# Patient Record
Sex: MALE | Race: BLACK OR AFRICAN AMERICAN | NOT HISPANIC OR LATINO | URBAN - METROPOLITAN AREA
[De-identification: names, ages, dates, MRNs, and addresses within clinical notes are randomized per-mention and may not be internally consistent; named-entity substitution may affect disease eponyms.]

---

## 2017-06-03 ENCOUNTER — INPATIENT (INPATIENT)
Facility: HOSPITAL | Age: 62
LOS: 0 days | Discharge: HOME | End: 2017-06-04
Attending: EMERGENCY MEDICINE

## 2017-06-03 DIAGNOSIS — E11.9 TYPE 2 DIABETES MELLITUS WITHOUT COMPLICATIONS: ICD-10-CM

## 2017-06-03 DIAGNOSIS — K85.90 ACUTE PANCREATITIS WITHOUT NECROSIS OR INFECTION, UNSPECIFIED: ICD-10-CM

## 2017-06-03 DIAGNOSIS — M19.90 UNSPECIFIED OSTEOARTHRITIS, UNSPECIFIED SITE: ICD-10-CM

## 2017-06-03 DIAGNOSIS — I10 ESSENTIAL (PRIMARY) HYPERTENSION: ICD-10-CM

## 2017-06-03 DIAGNOSIS — R10.9 UNSPECIFIED ABDOMINAL PAIN: ICD-10-CM

## 2017-06-03 DIAGNOSIS — C61 MALIGNANT NEOPLASM OF PROSTATE: ICD-10-CM

## 2017-06-03 DIAGNOSIS — J20.9 ACUTE BRONCHITIS, UNSPECIFIED: ICD-10-CM

## 2017-06-03 DIAGNOSIS — E78.5 HYPERLIPIDEMIA, UNSPECIFIED: ICD-10-CM

## 2017-06-28 DIAGNOSIS — Z96.649 PRESENCE OF UNSPECIFIED ARTIFICIAL HIP JOINT: ICD-10-CM

## 2017-06-28 DIAGNOSIS — E11.9 TYPE 2 DIABETES MELLITUS WITHOUT COMPLICATIONS: ICD-10-CM

## 2017-06-28 DIAGNOSIS — C61 MALIGNANT NEOPLASM OF PROSTATE: ICD-10-CM

## 2017-06-28 DIAGNOSIS — I10 ESSENTIAL (PRIMARY) HYPERTENSION: ICD-10-CM

## 2017-06-28 DIAGNOSIS — R07.9 CHEST PAIN, UNSPECIFIED: ICD-10-CM

## 2017-06-28 DIAGNOSIS — K85.90 ACUTE PANCREATITIS WITHOUT NECROSIS OR INFECTION, UNSPECIFIED: ICD-10-CM

## 2017-06-28 DIAGNOSIS — R07.89 OTHER CHEST PAIN: ICD-10-CM

## 2017-06-28 DIAGNOSIS — E78.5 HYPERLIPIDEMIA, UNSPECIFIED: ICD-10-CM

## 2017-08-25 ENCOUNTER — EMERGENCY (EMERGENCY)
Facility: HOSPITAL | Age: 62
LOS: 0 days | Discharge: HOME | End: 2017-08-25

## 2017-08-25 DIAGNOSIS — K86.1 OTHER CHRONIC PANCREATITIS: ICD-10-CM

## 2017-08-25 DIAGNOSIS — R10.9 UNSPECIFIED ABDOMINAL PAIN: ICD-10-CM

## 2017-08-25 DIAGNOSIS — R10.13 EPIGASTRIC PAIN: ICD-10-CM

## 2017-08-25 DIAGNOSIS — C61 MALIGNANT NEOPLASM OF PROSTATE: ICD-10-CM

## 2017-08-25 DIAGNOSIS — Z79.82 LONG TERM (CURRENT) USE OF ASPIRIN: ICD-10-CM

## 2017-08-25 DIAGNOSIS — Z79.84 LONG TERM (CURRENT) USE OF ORAL HYPOGLYCEMIC DRUGS: ICD-10-CM

## 2017-08-25 DIAGNOSIS — I10 ESSENTIAL (PRIMARY) HYPERTENSION: ICD-10-CM

## 2017-08-25 DIAGNOSIS — E78.5 HYPERLIPIDEMIA, UNSPECIFIED: ICD-10-CM

## 2017-08-25 DIAGNOSIS — M19.90 UNSPECIFIED OSTEOARTHRITIS, UNSPECIFIED SITE: ICD-10-CM

## 2017-08-25 DIAGNOSIS — E11.9 TYPE 2 DIABETES MELLITUS WITHOUT COMPLICATIONS: ICD-10-CM

## 2017-08-25 DIAGNOSIS — Z79.51 LONG TERM (CURRENT) USE OF INHALED STEROIDS: ICD-10-CM

## 2017-08-25 DIAGNOSIS — J20.9 ACUTE BRONCHITIS, UNSPECIFIED: ICD-10-CM

## 2017-08-25 DIAGNOSIS — K85.90 ACUTE PANCREATITIS WITHOUT NECROSIS OR INFECTION, UNSPECIFIED: ICD-10-CM

## 2017-08-25 DIAGNOSIS — Z79.899 OTHER LONG TERM (CURRENT) DRUG THERAPY: ICD-10-CM

## 2017-09-23 ENCOUNTER — EMERGENCY (EMERGENCY)
Facility: HOSPITAL | Age: 62
LOS: 0 days | Discharge: HOME | End: 2017-09-24

## 2017-09-23 DIAGNOSIS — R10.13 EPIGASTRIC PAIN: ICD-10-CM

## 2017-09-23 DIAGNOSIS — K85.90 ACUTE PANCREATITIS WITHOUT NECROSIS OR INFECTION, UNSPECIFIED: ICD-10-CM

## 2017-09-23 DIAGNOSIS — E78.5 HYPERLIPIDEMIA, UNSPECIFIED: ICD-10-CM

## 2017-09-23 DIAGNOSIS — I10 ESSENTIAL (PRIMARY) HYPERTENSION: ICD-10-CM

## 2017-09-23 DIAGNOSIS — R10.9 UNSPECIFIED ABDOMINAL PAIN: ICD-10-CM

## 2017-09-23 DIAGNOSIS — M19.90 UNSPECIFIED OSTEOARTHRITIS, UNSPECIFIED SITE: ICD-10-CM

## 2017-09-23 DIAGNOSIS — Z90.49 ACQUIRED ABSENCE OF OTHER SPECIFIED PARTS OF DIGESTIVE TRACT: ICD-10-CM

## 2017-09-23 DIAGNOSIS — Z79.51 LONG TERM (CURRENT) USE OF INHALED STEROIDS: ICD-10-CM

## 2017-09-23 DIAGNOSIS — Z79.84 LONG TERM (CURRENT) USE OF ORAL HYPOGLYCEMIC DRUGS: ICD-10-CM

## 2017-09-23 DIAGNOSIS — R10.84 GENERALIZED ABDOMINAL PAIN: ICD-10-CM

## 2017-09-23 DIAGNOSIS — E11.9 TYPE 2 DIABETES MELLITUS WITHOUT COMPLICATIONS: ICD-10-CM

## 2017-09-23 DIAGNOSIS — Z79.899 OTHER LONG TERM (CURRENT) DRUG THERAPY: ICD-10-CM

## 2017-09-23 DIAGNOSIS — Z79.82 LONG TERM (CURRENT) USE OF ASPIRIN: ICD-10-CM

## 2017-09-23 DIAGNOSIS — J20.9 ACUTE BRONCHITIS, UNSPECIFIED: ICD-10-CM

## 2017-09-23 DIAGNOSIS — R11.0 NAUSEA: ICD-10-CM

## 2017-09-23 DIAGNOSIS — R19.7 DIARRHEA, UNSPECIFIED: ICD-10-CM

## 2017-09-23 DIAGNOSIS — C61 MALIGNANT NEOPLASM OF PROSTATE: ICD-10-CM

## 2017-12-23 ENCOUNTER — EMERGENCY (EMERGENCY)
Facility: HOSPITAL | Age: 62
LOS: 0 days | Discharge: HOME | End: 2017-12-23

## 2017-12-23 DIAGNOSIS — E11.9 TYPE 2 DIABETES MELLITUS WITHOUT COMPLICATIONS: ICD-10-CM

## 2017-12-23 DIAGNOSIS — E78.00 PURE HYPERCHOLESTEROLEMIA, UNSPECIFIED: ICD-10-CM

## 2017-12-23 DIAGNOSIS — M19.90 UNSPECIFIED OSTEOARTHRITIS, UNSPECIFIED SITE: ICD-10-CM

## 2017-12-23 DIAGNOSIS — R30.0 DYSURIA: ICD-10-CM

## 2017-12-23 DIAGNOSIS — Z79.82 LONG TERM (CURRENT) USE OF ASPIRIN: ICD-10-CM

## 2017-12-23 DIAGNOSIS — K85.90 ACUTE PANCREATITIS WITHOUT NECROSIS OR INFECTION, UNSPECIFIED: ICD-10-CM

## 2017-12-23 DIAGNOSIS — I10 ESSENTIAL (PRIMARY) HYPERTENSION: ICD-10-CM

## 2017-12-23 DIAGNOSIS — E78.5 HYPERLIPIDEMIA, UNSPECIFIED: ICD-10-CM

## 2017-12-23 DIAGNOSIS — J20.9 ACUTE BRONCHITIS, UNSPECIFIED: ICD-10-CM

## 2017-12-23 DIAGNOSIS — R35.0 FREQUENCY OF MICTURITION: ICD-10-CM

## 2017-12-23 DIAGNOSIS — C61 MALIGNANT NEOPLASM OF PROSTATE: ICD-10-CM

## 2017-12-23 DIAGNOSIS — R10.9 UNSPECIFIED ABDOMINAL PAIN: ICD-10-CM

## 2017-12-23 DIAGNOSIS — R39.15 URGENCY OF URINATION: ICD-10-CM

## 2018-02-08 ENCOUNTER — OUTPATIENT (OUTPATIENT)
Dept: OUTPATIENT SERVICES | Facility: HOSPITAL | Age: 63
LOS: 1 days | Discharge: HOME | End: 2018-02-08

## 2018-02-08 DIAGNOSIS — C61 MALIGNANT NEOPLASM OF PROSTATE: ICD-10-CM

## 2018-02-27 ENCOUNTER — APPOINTMENT (OUTPATIENT)
Dept: PODIATRY | Facility: CLINIC | Age: 63
End: 2018-02-27

## 2018-04-04 ENCOUNTER — APPOINTMENT (OUTPATIENT)
Dept: INTERNAL MEDICINE | Facility: CLINIC | Age: 63
End: 2018-04-04

## 2018-04-04 ENCOUNTER — OUTPATIENT (OUTPATIENT)
Dept: OUTPATIENT SERVICES | Facility: HOSPITAL | Age: 63
LOS: 1 days | Discharge: HOME | End: 2018-04-04

## 2018-04-04 VITALS
BODY MASS INDEX: 32.73 KG/M2 | SYSTOLIC BLOOD PRESSURE: 134 MMHG | HEIGHT: 67.5 IN | WEIGHT: 211 LBS | HEART RATE: 74 BPM | DIASTOLIC BLOOD PRESSURE: 81 MMHG

## 2018-04-04 DIAGNOSIS — Z87.19 PERSONAL HISTORY OF OTHER DISEASES OF THE DIGESTIVE SYSTEM: ICD-10-CM

## 2018-04-05 DIAGNOSIS — I10 ESSENTIAL (PRIMARY) HYPERTENSION: ICD-10-CM

## 2018-04-05 DIAGNOSIS — M25.559 PAIN IN UNSPECIFIED HIP: ICD-10-CM

## 2018-04-05 DIAGNOSIS — M54.5 LOW BACK PAIN: ICD-10-CM

## 2018-04-05 DIAGNOSIS — E11.9 TYPE 2 DIABETES MELLITUS WITHOUT COMPLICATIONS: ICD-10-CM

## 2018-04-17 ENCOUNTER — INPATIENT (INPATIENT)
Facility: HOSPITAL | Age: 63
LOS: 2 days | Discharge: HOME | End: 2018-04-20
Attending: INTERNAL MEDICINE | Admitting: INTERNAL MEDICINE

## 2018-04-17 ENCOUNTER — OTHER (OUTPATIENT)
Age: 63
End: 2018-04-17

## 2018-04-17 VITALS
OXYGEN SATURATION: 98 % | DIASTOLIC BLOOD PRESSURE: 77 MMHG | HEART RATE: 82 BPM | TEMPERATURE: 97 F | RESPIRATION RATE: 18 BRPM | SYSTOLIC BLOOD PRESSURE: 137 MMHG

## 2018-04-17 DIAGNOSIS — Z98.890 OTHER SPECIFIED POSTPROCEDURAL STATES: Chronic | ICD-10-CM

## 2018-04-17 DIAGNOSIS — C61 MALIGNANT NEOPLASM OF PROSTATE: Chronic | ICD-10-CM

## 2018-04-17 DIAGNOSIS — Z96.642 PRESENCE OF LEFT ARTIFICIAL HIP JOINT: Chronic | ICD-10-CM

## 2018-04-17 LAB
ALBUMIN SERPL ELPH-MCNC: 4.1 G/DL — SIGNIFICANT CHANGE UP (ref 3.5–5.2)
ALP SERPL-CCNC: 88 U/L — SIGNIFICANT CHANGE UP (ref 30–115)
ALT FLD-CCNC: 15 U/L — SIGNIFICANT CHANGE UP (ref 0–41)
ANION GAP SERPL CALC-SCNC: 11 MMOL/L — SIGNIFICANT CHANGE UP (ref 7–14)
AST SERPL-CCNC: 16 U/L — SIGNIFICANT CHANGE UP (ref 0–41)
BASOPHILS # BLD AUTO: 0.04 K/UL — SIGNIFICANT CHANGE UP (ref 0–0.2)
BASOPHILS NFR BLD AUTO: 0.6 % — SIGNIFICANT CHANGE UP (ref 0–1)
BILIRUB DIRECT SERPL-MCNC: <0.2 MG/DL — SIGNIFICANT CHANGE UP (ref 0–0.2)
BILIRUB INDIRECT FLD-MCNC: >0.2 MG/DL — SIGNIFICANT CHANGE UP (ref 0.2–1.2)
BILIRUB SERPL-MCNC: 0.4 MG/DL — SIGNIFICANT CHANGE UP (ref 0.2–1.2)
BUN SERPL-MCNC: 16 MG/DL — SIGNIFICANT CHANGE UP (ref 10–20)
CALCIUM SERPL-MCNC: 8.8 MG/DL — SIGNIFICANT CHANGE UP (ref 8.5–10.1)
CHLORIDE SERPL-SCNC: 102 MMOL/L — SIGNIFICANT CHANGE UP (ref 98–110)
CK MB CFR SERPL CALC: 2.5 NG/ML — SIGNIFICANT CHANGE UP (ref 0.6–6.3)
CK SERPL-CCNC: 182 U/L — SIGNIFICANT CHANGE UP (ref 0–225)
CO2 SERPL-SCNC: 24 MMOL/L — SIGNIFICANT CHANGE UP (ref 17–32)
CREAT SERPL-MCNC: 0.7 MG/DL — SIGNIFICANT CHANGE UP (ref 0.7–1.5)
EOSINOPHIL # BLD AUTO: 0.31 K/UL — SIGNIFICANT CHANGE UP (ref 0–0.7)
EOSINOPHIL NFR BLD AUTO: 4.5 % — SIGNIFICANT CHANGE UP (ref 0–8)
GLUCOSE SERPL-MCNC: 121 MG/DL — HIGH (ref 70–99)
HCT VFR BLD CALC: 36.5 % — LOW (ref 42–52)
HGB BLD-MCNC: 12.3 G/DL — LOW (ref 14–18)
IMM GRANULOCYTES NFR BLD AUTO: 0.4 % — HIGH (ref 0.1–0.3)
LACTATE SERPL-SCNC: 1.4 MMOL/L — SIGNIFICANT CHANGE UP (ref 0.5–2.2)
LIDOCAIN IGE QN: 18 U/L — SIGNIFICANT CHANGE UP (ref 7–60)
LYMPHOCYTES # BLD AUTO: 1.68 K/UL — SIGNIFICANT CHANGE UP (ref 1.2–3.4)
LYMPHOCYTES # BLD AUTO: 24.3 % — SIGNIFICANT CHANGE UP (ref 20.5–51.1)
MCHC RBC-ENTMCNC: 29.1 PG — SIGNIFICANT CHANGE UP (ref 27–31)
MCHC RBC-ENTMCNC: 33.7 G/DL — SIGNIFICANT CHANGE UP (ref 32–37)
MCV RBC AUTO: 86.5 FL — SIGNIFICANT CHANGE UP (ref 80–94)
MONOCYTES # BLD AUTO: 0.68 K/UL — HIGH (ref 0.1–0.6)
MONOCYTES NFR BLD AUTO: 9.9 % — HIGH (ref 1.7–9.3)
NEUTROPHILS # BLD AUTO: 4.16 K/UL — SIGNIFICANT CHANGE UP (ref 1.4–6.5)
NEUTROPHILS NFR BLD AUTO: 60.3 % — SIGNIFICANT CHANGE UP (ref 42.2–75.2)
PLATELET # BLD AUTO: 213 K/UL — SIGNIFICANT CHANGE UP (ref 130–400)
POTASSIUM SERPL-MCNC: 4.2 MMOL/L — SIGNIFICANT CHANGE UP (ref 3.5–5)
POTASSIUM SERPL-SCNC: 4.2 MMOL/L — SIGNIFICANT CHANGE UP (ref 3.5–5)
PROT SERPL-MCNC: 6.5 G/DL — SIGNIFICANT CHANGE UP (ref 6–8)
RBC # BLD: 4.22 M/UL — LOW (ref 4.7–6.1)
RBC # FLD: 12.7 % — SIGNIFICANT CHANGE UP (ref 11.5–14.5)
SODIUM SERPL-SCNC: 137 MMOL/L — SIGNIFICANT CHANGE UP (ref 135–146)
TROPONIN T SERPL-MCNC: <0.01 NG/ML — SIGNIFICANT CHANGE UP
WBC # BLD: 6.9 K/UL — SIGNIFICANT CHANGE UP (ref 4.8–10.8)
WBC # FLD AUTO: 6.9 K/UL — SIGNIFICANT CHANGE UP (ref 4.8–10.8)

## 2018-04-17 RX ORDER — ATORVASTATIN CALCIUM 80 MG/1
40 TABLET, FILM COATED ORAL AT BEDTIME
Qty: 0 | Refills: 0 | Status: DISCONTINUED | OUTPATIENT
Start: 2018-04-17 | End: 2018-04-20

## 2018-04-17 RX ORDER — AMLODIPINE BESYLATE 2.5 MG/1
5 TABLET ORAL DAILY
Qty: 0 | Refills: 0 | Status: DISCONTINUED | OUTPATIENT
Start: 2018-04-17 | End: 2018-04-20

## 2018-04-17 RX ORDER — SENNA PLUS 8.6 MG/1
2 TABLET ORAL AT BEDTIME
Qty: 0 | Refills: 0 | Status: DISCONTINUED | OUTPATIENT
Start: 2018-04-17 | End: 2018-04-20

## 2018-04-17 RX ORDER — SODIUM CHLORIDE 9 MG/ML
1000 INJECTION, SOLUTION INTRAVENOUS
Qty: 0 | Refills: 0 | Status: DISCONTINUED | OUTPATIENT
Start: 2018-04-17 | End: 2018-04-19

## 2018-04-17 RX ORDER — MORPHINE SULFATE 50 MG/1
4 CAPSULE, EXTENDED RELEASE ORAL ONCE
Qty: 0 | Refills: 0 | Status: DISCONTINUED | OUTPATIENT
Start: 2018-04-17 | End: 2018-04-17

## 2018-04-17 RX ORDER — PANTOPRAZOLE SODIUM 20 MG/1
40 TABLET, DELAYED RELEASE ORAL
Qty: 0 | Refills: 0 | Status: DISCONTINUED | OUTPATIENT
Start: 2018-04-17 | End: 2018-04-18

## 2018-04-17 RX ORDER — HEPARIN SODIUM 5000 [USP'U]/ML
5000 INJECTION INTRAVENOUS; SUBCUTANEOUS EVERY 8 HOURS
Qty: 0 | Refills: 0 | Status: DISCONTINUED | OUTPATIENT
Start: 2018-04-17 | End: 2018-04-18

## 2018-04-17 RX ORDER — MORPHINE SULFATE 50 MG/1
4 CAPSULE, EXTENDED RELEASE ORAL EVERY 4 HOURS
Qty: 0 | Refills: 0 | Status: DISCONTINUED | OUTPATIENT
Start: 2018-04-17 | End: 2018-04-19

## 2018-04-17 RX ORDER — SODIUM CHLORIDE 9 MG/ML
1000 INJECTION, SOLUTION INTRAVENOUS
Qty: 0 | Refills: 0 | Status: DISCONTINUED | OUTPATIENT
Start: 2018-04-17 | End: 2018-04-17

## 2018-04-17 RX ORDER — FAMOTIDINE 10 MG/ML
20 INJECTION INTRAVENOUS ONCE
Qty: 0 | Refills: 0 | Status: COMPLETED | OUTPATIENT
Start: 2018-04-17 | End: 2018-04-17

## 2018-04-17 RX ORDER — SODIUM CHLORIDE 9 MG/ML
1000 INJECTION, SOLUTION INTRAVENOUS ONCE
Qty: 0 | Refills: 0 | Status: COMPLETED | OUTPATIENT
Start: 2018-04-17 | End: 2018-04-17

## 2018-04-17 RX ORDER — DIATRIZOATE MEGLUMINE 180 MG/ML
20 INJECTION, SOLUTION INTRAVESICAL ONCE
Qty: 0 | Refills: 0 | Status: COMPLETED | OUTPATIENT
Start: 2018-04-17 | End: 2018-04-17

## 2018-04-17 RX ORDER — ASPIRIN/CALCIUM CARB/MAGNESIUM 324 MG
81 TABLET ORAL DAILY
Qty: 0 | Refills: 0 | Status: DISCONTINUED | OUTPATIENT
Start: 2018-04-17 | End: 2018-04-20

## 2018-04-17 RX ORDER — DOCUSATE SODIUM 100 MG
100 CAPSULE ORAL
Qty: 0 | Refills: 0 | Status: DISCONTINUED | OUTPATIENT
Start: 2018-04-17 | End: 2018-04-20

## 2018-04-17 RX ORDER — ATORVASTATIN CALCIUM 80 MG/1
1 TABLET, FILM COATED ORAL
Qty: 0 | Refills: 0 | COMMUNITY

## 2018-04-17 RX ORDER — ONDANSETRON 8 MG/1
4 TABLET, FILM COATED ORAL ONCE
Qty: 0 | Refills: 0 | Status: COMPLETED | OUTPATIENT
Start: 2018-04-17 | End: 2018-04-17

## 2018-04-17 RX ORDER — ACETAMINOPHEN 500 MG
650 TABLET ORAL EVERY 6 HOURS
Qty: 0 | Refills: 0 | Status: DISCONTINUED | OUTPATIENT
Start: 2018-04-17 | End: 2018-04-20

## 2018-04-17 RX ORDER — TAMSULOSIN HYDROCHLORIDE 0.4 MG/1
0.4 CAPSULE ORAL AT BEDTIME
Qty: 0 | Refills: 0 | Status: DISCONTINUED | OUTPATIENT
Start: 2018-04-17 | End: 2018-04-20

## 2018-04-17 RX ADMIN — Medication 30 MILLILITER(S): at 03:56

## 2018-04-17 RX ADMIN — ATORVASTATIN CALCIUM 40 MILLIGRAM(S): 80 TABLET, FILM COATED ORAL at 21:32

## 2018-04-17 RX ADMIN — SODIUM CHLORIDE 200 MILLILITER(S): 9 INJECTION, SOLUTION INTRAVENOUS at 12:28

## 2018-04-17 RX ADMIN — FAMOTIDINE 20 MILLIGRAM(S): 10 INJECTION INTRAVENOUS at 03:56

## 2018-04-17 RX ADMIN — SODIUM CHLORIDE 2000 MILLILITER(S): 9 INJECTION, SOLUTION INTRAVENOUS at 03:53

## 2018-04-17 RX ADMIN — MORPHINE SULFATE 4 MILLIGRAM(S): 50 CAPSULE, EXTENDED RELEASE ORAL at 21:35

## 2018-04-17 RX ADMIN — MORPHINE SULFATE 4 MILLIGRAM(S): 50 CAPSULE, EXTENDED RELEASE ORAL at 20:05

## 2018-04-17 RX ADMIN — Medication 100 MILLIGRAM(S): at 17:01

## 2018-04-17 RX ADMIN — MORPHINE SULFATE 4 MILLIGRAM(S): 50 CAPSULE, EXTENDED RELEASE ORAL at 11:51

## 2018-04-17 RX ADMIN — DIATRIZOATE MEGLUMINE 20 MILLILITER(S): 180 INJECTION, SOLUTION INTRAVESICAL at 12:09

## 2018-04-17 RX ADMIN — AMLODIPINE BESYLATE 5 MILLIGRAM(S): 2.5 TABLET ORAL at 12:29

## 2018-04-17 RX ADMIN — ONDANSETRON 4 MILLIGRAM(S): 8 TABLET, FILM COATED ORAL at 03:53

## 2018-04-17 RX ADMIN — HEPARIN SODIUM 5000 UNIT(S): 5000 INJECTION INTRAVENOUS; SUBCUTANEOUS at 15:04

## 2018-04-17 RX ADMIN — SENNA PLUS 2 TABLET(S): 8.6 TABLET ORAL at 21:32

## 2018-04-17 RX ADMIN — Medication 81 MILLIGRAM(S): at 11:36

## 2018-04-17 RX ADMIN — MORPHINE SULFATE 4 MILLIGRAM(S): 50 CAPSULE, EXTENDED RELEASE ORAL at 11:36

## 2018-04-17 RX ADMIN — SODIUM CHLORIDE 100 MILLILITER(S): 9 INJECTION, SOLUTION INTRAVENOUS at 16:58

## 2018-04-17 RX ADMIN — MORPHINE SULFATE 4 MILLIGRAM(S): 50 CAPSULE, EXTENDED RELEASE ORAL at 05:51

## 2018-04-17 RX ADMIN — MORPHINE SULFATE 4 MILLIGRAM(S): 50 CAPSULE, EXTENDED RELEASE ORAL at 03:53

## 2018-04-17 RX ADMIN — TAMSULOSIN HYDROCHLORIDE 0.4 MILLIGRAM(S): 0.4 CAPSULE ORAL at 21:32

## 2018-04-17 RX ADMIN — HEPARIN SODIUM 5000 UNIT(S): 5000 INJECTION INTRAVENOUS; SUBCUTANEOUS at 21:32

## 2018-04-17 NOTE — H&P ADULT - HISTORY OF PRESENT ILLNESS
62 yom, PMH of DM II, HTN, prostate cancer s/p seeding, hyperlipidemia, osteoarthritis, several episodes of acute pancreatitis (last in December 2015) presents with several days of abdominal pain, greatest in the LUQ and RUQ, radiating to the back, increased with food intake.  No nausea/vomiting/diarrhea/constipation.  Patient states that the pain feels exactly like his previous episodes of pancreatitis.  Patient is unsure why he has recurrent episodes of pancreatitis.  He states that he does not drink alcohol at all and has no history of gallstones, but he does eat a high-fat diet.  Patient was recently seen by his PMD for a routine office visit and told to follow up in gastroenterology clinic, but has not yet made the appointment.  In the ED, patient received morphine, famotidine, zofran, and IVF.  Lipase was 18, but patient stated that he did not feel comfortable going home because of the severity of pain.  Abdominal ultrasound was performed, which showed no evidence of cholecystitis or cholelithiasis.  Of note, patient was recently prescribed high-dose ibuprofen for osteoarthritis pain (800 mg q8 hours PRN, but states that he has only taken several pills.  ROS is otherwise unremarkable. 62 yom, PMH of DM II, HTN, prostate cancer s/p seeding, osteoarthritis, hyperlipidemia, and recurrent pancreatitis (Patient states that he has had "several" episodes, last in December 2015.) presents with several days of abdominal pain, greatest in the LUQ and RUQ, radiating to the back, increased with any food intake.  Patient states that the pain feels exactly like his previous pain from pancreatitis.  He is unsure why he has had recurrent episodes of pancreatitis.  He does not consume alcohol and has no history of gallstones, but he does eat a high-fat diet.  Patient states that he has not yet followed up at the gastroenterology clinic.  In the ED, patient received morphine, famotidine, zofran, and LR.  Lipase was 18, and LFTs were within normal limits.  Abdominal ultrasound was performed, which showed no evidence of cholecystitis or cholelithiasis.  Patient declined to be discharged, secondary to the severity of the pain.  ROS negative for fevers, chills, nausea, vomiting, diarrhea, or urinary symptoms.  Of note, patient was recently prescribed ibuprofen 800 mg q8 hours PRN for osteoarthritis pain, but states that he has only taken several pills.  ROS is otherwise unremarkable. 61 yo man, PMH of DM II, HTN, prostate cancer s/p seeding (in remission, last PSA low 1 mo ago), osteoarthritis, hyperlipidemia, and hx pancreatitis (Patient states that he has had "several" episodes, last in December 2015.) Presents with several days of abdominal pain, greatest in the LUQ and RUQ, radiating to the back, increased with any food intake.  Patient states that the pain feels exactly like his previous pain from pancreatitis.  He is unsure why he has had recurrent episodes of pancreatitis.  He does not consume alcohol and has no history of gallstones, but he does eat a high-fat diet.  Patient states that he has not yet followed up at the gastroenterology clinic.  Pt says that he was completely fine until he ate multiple burgers from [employer].  24 hrs later he was sitting down to eat a dinner that he made (Spaghetti, meatballs and sausage) and he felt horrific pain after the first bite.  So, he felt well enough to make such a meal, but then got sick after the first bite.    Also, the last time I had him, he asked to borrow $5.00 from me.  I gave it to him and about a week later, he paid me back.  This was a few years ago.  Today, he asked to borrow $20.00, which I again gave to him.  He said he will pay me back May 1, when he gets paid again (he works as a  at [employer]).       In the ED, patient received morphine, famotidine, zofran, and LR.  Lipase was 18, and LFTs were within normal limits.  Abdominal ultrasound was performed, which showed no evidence of cholecystitis or cholelithiasis.  Patient declined to be discharged, secondary to the severity of the pain.  ROS negative for fevers, chills, nausea, vomiting, diarrhea, or urinary symptoms.  Of note, patient was recently prescribed ibuprofen 800 mg q8 hours PRN for osteoarthritis pain, but states that he has only taken several pills.  ROS is otherwise unremarkable.    Today, he feels a little better than last night, but still c/o pain.  He also says he is hungry.  He did his CT abd/pel.

## 2018-04-17 NOTE — ED PROVIDER NOTE - PHYSICAL EXAMINATION
CONSTITUTIONAL: Well-developed; well-nourished; in no acute distress.   SKIN: warm, dry  HEAD: Normocephalic; atraumatic.  EYES: no conj injection  ENT: No nasal discharge; airway clear.  NECK: Supple; non tender.  CARD: S1, S2 normal; no murmurs, gallops, or rubs. Regular rate and rhythm.   RESP: No wheezes, rales or rhonchi.  ABD: tender to palpation in epigastrium , no rebound or guarding, soft  EXT: Normal ROM.  No clubbing, cyanosis or edema.   LYMPH: No acute cervical adenopathy.  NEURO: Alert, oriented, grossly unremarkable  PSYCH: Cooperative, appropriate.

## 2018-04-17 NOTE — H&P ADULT - ASSESSMENT
62 yom, PMH of DM II, HTN, prostate cancer s/p seeding, osteoarthritis, hyperlipidemia, and several episodes of recurrent pancreatitis (unclear etiology) presents with several days of abdominal pain radiating to back, increasing with food intake, which patient describes as identical to prior pain from pancreatitis.  Lipase and LFTs WNL.  ROS otherwise unremarkable.  1. Abdominal pain-top differential diagnosis is acute on chronic pancreatitis.  Lipase could be normal in this setting.  Check CT abdomen and pelvis with contrast.  Pain control with bowel regimen.  Will start high rate of LR for now and check lipid profile for hypertriglyceridemia.  IgG subclass testing was performed in 11/2015 and was within reference range.  If CT findings not consistent with pancreatitis, can decrease or stop LR.  Other potential diagnoses include peptic ulcer disease and GERD.  Hold ibuprofen and continue PPI.  Consider gastroenterology consult, depending on results of CT abdomen and pelvis.  2. HTN-continue amlodipine, flomax  3. HLD-continue atorvastatin, check lipid profile  4. Osteoarthritis-change ibuprofen to tylenol for now.  Patient is receiving morphine for abdominal pain.  5. Keep patient NPO except medications.  6. DVT prophylaxis-heparin subq 62 yom, PMH of DM II, HTN, prostate cancer s/p seeding, osteoarthritis, hyperlipidemia, and several episodes of recurrent pancreatitis (unclear etiology) presents with several days of abdominal pain radiating to back, increasing with food intake, which patient describes as identical to prior pain from pancreatitis.  Lipase and LFTs WNL.  ROS otherwise unremarkable.  1. Abdominal pain-top differential diagnosis is acute on chronic pancreatitis.  Lipase could be normal in this setting.  Check CT abdomen and pelvis with contrast.  Keep patient NPO except medications.  Pain control with bowel regimen.  Will start high rate of LR for now and check lipid profile for hypertriglyceridemia.  IgG subclass testing was performed in 11/2015 and was within reference range.  If CT findings not consistent with pancreatitis, can decrease or stop LR.  Other potential diagnoses include peptic ulcer disease and GERD.  Hold ibuprofen and continue PPI.  Consider gastroenterology consult, depending on results of CT abdomen and pelvis.  2. DM II-check hemoglobin a1c.  Hold metformin.  Check fingersticks every 6 hours while patient is NPO.  3 HTN-continue amlodipine, flomax  4. HLD-continue atorvastatin, check lipid profile  5. Osteoarthritis-change ibuprofen to tylenol for now.  Patient is receiving morphine for abdominal pain.  6. Keep patient NPO except medications.  Re-evaluate after results of CT abdomen/pelvis available.  7. DVT prophylaxis-heparin subq 62 yom, PMH of DM II, HTN, prostate cancer s/p seeding, osteoarthritis, hyperlipidemia, and several episodes of recurrent pancreatitis (unclear etiology) presents with several days of abdominal pain radiating to back, increasing with food intake, which patient describes as identical to prior pain from pancreatitis.  Lipase and LFTs WNL.  ROS otherwise unremarkable. CT A/P nl.    1. Abdominal pain-likely secondary to gastroenteritis after eating out, w/ pain only - no nausea, no vomiting; pt is constipated x few days, but he does not believe pain is from this  Pancreatitis is ruled out w/ neg enz and neg CT, story does not fit well either.  IgG subclass testing was performed in 11/2015 and was within reference range.  OK to feed pt clear liquids and adv as tolerated  Maintain IVFs, but decrease rate to 100 cc/hr  Pain control with IV pain meds for today.  Check lipid profile for hypertriglyceridemia.    Other potential diagnoses include peptic ulcer disease and GERD.  Hold ibuprofen and continue PPI.  Consider gastroenterology consult, if pain does not go away.    2. DM II-check hemoglobin a1c.  Hold metformin.  Check fingersticks 1-2x/day for now.    3 HTN-continue amlodipine,    4. HLD-continue atorvastatin, check lipid profile    5. Osteoarthritis-change ibuprofen to tylenol for now.  Patient is receiving morphine for abdominal pain.    6.Prostate Ca hx - stable, cont flomax    7. DVT prophylaxis-heparin subq    8. Anticipate d/c home tomorrow

## 2018-04-17 NOTE — H&P ADULT - NSHPPHYSICALEXAM_GEN_ALL_CORE
Vital signs: 96.3 140/66 65 18 100% RA    General: NAD, lying comfortably in bed, non-toxic appearing  HEENT: NC/AT  Heart: +S1, S2, RRR  Lungs: CTA B/L  Abdomen: soft, ND, +BS, +diffuse tenderness to palpation, greatest in RUQ and LUQ, no rebound, voluntary guarding  Neuro: AAO x 4 Vital signs: 96.3 140/66 65 18 100% RA    General: NAD, lying comfortably in bed, non-toxic appearing  HEENT: NC/AT  Heart: +S1, S2, RRR  Lungs: CTA B/L  Abdomen: soft, ND, +BS, +diffuse tenderness to palpation, greatest in RUQ and LUQ, no rebound, no voluntary guarding  Neuro: AAO x 4

## 2018-04-17 NOTE — H&P ADULT - PSH
History of appendectomy    Prostate cancer  s/p history of seeding  S/P hip replacement, left  9/2015

## 2018-04-17 NOTE — H&P ADULT - NSHPLABSRESULTS_GEN_ALL_CORE
LABS:                         12.3   6.90  )-----------( 213      ( 17 Apr 2018 03:06 )             36.5     (hemoglobin at baseline)    04-17    137  |  102  |  16  ----------------------------<  121<H>  4.2   |  24  |  0.7    Ca    8.8      17 Apr 2018 03:06    TPro  6.5  /  Alb  4.1  /  TBili  0.4  /  DBili  <0.2  /  AST  16  /  ALT  15  /  AlkPhos  88  04-17    Lactate, Blood: 1.4 mmol/L (04-17 @ 03:06)    RADIOLOGY, EKG & ADDITIONAL TESTS: Reviewed. LABS:                         12.3   6.90  )-----------( 213      ( 17 Apr 2018 03:06 )             36.5     (hemoglobin at baseline)    04-17    137  |  102  |  16  ----------------------------<  121<H>  4.2   |  24  |  0.7    Ca    8.8      17 Apr 2018 03:06    TPro  6.5  /  Alb  4.1  /  TBili  0.4  /  DBili  <0.2  /  AST  16  /  ALT  15  /  AlkPhos  88  04-17    Lactate, Blood: 1.4 mmol/L (04-17 @ 03:06)    RADIOLOGY, EKG & ADDITIONAL TESTS: Reviewed.  < from: CT Abdomen and Pelvis w/ Oral Cont and w/ IV Cont (04.17.18 @ 14:40) >    IMPRESSION:    No CT evidence for acute intra-abdominal pathology.    < end of copied text >

## 2018-04-17 NOTE — H&P ADULT - PMH
Diabetes mellitus, type II    Hyperlipidemia    Hypertension    Osteoarthritis    Pancreatitis  recurrent  Prostate cancer  s/p history of seeding

## 2018-04-17 NOTE — ED PROVIDER NOTE - NS ED ROS FT
Eyes:  No visual changes, eye pain or discharge.  ENMT:  No hearing changes, pain, discharge or infections. No neck pain or stiffness.  Cardiac:  No chest pain with exertion.  Respiratory:  No cough or respiratory distress. No hemoptysis. No history of asthma or RAD.  GI:  see hpi  :  No dysuria, frequency or burning.  MS:  No back pain.  Neuro:  No headache  Skin:  No skin rash.   Endocrine: No history of thyroid disease

## 2018-04-17 NOTE — ED PROVIDER NOTE - ATTENDING CONTRIBUTION TO CARE
acute on chronic pancreatitis, abd benign.  doubt pseudocyst. plan is fluids, analgesia, labs and reassess.

## 2018-04-18 LAB
ALBUMIN SERPL ELPH-MCNC: 4.2 G/DL — SIGNIFICANT CHANGE UP (ref 3.5–5.2)
ALP SERPL-CCNC: 78 U/L — SIGNIFICANT CHANGE UP (ref 30–115)
ALT FLD-CCNC: 14 U/L — SIGNIFICANT CHANGE UP (ref 0–41)
ANION GAP SERPL CALC-SCNC: 12 MMOL/L — SIGNIFICANT CHANGE UP (ref 7–14)
AST SERPL-CCNC: 14 U/L — SIGNIFICANT CHANGE UP (ref 0–41)
BASOPHILS # BLD AUTO: 0.05 K/UL — SIGNIFICANT CHANGE UP (ref 0–0.2)
BASOPHILS NFR BLD AUTO: 0.9 % — SIGNIFICANT CHANGE UP (ref 0–1)
BILIRUB SERPL-MCNC: 0.9 MG/DL — SIGNIFICANT CHANGE UP (ref 0.2–1.2)
BUN SERPL-MCNC: 9 MG/DL — LOW (ref 10–20)
CALCIUM SERPL-MCNC: 9 MG/DL — SIGNIFICANT CHANGE UP (ref 8.5–10.1)
CHLORIDE SERPL-SCNC: 99 MMOL/L — SIGNIFICANT CHANGE UP (ref 98–110)
CHOLEST SERPL-MCNC: 146 MG/DL — SIGNIFICANT CHANGE UP (ref 100–200)
CO2 SERPL-SCNC: 29 MMOL/L — SIGNIFICANT CHANGE UP (ref 17–32)
CREAT SERPL-MCNC: 0.6 MG/DL — LOW (ref 0.7–1.5)
EOSINOPHIL # BLD AUTO: 0.22 K/UL — SIGNIFICANT CHANGE UP (ref 0–0.7)
EOSINOPHIL NFR BLD AUTO: 3.7 % — SIGNIFICANT CHANGE UP (ref 0–8)
ESTIMATED AVERAGE GLUCOSE: 160 MG/DL — HIGH (ref 68–114)
GLUCOSE SERPL-MCNC: 102 MG/DL — HIGH (ref 70–99)
HBA1C BLD-MCNC: 7.2 % — HIGH (ref 4–5.6)
HCT VFR BLD CALC: 37.6 % — LOW (ref 42–52)
HDLC SERPL-MCNC: 44 MG/DL — SIGNIFICANT CHANGE UP (ref 40–125)
HGB BLD-MCNC: 12.6 G/DL — LOW (ref 14–18)
IMM GRANULOCYTES NFR BLD AUTO: 0.3 % — SIGNIFICANT CHANGE UP (ref 0.1–0.3)
LIPID PNL WITH DIRECT LDL SERPL: 90 MG/DL — SIGNIFICANT CHANGE UP (ref 4–129)
LYMPHOCYTES # BLD AUTO: 1.19 K/UL — LOW (ref 1.2–3.4)
LYMPHOCYTES # BLD AUTO: 20.2 % — LOW (ref 20.5–51.1)
MAGNESIUM SERPL-MCNC: 2.2 MG/DL — SIGNIFICANT CHANGE UP (ref 1.8–2.4)
MCHC RBC-ENTMCNC: 29 PG — SIGNIFICANT CHANGE UP (ref 27–31)
MCHC RBC-ENTMCNC: 33.5 G/DL — SIGNIFICANT CHANGE UP (ref 32–37)
MCV RBC AUTO: 86.6 FL — SIGNIFICANT CHANGE UP (ref 80–94)
MONOCYTES # BLD AUTO: 0.52 K/UL — SIGNIFICANT CHANGE UP (ref 0.1–0.6)
MONOCYTES NFR BLD AUTO: 8.8 % — SIGNIFICANT CHANGE UP (ref 1.7–9.3)
NEUTROPHILS # BLD AUTO: 3.88 K/UL — SIGNIFICANT CHANGE UP (ref 1.4–6.5)
NEUTROPHILS NFR BLD AUTO: 66.1 % — SIGNIFICANT CHANGE UP (ref 42.2–75.2)
PLATELET # BLD AUTO: 203 K/UL — SIGNIFICANT CHANGE UP (ref 130–400)
POTASSIUM SERPL-MCNC: 4.3 MMOL/L — SIGNIFICANT CHANGE UP (ref 3.5–5)
POTASSIUM SERPL-SCNC: 4.3 MMOL/L — SIGNIFICANT CHANGE UP (ref 3.5–5)
PROT SERPL-MCNC: 6.7 G/DL — SIGNIFICANT CHANGE UP (ref 6–8)
RBC # BLD: 4.34 M/UL — LOW (ref 4.7–6.1)
RBC # FLD: 12.5 % — SIGNIFICANT CHANGE UP (ref 11.5–14.5)
SODIUM SERPL-SCNC: 140 MMOL/L — SIGNIFICANT CHANGE UP (ref 135–146)
TOTAL CHOLESTEROL/HDL RATIO MEASUREMENT: 3.3 RATIO — LOW (ref 4–5.5)
TRIGL SERPL-MCNC: 103 MG/DL — SIGNIFICANT CHANGE UP (ref 10–149)
TYPE + AB SCN PNL BLD: SIGNIFICANT CHANGE UP
WBC # BLD: 5.88 K/UL — SIGNIFICANT CHANGE UP (ref 4.8–10.8)
WBC # FLD AUTO: 5.88 K/UL — SIGNIFICANT CHANGE UP (ref 4.8–10.8)

## 2018-04-18 RX ORDER — PANTOPRAZOLE SODIUM 20 MG/1
40 TABLET, DELAYED RELEASE ORAL EVERY 12 HOURS
Qty: 0 | Refills: 0 | Status: DISCONTINUED | OUTPATIENT
Start: 2018-04-18 | End: 2018-04-20

## 2018-04-18 RX ADMIN — TAMSULOSIN HYDROCHLORIDE 0.4 MILLIGRAM(S): 0.4 CAPSULE ORAL at 21:07

## 2018-04-18 RX ADMIN — HEPARIN SODIUM 5000 UNIT(S): 5000 INJECTION INTRAVENOUS; SUBCUTANEOUS at 14:36

## 2018-04-18 RX ADMIN — SENNA PLUS 2 TABLET(S): 8.6 TABLET ORAL at 21:07

## 2018-04-18 RX ADMIN — MORPHINE SULFATE 4 MILLIGRAM(S): 50 CAPSULE, EXTENDED RELEASE ORAL at 10:47

## 2018-04-18 RX ADMIN — MORPHINE SULFATE 4 MILLIGRAM(S): 50 CAPSULE, EXTENDED RELEASE ORAL at 14:50

## 2018-04-18 RX ADMIN — MORPHINE SULFATE 4 MILLIGRAM(S): 50 CAPSULE, EXTENDED RELEASE ORAL at 22:41

## 2018-04-18 RX ADMIN — MORPHINE SULFATE 4 MILLIGRAM(S): 50 CAPSULE, EXTENDED RELEASE ORAL at 23:50

## 2018-04-18 RX ADMIN — AMLODIPINE BESYLATE 5 MILLIGRAM(S): 2.5 TABLET ORAL at 05:43

## 2018-04-18 RX ADMIN — Medication 100 MILLIGRAM(S): at 05:43

## 2018-04-18 RX ADMIN — MORPHINE SULFATE 4 MILLIGRAM(S): 50 CAPSULE, EXTENDED RELEASE ORAL at 11:09

## 2018-04-18 RX ADMIN — ATORVASTATIN CALCIUM 40 MILLIGRAM(S): 80 TABLET, FILM COATED ORAL at 21:07

## 2018-04-18 RX ADMIN — Medication 81 MILLIGRAM(S): at 10:48

## 2018-04-18 RX ADMIN — HEPARIN SODIUM 5000 UNIT(S): 5000 INJECTION INTRAVENOUS; SUBCUTANEOUS at 05:43

## 2018-04-18 RX ADMIN — PANTOPRAZOLE SODIUM 40 MILLIGRAM(S): 20 TABLET, DELAYED RELEASE ORAL at 17:26

## 2018-04-18 RX ADMIN — PANTOPRAZOLE SODIUM 40 MILLIGRAM(S): 20 TABLET, DELAYED RELEASE ORAL at 05:43

## 2018-04-18 RX ADMIN — MORPHINE SULFATE 4 MILLIGRAM(S): 50 CAPSULE, EXTENDED RELEASE ORAL at 18:26

## 2018-04-18 RX ADMIN — Medication 100 MILLIGRAM(S): at 17:25

## 2018-04-18 RX ADMIN — MORPHINE SULFATE 4 MILLIGRAM(S): 50 CAPSULE, EXTENDED RELEASE ORAL at 14:33

## 2018-04-18 RX ADMIN — MORPHINE SULFATE 4 MILLIGRAM(S): 50 CAPSULE, EXTENDED RELEASE ORAL at 01:35

## 2018-04-18 RX ADMIN — SODIUM CHLORIDE 100 MILLILITER(S): 9 INJECTION, SOLUTION INTRAVENOUS at 05:46

## 2018-04-18 NOTE — CONSULT NOTE ADULT - SUBJECTIVE AND OBJECTIVE BOX
Pt is a 63 yo man, PMH of DM II, HTN, prostate cancer s/p seeding (in remission, last PSA low 1 mo ago), osteoarthritis, hyperlipidemia, and hx pancreatitis presented to the ED with abdominal pain. Pt reports eating 2 Mchamburgers, 1 Mcchicken, and french fries on Friday. He reports waking up the next morning with severe abdominal pain. The worst pain is in the umbilical area, LUQ, and LLQ. States pain radiates to the back and is increased when he eats.  Pt took ibuprofen for this pain, but it did not help. Pt reports this pain feeling similar to his several episodes of pancreatitis. Last episode-December 2015.Consumes alcohol only on social occasions and has no history of gallstones. States he does eat a high-fat diet. Pt admits to taking 2 pills of ibuprofen (1600 mg) about 4-6x/mo for his hip pain. Last bowel movement was 4 days ago (usually has a bowel movement every 2 days). Denies nausea, vomiting, diarrhea, fever.  Pt was seen resting comfortably in bed. Pt is c/o of pain, although he said it has improved. Pt states he can't tolerate food well because it causes abdominal pain.     Past Medical History:  Diabetes mellitus, type II    Hyperlipidemia    Hypertension    Osteoarthritis    Pancreatitis,  recurrent  Prostate cancer  s/p history of seeding.     Past Surgical History:  History of appendectomy    Prostate cancer  s/p history of seeding  S/P hip replacement, left  9/2015.     Family History:  No pertinent family history in first degree relatives.     Social History:  Quit smoking at age of 50.  Drinks ETOH on rare occasion. Regularly uses marijuana.    Allergies:  No known allergies    Medications   acetaminophen   Tablet 650 milliGRAM(s) Oral every 6 hours PRN  amLODIPine   Tablet 5 milliGRAM(s) Oral daily  aspirin  chewable 81 milliGRAM(s) Oral daily  atorvastatin 40 milliGRAM(s) Oral at bedtime  docusate sodium 100 milliGRAM(s) Oral two times a day  heparin  Injectable 5000 Unit(s) SubCutaneous every 8 hours  lactated ringers. 1000 milliLiter(s) IV Continuous <Continuous>  morphine  - Injectable 4 milliGRAM(s) IV Push every 4 hours PRN  pantoprazole    Tablet 40 milliGRAM(s) Oral every 12 hours  senna 2 Tablet(s) Oral at bedtime  tamsulosin 0.4 milliGRAM(s) Oral at bedtime      Vitals  T(F): 97 (04-18-18 @ 05:26), Max: 97.4 (04-17-18 @ 21:05)  HR: 71 (04-18-18 @ 05:26) (60 - 71)  BP: 122/85 (04-18-18 @ 05:26) (117/59 - 133/77)  RR: 18 (04-18-18 @ 05:26) (18 - 18)  SpO2: 98% (04-18-18 @ 08:53) (96% - 98%)    PE  General: NAD, lying comfortably in bed, non-toxic appearing  Heart: +S1, S2, RRR  Lungs: CTA B/L  Abdomen: soft, ND, +BS, +diffuse tenderness to palpation in umbilical area, LLQ , no voluntary guarding  Neuro: AAO x 4    LABS:                        12.6    (    86.6   5.88  )-----------( ---------      203      ( 18 Apr 2018 05:36 )             37.6    (    12.5     140   (   99   (   102      04-18-18 @ 05:36  ----------------------               4.3   (   29   (   9                             -----                        0.6  Ca  9.0   Mg  2.2    P   --     LFT  6.7  (  0.9  (  14       04-18-18 @ 05:36  -------------------------  4.2  (  78  (  14    Cholesterol, serum: 146  Triglycerides, serum: 104  HDL Cholesterol, serum: 44  Direct LDL: 90    Lactate: 1.4    Lipase, serum: 18      CARDIAC MARKERS ( 17 Apr 2018 03:06 )  x     / <0.01 ng/mL / 182 U/L / x     / 2.5 ng/mL    RADIOLOGY & ADDITIONAL TESTS:  CT Abdomen and Pelvis w/ Oral Cont and w/ IV Cont: No CT evidence for acute intra-abdominal pathology.  Xray of Chest: No radiographic evidence of acute cardiopulmonary disease  US Abdomen: No evidence of acute cholecystitis or cholelithiasis Pt is a 63 yo man, PMH of DM II, HTN, prostate cancer s/p seeding (in remission, last PSA low 1 mo ago), osteoarthritis, hyperlipidemia, and hx pancreatitis presented to the ED with abdominal pain. Pt reports eating 2 Mchamburgers, 1 Mcchicken, and french fries on Friday. He reports waking up the next morning with severe abdominal pain. The worst pain is in the umbilical area, LUQ, and LLQ. States pain radiates to the back and is increased when he eats.  Pt took ibuprofen for this pain, but it did not help. Pt reports this pain feeling similar to his several episodes of pancreatitis. Last episode-December 2015.Consumes alcohol only on social occasions and has no history of gallstones. States he does eat a high-fat diet. Pt admits to taking 2 pills of ibuprofen (1600 mg) about 4-6x/mo for his hip pain. Last bowel movement was 4 days ago (usually has a bowel movement every 2 days). Denies nausea, vomiting, diarrhea, fever.  Pt was seen resting comfortably in bed. Pt is c/o of pain, although he said it has improved. Pt states he can't tolerate food well because it causes abdominal pain.     Last EGD: 2012 Gastritis, duodenitis. Miguel Ángel  Last colonoscopy: 2011 single diminutive polyp descending colon removed- ext/int hemorrhoids. Miguel Ángel.      Past Medical History:  Diabetes mellitus, type II    Hyperlipidemia    Hypertension    Osteoarthritis    Pancreatitis,  recurrent  Prostate cancer  s/p history of seeding.     Past Surgical History:  History of appendectomy    Prostate cancer  s/p history of seeding  S/P hip replacement, left  9/2015.     Family History:  No pertinent family history in first degree relatives.     Social History:  Quit smoking at age of 50.  Drinks ETOH on rare occasion. Regularly uses marijuana.    Allergies:  No known allergies    Medications   acetaminophen   Tablet 650 milliGRAM(s) Oral every 6 hours PRN  amLODIPine   Tablet 5 milliGRAM(s) Oral daily  aspirin  chewable 81 milliGRAM(s) Oral daily  atorvastatin 40 milliGRAM(s) Oral at bedtime  docusate sodium 100 milliGRAM(s) Oral two times a day  heparin  Injectable 5000 Unit(s) SubCutaneous every 8 hours  lactated ringers. 1000 milliLiter(s) IV Continuous <Continuous>  morphine  - Injectable 4 milliGRAM(s) IV Push every 4 hours PRN  pantoprazole    Tablet 40 milliGRAM(s) Oral every 12 hours  senna 2 Tablet(s) Oral at bedtime  tamsulosin 0.4 milliGRAM(s) Oral at bedtime      Vitals  T(F): 97 (04-18-18 @ 05:26), Max: 97.4 (04-17-18 @ 21:05)  HR: 71 (04-18-18 @ 05:26) (60 - 71)  BP: 122/85 (04-18-18 @ 05:26) (117/59 - 133/77)  RR: 18 (04-18-18 @ 05:26) (18 - 18)  SpO2: 98% (04-18-18 @ 08:53) (96% - 98%)    PE  General: NAD, lying comfortably in bed, non-toxic appearing  Heart: +S1, S2, RRR  Lungs: CTA B/L  Abdomen: soft, ND, +BS, +diffuse tenderness to palpation in umbilical area, LLQ , no voluntary guarding  Neuro: AAO x 4    LABS:                        12.6    (    86.6   5.88  )-----------( ---------      203      ( 18 Apr 2018 05:36 )             37.6    (    12.5     140   (   99   (   102      04-18-18 @ 05:36  ----------------------               4.3   (   29   (   9                             -----                        0.6  Ca  9.0   Mg  2.2    P   --     LFT  6.7  (  0.9  (  14       04-18-18 @ 05:36  -------------------------  4.2  (  78  (  14    Cholesterol, serum: 146  Triglycerides, serum: 104  HDL Cholesterol, serum: 44  Direct LDL: 90    Lactate: 1.4    Lipase, serum: 18      CARDIAC MARKERS ( 17 Apr 2018 03:06 )  x     / <0.01 ng/mL / 182 U/L / x     / 2.5 ng/mL    RADIOLOGY & ADDITIONAL TESTS:  CT Abdomen and Pelvis w/ Oral Cont and w/ IV Cont: No CT evidence for acute intra-abdominal pathology.  Xray of Chest: No radiographic evidence of acute cardiopulmonary disease  US Abdomen: No evidence of acute cholecystitis or cholelithiasis

## 2018-04-18 NOTE — CONSULT NOTE ADULT - ATTENDING COMMENTS
Pt seen and examined with Dr Chilo Florez.  Pt reports taking HD NSAID and ASA and presented with abdominal pain which he states to me is predominantly in the epigastrium and LUQ.  He also states that he is constipated.  I recommend egd tomorrow to rule out NSAID gastropathy and would recommend miralax BID to get the bowels moving since a component of his abdominal pain may be due to his constipation.

## 2018-04-18 NOTE — CONSULT NOTE ADULT - ASSESSMENT
Pt is a 61 yo man, PMH of DM II, HTN, hyperlipidemia, and hx pancreatitis presented to the ED with abdominal pain. The worst pain is in the umbical area, LUQ, and LLQ, radiates to the back, and is increased when he eats.  Pain began the morning after he ate greasy foods at [employer]. Pt took Ibuprofen for this pain. Pt admits to taking 2 pills of ibuprofen (1600 mg) about 4-6x/mo for his hip pain.  ROS negative for fevers, chills, nausea, vomiting, diarrhea.    In the ED, patient received morphine, famotidine, zofran, and LR.      #Peptic ulcer disease vs. gastritis  - Abdominal US- no evidence of cholecystitis or cholelithiasis.   - CT Abdomen and Pelvis w/ Oral Cont and w/ IV Cont: No CT evidence for acute intra-abdominal pathology  - Lipase: 18  - LFTS: wnl  - EGD  - PPI  - Analgesics Pt is a 63 yo man, PMH of DM II, HTN, hyperlipidemia, and hx pancreatitis presented to the ED with abdominal pain. The worst pain is in the umbical area, LUQ, and LLQ, radiates to the back, and is increased when he eats.  Pain began the morning after he ate greasy foods at [employer]. Pt took Ibuprofen for this pain. Pt admits to taking 2 pills of ibuprofen (1600 mg) about 4-6x/mo for his hip pain.  ROS negative for fevers, chills, nausea, vomiting, diarrhea.    In the ED, patient received morphine, famotidine, zofran, and LR.      #Peptic ulcer disease vs. gastritis  Possible etiologies, NSAID induced vs H.pylori  - Abdominal US- no evidence of cholecystitis or cholelithiasis.   - CT Abdomen and Pelvis w/ Oral Cont and w/ IV Cont: No CT evidence for acute intra-abdominal pathology  - Lipase: 18  - LFTS: wnl  - EGD (NPO after midnight and hold AM AC)  - PPI Pt is a 61 yo man, PMH of DM II, HTN, hyperlipidemia, and hx pancreatitis presented to the ED with abdominal pain. The worst pain is in the umbical area, LUQ, and LLQ, radiates to the back, and is increased when he eats.  Pain began the morning after he ate greasy foods at [employer]. Pt took Ibuprofen for this pain. Pt admits to taking 2 pills of ibuprofen (1600 mg) about 4-6x/mo for his hip pain.  ROS negative for fevers, chills, nausea, vomiting, diarrhea.    In the ED, patient received morphine, famotidine, zofran, and LR.      #Peptic ulcer disease vs. gastritis  Possible etiologies, NSAID induced vs H.pylori  - Abdominal US- no evidence of cholecystitis or cholelithiasis.   - CT Abdomen and Pelvis w/ Oral Cont and w/ IV Cont: No CT evidence for acute intra-abdominal pathology  - Lipase: 18  - LFTS: wnl  - EGD (NPO after midnight and hold AM AC)  - PPI    #Constipation:  - Miralax 17 gram BID  - dulcolax 2tabs x1 now

## 2018-04-19 ENCOUNTER — RESULT REVIEW (OUTPATIENT)
Age: 63
End: 2018-04-19

## 2018-04-19 LAB
ANION GAP SERPL CALC-SCNC: 18 MMOL/L — HIGH (ref 7–14)
APTT BLD: 26.8 SEC — LOW (ref 27–39.2)
BASOPHILS # BLD AUTO: 0.04 K/UL — SIGNIFICANT CHANGE UP (ref 0–0.2)
BASOPHILS NFR BLD AUTO: 0.7 % — SIGNIFICANT CHANGE UP (ref 0–1)
BUN SERPL-MCNC: 8 MG/DL — LOW (ref 10–20)
CALCIUM SERPL-MCNC: 9.4 MG/DL — SIGNIFICANT CHANGE UP (ref 8.5–10.1)
CHLORIDE SERPL-SCNC: 99 MMOL/L — SIGNIFICANT CHANGE UP (ref 98–110)
CO2 SERPL-SCNC: 22 MMOL/L — SIGNIFICANT CHANGE UP (ref 17–32)
CREAT SERPL-MCNC: 0.6 MG/DL — LOW (ref 0.7–1.5)
EOSINOPHIL # BLD AUTO: 0.25 K/UL — SIGNIFICANT CHANGE UP (ref 0–0.7)
EOSINOPHIL NFR BLD AUTO: 4.5 % — SIGNIFICANT CHANGE UP (ref 0–8)
GLUCOSE SERPL-MCNC: 106 MG/DL — HIGH (ref 70–99)
HCT VFR BLD CALC: 39.8 % — LOW (ref 42–52)
HGB BLD-MCNC: 13.5 G/DL — LOW (ref 14–18)
IMM GRANULOCYTES NFR BLD AUTO: 0.4 % — HIGH (ref 0.1–0.3)
INR BLD: 1.2 RATIO — SIGNIFICANT CHANGE UP (ref 0.65–1.3)
LYMPHOCYTES # BLD AUTO: 1.03 K/UL — LOW (ref 1.2–3.4)
LYMPHOCYTES # BLD AUTO: 18.5 % — LOW (ref 20.5–51.1)
MCHC RBC-ENTMCNC: 28.7 PG — SIGNIFICANT CHANGE UP (ref 27–31)
MCHC RBC-ENTMCNC: 33.9 G/DL — SIGNIFICANT CHANGE UP (ref 32–37)
MCV RBC AUTO: 84.5 FL — SIGNIFICANT CHANGE UP (ref 80–94)
MONOCYTES # BLD AUTO: 0.49 K/UL — SIGNIFICANT CHANGE UP (ref 0.1–0.6)
MONOCYTES NFR BLD AUTO: 8.8 % — SIGNIFICANT CHANGE UP (ref 1.7–9.3)
NEUTROPHILS # BLD AUTO: 3.73 K/UL — SIGNIFICANT CHANGE UP (ref 1.4–6.5)
NEUTROPHILS NFR BLD AUTO: 67.1 % — SIGNIFICANT CHANGE UP (ref 42.2–75.2)
NRBC # BLD: 0 /100 WBCS — SIGNIFICANT CHANGE UP (ref 0–0)
PLATELET # BLD AUTO: 210 K/UL — SIGNIFICANT CHANGE UP (ref 130–400)
POTASSIUM SERPL-MCNC: 3.9 MMOL/L — SIGNIFICANT CHANGE UP (ref 3.5–5)
POTASSIUM SERPL-SCNC: 3.9 MMOL/L — SIGNIFICANT CHANGE UP (ref 3.5–5)
PROTHROM AB SERPL-ACNC: 13 SEC — HIGH (ref 9.95–12.87)
RBC # BLD: 4.71 M/UL — SIGNIFICANT CHANGE UP (ref 4.7–6.1)
RBC # FLD: 12.1 % — SIGNIFICANT CHANGE UP (ref 11.5–14.5)
SODIUM SERPL-SCNC: 139 MMOL/L — SIGNIFICANT CHANGE UP (ref 135–146)
WBC # BLD: 5.56 K/UL — SIGNIFICANT CHANGE UP (ref 4.8–10.8)
WBC # FLD AUTO: 5.56 K/UL — SIGNIFICANT CHANGE UP (ref 4.8–10.8)

## 2018-04-19 RX ORDER — SIMETHICONE 80 MG/1
80 TABLET, CHEWABLE ORAL
Qty: 0 | Refills: 0 | Status: DISCONTINUED | OUTPATIENT
Start: 2018-04-19 | End: 2018-04-20

## 2018-04-19 RX ADMIN — MORPHINE SULFATE 4 MILLIGRAM(S): 50 CAPSULE, EXTENDED RELEASE ORAL at 12:04

## 2018-04-19 RX ADMIN — SIMETHICONE 80 MILLIGRAM(S): 80 TABLET, CHEWABLE ORAL at 23:31

## 2018-04-19 RX ADMIN — MORPHINE SULFATE 4 MILLIGRAM(S): 50 CAPSULE, EXTENDED RELEASE ORAL at 12:36

## 2018-04-19 RX ADMIN — Medication 100 MILLIGRAM(S): at 06:01

## 2018-04-19 RX ADMIN — Medication 81 MILLIGRAM(S): at 12:07

## 2018-04-19 RX ADMIN — SIMETHICONE 80 MILLIGRAM(S): 80 TABLET, CHEWABLE ORAL at 14:37

## 2018-04-19 RX ADMIN — SENNA PLUS 2 TABLET(S): 8.6 TABLET ORAL at 21:30

## 2018-04-19 RX ADMIN — ATORVASTATIN CALCIUM 40 MILLIGRAM(S): 80 TABLET, FILM COATED ORAL at 21:30

## 2018-04-19 RX ADMIN — MORPHINE SULFATE 4 MILLIGRAM(S): 50 CAPSULE, EXTENDED RELEASE ORAL at 03:40

## 2018-04-19 RX ADMIN — TAMSULOSIN HYDROCHLORIDE 0.4 MILLIGRAM(S): 0.4 CAPSULE ORAL at 21:30

## 2018-04-19 RX ADMIN — AMLODIPINE BESYLATE 5 MILLIGRAM(S): 2.5 TABLET ORAL at 06:02

## 2018-04-19 RX ADMIN — PANTOPRAZOLE SODIUM 40 MILLIGRAM(S): 20 TABLET, DELAYED RELEASE ORAL at 06:01

## 2018-04-19 RX ADMIN — MORPHINE SULFATE 4 MILLIGRAM(S): 50 CAPSULE, EXTENDED RELEASE ORAL at 04:52

## 2018-04-19 RX ADMIN — PANTOPRAZOLE SODIUM 40 MILLIGRAM(S): 20 TABLET, DELAYED RELEASE ORAL at 17:26

## 2018-04-19 RX ADMIN — Medication 100 MILLIGRAM(S): at 17:26

## 2018-04-19 RX ADMIN — SODIUM CHLORIDE 100 MILLILITER(S): 9 INJECTION, SOLUTION INTRAVENOUS at 04:52

## 2018-04-19 RX ADMIN — SIMETHICONE 80 MILLIGRAM(S): 80 TABLET, CHEWABLE ORAL at 17:27

## 2018-04-19 NOTE — CHART NOTE - NSCHARTNOTEFT_GEN_A_CORE
PACU ANESTHESIA ADMISSION NOTE      Procedure:   Post op diagnosis:      ____  Intubated  TV:______       Rate: ______      FiO2: ______    _x___  Patent Airway    _x___  Full return of protective reflexes    _x___  Full recovery from anesthesia / back to baseline status    Vitals:  T(C): 36.3 (04-19-18 @ 10:32), Max: 36.3 (04-18-18 @ 21:25)  HR: 68 (04-19-18 @ 10:32) (62 - 95)  BP: 123/122 (04-19-18 @ 10:32) (123/82 - 138/75)  RR: 18 (04-19-18 @ 10:32) (18 - 18)  SpO2: --    Mental Status:  _x___ Awake   _____ Alert   _____ Drowsy   _____ Sedated    Nausea/Vomiting:  _x___  NO       ______Yes,   See Post - Op Orders         Pain Scale (0-10):  __0___    Treatment: _x___ None    ____ See Post - Op/PCA Orders    Post - Operative Fluids:   __x__ Oral   ____ See Post - Op Orders    Plan: Discharge:   _x___Home       _____Floor     _____Critical Care    _____  Other:_________________    Comments:  No anesthesia issues or complications noted.  Discharge when criteria met.

## 2018-04-20 ENCOUNTER — TRANSCRIPTION ENCOUNTER (OUTPATIENT)
Age: 63
End: 2018-04-20

## 2018-04-20 VITALS
DIASTOLIC BLOOD PRESSURE: 86 MMHG | SYSTOLIC BLOOD PRESSURE: 125 MMHG | HEART RATE: 75 BPM | RESPIRATION RATE: 18 BRPM | TEMPERATURE: 97 F

## 2018-04-20 LAB
ALBUMIN SERPL ELPH-MCNC: 4.3 G/DL — SIGNIFICANT CHANGE UP (ref 3.5–5.2)
ALP SERPL-CCNC: 81 U/L — SIGNIFICANT CHANGE UP (ref 30–115)
ALT FLD-CCNC: 14 U/L — SIGNIFICANT CHANGE UP (ref 0–41)
ANION GAP SERPL CALC-SCNC: 15 MMOL/L — HIGH (ref 7–14)
AST SERPL-CCNC: 13 U/L — SIGNIFICANT CHANGE UP (ref 0–41)
BASOPHILS # BLD AUTO: 0.03 K/UL — SIGNIFICANT CHANGE UP (ref 0–0.2)
BASOPHILS NFR BLD AUTO: 0.4 % — SIGNIFICANT CHANGE UP (ref 0–1)
BILIRUB SERPL-MCNC: 0.8 MG/DL — SIGNIFICANT CHANGE UP (ref 0.2–1.2)
BUN SERPL-MCNC: 12 MG/DL — SIGNIFICANT CHANGE UP (ref 10–20)
CALCIUM SERPL-MCNC: 8.9 MG/DL — SIGNIFICANT CHANGE UP (ref 8.5–10.1)
CHLORIDE SERPL-SCNC: 97 MMOL/L — LOW (ref 98–110)
CO2 SERPL-SCNC: 26 MMOL/L — SIGNIFICANT CHANGE UP (ref 17–32)
CREAT SERPL-MCNC: 0.7 MG/DL — SIGNIFICANT CHANGE UP (ref 0.7–1.5)
EOSINOPHIL # BLD AUTO: 0.28 K/UL — SIGNIFICANT CHANGE UP (ref 0–0.7)
EOSINOPHIL NFR BLD AUTO: 4.2 % — SIGNIFICANT CHANGE UP (ref 0–8)
GLUCOSE SERPL-MCNC: 136 MG/DL — HIGH (ref 70–99)
HCT VFR BLD CALC: 38 % — LOW (ref 42–52)
HGB BLD-MCNC: 13.1 G/DL — LOW (ref 14–18)
IMM GRANULOCYTES NFR BLD AUTO: 0.3 % — SIGNIFICANT CHANGE UP (ref 0.1–0.3)
LYMPHOCYTES # BLD AUTO: 1.13 K/UL — LOW (ref 1.2–3.4)
LYMPHOCYTES # BLD AUTO: 16.8 % — LOW (ref 20.5–51.1)
MCHC RBC-ENTMCNC: 29.3 PG — SIGNIFICANT CHANGE UP (ref 27–31)
MCHC RBC-ENTMCNC: 34.5 G/DL — SIGNIFICANT CHANGE UP (ref 32–37)
MCV RBC AUTO: 85 FL — SIGNIFICANT CHANGE UP (ref 80–94)
MONOCYTES # BLD AUTO: 0.82 K/UL — HIGH (ref 0.1–0.6)
MONOCYTES NFR BLD AUTO: 12.2 % — HIGH (ref 1.7–9.3)
NEUTROPHILS # BLD AUTO: 4.46 K/UL — SIGNIFICANT CHANGE UP (ref 1.4–6.5)
NEUTROPHILS NFR BLD AUTO: 66.1 % — SIGNIFICANT CHANGE UP (ref 42.2–75.2)
NRBC # BLD: 0 /100 WBCS — SIGNIFICANT CHANGE UP (ref 0–0)
PLATELET # BLD AUTO: 217 K/UL — SIGNIFICANT CHANGE UP (ref 130–400)
POTASSIUM SERPL-MCNC: 4.3 MMOL/L — SIGNIFICANT CHANGE UP (ref 3.5–5)
POTASSIUM SERPL-SCNC: 4.3 MMOL/L — SIGNIFICANT CHANGE UP (ref 3.5–5)
PROT SERPL-MCNC: 6.9 G/DL — SIGNIFICANT CHANGE UP (ref 6–8)
RBC # BLD: 4.47 M/UL — LOW (ref 4.7–6.1)
RBC # FLD: 12.3 % — SIGNIFICANT CHANGE UP (ref 11.5–14.5)
SODIUM SERPL-SCNC: 138 MMOL/L — SIGNIFICANT CHANGE UP (ref 135–146)
WBC # BLD: 6.74 K/UL — SIGNIFICANT CHANGE UP (ref 4.8–10.8)
WBC # FLD AUTO: 6.74 K/UL — SIGNIFICANT CHANGE UP (ref 4.8–10.8)

## 2018-04-20 RX ORDER — ACETAMINOPHEN 500 MG
2 TABLET ORAL
Qty: 0 | Refills: 0 | COMMUNITY
Start: 2018-04-20

## 2018-04-20 RX ORDER — SIMETHICONE 80 MG/1
1 TABLET, CHEWABLE ORAL
Qty: 0 | Refills: 0 | COMMUNITY
Start: 2018-04-20

## 2018-04-20 RX ORDER — SENNA PLUS 8.6 MG/1
2 TABLET ORAL
Qty: 20 | Refills: 0 | OUTPATIENT
Start: 2018-04-20 | End: 2018-04-29

## 2018-04-20 RX ORDER — PANTOPRAZOLE SODIUM 20 MG/1
1 TABLET, DELAYED RELEASE ORAL
Qty: 60 | Refills: 0 | OUTPATIENT
Start: 2018-04-20 | End: 2018-05-19

## 2018-04-20 RX ORDER — SIMETHICONE 80 MG/1
1 TABLET, CHEWABLE ORAL
Qty: 120 | Refills: 0 | OUTPATIENT
Start: 2018-04-20 | End: 2018-05-19

## 2018-04-20 RX ORDER — PANTOPRAZOLE SODIUM 20 MG/1
1 TABLET, DELAYED RELEASE ORAL
Qty: 0 | Refills: 0 | COMMUNITY
Start: 2018-04-20

## 2018-04-20 RX ORDER — IBUPROFEN 200 MG
1 TABLET ORAL
Qty: 0 | Refills: 0 | COMMUNITY

## 2018-04-20 RX ORDER — DOCUSATE SODIUM 100 MG
1 CAPSULE ORAL
Qty: 20 | Refills: 0 | OUTPATIENT
Start: 2018-04-20 | End: 2018-04-29

## 2018-04-20 RX ORDER — OMEPRAZOLE 10 MG/1
1 CAPSULE, DELAYED RELEASE ORAL
Qty: 0 | Refills: 0 | COMMUNITY

## 2018-04-20 RX ADMIN — SIMETHICONE 80 MILLIGRAM(S): 80 TABLET, CHEWABLE ORAL at 05:13

## 2018-04-20 RX ADMIN — AMLODIPINE BESYLATE 5 MILLIGRAM(S): 2.5 TABLET ORAL at 05:13

## 2018-04-20 RX ADMIN — PANTOPRAZOLE SODIUM 40 MILLIGRAM(S): 20 TABLET, DELAYED RELEASE ORAL at 05:14

## 2018-04-20 RX ADMIN — Medication 100 MILLIGRAM(S): at 05:13

## 2018-04-20 NOTE — DISCHARGE NOTE ADULT - CARE PROVIDERS DIRECT ADDRESSES
[email or web address]

## 2018-04-20 NOTE — DISCHARGE NOTE ADULT - PLAN OF CARE
Medical Management Patient had EGD that showed mild gastritis and duodenitis. Biopsies were taken. He can follow up the results at Dr. Dobbs's office in 2 weeks. He should continue taking Protonix twice a day for the next two weeks. He is currently tolerating solid foods with minimal abdominal pain. Patient advised to avoid greasy foods. Patient had EGD that showed mild gastritis and duodenitis. Biopsies were taken. He can follow up the results at Dr. Dobbs's office in 2 weeks. He should continue taking Protonix twice a day for the next two weeks and then after that just take Protonix once a day. He is currently tolerating solid foods with minimal abdominal pain. Patient advised to avoid greasy foods.

## 2018-04-20 NOTE — DISCHARGE NOTE ADULT - PATIENT PORTAL LINK FT
You can access the [facility] Patient Portal, offered by [facility], by registering with the following website: [email or web address]

## 2018-04-20 NOTE — DISCHARGE NOTE ADULT - CARE PROVIDER_API CALL
Marty Oglesby), Internal Medicine  [address] [place] [address]  Phone: [phone]  Fax: [phone]    Allan Dobbs), Gastroenterology; Internal Medicine  [address] [place] [address]  Phone: [phone]  Fax: [phone]

## 2018-04-20 NOTE — DISCHARGE NOTE ADULT - HOSPITAL COURSE
62 yom, PMH of DM II, HTN, prostate cancer s/p seeding, osteoarthritis, hyperlipidemia, and several episodes of recurrent pancreatitis (unclear etiology) presents with several days of abdominal pain radiating to back, increasing with food intake, which patient describes as identical to prior pain from pancreatitis.  Lipase and LFTs WNL.  ROS otherwise unremarkable. CT A/P ruled out pancreatitis. Patient had EGD that showed mild gastritis and duodenitis. Biopsies were taken. He can follow up the results at Dr. Dobbs's office in 2 weeks. He should continue taking Protonix twice a day for the next two weeks. He is currently tolerating solid foods with minimal abdominal pain. Patient advised to avoid greasy foods.

## 2018-04-20 NOTE — DISCHARGE NOTE ADULT - MEDICATION SUMMARY - MEDICATIONS TO TAKE
I will START or STAY ON the medications listed below when I get home from the hospital:    aspirin 81 mg oral tablet  -- 1 tab(s) by mouth once a day  -- Indication: For CAD    acetaminophen 325 mg oral tablet  -- 2 tab(s) by mouth every 6 hours, As needed, mild pain  -- Indication: For Pain control    Flomax 0.4 mg oral capsule  -- 1 cap(s) by mouth once a day (at bedtime)  -- Indication: For BPH    metFORMIN 500 mg oral tablet  -- 1 tab(s) by mouth 2 times a day  -- Indication: For Diabetes mellitus, type II    Lipitor 40 mg oral tablet  -- 1 tab(s) by mouth once a day (at bedtime)  -- Indication: For Hyperlipidemia    amLODIPine 5 mg oral tablet  -- 1 tab(s) by mouth once a day  -- Indication: For Hypertension    docusate sodium 100 mg oral capsule  -- 1 cap(s) by mouth 2 times a day  -- Indication: For Constiopation    senna oral tablet  -- 2 tab(s) by mouth once a day (at bedtime)  -- Indication: For Constipation    simethicone 80 mg oral tablet, chewable  -- 1 tab(s) by mouth 4 times a day  -- Indication: For Gas and bloating    pantoprazole 40 mg oral delayed release tablet  -- 1 tab(s) by mouth every 12 hours  -- Indication: For Gastritis and duodenitis

## 2018-04-20 NOTE — DISCHARGE NOTE ADULT - CARE PLAN
Principal Discharge DX:	Gastritis and duodenitis  Goal:	Medical Management  Assessment and plan of treatment:	Patient had EGD that showed mild gastritis and duodenitis. Biopsies were taken. He can follow up the results at Dr. Dobbs's office in 2 weeks. He should continue taking Protonix twice a day for the next two weeks. He is currently tolerating solid foods with minimal abdominal pain. Patient advised to avoid greasy foods. Principal Discharge DX:	Gastritis and duodenitis  Goal:	Medical Management  Assessment and plan of treatment:	Patient had EGD that showed mild gastritis and duodenitis. Biopsies were taken. He can follow up the results at Dr. Dobbs's office in 2 weeks. He should continue taking Protonix twice a day for the next two weeks and then after that just take Protonix once a day. He is currently tolerating solid foods with minimal abdominal pain. Patient advised to avoid greasy foods.

## 2018-04-20 NOTE — PROGRESS NOTE ADULT - ASSESSMENT
62 yom, PMH of DM II, HTN, prostate cancer s/p seeding, osteoarthritis, hyperlipidemia, and several episodes of recurrent pancreatitis (unclear etiology) presents with several days of abdominal pain radiating to back, increasing with food intake, which patient describes as identical to prior pain from pancreatitis.  Lipase and LFTs WNL.  ROS otherwise unremarkable. CT A/P nl.    #Abdominal pain-likely secondary to PUD/gastritis from NSAID use  - Pancreatitis is ruled out w/ neg enz and neg CT, story does not fit well either.  IgG subclass testing was performed in 11/2015 and was within reference range.  - OK to feed pt clear liquids; will make NPO when GI ready to scope  - Maintain IVFs at 100 cc/hr  - Pain control with IV pain meds  - Check lipid profile for hypertriglyceridemia.    - Hold ibuprofen  - PPI q12  - Gastroenterology consult, since pain did not go away - EGD planned tomorrow    #DM II-check hemoglobin a1c.  Hold metformin.  Check fingersticks 1-2x/day for now.    #HTN- continue amlodipine,    #HLD- continue atorvastatin, check lipid profile    #Osteoarthritis-change ibuprofen to tylenol for now.  Patient is receiving morphine, too.    #Prostate Ca hx - stable, cont flomax    #DVT prophylaxis-heparin subq, hold in AM for EGD    #Code Status: Full Code  #Dispo: home when medically stable, pending EGD results. Plan discussed with attending.
62 yom, PMH of DM II, HTN, prostate cancer s/p seeding, osteoarthritis, hyperlipidemia, and several episodes of recurrent pancreatitis (unclear etiology) presents with several days of abdominal pain radiating to back, increasing with food intake, which patient describes as identical to prior pain from pancreatitis.  Lipase and LFTs WNL.  ROS otherwise unremarkable. CT A/P nl.    #Abdominal pain-likely secondary to PUD/gastritis from NSAID use  - Pancreatitis is ruled out w/ neg enz and neg CT, story does not fit well either.  IgG subclass testing was performed in 11/2015 and was within reference range.  - OK to feed pt clear liquids; advance diet as tolerated  - D/C IVF  - D/C IV pain meds   - Hold ibuprofen  - PPI q12  - Gastroenterology consult, since pain did not go away - EGD showed some mild gastritis and duodenitis    #DM II-check hemoglobin a1c.  Hold metformin.  Check fingersticks 1-2x/day for now.    #HTN- continue amlodipine,    #HLD- continue atorvastatin, check lipid profile    #Osteoarthritis-change ibuprofen to tylenol for now.  Patient is receiving morphine, too.    #Prostate Ca hx - stable, cont flomax    #DVT prophylaxis-heparin subq, hold in AM for EGD    #Code Status: Full Code  #Dispo: home when medically stable, likely tomorrow. Plan discussed with attending.
62 yom, PMH of DM II, HTN, prostate cancer s/p seeding, osteoarthritis, hyperlipidemia, and several episodes of recurrent pancreatitis (unclear etiology) presents with several days of abdominal pain radiating to back, increasing with food intake, which patient describes as identical to prior pain from pancreatitis.  Lipase and LFTs WNL.  ROS otherwise unremarkable. CT A/P nl.    #Abdominal pain-likely secondary to gastritis/duodenitis from NSAID use  - Pancreatitis is ruled out   diet advanced and tolerated  d/c ibuprofen  - PPI q12 for 2 weeks, then q24 for life (pt on ASA)  outpt f/u GI for bx results of subepithelial lesion in antrum    #DM II  OK to resume metformin    #HTN- continue amlodipine  OK to cont baby asa    #HLD- continue atorvastatin    #Osteoarthritis-use tylenol for now.     #Prostate Ca hx - stable, cont flomax    #DVT prophylaxis-heparin     #Code Status: Full Code    #Dispo: d/c home today
62 yom, PMH of DM II, HTN, prostate cancer s/p seeding, osteoarthritis, hyperlipidemia, and several episodes of recurrent pancreatitis (unclear etiology) presents with several days of abdominal pain radiating to back, increasing with food intake, which patient describes as identical to prior pain from pancreatitis.  Lipase and LFTs WNL.  ROS otherwise unremarkable. CT A/P nl.    1. Abdominal pain-likely secondary to PUD/gastritis from NSAID use  Pancreatitis is ruled out w/ neg enz and neg CT, story does not fit well either.  IgG subclass testing was performed in 11/2015 and was within reference range.  OK to feed pt clear liquids; will make NPO when GI ready to scope  Maintain IVFs at 100 cc/hr  Pain control with IV pain meds  Check lipid profile for hypertriglyceridemia.    Hold ibuprofen  PPI q12  Gastroenterology consult, since pain did not go away.    2. DM II-check hemoglobin a1c.  Hold metformin.  Check fingersticks 1-2x/day for now.    3 HTN- continue amlodipine,    4. HLD- continue atorvastatin, check lipid profile    5. Osteoarthritis-change ibuprofen to tylenol for now.  Patient is receiving morphine, too.    6.Prostate Ca hx - stable, cont flomax    7. DVT prophylaxis-heparin subq, unless hgb drops
62 yom, PMH of DM II, HTN, prostate cancer s/p seeding, osteoarthritis, hyperlipidemia, and several episodes of recurrent pancreatitis (unclear etiology) presents with several days of abdominal pain radiating to back, increasing with food intake, which patient describes as identical to prior pain from pancreatitis.  Lipase and LFTs WNL.  ROS otherwise unremarkable. CT A/P nl.    1. Abdominal pain-likely secondary to gastritis from NSAID use  Pancreatitis is ruled out w/ neg enz and neg CT, story does not fit well either.    OK to feed pt low fat, low salt diet  d/c IVFs  Pain control with tylenol, d/c iv pain meds  Hold ibuprofen forever  PPI q12  add simethicone  no need carafate  outpt GI f/u for bx results    2. DM II-check hemoglobin a1c.  Hold metformin.  Check fingersticks 1-2x/day for now.  can restart metformin on d/c  carb consistent diet    3 HTN- continue amlodipine,    4. HLD- continue atorvastatin, check lipid profile    5. Osteoarthritis-change ibuprofen to tylenol for now.      6.Prostate Ca hx - stable, cont flomax    7. DVT prophylaxis-heparin subq, unless hgb drops    Anticipate d/c home tomorrow

## 2018-04-20 NOTE — PROGRESS NOTE ADULT - SUBJECTIVE AND OBJECTIVE BOX
Patient is a 62y old  Male who presents with a chief complaint of Abdominal pain for several days (17 Apr 2018 10:28)   Patient seen and examined at bedside. He says he feels nauseous when he eats and has mild abdominal pain.    PAST MEDICAL & SURGICAL HISTORY:  Pancreatitis: recurrent  Osteoarthritis  Hyperlipidemia  Prostate cancer: s/p history of seeding  Hypertension  Diabetes mellitus, type II  S/P hip replacement, left: 9/2015  History of appendectomy  Prostate cancer: s/p history of seeding      MEDICATIONS  (STANDING):  amLODIPine   Tablet 5 milliGRAM(s) Oral daily  aspirin  chewable 81 milliGRAM(s) Oral daily  atorvastatin 40 milliGRAM(s) Oral at bedtime  docusate sodium 100 milliGRAM(s) Oral two times a day  lactated ringers. 1000 milliLiter(s) (100 mL/Hr) IV Continuous <Continuous>  pantoprazole    Tablet 40 milliGRAM(s) Oral every 12 hours  senna 2 Tablet(s) Oral at bedtime  tamsulosin 0.4 milliGRAM(s) Oral at bedtime    MEDICATIONS  (PRN):  acetaminophen   Tablet 650 milliGRAM(s) Oral every 6 hours PRN mild pain  morphine  - Injectable 4 milliGRAM(s) IV Push every 4 hours PRN Severe Pain (7 - 10)      Overnight events:    Vital Signs Last 24 Hrs  T(C): 35.6 (18 Apr 2018 14:08), Max: 36.3 (17 Apr 2018 21:05)  T(F): 96 (18 Apr 2018 14:08), Max: 97.4 (17 Apr 2018 21:05)  HR: 95 (18 Apr 2018 14:08) (66 - 95)  BP: 129/59 (18 Apr 2018 14:08) (117/59 - 129/59)  BP(mean): --  RR: 18 (18 Apr 2018 14:08) (18 - 18)  SpO2: 98% (18 Apr 2018 08:53) (96% - 98%)  CAPILLARY BLOOD GLUCOSE  132 (18 Apr 2018 11:47)  119 (18 Apr 2018 07:27)  105 (17 Apr 2018 21:34)  92 (17 Apr 2018 16:20)        I&O's Summary    17 Apr 2018 07:01  -  18 Apr 2018 07:00  --------------------------------------------------------  IN: 2700 mL / OUT: 1 mL / NET: 2699 mL    18 Apr 2018 07:01  -  18 Apr 2018 15:16  --------------------------------------------------------  IN: 600 mL / OUT: 0 mL / NET: 600 mL        Physical Exam:    GENERAL APPEARANCE:  alert and cooperative, and appears to be in no acute distress.  HEENT: Head Normocephalic/Atraumatic; PERRL  NECK: Neck supple, non-tender without lymphadenopathy, masses or thyromegaly.  CARDIAC: RRR, Normal S1 and S2. No S3, S4 or murmurs  LUNGS: Clear to auscultation without rales, rhonchi or wheezing.  ABDOMEN: Positive bowel sounds. Soft, nondistended, slight diffuse tenderness  MUSCULOSKELETAL: No joint erythema or tenderness. Normal gait.  EXTREMITIES: No edema. Peripheral pulses intact.   NEUROLOGICAL: CN II-XII intact. Strength and sensation symmetric and intact throughout.       Labs:                        12.6   5.88  )-----------( 203      ( 18 Apr 2018 05:36 )             37.6             04-18    140  |  99  |  9<L>  ----------------------------<  102<H>  4.3   |  29  |  0.6<L>    Ca    9.0      18 Apr 2018 05:36  Mg     2.2     04-18    TPro  6.7  /  Alb  4.2  /  TBili  0.9  /  DBili  x   /  AST  14  /  ALT  14  /  AlkPhos  78  04-18    LIVER FUNCTIONS - ( 18 Apr 2018 05:36 )  Alb: 4.2 g/dL / Pro: 6.7 g/dL / ALK PHOS: 78 U/L / ALT: 14 U/L / AST: 14 U/L / GGT: x                   CARDIAC MARKERS ( 17 Apr 2018 03:06 )  x     / <0.01 ng/mL / 182 U/L / x     / 2.5 ng/mL
Patient is a 62y old  Male who presents with a chief complaint of Abdominal pain for several days (17 Apr 2018 10:28)   Patient seen and examined at bedside. He complains of abdominal pain after eating jello.    PAST MEDICAL & SURGICAL HISTORY:  Pancreatitis: recurrent  Osteoarthritis  Hyperlipidemia  Prostate cancer: s/p history of seeding  Hypertension  Diabetes mellitus, type II  S/P hip replacement, left: 9/2015  History of appendectomy  Prostate cancer: s/p history of seeding      MEDICATIONS  (STANDING):  amLODIPine   Tablet 5 milliGRAM(s) Oral daily  aspirin  chewable 81 milliGRAM(s) Oral daily  atorvastatin 40 milliGRAM(s) Oral at bedtime  docusate sodium 100 milliGRAM(s) Oral two times a day  pantoprazole    Tablet 40 milliGRAM(s) Oral every 12 hours  senna 2 Tablet(s) Oral at bedtime  simethicone 80 milliGRAM(s) Chew four times a day  tamsulosin 0.4 milliGRAM(s) Oral at bedtime    MEDICATIONS  (PRN):  acetaminophen   Tablet 650 milliGRAM(s) Oral every 6 hours PRN mild pain      Overnight events:    Vital Signs Last 24 Hrs  T(C): 36.1 (19 Apr 2018 14:35), Max: 36.3 (18 Apr 2018 21:25)  T(F): 96.9 (19 Apr 2018 14:35), Max: 97.4 (18 Apr 2018 21:25)  HR: 74 (19 Apr 2018 14:35) (62 - 86)  BP: 140/73 (19 Apr 2018 14:35) (113/83 - 140/73)  BP(mean): 90 (19 Apr 2018 10:32) (90 - 90)  RR: 19 (19 Apr 2018 14:35) (12 - 19)  SpO2: 99% (19 Apr 2018 11:17) (99% - 99%)  CAPILLARY BLOOD GLUCOSE  107 (19 Apr 2018 12:07)  108 (19 Apr 2018 08:22)  100 (18 Apr 2018 22:16)  105 (18 Apr 2018 16:57)        I&O's Summary    18 Apr 2018 07:01  -  19 Apr 2018 07:00  --------------------------------------------------------  IN: 1600 mL / OUT: 0 mL / NET: 1600 mL    19 Apr 2018 07:01  -  19 Apr 2018 15:04  --------------------------------------------------------  IN: 0 mL / OUT: 1 mL / NET: -1 mL        Physical Exam:    GENERAL APPEARANCE:  alert and cooperative, and appears to be in no acute distress.  HEENT: Head Normocephalic/Atraumatic; PERRL  NECK: Neck supple, non-tender without lymphadenopathy, masses or thyromegaly.  CARDIAC: RRR, Normal S1 and S2. No S3, S4 or murmurs  LUNGS: Clear to auscultation without rales, rhonchi or wheezing.  ABDOMEN: Positive bowel sounds. Soft, nondistended, tenderness to palpation (seems out of proportion to exam)  MUSCULOSKELETAL: No joint erythema or tenderness. Normal gait.  EXTREMITIES: No edema. Peripheral pulses intact.  NEUROLOGICAL: CN II-XII intact. Strength and sensation symmetric and intact throughout.       Labs:                        13.5   5.56  )-----------( 210      ( 19 Apr 2018 04:36 )             39.8             04-19    139  |  99  |  8<L>  ----------------------------<  106<H>  3.9   |  22  |  0.6<L>    Ca    9.4      19 Apr 2018 04:36  Mg     2.2     04-18    TPro  6.7  /  Alb  4.2  /  TBili  0.9  /  DBili  x   /  AST  14  /  ALT  14  /  AlkPhos  78  04-18    LIVER FUNCTIONS - ( 18 Apr 2018 05:36 )  Alb: 4.2 g/dL / Pro: 6.7 g/dL / ALK PHOS: 78 U/L / ALT: 14 U/L / AST: 14 U/L / GGT: x                 PT/INR - ( 19 Apr 2018 04:36 )   PT: 13.00 sec;   INR: 1.20 ratio         PTT - ( 19 Apr 2018 04:36 )  PTT:26.8 sec
WILI ALFARO  62y  Male  ***My note supercedes ALL resident notes that I sign.  My corrections for their notes are in my note.***    INTERVAL EVENTS: Pt doing fine.  Ate 2 solid meals w/ no pain.  Pt feels well enough to go home.    T(F): 97.1 (04-20-18 @ 05:35), Max: 97.9 (04-19-18 @ 21:35)  HR: 75 (04-20-18 @ 05:35) (68 - 77)  BP: 125/86 (04-20-18 @ 05:35) (113/83 - 140/73)  RR: 18 (04-20-18 @ 05:35) (12 - 19)  SpO2: 99% (04-19-18 @ 11:17) (99% - 99%)    	General: NAD, lying comfortably in bed, non-toxic appearing  	HEENT: NC/AT  	Heart: +S1, S2, RRR  	Lungs: CTA B/L  	Abdomen: soft, ND, +BS, no tenderness no rebound, no voluntary guarding  Neuro: AAO x 4    LABS:                        13.5    (    84.5   5.56  )-----------( ---------      210      ( 19 Apr 2018 04:36 )             39.8    (    12.1         PT/INR - ( 19 Apr 2018 04:36 )   PT: 13.00 sec;   INR: 1.20 ratio         PTT - ( 19 Apr 2018 04:36 )  PTT:26.8 sec    RADIOLOGY & ADDITIONAL TESTS:      acetaminophen   Tablet 650 milliGRAM(s) Oral every 6 hours PRN  amLODIPine   Tablet 5 milliGRAM(s) Oral daily  aspirin  chewable 81 milliGRAM(s) Oral daily  atorvastatin 40 milliGRAM(s) Oral at bedtime  docusate sodium 100 milliGRAM(s) Oral two times a day  pantoprazole    Tablet 40 milliGRAM(s) Oral every 12 hours  senna 2 Tablet(s) Oral at bedtime  simethicone 80 milliGRAM(s) Chew four times a day  tamsulosin 0.4 milliGRAM(s) Oral at bedtime
WILI ALFARO  62y  Male  ***My note supercedes ALL resident notes that I sign.  My corrections for their notes are in my note.***    INTERVAL EVENTS: Pt drank juice fine, says jello hurt him a lot.  Pain occurred w/in 1 min of eating jello.  Pt is extremely hungry.  Lidya EGD well.  Still afraid to go home.    T(F): 97.4 (04-19-18 @ 09:50), Max: 97.4 (04-18-18 @ 21:25)  HR: 77 (04-19-18 @ 11:30) (62 - 95)  BP: 118/74 (04-19-18 @ 11:30) (113/83 - 138/75)  RR: 18 (04-19-18 @ 11:30) (12 - 18)  SpO2: 99% (04-19-18 @ 11:17) (99% - 99%)    	General: NAD, lying comfortably in bed, non-toxic appearing  	HEENT: NC/AT  	Heart: +S1, S2, RRR  	Lungs: CTA B/L  	Abdomen: soft, ND, +BS, +mild tenderness to palpation in epigastrium, no rebound, no voluntary guarding  Neuro: AAO x 4    LABS:                        13.5    (    84.5   5.56  )-----------( ---------      210      ( 19 Apr 2018 04:36 )             39.8    (    12.1     139   (   99   (   106      04-19-18 @ 04:36  ----------------------               3.9   (   22   (   8                             -----                        0.6  Ca  9.4   Mg  --    P   --       PT/INR - ( 19 Apr 2018 04:36 )   PT: 13.00 sec;   INR: 1.20 ratio         PTT - ( 19 Apr 2018 04:36 )  PTT:26.8 sec    RADIOLOGY & ADDITIONAL TESTS:  EGD - very mild gastritis and duod; no ulcer, no bleed; 1-2 cm subepithelial lesion in antrum - bx'd    acetaminophen   Tablet 650 milliGRAM(s) Oral every 6 hours PRN  amLODIPine   Tablet 5 milliGRAM(s) Oral daily  aspirin  chewable 81 milliGRAM(s) Oral daily  atorvastatin 40 milliGRAM(s) Oral at bedtime  docusate sodium 100 milliGRAM(s) Oral two times a day  pantoprazole    Tablet 40 milliGRAM(s) Oral every 12 hours  senna 2 Tablet(s) Oral at bedtime  simethicone 80 milliGRAM(s) Chew four times a day  tamsulosin 0.4 milliGRAM(s) Oral at bedtime
WILI ALFARO  62y  Male  ***My note supercedes ALL resident notes that I sign.  My corrections for their notes are in my note.***    INTERVAL EVENTS: Pt still having pain, not able to advance diet.  No breathing issues. Looks comfortable, but still reports pain - relieved w/ morphine.    T(F): 97 (04-18-18 @ 05:26), Max: 97.4 (04-17-18 @ 21:05)  HR: 71 (04-18-18 @ 05:26) (60 - 71)  BP: 122/85 (04-18-18 @ 05:26) (117/59 - 133/77)  RR: 18 (04-18-18 @ 05:26) (18 - 18)  SpO2: 98% (04-18-18 @ 08:53) (96% - 98%)    	General: NAD, lying comfortably in bed, non-toxic appearing  	HEENT: NC/AT  	Heart: +S1, S2, RRR  	Lungs: CTA B/L  	Abdomen: soft, ND, +BS, +diffuse tenderness to palpation, greatest in RUQ and LUQ, no rebound, no voluntary guarding  Neuro: AAO x 4    LABS:                        12.6    (    86.6   5.88  )-----------( ---------      203      ( 18 Apr 2018 05:36 )             37.6    (    12.5     140   (   99   (   102      04-18-18 @ 05:36  ----------------------               4.3   (   29   (   9                             -----                        0.6  Ca  9.0   Mg  2.2    P   --     LFT  6.7  (  0.9  (  14       04-18-18 @ 05:36  -------------------------  4.2  (  78  (  14      CARDIAC MARKERS ( 17 Apr 2018 03:06 )  x     / <0.01 ng/mL / 182 U/L / x     / 2.5 ng/mL    RADIOLOGY & ADDITIONAL TESTS:      acetaminophen   Tablet 650 milliGRAM(s) Oral every 6 hours PRN  amLODIPine   Tablet 5 milliGRAM(s) Oral daily  aspirin  chewable 81 milliGRAM(s) Oral daily  atorvastatin 40 milliGRAM(s) Oral at bedtime  docusate sodium 100 milliGRAM(s) Oral two times a day  heparin  Injectable 5000 Unit(s) SubCutaneous every 8 hours  lactated ringers. 1000 milliLiter(s) IV Continuous <Continuous>  morphine  - Injectable 4 milliGRAM(s) IV Push every 4 hours PRN  pantoprazole    Tablet 40 milliGRAM(s) Oral every 12 hours  senna 2 Tablet(s) Oral at bedtime  tamsulosin 0.4 milliGRAM(s) Oral at bedtime

## 2018-04-23 LAB — SURGICAL PATHOLOGY STUDY: SIGNIFICANT CHANGE UP

## 2018-04-25 ENCOUNTER — APPOINTMENT (OUTPATIENT)
Dept: ORTHOPEDIC SURGERY | Facility: CLINIC | Age: 63
End: 2018-04-25

## 2018-05-03 DIAGNOSIS — Z79.84 LONG TERM (CURRENT) USE OF ORAL HYPOGLYCEMIC DRUGS: ICD-10-CM

## 2018-05-03 DIAGNOSIS — Z85.46 PERSONAL HISTORY OF MALIGNANT NEOPLASM OF PROSTATE: ICD-10-CM

## 2018-05-03 DIAGNOSIS — R10.13 EPIGASTRIC PAIN: ICD-10-CM

## 2018-05-03 DIAGNOSIS — T39.395A ADVERSE EFFECT OF OTHER NONSTEROIDAL ANTI-INFLAMMATORY DRUGS [NSAID], INITIAL ENCOUNTER: ICD-10-CM

## 2018-05-03 DIAGNOSIS — Z87.891 PERSONAL HISTORY OF NICOTINE DEPENDENCE: ICD-10-CM

## 2018-05-03 DIAGNOSIS — I10 ESSENTIAL (PRIMARY) HYPERTENSION: ICD-10-CM

## 2018-05-03 DIAGNOSIS — Z79.82 LONG TERM (CURRENT) USE OF ASPIRIN: ICD-10-CM

## 2018-05-03 DIAGNOSIS — E11.9 TYPE 2 DIABETES MELLITUS WITHOUT COMPLICATIONS: ICD-10-CM

## 2018-05-03 DIAGNOSIS — E78.5 HYPERLIPIDEMIA, UNSPECIFIED: ICD-10-CM

## 2018-05-03 DIAGNOSIS — K59.00 CONSTIPATION, UNSPECIFIED: ICD-10-CM

## 2018-05-03 DIAGNOSIS — M19.90 UNSPECIFIED OSTEOARTHRITIS, UNSPECIFIED SITE: ICD-10-CM

## 2018-05-03 DIAGNOSIS — K29.90 GASTRODUODENITIS, UNSPECIFIED, WITHOUT BLEEDING: ICD-10-CM

## 2018-05-18 ENCOUNTER — APPOINTMENT (OUTPATIENT)
Dept: NUTRITION | Facility: CLINIC | Age: 63
End: 2018-05-18

## 2018-05-23 ENCOUNTER — APPOINTMENT (OUTPATIENT)
Dept: ORTHOPEDIC SURGERY | Facility: CLINIC | Age: 63
End: 2018-05-23

## 2018-06-23 ENCOUNTER — EMERGENCY (EMERGENCY)
Facility: HOSPITAL | Age: 63
LOS: 0 days | Discharge: HOME | End: 2018-06-23
Attending: EMERGENCY MEDICINE | Admitting: EMERGENCY MEDICINE

## 2018-06-23 VITALS
HEART RATE: 69 BPM | RESPIRATION RATE: 18 BRPM | SYSTOLIC BLOOD PRESSURE: 108 MMHG | TEMPERATURE: 97 F | OXYGEN SATURATION: 100 % | DIASTOLIC BLOOD PRESSURE: 72 MMHG

## 2018-06-23 DIAGNOSIS — Z79.899 OTHER LONG TERM (CURRENT) DRUG THERAPY: ICD-10-CM

## 2018-06-23 DIAGNOSIS — C61 MALIGNANT NEOPLASM OF PROSTATE: Chronic | ICD-10-CM

## 2018-06-23 DIAGNOSIS — Z79.84 LONG TERM (CURRENT) USE OF ORAL HYPOGLYCEMIC DRUGS: ICD-10-CM

## 2018-06-23 DIAGNOSIS — Z96.642 PRESENCE OF LEFT ARTIFICIAL HIP JOINT: Chronic | ICD-10-CM

## 2018-06-23 DIAGNOSIS — E11.9 TYPE 2 DIABETES MELLITUS WITHOUT COMPLICATIONS: ICD-10-CM

## 2018-06-23 DIAGNOSIS — Z98.890 OTHER SPECIFIED POSTPROCEDURAL STATES: Chronic | ICD-10-CM

## 2018-06-23 DIAGNOSIS — Z79.82 LONG TERM (CURRENT) USE OF ASPIRIN: ICD-10-CM

## 2018-06-23 DIAGNOSIS — N39.0 URINARY TRACT INFECTION, SITE NOT SPECIFIED: ICD-10-CM

## 2018-06-23 DIAGNOSIS — R10.13 EPIGASTRIC PAIN: ICD-10-CM

## 2018-06-23 DIAGNOSIS — E78.5 HYPERLIPIDEMIA, UNSPECIFIED: ICD-10-CM

## 2018-06-23 DIAGNOSIS — I10 ESSENTIAL (PRIMARY) HYPERTENSION: ICD-10-CM

## 2018-06-23 LAB
ALBUMIN SERPL ELPH-MCNC: 4 G/DL — SIGNIFICANT CHANGE UP (ref 3.5–5.2)
ALP SERPL-CCNC: 80 U/L — SIGNIFICANT CHANGE UP (ref 30–115)
ALT FLD-CCNC: 15 U/L — SIGNIFICANT CHANGE UP (ref 0–41)
ANION GAP SERPL CALC-SCNC: 13 MMOL/L — SIGNIFICANT CHANGE UP (ref 7–14)
APPEARANCE UR: (no result)
AST SERPL-CCNC: 26 U/L — SIGNIFICANT CHANGE UP (ref 0–41)
BASOPHILS # BLD AUTO: 0.04 K/UL — SIGNIFICANT CHANGE UP (ref 0–0.2)
BASOPHILS NFR BLD AUTO: 0.6 % — SIGNIFICANT CHANGE UP (ref 0–1)
BILIRUB SERPL-MCNC: 0.9 MG/DL — SIGNIFICANT CHANGE UP (ref 0.2–1.2)
BILIRUB UR-MCNC: NEGATIVE — SIGNIFICANT CHANGE UP
BUN SERPL-MCNC: 13 MG/DL — SIGNIFICANT CHANGE UP (ref 10–20)
CALCIUM SERPL-MCNC: 9.2 MG/DL — SIGNIFICANT CHANGE UP (ref 8.5–10.1)
CHLORIDE SERPL-SCNC: 100 MMOL/L — SIGNIFICANT CHANGE UP (ref 98–110)
CK MB CFR SERPL CALC: 2.2 NG/ML — SIGNIFICANT CHANGE UP (ref 0.6–6.3)
CK SERPL-CCNC: 177 U/L — SIGNIFICANT CHANGE UP (ref 0–225)
CO2 SERPL-SCNC: 23 MMOL/L — SIGNIFICANT CHANGE UP (ref 17–32)
COLOR SPEC: YELLOW — SIGNIFICANT CHANGE UP
CREAT SERPL-MCNC: 0.7 MG/DL — SIGNIFICANT CHANGE UP (ref 0.7–1.5)
DIFF PNL FLD: NEGATIVE — SIGNIFICANT CHANGE UP
EOSINOPHIL # BLD AUTO: 0.14 K/UL — SIGNIFICANT CHANGE UP (ref 0–0.7)
EOSINOPHIL NFR BLD AUTO: 2.1 % — SIGNIFICANT CHANGE UP (ref 0–8)
GLUCOSE SERPL-MCNC: 113 MG/DL — HIGH (ref 70–99)
GLUCOSE UR QL: NEGATIVE MG/DL — SIGNIFICANT CHANGE UP
HCT VFR BLD CALC: 37 % — LOW (ref 42–52)
HGB BLD-MCNC: 12.4 G/DL — LOW (ref 14–18)
IMM GRANULOCYTES NFR BLD AUTO: 0.3 % — SIGNIFICANT CHANGE UP (ref 0.1–0.3)
KETONES UR-MCNC: NEGATIVE — SIGNIFICANT CHANGE UP
LEUKOCYTE ESTERASE UR-ACNC: NEGATIVE — SIGNIFICANT CHANGE UP
LIDOCAIN IGE QN: 19 U/L — SIGNIFICANT CHANGE UP (ref 7–60)
LYMPHOCYTES # BLD AUTO: 1.52 K/UL — SIGNIFICANT CHANGE UP (ref 1.2–3.4)
LYMPHOCYTES # BLD AUTO: 23 % — SIGNIFICANT CHANGE UP (ref 20.5–51.1)
MCHC RBC-ENTMCNC: 29.2 PG — SIGNIFICANT CHANGE UP (ref 27–31)
MCHC RBC-ENTMCNC: 33.5 G/DL — SIGNIFICANT CHANGE UP (ref 32–37)
MCV RBC AUTO: 87.1 FL — SIGNIFICANT CHANGE UP (ref 80–94)
MONOCYTES # BLD AUTO: 0.66 K/UL — HIGH (ref 0.1–0.6)
MONOCYTES NFR BLD AUTO: 10 % — HIGH (ref 1.7–9.3)
NEUTROPHILS # BLD AUTO: 4.22 K/UL — SIGNIFICANT CHANGE UP (ref 1.4–6.5)
NEUTROPHILS NFR BLD AUTO: 64 % — SIGNIFICANT CHANGE UP (ref 42.2–75.2)
NITRITE UR-MCNC: NEGATIVE — SIGNIFICANT CHANGE UP
PH UR: 7 — SIGNIFICANT CHANGE UP (ref 5–8)
PLATELET # BLD AUTO: 237 K/UL — SIGNIFICANT CHANGE UP (ref 130–400)
POTASSIUM SERPL-MCNC: 5.9 MMOL/L — HIGH (ref 3.5–5)
POTASSIUM SERPL-SCNC: 5.9 MMOL/L — HIGH (ref 3.5–5)
PROT SERPL-MCNC: 7.3 G/DL — SIGNIFICANT CHANGE UP (ref 6–8)
PROT UR-MCNC: (no result) MG/DL
RBC # BLD: 4.25 M/UL — LOW (ref 4.7–6.1)
RBC # FLD: 12.4 % — SIGNIFICANT CHANGE UP (ref 11.5–14.5)
SODIUM SERPL-SCNC: 136 MMOL/L — SIGNIFICANT CHANGE UP (ref 135–146)
SP GR SPEC: 1.02 — SIGNIFICANT CHANGE UP (ref 1.01–1.03)
TROPONIN T SERPL-MCNC: <0.01 NG/ML — SIGNIFICANT CHANGE UP
UROBILINOGEN FLD QL: 1 MG/DL (ref 0.2–0.2)
WBC # BLD: 6.6 K/UL — SIGNIFICANT CHANGE UP (ref 4.8–10.8)
WBC # FLD AUTO: 6.6 K/UL — SIGNIFICANT CHANGE UP (ref 4.8–10.8)

## 2018-06-23 RX ORDER — MORPHINE SULFATE 50 MG/1
6 CAPSULE, EXTENDED RELEASE ORAL ONCE
Qty: 0 | Refills: 0 | Status: DISCONTINUED | OUTPATIENT
Start: 2018-06-23 | End: 2018-06-23

## 2018-06-23 RX ORDER — MORPHINE SULFATE 50 MG/1
4 CAPSULE, EXTENDED RELEASE ORAL ONCE
Qty: 0 | Refills: 0 | Status: DISCONTINUED | OUTPATIENT
Start: 2018-06-23 | End: 2018-06-23

## 2018-06-23 RX ORDER — AZTREONAM 2 G
1 VIAL (EA) INJECTION
Qty: 14 | Refills: 0 | OUTPATIENT
Start: 2018-06-23 | End: 2018-06-29

## 2018-06-23 RX ORDER — ONDANSETRON 8 MG/1
4 TABLET, FILM COATED ORAL ONCE
Qty: 0 | Refills: 0 | Status: COMPLETED | OUTPATIENT
Start: 2018-06-23 | End: 2018-06-23

## 2018-06-23 RX ORDER — SODIUM CHLORIDE 9 MG/ML
1000 INJECTION, SOLUTION INTRAVENOUS ONCE
Qty: 0 | Refills: 0 | Status: COMPLETED | OUTPATIENT
Start: 2018-06-23 | End: 2018-06-23

## 2018-06-23 RX ADMIN — SODIUM CHLORIDE 2000 MILLILITER(S): 9 INJECTION, SOLUTION INTRAVENOUS at 08:31

## 2018-06-23 RX ADMIN — MORPHINE SULFATE 6 MILLIGRAM(S): 50 CAPSULE, EXTENDED RELEASE ORAL at 08:22

## 2018-06-23 RX ADMIN — ONDANSETRON 4 MILLIGRAM(S): 8 TABLET, FILM COATED ORAL at 08:22

## 2018-06-23 RX ADMIN — MORPHINE SULFATE 4 MILLIGRAM(S): 50 CAPSULE, EXTENDED RELEASE ORAL at 11:26

## 2018-06-23 NOTE — ED PROVIDER NOTE - ATTENDING CONTRIBUTION TO CARE
62M prostate ca in remission, DM, HTN, pancreatitis, s/p appendicitis, p/w 3 days abd pain radiating to chest pain. started in epigastrium and radiates now to bilat UQ and down bilat LQ. No nvdc. feels like prior pancreatitis. No ETOH, gallstones hx. epigastric pain radiates to chest. No sob. worse w meals. no dysuria, freq, hematuria. no fever, cough. Last BM yesterday. No brbpr or melena. on exam, AFVSS, well liana nad, ncat, eomi, perrla, mmm, lctab, rrr nl s1s2 no mrg, abd soft +mild epigastric/RUQ/LUQ ttp, no rebound or rigidity, ND, no cvat, aaox3, no focal deficits, no le edema or calf ttp, a/p; abd pain, radiates to chest, concern for pancreatitis, cholecystitis. r/o acs. will do labs, ekg/trop, cxr, ct scan, ruq sono, ua, ivf pain control and re-eval.

## 2018-06-23 NOTE — ED PROVIDER NOTE - OBJECTIVE STATEMENT
61 yo M w hx of prostate CA, DM, HTN c/o 2 days of abdominal pain in the epigastrium and LLQ.  Pt nauseous but not vomiting.  No diarrhea.  no fever.  No dysuria.  Last bowel movement was 5 days ago.

## 2018-06-23 NOTE — ED PROVIDER NOTE - MEDICAL DECISION MAKING DETAILS
UA w bacteria, CT w ?cystitis, will treat w abx, f/u pmd 1-2 weeks, strict return precautions UA w bacteria, CT w ?cystitis, pt is diabetic, will treat w abx, f/u pmd 1-2 weeks, strict return precautions provided

## 2018-06-23 NOTE — ED PROVIDER NOTE - PHYSICAL EXAMINATION
CONSTITUTIONAL: Well-developed; well-nourished; in no acute distress.   SKIN: warm, dry  HEAD: Normocephalic; atraumatic.  EYES: PERRL, EOMI, no conjunctival erythema  ENT: No nasal discharge; airway clear.  NECK: Supple; non tender.  CARD: S1, S2 normal; no murmurs, gallops, or rubs. Regular rate and rhythm.   RESP: No wheezes, rales or rhonchi.  ABD: soft, tender in epigastrium, LLQ and RUQ   EXT: Normal ROM.  No clubbing, cyanosis or edema.   NEURO: Alert, oriented, grossly unremarkable  PSYCH: Cooperative, appropriate.

## 2018-06-23 NOTE — ED PROVIDER NOTE - NS ED ROS FT
Eyes:  No visual changes, eye pain or discharge.  ENMT:  No hearing changes, pain, no sore throat or runny nose, no difficulty swallowing  Cardiac:  No chest pain, SOB or edema. No chest pain with exertion.  Respiratory:  cough  GI:  abdominal pain, constipation   :  No dysuria, frequency or burning.  MS:  No myalgia, muscle weakness, joint pain or back pain.  Neuro:  No headache or weakness.  No LOC.  Skin:  No skin rash.

## 2018-06-23 NOTE — ED PROVIDER NOTE - PROGRESS NOTE DETAILS
no stone or dilation of GB on POCUS pt with gb stones on POCUS pt with possible gb stones on POCUS no stones on official sono.  will await ct pt with bladder wall thickening and bacteria in the urine.  will treat for uti.  recommend GI and urology follow up

## 2018-06-24 LAB
CULTURE RESULTS: NO GROWTH — SIGNIFICANT CHANGE UP
SPECIMEN SOURCE: SIGNIFICANT CHANGE UP

## 2018-06-27 RX ORDER — AZTREONAM 2 G
1 VIAL (EA) INJECTION
Qty: 14 | Refills: 0 | OUTPATIENT
Start: 2018-06-27 | End: 2018-07-03

## 2018-06-28 ENCOUNTER — APPOINTMENT (OUTPATIENT)
Dept: UROLOGY | Facility: CLINIC | Age: 63
End: 2018-06-28

## 2018-06-30 ENCOUNTER — EMERGENCY (EMERGENCY)
Facility: HOSPITAL | Age: 63
LOS: 0 days | Discharge: HOME | End: 2018-06-30
Attending: EMERGENCY MEDICINE | Admitting: EMERGENCY MEDICINE

## 2018-06-30 VITALS
TEMPERATURE: 98 F | SYSTOLIC BLOOD PRESSURE: 127 MMHG | OXYGEN SATURATION: 99 % | RESPIRATION RATE: 18 BRPM | DIASTOLIC BLOOD PRESSURE: 80 MMHG | HEART RATE: 86 BPM

## 2018-06-30 VITALS — WEIGHT: 201.06 LBS

## 2018-06-30 DIAGNOSIS — Z96.642 PRESENCE OF LEFT ARTIFICIAL HIP JOINT: Chronic | ICD-10-CM

## 2018-06-30 DIAGNOSIS — Z79.899 OTHER LONG TERM (CURRENT) DRUG THERAPY: ICD-10-CM

## 2018-06-30 DIAGNOSIS — Z98.890 OTHER SPECIFIED POSTPROCEDURAL STATES: Chronic | ICD-10-CM

## 2018-06-30 DIAGNOSIS — Z87.19 PERSONAL HISTORY OF OTHER DISEASES OF THE DIGESTIVE SYSTEM: ICD-10-CM

## 2018-06-30 DIAGNOSIS — C61 MALIGNANT NEOPLASM OF PROSTATE: Chronic | ICD-10-CM

## 2018-06-30 DIAGNOSIS — Z79.2 LONG TERM (CURRENT) USE OF ANTIBIOTICS: ICD-10-CM

## 2018-06-30 DIAGNOSIS — Z79.84 LONG TERM (CURRENT) USE OF ORAL HYPOGLYCEMIC DRUGS: ICD-10-CM

## 2018-06-30 DIAGNOSIS — E11.9 TYPE 2 DIABETES MELLITUS WITHOUT COMPLICATIONS: ICD-10-CM

## 2018-06-30 DIAGNOSIS — I10 ESSENTIAL (PRIMARY) HYPERTENSION: ICD-10-CM

## 2018-06-30 DIAGNOSIS — E78.5 HYPERLIPIDEMIA, UNSPECIFIED: ICD-10-CM

## 2018-06-30 DIAGNOSIS — R51 HEADACHE: ICD-10-CM

## 2018-06-30 DIAGNOSIS — Z85.46 PERSONAL HISTORY OF MALIGNANT NEOPLASM OF PROSTATE: ICD-10-CM

## 2018-06-30 DIAGNOSIS — Z79.82 LONG TERM (CURRENT) USE OF ASPIRIN: ICD-10-CM

## 2018-06-30 DIAGNOSIS — R10.9 UNSPECIFIED ABDOMINAL PAIN: ICD-10-CM

## 2018-06-30 DIAGNOSIS — Z87.39 PERSONAL HISTORY OF OTHER DISEASES OF THE MUSCULOSKELETAL SYSTEM AND CONNECTIVE TISSUE: ICD-10-CM

## 2018-06-30 DIAGNOSIS — R10.32 LEFT LOWER QUADRANT PAIN: ICD-10-CM

## 2018-06-30 LAB
ALBUMIN SERPL ELPH-MCNC: 4.6 G/DL — SIGNIFICANT CHANGE UP (ref 3.5–5.2)
ALP SERPL-CCNC: 88 U/L — SIGNIFICANT CHANGE UP (ref 30–115)
ALT FLD-CCNC: 14 U/L — SIGNIFICANT CHANGE UP (ref 0–41)
ANION GAP SERPL CALC-SCNC: 15 MMOL/L — HIGH (ref 7–14)
APPEARANCE UR: CLEAR — SIGNIFICANT CHANGE UP
AST SERPL-CCNC: 22 U/L — SIGNIFICANT CHANGE UP (ref 0–41)
BASOPHILS # BLD AUTO: 0.03 K/UL — SIGNIFICANT CHANGE UP (ref 0–0.2)
BASOPHILS NFR BLD AUTO: 0.4 % — SIGNIFICANT CHANGE UP (ref 0–1)
BILIRUB SERPL-MCNC: 1 MG/DL — SIGNIFICANT CHANGE UP (ref 0.2–1.2)
BILIRUB UR-MCNC: NEGATIVE — SIGNIFICANT CHANGE UP
BUN SERPL-MCNC: 19 MG/DL — SIGNIFICANT CHANGE UP (ref 10–20)
CALCIUM SERPL-MCNC: 9.6 MG/DL — SIGNIFICANT CHANGE UP (ref 8.5–10.1)
CHLORIDE SERPL-SCNC: 99 MMOL/L — SIGNIFICANT CHANGE UP (ref 98–110)
CO2 SERPL-SCNC: 23 MMOL/L — SIGNIFICANT CHANGE UP (ref 17–32)
COLOR SPEC: YELLOW — SIGNIFICANT CHANGE UP
CREAT SERPL-MCNC: 0.7 MG/DL — SIGNIFICANT CHANGE UP (ref 0.7–1.5)
DIFF PNL FLD: NEGATIVE — SIGNIFICANT CHANGE UP
EOSINOPHIL # BLD AUTO: 0.23 K/UL — SIGNIFICANT CHANGE UP (ref 0–0.7)
EOSINOPHIL NFR BLD AUTO: 2.8 % — SIGNIFICANT CHANGE UP (ref 0–8)
EPI CELLS # UR: (no result) /HPF
GLUCOSE SERPL-MCNC: 165 MG/DL — HIGH (ref 70–99)
GLUCOSE UR QL: 100 MG/DL
HCT VFR BLD CALC: 38.6 % — LOW (ref 42–52)
HGB BLD-MCNC: 13.1 G/DL — LOW (ref 14–18)
IMM GRANULOCYTES NFR BLD AUTO: 0.6 % — HIGH (ref 0.1–0.3)
KETONES UR-MCNC: (no result)
LACTATE SERPL-SCNC: 2 MMOL/L — SIGNIFICANT CHANGE UP (ref 0.5–2.2)
LEUKOCYTE ESTERASE UR-ACNC: NEGATIVE — SIGNIFICANT CHANGE UP
LIDOCAIN IGE QN: 19 U/L — SIGNIFICANT CHANGE UP (ref 7–60)
LYMPHOCYTES # BLD AUTO: 1.44 K/UL — SIGNIFICANT CHANGE UP (ref 1.2–3.4)
LYMPHOCYTES # BLD AUTO: 17.3 % — LOW (ref 20.5–51.1)
MCHC RBC-ENTMCNC: 29 PG — SIGNIFICANT CHANGE UP (ref 27–31)
MCHC RBC-ENTMCNC: 33.9 G/DL — SIGNIFICANT CHANGE UP (ref 32–37)
MCV RBC AUTO: 85.4 FL — SIGNIFICANT CHANGE UP (ref 80–94)
MONOCYTES # BLD AUTO: 0.92 K/UL — HIGH (ref 0.1–0.6)
MONOCYTES NFR BLD AUTO: 11.1 % — HIGH (ref 1.7–9.3)
NEUTROPHILS # BLD AUTO: 5.65 K/UL — SIGNIFICANT CHANGE UP (ref 1.4–6.5)
NEUTROPHILS NFR BLD AUTO: 67.8 % — SIGNIFICANT CHANGE UP (ref 42.2–75.2)
NITRITE UR-MCNC: NEGATIVE — SIGNIFICANT CHANGE UP
NRBC # BLD: 0 /100 WBCS — SIGNIFICANT CHANGE UP (ref 0–0)
PH UR: 6 — SIGNIFICANT CHANGE UP (ref 5–8)
PLATELET # BLD AUTO: 272 K/UL — SIGNIFICANT CHANGE UP (ref 130–400)
POTASSIUM SERPL-MCNC: 4.8 MMOL/L — SIGNIFICANT CHANGE UP (ref 3.5–5)
POTASSIUM SERPL-SCNC: 4.8 MMOL/L — SIGNIFICANT CHANGE UP (ref 3.5–5)
PROT SERPL-MCNC: 7.9 G/DL — SIGNIFICANT CHANGE UP (ref 6–8)
PROT UR-MCNC: 30
RBC # BLD: 4.52 M/UL — LOW (ref 4.7–6.1)
RBC # FLD: 12.6 % — SIGNIFICANT CHANGE UP (ref 11.5–14.5)
SODIUM SERPL-SCNC: 137 MMOL/L — SIGNIFICANT CHANGE UP (ref 135–146)
SP GR SPEC: >=1.03 — SIGNIFICANT CHANGE UP (ref 1.01–1.03)
UROBILINOGEN FLD QL: 1 (ref 0.2–0.2)
WBC # BLD: 8.32 K/UL — SIGNIFICANT CHANGE UP (ref 4.8–10.8)
WBC # FLD AUTO: 8.32 K/UL — SIGNIFICANT CHANGE UP (ref 4.8–10.8)

## 2018-06-30 RX ORDER — MORPHINE SULFATE 50 MG/1
4 CAPSULE, EXTENDED RELEASE ORAL ONCE
Qty: 0 | Refills: 0 | Status: DISCONTINUED | OUTPATIENT
Start: 2018-06-30 | End: 2018-06-30

## 2018-06-30 RX ADMIN — MORPHINE SULFATE 4 MILLIGRAM(S): 50 CAPSULE, EXTENDED RELEASE ORAL at 03:28

## 2018-06-30 NOTE — ED PROVIDER NOTE - OBJECTIVE STATEMENT
pt co 1 week ab pain, left sided. worse with  movement. non rad. no n, v, d. no fever or chills. also has mild ha. no vis changes or neuro c/o.

## 2018-06-30 NOTE — ED PROVIDER NOTE - PHYSICAL EXAMINATION
VITAL SIGNS: I have reviewed nursing notes and confirm.  CONSTITUTIONAL: Well-developed; well-nourished; in no acute distress.  SKIN: Skin exam is warm and dry, no acute rash.  HEAD: Normocephalic; atraumatic.  EYES: PERRL, EOM intact; conjunctiva and sclera clear.  ENT: No nasal discharge; airway clear.   NECK: Supple; non tender.  CARD:+ S1, S2   RESP: No wheezes, rales or rhonchi.  ABD: Normal bowel sounds; soft; non-distended; tender LLQ, guarding. no rebound;  EXT: Normal ROM. No cyanosis or edema.  LYMPH: No acute adenopathy.  NEURO: Alert. Grossly unremarkable. No focal deficits.  PSYCH: Cooperative, appropriate.

## 2018-06-30 NOTE — ED ADULT NURSE NOTE - CHPI ED SYMPTOMS NEG
no dysuria/no chills/no burning urination/no vomiting/no fever/no hematuria/no nausea/no abdominal distension/no diarrhea/no blood in stool

## 2018-07-01 LAB
CULTURE RESULTS: SIGNIFICANT CHANGE UP
SPECIMEN SOURCE: SIGNIFICANT CHANGE UP

## 2018-07-11 ENCOUNTER — APPOINTMENT (OUTPATIENT)
Dept: INTERNAL MEDICINE | Facility: CLINIC | Age: 63
End: 2018-07-11

## 2018-09-03 ENCOUNTER — EMERGENCY (EMERGENCY)
Facility: HOSPITAL | Age: 63
LOS: 0 days | Discharge: HOME | End: 2018-09-03
Attending: EMERGENCY MEDICINE | Admitting: EMERGENCY MEDICINE

## 2018-09-03 VITALS
SYSTOLIC BLOOD PRESSURE: 127 MMHG | HEART RATE: 80 BPM | DIASTOLIC BLOOD PRESSURE: 71 MMHG | RESPIRATION RATE: 18 BRPM | WEIGHT: 200.62 LBS | TEMPERATURE: 97 F | OXYGEN SATURATION: 98 %

## 2018-09-03 VITALS
RESPIRATION RATE: 18 BRPM | OXYGEN SATURATION: 99 % | HEART RATE: 72 BPM | SYSTOLIC BLOOD PRESSURE: 116 MMHG | DIASTOLIC BLOOD PRESSURE: 72 MMHG

## 2018-09-03 DIAGNOSIS — E11.9 TYPE 2 DIABETES MELLITUS WITHOUT COMPLICATIONS: ICD-10-CM

## 2018-09-03 DIAGNOSIS — R10.33 PERIUMBILICAL PAIN: ICD-10-CM

## 2018-09-03 DIAGNOSIS — I10 ESSENTIAL (PRIMARY) HYPERTENSION: ICD-10-CM

## 2018-09-03 DIAGNOSIS — Z98.890 OTHER SPECIFIED POSTPROCEDURAL STATES: Chronic | ICD-10-CM

## 2018-09-03 DIAGNOSIS — Z79.52 LONG TERM (CURRENT) USE OF SYSTEMIC STEROIDS: ICD-10-CM

## 2018-09-03 DIAGNOSIS — C61 MALIGNANT NEOPLASM OF PROSTATE: Chronic | ICD-10-CM

## 2018-09-03 DIAGNOSIS — Z79.4 LONG TERM (CURRENT) USE OF INSULIN: ICD-10-CM

## 2018-09-03 DIAGNOSIS — Z79.82 LONG TERM (CURRENT) USE OF ASPIRIN: ICD-10-CM

## 2018-09-03 DIAGNOSIS — R10.9 UNSPECIFIED ABDOMINAL PAIN: ICD-10-CM

## 2018-09-03 DIAGNOSIS — K59.00 CONSTIPATION, UNSPECIFIED: ICD-10-CM

## 2018-09-03 DIAGNOSIS — E78.5 HYPERLIPIDEMIA, UNSPECIFIED: ICD-10-CM

## 2018-09-03 DIAGNOSIS — Z79.84 LONG TERM (CURRENT) USE OF ORAL HYPOGLYCEMIC DRUGS: ICD-10-CM

## 2018-09-03 DIAGNOSIS — Z79.891 LONG TERM (CURRENT) USE OF OPIATE ANALGESIC: ICD-10-CM

## 2018-09-03 DIAGNOSIS — H72.91 UNSPECIFIED PERFORATION OF TYMPANIC MEMBRANE, RIGHT EAR: ICD-10-CM

## 2018-09-03 DIAGNOSIS — Z96.642 PRESENCE OF LEFT ARTIFICIAL HIP JOINT: Chronic | ICD-10-CM

## 2018-09-03 DIAGNOSIS — Z90.49 ACQUIRED ABSENCE OF OTHER SPECIFIED PARTS OF DIGESTIVE TRACT: ICD-10-CM

## 2018-09-03 PROBLEM — M19.90 UNSPECIFIED OSTEOARTHRITIS, UNSPECIFIED SITE: Chronic | Status: ACTIVE | Noted: 2018-04-17

## 2018-09-03 PROBLEM — K85.90 ACUTE PANCREATITIS WITHOUT NECROSIS OR INFECTION, UNSPECIFIED: Chronic | Status: ACTIVE | Noted: 2018-04-17

## 2018-09-03 LAB
ALBUMIN SERPL ELPH-MCNC: 4.4 G/DL — SIGNIFICANT CHANGE UP (ref 3.5–5.2)
ALP SERPL-CCNC: 95 U/L — SIGNIFICANT CHANGE UP (ref 30–115)
ALT FLD-CCNC: 13 U/L — SIGNIFICANT CHANGE UP (ref 0–41)
ANION GAP SERPL CALC-SCNC: 16 MMOL/L — HIGH (ref 7–14)
APPEARANCE UR: CLEAR — SIGNIFICANT CHANGE UP
AST SERPL-CCNC: 15 U/L — SIGNIFICANT CHANGE UP (ref 0–41)
BASE EXCESS BLDV CALC-SCNC: -0.7 MMOL/L — SIGNIFICANT CHANGE UP (ref -2–2)
BASOPHILS # BLD AUTO: 0.03 K/UL — SIGNIFICANT CHANGE UP (ref 0–0.2)
BASOPHILS NFR BLD AUTO: 0.5 % — SIGNIFICANT CHANGE UP (ref 0–1)
BILIRUB DIRECT SERPL-MCNC: <0.2 MG/DL — SIGNIFICANT CHANGE UP (ref 0–0.2)
BILIRUB INDIRECT FLD-MCNC: >0.3 MG/DL — SIGNIFICANT CHANGE UP (ref 0.2–1.2)
BILIRUB SERPL-MCNC: 0.5 MG/DL — SIGNIFICANT CHANGE UP (ref 0.2–1.2)
BILIRUB UR-MCNC: NEGATIVE — SIGNIFICANT CHANGE UP
BLD GP AB SCN SERPL QL: SIGNIFICANT CHANGE UP
BUN SERPL-MCNC: 16 MG/DL — SIGNIFICANT CHANGE UP (ref 10–20)
CA-I SERPL-SCNC: 1.2 MMOL/L — SIGNIFICANT CHANGE UP (ref 1.12–1.3)
CALCIUM SERPL-MCNC: 8.9 MG/DL — SIGNIFICANT CHANGE UP (ref 8.5–10.1)
CHLORIDE SERPL-SCNC: 103 MMOL/L — SIGNIFICANT CHANGE UP (ref 98–110)
CO2 SERPL-SCNC: 21 MMOL/L — SIGNIFICANT CHANGE UP (ref 17–32)
COLOR SPEC: YELLOW — SIGNIFICANT CHANGE UP
CREAT SERPL-MCNC: 0.6 MG/DL — LOW (ref 0.7–1.5)
DIFF PNL FLD: NEGATIVE — SIGNIFICANT CHANGE UP
EOSINOPHIL # BLD AUTO: 0.38 K/UL — SIGNIFICANT CHANGE UP (ref 0–0.7)
EOSINOPHIL NFR BLD AUTO: 6 % — SIGNIFICANT CHANGE UP (ref 0–8)
GAS PNL BLDV: 141 MMOL/L — SIGNIFICANT CHANGE UP (ref 136–145)
GAS PNL BLDV: SIGNIFICANT CHANGE UP
GLUCOSE SERPL-MCNC: 99 MG/DL — SIGNIFICANT CHANGE UP (ref 70–99)
GLUCOSE UR QL: NEGATIVE — SIGNIFICANT CHANGE UP
HCO3 BLDV-SCNC: 26 MMOL/L — SIGNIFICANT CHANGE UP (ref 22–29)
HCT VFR BLD CALC: 37.5 % — LOW (ref 42–52)
HCT VFR BLDA CALC: 40.4 % — SIGNIFICANT CHANGE UP (ref 34–44)
HGB BLD CALC-MCNC: 13.2 G/DL — LOW (ref 14–18)
HGB BLD-MCNC: 12.5 G/DL — LOW (ref 14–18)
IMM GRANULOCYTES NFR BLD AUTO: 0.3 % — SIGNIFICANT CHANGE UP (ref 0.1–0.3)
INR BLD: 1.11 RATIO — SIGNIFICANT CHANGE UP (ref 0.65–1.3)
KETONES UR-MCNC: NEGATIVE — SIGNIFICANT CHANGE UP
LACTATE BLDV-MCNC: 2.5 MMOL/L — HIGH (ref 0.5–1.6)
LEUKOCYTE ESTERASE UR-ACNC: NEGATIVE — SIGNIFICANT CHANGE UP
LIDOCAIN IGE QN: 14 U/L — SIGNIFICANT CHANGE UP (ref 7–60)
LYMPHOCYTES # BLD AUTO: 1.18 K/UL — LOW (ref 1.2–3.4)
LYMPHOCYTES # BLD AUTO: 18.6 % — LOW (ref 20.5–51.1)
MCHC RBC-ENTMCNC: 28.9 PG — SIGNIFICANT CHANGE UP (ref 27–31)
MCHC RBC-ENTMCNC: 33.3 G/DL — SIGNIFICANT CHANGE UP (ref 32–37)
MCV RBC AUTO: 86.8 FL — SIGNIFICANT CHANGE UP (ref 80–94)
MONOCYTES # BLD AUTO: 0.56 K/UL — SIGNIFICANT CHANGE UP (ref 0.1–0.6)
MONOCYTES NFR BLD AUTO: 8.8 % — SIGNIFICANT CHANGE UP (ref 1.7–9.3)
NEUTROPHILS # BLD AUTO: 4.18 K/UL — SIGNIFICANT CHANGE UP (ref 1.4–6.5)
NEUTROPHILS NFR BLD AUTO: 65.8 % — SIGNIFICANT CHANGE UP (ref 42.2–75.2)
NITRITE UR-MCNC: NEGATIVE — SIGNIFICANT CHANGE UP
NRBC # BLD: 0 /100 WBCS — SIGNIFICANT CHANGE UP (ref 0–0)
PCO2 BLDV: 47 MMHG — SIGNIFICANT CHANGE UP (ref 41–51)
PH BLDV: 7.34 — SIGNIFICANT CHANGE UP (ref 7.26–7.43)
PH UR: 6 — SIGNIFICANT CHANGE UP (ref 5–8)
PLATELET # BLD AUTO: 200 K/UL — SIGNIFICANT CHANGE UP (ref 130–400)
PO2 BLDV: 53 MMHG — HIGH (ref 20–40)
POTASSIUM BLDV-SCNC: 4.1 MMOL/L — SIGNIFICANT CHANGE UP (ref 3.3–5.6)
POTASSIUM SERPL-MCNC: 4.3 MMOL/L — SIGNIFICANT CHANGE UP (ref 3.5–5)
POTASSIUM SERPL-SCNC: 4.3 MMOL/L — SIGNIFICANT CHANGE UP (ref 3.5–5)
PROT SERPL-MCNC: 7 G/DL — SIGNIFICANT CHANGE UP (ref 6–8)
PROT UR-MCNC: ABNORMAL
PROTHROM AB SERPL-ACNC: 12 SEC — SIGNIFICANT CHANGE UP (ref 9.95–12.87)
RBC # BLD: 4.32 M/UL — LOW (ref 4.7–6.1)
RBC # FLD: 13 % — SIGNIFICANT CHANGE UP (ref 11.5–14.5)
SAO2 % BLDV: 84 % — SIGNIFICANT CHANGE UP
SODIUM SERPL-SCNC: 140 MMOL/L — SIGNIFICANT CHANGE UP (ref 135–146)
SP GR SPEC: >=1.03 — SIGNIFICANT CHANGE UP (ref 1.01–1.03)
TYPE + AB SCN PNL BLD: SIGNIFICANT CHANGE UP
UROBILINOGEN FLD QL: 1 (ref 0.2–0.2)
WBC # BLD: 6.35 K/UL — SIGNIFICANT CHANGE UP (ref 4.8–10.8)
WBC # FLD AUTO: 6.35 K/UL — SIGNIFICANT CHANGE UP (ref 4.8–10.8)

## 2018-09-03 RX ORDER — DOCUSATE SODIUM 100 MG
1 CAPSULE ORAL
Qty: 60 | Refills: 0 | OUTPATIENT
Start: 2018-09-03

## 2018-09-03 RX ORDER — ONDANSETRON 8 MG/1
4 TABLET, FILM COATED ORAL ONCE
Qty: 0 | Refills: 0 | Status: COMPLETED | OUTPATIENT
Start: 2018-09-03 | End: 2018-09-03

## 2018-09-03 RX ORDER — POLYETHYLENE GLYCOL 3350 17 G/17G
17 POWDER, FOR SOLUTION ORAL
Qty: 1 | Refills: 0 | OUTPATIENT
Start: 2018-09-03

## 2018-09-03 RX ORDER — SENNA PLUS 8.6 MG/1
1 TABLET ORAL
Qty: 60 | Refills: 0 | OUTPATIENT
Start: 2018-09-03

## 2018-09-03 RX ORDER — IBUPROFEN 200 MG
3 TABLET ORAL
Qty: 60 | Refills: 0 | OUTPATIENT
Start: 2018-09-03

## 2018-09-03 RX ORDER — IOHEXOL 300 MG/ML
30 INJECTION, SOLUTION INTRAVENOUS ONCE
Qty: 0 | Refills: 0 | Status: COMPLETED | OUTPATIENT
Start: 2018-09-03 | End: 2018-09-03

## 2018-09-03 RX ORDER — SODIUM CHLORIDE 9 MG/ML
1000 INJECTION INTRAMUSCULAR; INTRAVENOUS; SUBCUTANEOUS ONCE
Qty: 0 | Refills: 0 | Status: COMPLETED | OUTPATIENT
Start: 2018-09-03 | End: 2018-09-03

## 2018-09-03 RX ORDER — SODIUM CHLORIDE 9 MG/ML
3 INJECTION INTRAMUSCULAR; INTRAVENOUS; SUBCUTANEOUS ONCE
Qty: 0 | Refills: 0 | Status: COMPLETED | OUTPATIENT
Start: 2018-09-03 | End: 2018-09-03

## 2018-09-03 RX ORDER — KETOROLAC TROMETHAMINE 30 MG/ML
15 SYRINGE (ML) INJECTION ONCE
Qty: 0 | Refills: 0 | Status: DISCONTINUED | OUTPATIENT
Start: 2018-09-03 | End: 2018-09-03

## 2018-09-03 RX ORDER — OFLOXACIN OTIC SOLUTION 3 MG/ML
2 SOLUTION/ DROPS AURICULAR (OTIC)
Qty: 1 | Refills: 0 | OUTPATIENT
Start: 2018-09-03 | End: 2018-09-09

## 2018-09-03 RX ADMIN — SODIUM CHLORIDE 1000 MILLILITER(S): 9 INJECTION INTRAMUSCULAR; INTRAVENOUS; SUBCUTANEOUS at 13:43

## 2018-09-03 RX ADMIN — IOHEXOL 30 MILLILITER(S): 300 INJECTION, SOLUTION INTRAVENOUS at 14:18

## 2018-09-03 RX ADMIN — SODIUM CHLORIDE 1000 MILLILITER(S): 9 INJECTION INTRAMUSCULAR; INTRAVENOUS; SUBCUTANEOUS at 14:32

## 2018-09-03 RX ADMIN — ONDANSETRON 4 MILLIGRAM(S): 8 TABLET, FILM COATED ORAL at 14:10

## 2018-09-03 RX ADMIN — Medication 15 MILLIGRAM(S): at 14:10

## 2018-09-03 RX ADMIN — SODIUM CHLORIDE 3 MILLILITER(S): 9 INJECTION INTRAMUSCULAR; INTRAVENOUS; SUBCUTANEOUS at 13:42

## 2018-09-03 RX ADMIN — Medication 15 MILLIGRAM(S): at 00:00

## 2018-09-03 NOTE — ED PROVIDER NOTE - CARE PLAN
Principal Discharge DX:	Constipation, unspecified constipation type  Secondary Diagnosis:	Ruptured eardrum, right

## 2018-09-03 NOTE — ED ADULT TRIAGE NOTE - CHIEF COMPLAINT QUOTE
c/o nausea last night, had one episode of bloody stool today and abdominal pain, also c/o right ear pain

## 2018-09-03 NOTE — ED ADULT NURSE NOTE - NSIMPLEMENTINTERV_GEN_ALL_ED
Implemented All Universal Safety Interventions:  [place] to call system. Call bell, personal items and telephone within reach. Instruct patient to call for assistance. Room bathroom lighting operational. Non-slip footwear when patient is off stretcher. Physically safe environment: no spills, clutter or unnecessary equipment. Stretcher in lowest position, wheels locked, appropriate side rails in place.

## 2018-09-03 NOTE — ED PROVIDER NOTE - OBJECTIVE STATEMENT
This is a 62yoM who presents for abdominal pain x 3-4d. Pt has hx constipation, occasionally taking dulcolax, but has noticed worsening periumbilical abd pain, associated mild nausea, bloating and constipation. Last bowel movement 3-4d ago.  +straining with occasional blood tinge when wipe.  No vomiting.  Still tolerating PO, though less so today.

## 2018-09-03 NOTE — ED PROVIDER NOTE - PROGRESS NOTE DETAILS
Pt drinking contrast w/o distress. Labs notable for lactate 2.5. No leukocytosis or severe anemia. UA neg. Awaiting CMP and CT. Pt reports improved pain after toradol.  CT w/o obstruction and with moderate stool burden.  Pt tolerating PO.  Labs reassuring.  Examined ears at pt request due to persistent pain and dec hearing in R ear.  Concern for ruptured TM on exam along with some cerumen. No purulent drainage. No sig erythema. Will prescribe ofloxacin gtt and referral to ENT along w/ bowel regimen.

## 2018-09-03 NOTE — ED PROVIDER NOTE - NS ED ROS FT
Constitutional: no fevers/chills, no sick contacts  Eyes: No visual changes, eye pain or discharge. No photophobia  ENMT: No hearing changes, pain, discharge or infections. No sore throat or drooling.  Neck:  No neck pain or stiffness. No limited ROM  Cardiac: No SOB or edema. No chest pain with exertion.  Respiratory: No cough or respiratory distress. No hemoptysis. No history of asthma or RAD  GI: + nausea, no vomiting, diarrhea, + abdominal pain  : No dysuria, frequency or burning. No discharge  MS: No myalgia, muscle weakness, joint pain or back pain  Neuro: No headache or weakness. No LOC  Skin: No skin rash  Endo: + diabetes, no thyroid dysfunction  Heme: + occ spotting w/ straining, no hx abnormal bleeding/clotting  Except as documented in the HPI, all other systems are negative

## 2018-09-03 NOTE — ED PROVIDER NOTE - PHYSICAL EXAMINATION
CONSTITUTIONAL: well developed; well nourished; well appearing in no acute distress  HEAD: normocephalic; atraumatic  EYES: PERRL, no conjunctival injection, no scleral icterus  ENT: no nasal discharge; airway clear.  NECK: supple; non tender. + full passive ROM in all directions  CARD: S1, S2 normal; no murmurs, gallops, or rubs. Regular rate and rhythm  RESP: no wheezes, rales or rhonchi. Good air movement bilaterally without significant accessory muscle use  ABD: soft; mildly distended; mild periumbilical discomfort, no RLQ tenderness, no rebound, no guarding, no pulsatile abdominal mass  EXT: moving all extremities spontaneously, normal ROM. No clubbing, cyanosis or edema  SKIN: warm and dry, no lesions noted  NEURO: alert, oriented, CN II-XII grossly intact, motor and sensory grossly intact, speech nonslurred, stable gait, no focal deficits. GCS 15  PSYCH: calm, cooperative, appropriate, good eye contact, logical thought process, no apparent danger to self or others

## 2018-10-12 ENCOUNTER — EMERGENCY (EMERGENCY)
Facility: HOSPITAL | Age: 63
LOS: 0 days | Discharge: HOME | End: 2018-10-12
Attending: EMERGENCY MEDICINE | Admitting: EMERGENCY MEDICINE

## 2018-10-12 VITALS
OXYGEN SATURATION: 97 % | RESPIRATION RATE: 18 BRPM | HEART RATE: 81 BPM | SYSTOLIC BLOOD PRESSURE: 132 MMHG | DIASTOLIC BLOOD PRESSURE: 69 MMHG | WEIGHT: 202.83 LBS | TEMPERATURE: 97 F

## 2018-10-12 DIAGNOSIS — Z79.82 LONG TERM (CURRENT) USE OF ASPIRIN: ICD-10-CM

## 2018-10-12 DIAGNOSIS — E11.9 TYPE 2 DIABETES MELLITUS WITHOUT COMPLICATIONS: ICD-10-CM

## 2018-10-12 DIAGNOSIS — I10 ESSENTIAL (PRIMARY) HYPERTENSION: ICD-10-CM

## 2018-10-12 DIAGNOSIS — Z79.84 LONG TERM (CURRENT) USE OF ORAL HYPOGLYCEMIC DRUGS: ICD-10-CM

## 2018-10-12 DIAGNOSIS — R31.9 HEMATURIA, UNSPECIFIED: ICD-10-CM

## 2018-10-12 DIAGNOSIS — C61 MALIGNANT NEOPLASM OF PROSTATE: Chronic | ICD-10-CM

## 2018-10-12 DIAGNOSIS — E78.5 HYPERLIPIDEMIA, UNSPECIFIED: ICD-10-CM

## 2018-10-12 DIAGNOSIS — Z98.890 OTHER SPECIFIED POSTPROCEDURAL STATES: Chronic | ICD-10-CM

## 2018-10-12 DIAGNOSIS — Z96.642 PRESENCE OF LEFT ARTIFICIAL HIP JOINT: Chronic | ICD-10-CM

## 2018-10-12 DIAGNOSIS — Z79.1 LONG TERM (CURRENT) USE OF NON-STEROIDAL ANTI-INFLAMMATORIES (NSAID): ICD-10-CM

## 2018-10-12 DIAGNOSIS — Z79.899 OTHER LONG TERM (CURRENT) DRUG THERAPY: ICD-10-CM

## 2018-10-12 DIAGNOSIS — Z79.2 LONG TERM (CURRENT) USE OF ANTIBIOTICS: ICD-10-CM

## 2018-10-12 LAB
APPEARANCE UR: ABNORMAL
BILIRUB UR-MCNC: ABNORMAL
COLOR SPEC: ABNORMAL
DIFF PNL FLD: ABNORMAL
GLUCOSE UR QL: 100 MG/DL
KETONES UR-MCNC: ABNORMAL
LEUKOCYTE ESTERASE UR-ACNC: ABNORMAL
NITRITE UR-MCNC: NEGATIVE — SIGNIFICANT CHANGE UP
PH UR: 7 — SIGNIFICANT CHANGE UP (ref 5–8)
PROT UR-MCNC: 30
RBC CASTS # UR COMP ASSIST: >50 /HPF
SP GR SPEC: >=1.03 — SIGNIFICANT CHANGE UP (ref 1.01–1.03)
UROBILINOGEN FLD QL: 1 (ref 0.2–0.2)
WBC UR QL: SIGNIFICANT CHANGE UP /HPF

## 2018-10-12 NOTE — ED PROVIDER NOTE - PHYSICAL EXAMINATION
CONSTITUTIONAL: Well-developed; well-nourished; in no acute distress.   SKIN: warm, dry  HEAD: Normocephalic; atraumatic.  EYES: normal sclera and conjunctiva   ENT: No nasal discharge; airway clear.  NECK: Supple; non tender.  CARD: S1, S2 normal; no murmurs, gallops, or rubs. Regular rate and rhythm.   RESP: No wheezes, rales or rhonchi.  ABD: soft ntnd, no cva tenderness.   EXT: Normal ROM.  No clubbing, cyanosis or edema.   LYMPH: No acute cervical adenopathy.  NEURO: Alert, oriented, grossly unremarkable  PSYCH: Cooperative, appropriate.

## 2018-10-12 NOTE — ED PROVIDER NOTE - CARE PROVIDER_API CALL
Jean López), Urology  [address] [place] [address]  [address] [place] [address]  Phone: [phone]  Fax: [phone]

## 2018-10-12 NOTE — ED PROVIDER NOTE - ATTENDING CONTRIBUTION TO CARE
pt w hx prostate ca, s/p seeding, c/o painless hematuria. 1 episode pta. no sx of anemia. no pain, no fever, chills. not on ac.  had several neg CTs of ab/pelvis this year, latest 1 month ago. pt in nad, pink conj, neck sup, ctab, rrr, ab soft, nt, nd. penis nml. no cvat. no rash. no bruising or bleeding. will check urine.

## 2018-10-12 NOTE — ED ADULT NURSE NOTE - NSIMPLEMENTINTERV_GEN_ALL_ED
Implemented All Universal Safety Interventions:  [place] to call system. Call bell, personal items and telephone within reach. Instruct patient to call for assistance. Room bathroom lighting operational. Non-slip footwear when patient is off stretcher. Physically safe environment: no spills, clutter or unnecessary equipment. Stretcher in lowest position, wheels locked, appropriate side rails in place.

## 2018-10-12 NOTE — ED ADULT NURSE NOTE - OBJECTIVE STATEMENT
Pt c/o two episodes of hematuria since last night. Pt states it was dark red blood, denies any clots. Denies dysuria/fever/chills/nausea/vomiting/diarrhea.

## 2018-10-12 NOTE — ED PROVIDER NOTE - NS ED ROS FT
Eyes:  No visual changes, eye pain or discharge.  ENMT:  No hearing changes, pain, discharge or infections. No neck pain or stiffness.  Cardiac:  No chest pain, SOB or edema.   Respiratory:  No cough or respiratory distress.   GI:  No nausea, vomiting, diarrhea or abdominal pain.  :  Hematuria. No dysuria, frequency or burning.  MS:  No myalgia, muscle weakness, joint pain or back pain.  Neuro:  No headache or weakness.  No LOC.  Skin:  No skin rash.   Endocrine: No history of thyroid disease. DM.

## 2018-10-12 NOTE — ED PROVIDER NOTE - PROGRESS NOTE DETAILS
Discussed results with patient. Given return precautions. Instructed patient to call Dr. López and try and make an appointment for today. WIll give copy of results.

## 2018-10-12 NOTE — ED PROVIDER NOTE - CARE PROVIDERS DIRECT ADDRESSES
[email or web address]

## 2018-10-23 ENCOUNTER — APPOINTMENT (OUTPATIENT)
Dept: INTERNAL MEDICINE | Facility: CLINIC | Age: 63
End: 2018-10-23

## 2018-11-11 NOTE — DISCHARGE NOTE ADULT - THE PATIENT HAS
Admitted:     11/10/2018      PRIMARY CARE PHYSICIAN:   Porfirio Terrell MD      Information obtained mostly from the ER physician, Epic chart, some from the patient's daughter and son who were present at the bedside, and some from the patient as he is a poor historian.      CHIEF COMPLAINT:  Tremors.      HISTORY OF PRESENT ILLNESS:  Mr. Edison Boss is a 89-year-old gentleman with a complex medical history including prostate cancer, nephrolithiasis, urinary retention (has an indwelling Christianson catheter) who recently underwent lithotripsy with a redo cystoscopy with double-J stent exchange on 10/30.  He also has a history of a CVA, paroxysmal atrial fibrillation, status post permanent pacemaker, hypertension, dyslipidemia, chronic pain, ventral hernia with gastroesophageal reflux disease, restless leg syndrome, and cognitive impairment.  [facility] the [facility] intermitt[facility] for the past 2 months.  He presented to the emergency room on 11/08 complaining of right leg feeling heavy and weak and that it first started over 6 weeks ago.  He was evaluated with a CT of the head that was negative, and after a negative examination he was sent back to the TCU.  He comes back today with complaints of right lower leg and arm tremors.  These first started about a week ago and have been more frequent and intense causing them to seek further attention.  The patient has not been able to ambulate since last being seen on 11/08, and is mostly now wheelchair bound.  Prior to that, he was using a walker.  Denies any trauma, similar complaints, or previous history of seizures.  The patient is unable to tell me if there is any numbness, but clearly feels he cannot move that leg.      PAST MEDICAL HISTORY:   1.  Dyslipidemia.   2.  Hypertension.   3.  Paroxysmal atrial fib, status post permanent pacemaker.   4.  CVA.   5.  Cognitive impairment.   6.  Prostate cancer.   7.  Urinary retention, has an indwelling Christianson catheter.   8.   Nephrolithiasis status post recent left extracorporeal lithotripsy, redo cystoscopy, and double-J stent exchange on 10/30.   9.  Chronic indwelling Christianson catheter.   10.  Chronic pain.   11.  Ventral hernia.   12.  Restless leg syndrome.   13.  Mention of sleep apnea, but not on CPAP.   14.  Gastroesophageal reflux disease.      PAST SURGICAL HISTORY:   1.  Ventral hernia repair.   2.  Prostate surgery.   3.  Lithotripsy.   4.  Bilateral cataract extraction.   5.  TURP.   6.  Left rotator cuff repair.      SOCIAL HISTORY:  He is .  His wife just passed away earlier this year.  He has 8 children.He has currently lived at a TCU on and off for the past 2 months.  He was a former smoker, quit about 45 years ago.  Denies alcohol use.      CODE STATUS:  DNR/DNI.      HOME MEDICATIONS:   1.  Tylenol.   2.  Amlodipine 5 mg daily.   3.  Aspirin 81 mg daily.   4.  Lidocaine cream.   5.  Medrol Dosepak that he finished earlier this morning.   6.  Metoprolol XL 50 mg b.i.d.   7.  Multivitamin daily.   8.  Omeprazole 20 mg twice a day.   9.  Paxil 30 mg half a tablet at bedtime.   10.  Albuterol inhaler 2 puffs every 6 p.r.n.   11.  Temovate ointment p.r.n.   12.  Senna 1 tablet b.i.d.    13.  B and O 30 mg rectally twice a day as needed.      ALLERGIES:  HE IS ALLERGIC TO CEFTAZIDINE, CEFTRIAZONE, BACTRIM, ZITHROMAXZ, LEVAQUIN, AND MACRODANTIN.       PHYSICAL EXAMINATION:   GENERAL:  He is an elderly gentleman, well-built, well-nourished, in no acute respiratory distress.  He is awake, alert, oriented to place and person.  Speech is coherent.   NECK:  Supple without any carotid bruits or thyromegaly.   EYES:  Anicteric, PERRLA, EOMI.   ENT:  Clear.   LUNGS:  Clear to auscultation with no rhonchi, rales or wheezing.   HEART:  Regular S1, S2, with no murmurs, gallops or rubs.   ABDOMEN:  Nondistended, nontender, soft.  Bowel sounds present.   EXTREMITIES:  Without any significant pitting edema.  There is a small abrasion  over his left knee.  He has a leg bag with a Christianson catheter on his left leg.   NEUROLOGIC:  He is awake, alert, oriented x2.  Speech coherent.  Unable to move his right leg and right foot.  There is decreased sensation on his right lower leg compared to the left.  Gait not tested.      LABORATORY DATA:   Normal electrolytes.  Blood sugar 115.  Hemoglobin 13.1.  White count 10.6.      IMPRESSION AND PLAN:  Mr. Edison Boss is a 89-year-old gentleman with a complex medical history who presented to the emergency room a couple days ago with complaint of a several week history of right leg heaviness, weakness, and now presented again to the emergency room today with complaints of recurrent tremor/possible seizure involving the right lower extremity radiating to the right upper extremity, lasting less than 1 minute   1.  Right lower extremity and right upper extremity jerking, unclear etiology, question if this is in fact a seizure versus cervical spine impingement disorder.  Neurology was consulted by the ER physician and has recommended CT of his C-spine.  We will also get a CT of his head to rule out a CVA.  The patient will be admitted.  He will be n.p.o. until CT is done and if negative, get a bedside swallow and advance diet.  Otherwise, continue tele with seizure precautions and Neurology consult requested.  PT, OT consult.   2.  Paroxysmal atrial fibrillation, status post pacemaker.  EKG shows AV dual paced rhythm.  Continue prior to admission aspirin and metoprolol, and continue tele.   3.  History of nephrolithiasis, status post recent lithotripsy, redo cystoscopy and double-J stent exchange, and has an indwelling Christianson catheter.  Plan followup with Urology as previously planned.   3.  Hypertension, maintained on amlodipine 5 mg daily and metoprolol 50 twice a day.  We will continue metoprolol but hold amlodipine for now.  Resume if CT of the head is negative for stroke, allowing for permissive hypertension.    3.  Gastroesophageal reflux disease.  Continue prior to admission PPI.   4.  Gait impairment/deconditioned.  Question if there is other etiology as mentioned in problem #1.  PT, OT will be involved.   5.  Deep venous thrombosis prophylaxis with SCDs.      CODE STATUS:  DNR/DNI as discussed with the patient and family present.      DISPOSITION:  The patient came from [facility] where the family has given up his bed.  The patient will be admitted as an inpatient as he requires more than 2 midnight stays.  PT, OT, and a  will be consulted for placement planning.         FRED ELLISON MD             D: 11/10/2018   T: 11/10/2018   MT: ORESTES      Name:     JOSELIN VAN   MRN:      [id number]        Account:      [id number]   :      10/19/1924        Admitted:     11/10/2018                   Document: [id number]       no difficulties

## 2018-12-01 ENCOUNTER — EMERGENCY (EMERGENCY)
Facility: HOSPITAL | Age: 63
LOS: 0 days | Discharge: HOME | End: 2018-12-01
Attending: STUDENT IN AN ORGANIZED HEALTH CARE EDUCATION/TRAINING PROGRAM | Admitting: STUDENT IN AN ORGANIZED HEALTH CARE EDUCATION/TRAINING PROGRAM

## 2018-12-01 VITALS
WEIGHT: 205.25 LBS | DIASTOLIC BLOOD PRESSURE: 74 MMHG | HEIGHT: 67 IN | HEART RATE: 77 BPM | OXYGEN SATURATION: 97 % | TEMPERATURE: 96 F | RESPIRATION RATE: 20 BRPM | SYSTOLIC BLOOD PRESSURE: 119 MMHG

## 2018-12-01 VITALS
DIASTOLIC BLOOD PRESSURE: 75 MMHG | HEART RATE: 72 BPM | RESPIRATION RATE: 18 BRPM | OXYGEN SATURATION: 98 % | SYSTOLIC BLOOD PRESSURE: 120 MMHG | TEMPERATURE: 97 F

## 2018-12-01 DIAGNOSIS — E78.00 PURE HYPERCHOLESTEROLEMIA, UNSPECIFIED: ICD-10-CM

## 2018-12-01 DIAGNOSIS — R10.13 EPIGASTRIC PAIN: ICD-10-CM

## 2018-12-01 DIAGNOSIS — Z85.46 PERSONAL HISTORY OF MALIGNANT NEOPLASM OF PROSTATE: ICD-10-CM

## 2018-12-01 DIAGNOSIS — S76.002A UNSPECIFIED INJURY OF MUSCLE, FASCIA AND TENDON OF LEFT HIP, INITIAL ENCOUNTER: ICD-10-CM

## 2018-12-01 DIAGNOSIS — Z98.890 OTHER SPECIFIED POSTPROCEDURAL STATES: Chronic | ICD-10-CM
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
## 2018-12-01 DIAGNOSIS — Z90.49 ACQUIRED ABSENCE OF OTHER SPECIFIED PARTS OF DIGESTIVE TRACT: ICD-10-CM
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
## 2018-12-01 DIAGNOSIS — Z96.642 PRESENCE OF LEFT ARTIFICIAL HIP JOINT: Chronic | ICD-10-CM

## 2018-12-01 DIAGNOSIS — C61 MALIGNANT NEOPLASM OF PROSTATE: Chronic | ICD-10-CM
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
## 2018-12-01 DIAGNOSIS — E11.9 TYPE 2 DIABETES MELLITUS WITHOUT COMPLICATIONS: ICD-10-CM

## 2018-12-01 DIAGNOSIS — R10.9 UNSPECIFIED ABDOMINAL PAIN: ICD-10-CM

## 2018-12-01 DIAGNOSIS — I10 ESSENTIAL (PRIMARY) HYPERTENSION: ICD-10-CM

## 2018-12-01 LAB
ALBUMIN SERPL ELPH-MCNC: 4.6 G/DL — SIGNIFICANT CHANGE UP (ref 3.5–5.2)
ALP SERPL-CCNC: 103 U/L — SIGNIFICANT CHANGE UP (ref 30–115)
ALT FLD-CCNC: 19 U/L — SIGNIFICANT CHANGE UP (ref 0–41)
ANION GAP SERPL CALC-SCNC: 14 MMOL/L — SIGNIFICANT CHANGE UP (ref 7–14)
APPEARANCE UR: CLEAR — SIGNIFICANT CHANGE UP
AST SERPL-CCNC: 48 U/L — HIGH (ref 0–41)
BASE EXCESS BLDV CALC-SCNC: 1.8 MMOL/L — SIGNIFICANT CHANGE UP (ref -2–2)
BASOPHILS # BLD AUTO: 0.05 K/UL — SIGNIFICANT CHANGE UP (ref 0–0.2)
BASOPHILS NFR BLD AUTO: 0.7 % — SIGNIFICANT CHANGE UP (ref 0–1)
BILIRUB SERPL-MCNC: 1.2 MG/DL — SIGNIFICANT CHANGE UP (ref 0.2–1.2)
BILIRUB UR-MCNC: NEGATIVE — SIGNIFICANT CHANGE UP
BUN SERPL-MCNC: 12 MG/DL — SIGNIFICANT CHANGE UP (ref 10–20)
CA-I SERPL-SCNC: 1.21 MMOL/L — SIGNIFICANT CHANGE UP (ref 1.12–1.3)
CALCIUM SERPL-MCNC: 9.5 MG/DL — SIGNIFICANT CHANGE UP (ref 8.5–10.1)
CHLORIDE SERPL-SCNC: 98 MMOL/L — SIGNIFICANT CHANGE UP (ref 98–110)
CO2 SERPL-SCNC: 22 MMOL/L — SIGNIFICANT CHANGE UP (ref 17–32)
COLOR SPEC: YELLOW — SIGNIFICANT CHANGE UP
CREAT SERPL-MCNC: 0.7 MG/DL — SIGNIFICANT CHANGE UP (ref 0.7–1.5)
DIFF PNL FLD: NEGATIVE — SIGNIFICANT CHANGE UP
EOSINOPHIL # BLD AUTO: 0.39 K/UL — SIGNIFICANT CHANGE UP (ref 0–0.7)
EOSINOPHIL NFR BLD AUTO: 5.2 % — SIGNIFICANT CHANGE UP (ref 0–8)
GAS PNL BLDV: 137 MMOL/L — SIGNIFICANT CHANGE UP (ref 136–145)
GAS PNL BLDV: SIGNIFICANT CHANGE UP
GLUCOSE SERPL-MCNC: 116 MG/DL — HIGH (ref 70–99)
GLUCOSE UR QL: NEGATIVE MG/DL — SIGNIFICANT CHANGE UP
HCO3 BLDV-SCNC: 27 MMOL/L — SIGNIFICANT CHANGE UP (ref 22–29)
HCT VFR BLD CALC: 41.6 % — LOW (ref 42–52)
HCT VFR BLDA CALC: 42 % — SIGNIFICANT CHANGE UP (ref 34–44)
HGB BLD CALC-MCNC: 14 G/DL — SIGNIFICANT CHANGE UP (ref 14–18)
HGB BLD-MCNC: 13.9 G/DL — LOW (ref 14–18)
IMM GRANULOCYTES NFR BLD AUTO: 0.5 % — HIGH (ref 0.1–0.3)
KETONES UR-MCNC: NEGATIVE — SIGNIFICANT CHANGE UP
LACTATE BLDV-MCNC: 1.3 MMOL/L — SIGNIFICANT CHANGE UP (ref 0.5–1.6)
LEUKOCYTE ESTERASE UR-ACNC: NEGATIVE — SIGNIFICANT CHANGE UP
LIDOCAIN IGE QN: 27 U/L — SIGNIFICANT CHANGE UP (ref 7–60)
LYMPHOCYTES # BLD AUTO: 1.42 K/UL — SIGNIFICANT CHANGE UP (ref 1.2–3.4)
LYMPHOCYTES # BLD AUTO: 19 % — LOW (ref 20.5–51.1)
MCHC RBC-ENTMCNC: 28.3 PG — SIGNIFICANT CHANGE UP (ref 27–31)
MCHC RBC-ENTMCNC: 33.4 G/DL — SIGNIFICANT CHANGE UP (ref 32–37)
MCV RBC AUTO: 84.7 FL — SIGNIFICANT CHANGE UP (ref 80–94)
MONOCYTES # BLD AUTO: 0.69 K/UL — HIGH (ref 0.1–0.6)
MONOCYTES NFR BLD AUTO: 9.2 % — SIGNIFICANT CHANGE UP (ref 1.7–9.3)
NEUTROPHILS # BLD AUTO: 4.89 K/UL — SIGNIFICANT CHANGE UP (ref 1.4–6.5)
NEUTROPHILS NFR BLD AUTO: 65.4 % — SIGNIFICANT CHANGE UP (ref 42.2–75.2)
NITRITE UR-MCNC: NEGATIVE — SIGNIFICANT CHANGE UP
NRBC # BLD: 0 /100 WBCS — SIGNIFICANT CHANGE UP (ref 0–0)
PCO2 BLDV: 42 MMHG — SIGNIFICANT CHANGE UP (ref 41–51)
PH BLDV: 7.41 — SIGNIFICANT CHANGE UP (ref 7.26–7.43)
PH UR: 6.5 — SIGNIFICANT CHANGE UP (ref 5–8)
PLATELET # BLD AUTO: 241 K/UL — SIGNIFICANT CHANGE UP (ref 130–400)
PO2 BLDV: 67 MMHG — HIGH (ref 20–40)
POTASSIUM BLDV-SCNC: 3.9 MMOL/L — SIGNIFICANT CHANGE UP (ref 3.3–5.6)
POTASSIUM SERPL-MCNC: 8 MMOL/L — CRITICAL HIGH (ref 3.5–5)
POTASSIUM SERPL-SCNC: 8 MMOL/L — CRITICAL HIGH (ref 3.5–5)
PROT SERPL-MCNC: 8.4 G/DL — HIGH (ref 6–8)
PROT UR-MCNC: NEGATIVE MG/DL — SIGNIFICANT CHANGE UP
RBC # BLD: 4.91 M/UL — SIGNIFICANT CHANGE UP (ref 4.7–6.1)
RBC # FLD: 12.8 % — SIGNIFICANT CHANGE UP (ref 11.5–14.5)
SAO2 % BLDV: 92 % — SIGNIFICANT CHANGE UP
SODIUM SERPL-SCNC: 134 MMOL/L — LOW (ref 135–146)
SP GR SPEC: 1.02 — SIGNIFICANT CHANGE UP (ref 1.01–1.03)
TROPONIN T SERPL-MCNC: <0.01 NG/ML — SIGNIFICANT CHANGE UP
UROBILINOGEN FLD QL: 1 MG/DL (ref 0.2–0.2)
WBC # BLD: 7.48 K/UL — SIGNIFICANT CHANGE UP (ref 4.8–10.8)
WBC # FLD AUTO: 7.48 K/UL — SIGNIFICANT CHANGE UP (ref 4.8–10.8)

## 2018-12-01 RX ORDER — METHOCARBAMOL 500 MG/1
2 TABLET, FILM COATED ORAL
Qty: 28 | Refills: 0 | OUTPATIENT
Start: 2018-12-01 | End: 2018-12-07

## 2018-12-01 RX ORDER — KETOROLAC TROMETHAMINE 30 MG/ML
30 SYRINGE (ML) INJECTION ONCE
Qty: 0 | Refills: 0 | Status: DISCONTINUED | OUTPATIENT
Start: 2018-12-01 | End: 2018-12-01

## 2018-12-01 RX ORDER — METHOCARBAMOL 500 MG/1
1000 TABLET, FILM COATED ORAL ONCE
Qty: 0 | Refills: 0 | Status: COMPLETED | OUTPATIENT
Start: 2018-12-01 | End: 2018-12-01

## 2018-12-01 RX ORDER — IBUPROFEN 200 MG
1 TABLET ORAL
Qty: 42 | Refills: 0 | OUTPATIENT
Start: 2018-12-01 | End: 2018-12-14

## 2018-12-01 RX ORDER — ACETAMINOPHEN 500 MG
975 TABLET ORAL ONCE
Qty: 0 | Refills: 0 | Status: COMPLETED | OUTPATIENT
Start: 2018-12-01 | End: 2018-12-01

## 2018-12-01 RX ADMIN — METHOCARBAMOL 1000 MILLIGRAM(S): 500 TABLET, FILM COATED ORAL at 10:53

## 2018-12-01 RX ADMIN — Medication 975 MILLIGRAM(S): at 10:54

## 2018-12-01 RX ADMIN — Medication 30 MILLIGRAM(S): at 10:53

## 2018-12-01 NOTE — ED ADULT NURSE NOTE - NSIMPLEMENTINTERV_GEN_ALL_ED
Implemented All Universal Safety Interventions:  [place] to call system. Call bell, personal items and telephone within reach. Instruct patient to call for assistance. Room bathroom lighting operational. Non-slip footwear when patient is off stretcher. Physically safe environment: no spills, clutter or unnecessary equipment. Stretcher in lowest position, wheels locked, appropriate side rails in place.

## 2018-12-01 NOTE — ED PROVIDER NOTE - NSFOLLOWUPINSTRUCTIONS_ED_ALL_ED_FT
Muscle Strain    A muscle strain is an injury that occurs when a muscle is stretched beyond its normal length. Usually a small number of muscle fibers are torn when this happens. Muscle strain is rated in degrees. First-degree strains have the least amount of muscle fiber tearing and pain. Second-degree and third-degree strains have increasingly more tearing and pain.     Usually, recovery from muscle strain takes 1–2 weeks. Complete healing takes 5–6 weeks.     CAUSES  Muscle strain happens when a sudden, violent force placed on a muscle stretches it too far. This may occur with lifting, sports, or a fall.     RISK FACTORS  Muscle strain is especially common in athletes.     SIGNS AND SYMPTOMS  At the site of the muscle strain, there may be:    Pain.  Bruising.  Swelling.   Difficulty using the muscle due to pain or lack of normal function.     DIAGNOSIS  Your health care provider will perform a physical exam and ask about your medical history.    TREATMENT  Often, the best treatment for a muscle strain is resting, icing, and applying cold compresses to the injured area.      HOME CARE INSTRUCTIONS  Use the PRICE method of treatment to promote muscle healing during the first 2–3 days after your injury. The PRICE method involves:  Protecting the muscle from being injured again.  Restricting your activity and resting the injured body part.   Icing your injury. To do this, put ice in a plastic bag. Place a towel between your skin and the bag. Then, apply the ice and leave it on from 15–20 minutes each hour. After the third day, switch to moist heat packs.   Apply compression to the injured area with a splint or elastic bandage. Be careful not to wrap it too tightly. This may interfere with blood circulation or increase swelling.   Elevate the injured body part above the level of your heart as often as you can.  Only take over-the-counter or prescription medicines for pain, discomfort, or fever as directed by your health care provider.   Warming up prior to exercise helps to prevent future muscle strains.     SEEK MEDICAL CARE IF:  You have increasing pain or swelling in the injured area.  You have numbness, tingling, or a significant loss of strength in the injured area.     MAKE SURE YOU:  Understand these instructions.   Will watch your condition.  Will get help right away if you are not doing well or get worse.    Joint Pain    Joint pain, which is also called arthralgia, can be caused by many things. Joint pain often goes away when you follow your health care provider's instructions for relieving pain at home. However, joint pain can also be caused by conditions that require further treatment. Common causes of joint pain include:    Bruising in the area of the joint.  Overuse of the joint.  Wear and tear on the joints that occur with aging (osteoarthritis).  Various other forms of arthritis.  A buildup of a crystal form of uric acid in the joint (gout).  Infections of the joint (septic arthritis) or of the bone (osteomyelitis).    Your health care provider may recommend medicine to help with the pain. If your joint pain continues, additional tests may be needed to diagnose your condition.    HOME CARE INSTRUCTIONS  Watch your condition for any changes. Follow these instructions as directed to lessen the pain that you are feeling.    Take medicines only as directed by your health care provider.  Rest the affected area for as long as your health care provider says that you should. If directed to do so, raise the painful joint above the level of your heart while you are sitting or lying down.  Do not do things that cause or worsen pain.  If directed, apply ice to the painful area:  Put ice in a plastic bag.  Place a towel between your skin and the bag.  Leave the ice on for 20 minutes, 2–3 times per day.  Wear an elastic bandage, splint, or sling as directed by your health care provider. Loosen the elastic bandage or splint if your fingers or toes become numb and tingle, or if they turn cold and blue.  Begin exercising or stretching the affected area as directed by your health care provider. Ask your health care provider what types of exercise are safe for you.  Keep all follow-up visits as directed by your health care provider. This is important.    SEEK MEDICAL CARE IF:  Your pain increases, and medicine does not help.  Your joint pain does not improve within 3 days.  You have increased bruising or swelling.  You have a fever.  You lose 10 lb (4.5 kg) or more without trying.    SEEK IMMEDIATE MEDICAL CARE IF:  You are not able to move the joint.   Your fingers or toes become numb or they turn cold and blue.    ADDITIONAL NOTES AND INSTRUCTIONS    Please follow up with your Primary MD in 24-48 hr.  Seek immediate medical care for any new/worsening signs or symptoms.

## 2018-12-01 NOTE — ED ADULT NURSE REASSESSMENT NOTE - NS ED NURSE REASSESS COMMENT FT1
Received pt awake , A&Ox3 denies the need for pain or discomfort at this time. pt noted ambulate steady with no assistant. pt aware of the plan of care and has no questions or concerns at this time. Safety maintained. Will continue to monitor

## 2018-12-01 NOTE — ED PROVIDER NOTE - NS ED ROS FT
Constitutional:  No fever  Eyes:  No visual changes  ENMT: No neck pain or stiffness  Cardiac:  No chest pain  Respiratory:  No cough or respiratory distress.   GI:  No nausea, vomiting, diarrhea.  See HPI  :  No dysuria, frequency or burning.  MS:  See HPI  Neuro:  No headache   Skin:  No skin rash  Except as documented in the HPI,  all other systems are negative

## 2018-12-01 NOTE — ED PROVIDER NOTE - PHYSICAL EXAMINATION
CONSTITUTIONAL: NAD  SKIN: Warm dry  HEAD: NCAT  EYES: NL inspection  ENT: MMM  NECK: Supple; non tender.  CARD: RRR  RESP: CTAB  ABD: S/nt/ no r/g. + pain to epigastrium w/ trunk movements  EXT: no pedal edema; + pain t/ L hip w/ ROM, o/w non tender, NL pulses, bears weight, walks w/o assistance  NEURO: Grossly unremarkable  PSYCH: Cooperative, appropriate.

## 2018-12-01 NOTE — ED PROVIDER NOTE - MEDICAL DECISION MAKING DETAILS
No acute events.  pt walking in ED w/o assistant, felt significantly better after meds.  Discussed all available results.  Pt and/ or family understand results, plan of care, outpt follow up as discussed, and signs and symptoms for ED return.  Pt/ family is comfortable with discharge. No acute events.  pt walking in ED w/o assistant, felt significantly better after meds.  W/up suggests MSK etiology/ strain. Discussed all available results.  Pt and/ or family understand results, plan of care, outpt follow up as discussed, and signs and symptoms for ED return.  Pt/ family is comfortable with discharge.

## 2018-12-01 NOTE — ED PROVIDER NOTE - OBJECTIVE STATEMENT
62 y/o M pmh DM, HTN, s/p distant L hip arthroplasty, p/w epigastric pain and L hip x2d s/p slipping and "doing a split".  Has been walking, but woke this AM and pain more severe. abd pain is epigastric, worse with trunk flexion or when lift. Hip pain is worse w/ movement.  NO weakness, numbness, cp, sob, back pain

## 2018-12-11 ENCOUNTER — OUTPATIENT (OUTPATIENT)
Dept: OUTPATIENT SERVICES | Facility: HOSPITAL | Age: 63
LOS: 1 days | Discharge: HOME | End: 2018-12-11

## 2018-12-11 DIAGNOSIS — Z98.890 OTHER SPECIFIED POSTPROCEDURAL STATES: Chronic | ICD-10-CM

## 2018-12-11 DIAGNOSIS — C61 MALIGNANT NEOPLASM OF PROSTATE: Chronic | ICD-10-CM

## 2018-12-11 DIAGNOSIS — Z96.642 PRESENCE OF LEFT ARTIFICIAL HIP JOINT: Chronic | ICD-10-CM

## 2018-12-12 ENCOUNTER — OUTPATIENT (OUTPATIENT)
Dept: OUTPATIENT SERVICES | Facility: HOSPITAL | Age: 63
LOS: 1 days | Discharge: HOME | End: 2018-12-12

## 2018-12-12 DIAGNOSIS — Z96.642 PRESENCE OF LEFT ARTIFICIAL HIP JOINT: Chronic | ICD-10-CM

## 2018-12-12 DIAGNOSIS — Z98.890 OTHER SPECIFIED POSTPROCEDURAL STATES: Chronic | ICD-10-CM

## 2018-12-12 DIAGNOSIS — C61 MALIGNANT NEOPLASM OF PROSTATE: Chronic | ICD-10-CM

## 2018-12-14 ENCOUNTER — OUTPATIENT (OUTPATIENT)
Dept: OUTPATIENT SERVICES | Facility: HOSPITAL | Age: 63
LOS: 1 days | Discharge: HOME | End: 2018-12-14

## 2018-12-14 DIAGNOSIS — C61 MALIGNANT NEOPLASM OF PROSTATE: Chronic | ICD-10-CM

## 2018-12-14 DIAGNOSIS — Z98.890 OTHER SPECIFIED POSTPROCEDURAL STATES: Chronic | ICD-10-CM

## 2018-12-14 DIAGNOSIS — Z96.642 PRESENCE OF LEFT ARTIFICIAL HIP JOINT: Chronic | ICD-10-CM

## 2018-12-21 ENCOUNTER — EMERGENCY (EMERGENCY)
Facility: HOSPITAL | Age: 63
LOS: 0 days | Discharge: HOME | End: 2018-12-21
Admitting: PHYSICIAN ASSISTANT

## 2018-12-21 VITALS
RESPIRATION RATE: 18 BRPM | DIASTOLIC BLOOD PRESSURE: 77 MMHG | TEMPERATURE: 97 F | OXYGEN SATURATION: 99 % | SYSTOLIC BLOOD PRESSURE: 127 MMHG | HEART RATE: 69 BPM

## 2018-12-21 DIAGNOSIS — Z79.82 LONG TERM (CURRENT) USE OF ASPIRIN: ICD-10-CM

## 2018-12-21 DIAGNOSIS — Z90.49 ACQUIRED ABSENCE OF OTHER SPECIFIED PARTS OF DIGESTIVE TRACT: ICD-10-CM

## 2018-12-21 DIAGNOSIS — C61 MALIGNANT NEOPLASM OF PROSTATE: Chronic | ICD-10-CM

## 2018-12-21 DIAGNOSIS — E11.9 TYPE 2 DIABETES MELLITUS WITHOUT COMPLICATIONS: ICD-10-CM

## 2018-12-21 DIAGNOSIS — Z96.642 PRESENCE OF LEFT ARTIFICIAL HIP JOINT: ICD-10-CM

## 2018-12-21 DIAGNOSIS — I10 ESSENTIAL (PRIMARY) HYPERTENSION: ICD-10-CM

## 2018-12-21 DIAGNOSIS — Z79.84 LONG TERM (CURRENT) USE OF ORAL HYPOGLYCEMIC DRUGS: ICD-10-CM

## 2018-12-21 DIAGNOSIS — E78.5 HYPERLIPIDEMIA, UNSPECIFIED: ICD-10-CM

## 2018-12-21 DIAGNOSIS — Z96.642 PRESENCE OF LEFT ARTIFICIAL HIP JOINT: Chronic | ICD-10-CM

## 2018-12-21 DIAGNOSIS — Z98.890 OTHER SPECIFIED POSTPROCEDURAL STATES: Chronic | ICD-10-CM

## 2018-12-21 DIAGNOSIS — K08.89 OTHER SPECIFIED DISORDERS OF TEETH AND SUPPORTING STRUCTURES: ICD-10-CM

## 2018-12-21 DIAGNOSIS — K06.8 OTHER SPECIFIED DISORDERS OF GINGIVA AND EDENTULOUS ALVEOLAR RIDGE: ICD-10-CM

## 2018-12-21 DIAGNOSIS — Z79.899 OTHER LONG TERM (CURRENT) DRUG THERAPY: ICD-10-CM

## 2018-12-21 RX ORDER — DIPHENHYDRAMINE HYDROCHLORIDE AND LIDOCAINE HYDROCHLORIDE AND ALUMINUM HYDROXIDE AND MAGNESIUM HYDRO
30 KIT ONCE
Qty: 0 | Refills: 0 | Status: COMPLETED | OUTPATIENT
Start: 2018-12-21 | End: 2018-12-21

## 2018-12-21 RX ADMIN — DIPHENHYDRAMINE HYDROCHLORIDE AND LIDOCAINE HYDROCHLORIDE AND ALUMINUM HYDROXIDE AND MAGNESIUM HYDRO 30 MILLILITER(S): KIT at 18:12

## 2018-12-21 NOTE — ED PROVIDER NOTE - PHYSICAL EXAMINATION
GEN: Alert & Oriented x 3, No acute distress. Calm, appropriate.  Head and Neck: No cervical lymphadenopathy.   ENT:Oral mucosa pink. Dentures removed, Superficial ulcer noted to upper gingiva. No Trismus. No discharge. No facial swelling.   RESP: Lungs clear to auscult bilat. no wheezes, rhonchi or rales. No retractions. Equal air entry.  CARDIO: regular rate and rhythm, no murmurs, rubs or gallops. Normal S1, S2.

## 2018-12-21 NOTE — ED PROVIDER NOTE - MEDICAL DECISION MAKING DETAILS
Triamcinolone mucous membrane topical sent to pharmacy. Patient to follow up with dental clinic. Patient educated on s/s to return to ED. I have discussed the discharge plan with the patient. The patient agrees with the plan, as discussed.  The patient understands Emergency Department diagnosis is a preliminary diagnosis often based on limited information and that the patient must adhere to the follow-up plan as discussed.  The patient understands that if the symptoms worsen or if prescribed medications do not have the desired/planned effect that the patient may return to the Emergency Department at any time for further evaluation and treatment.

## 2018-12-21 NOTE — ED PROVIDER NOTE - OBJECTIVE STATEMENT
The patient is a 63y Male with PMH of DM, HTN and HLD is presenting to ED with gingival pain x 1wk. Patient states he got 4 teeth removed at the dental clinic 1 1/2wks ago and had dentures placed. He states he has been having pain since the dentures have been placed. He has been taking 800mg ibuprofen with limited relief. He denies drainage, gingival swelling, facial swelling, and fever/chills.

## 2018-12-21 NOTE — ED PROVIDER NOTE - NSFOLLOWUPCLINICS_GEN_ALL_ED_FT
[facility]  Dental  [address] [place] [id number]  Phone: [phone]  Fax:   Follow Up Time:

## 2018-12-21 NOTE — ED PROVIDER NOTE - NS ED ROS FT
GEN: (-) fever, (-) chills  HEENT: (+) gingival pain, (-) HA, (-) sore throat  SKIN: (-) rashes, (-) new lesions, (-) pruritus, (-) jaundice  HEME: (-) bleeding, (-) ecchymosis

## 2018-12-28 ENCOUNTER — OUTPATIENT (OUTPATIENT)
Dept: OUTPATIENT SERVICES | Facility: HOSPITAL | Age: 63
LOS: 1 days | Discharge: HOME | End: 2018-12-28

## 2018-12-28 DIAGNOSIS — Z96.642 PRESENCE OF LEFT ARTIFICIAL HIP JOINT: Chronic | ICD-10-CM

## 2018-12-28 DIAGNOSIS — Z98.890 OTHER SPECIFIED POSTPROCEDURAL STATES: Chronic | ICD-10-CM

## 2018-12-28 DIAGNOSIS — C61 MALIGNANT NEOPLASM OF PROSTATE: Chronic | ICD-10-CM

## 2019-02-01 ENCOUNTER — INPATIENT (INPATIENT)
Facility: HOSPITAL | Age: 64
LOS: 1 days | Discharge: HOME | End: 2019-02-03
Attending: HOSPITALIST | Admitting: HOSPITALIST

## 2019-02-01 ENCOUNTER — OUTPATIENT (OUTPATIENT)
Dept: OUTPATIENT SERVICES | Facility: HOSPITAL | Age: 64
LOS: 1 days | End: 2019-02-01
Payer: MEDICAID

## 2019-02-01 VITALS
HEART RATE: 84 BPM | TEMPERATURE: 98 F | DIASTOLIC BLOOD PRESSURE: 82 MMHG | RESPIRATION RATE: 18 BRPM | OXYGEN SATURATION: 100 % | SYSTOLIC BLOOD PRESSURE: 131 MMHG

## 2019-02-01 DIAGNOSIS — Z98.890 OTHER SPECIFIED POSTPROCEDURAL STATES: Chronic | ICD-10-CM

## 2019-02-01 DIAGNOSIS — Z96.642 PRESENCE OF LEFT ARTIFICIAL HIP JOINT: Chronic | ICD-10-CM

## 2019-02-01 DIAGNOSIS — C61 MALIGNANT NEOPLASM OF PROSTATE: Chronic | ICD-10-CM

## 2019-02-01 LAB
ALBUMIN SERPL ELPH-MCNC: 4.4 G/DL — SIGNIFICANT CHANGE UP (ref 3.5–5.2)
ALP SERPL-CCNC: 109 U/L — SIGNIFICANT CHANGE UP (ref 30–115)
ALT FLD-CCNC: 13 U/L — SIGNIFICANT CHANGE UP (ref 0–41)
ANION GAP SERPL CALC-SCNC: 16 MMOL/L — HIGH (ref 7–14)
APPEARANCE UR: ABNORMAL
AST SERPL-CCNC: 11 U/L — SIGNIFICANT CHANGE UP (ref 0–41)
BACTERIA # UR AUTO: ABNORMAL /HPF
BASOPHILS # BLD AUTO: 0.04 K/UL — SIGNIFICANT CHANGE UP (ref 0–0.2)
BASOPHILS NFR BLD AUTO: 0.6 % — SIGNIFICANT CHANGE UP (ref 0–1)
BILIRUB DIRECT SERPL-MCNC: <0.2 MG/DL — SIGNIFICANT CHANGE UP (ref 0–0.2)
BILIRUB INDIRECT FLD-MCNC: >0.1 MG/DL — LOW (ref 0.2–1.2)
BILIRUB SERPL-MCNC: 0.3 MG/DL — SIGNIFICANT CHANGE UP (ref 0.2–1.2)
BILIRUB UR-MCNC: NEGATIVE — SIGNIFICANT CHANGE UP
BUN SERPL-MCNC: 9 MG/DL — LOW (ref 10–20)
CALCIUM SERPL-MCNC: 9.2 MG/DL — SIGNIFICANT CHANGE UP (ref 8.5–10.1)
CHLORIDE SERPL-SCNC: 99 MMOL/L — SIGNIFICANT CHANGE UP (ref 98–110)
CO2 SERPL-SCNC: 24 MMOL/L — SIGNIFICANT CHANGE UP (ref 17–32)
COLOR SPEC: YELLOW — SIGNIFICANT CHANGE UP
CREAT SERPL-MCNC: 0.6 MG/DL — LOW (ref 0.7–1.5)
DIFF PNL FLD: NEGATIVE — SIGNIFICANT CHANGE UP
EOSINOPHIL # BLD AUTO: 0.4 K/UL — SIGNIFICANT CHANGE UP (ref 0–0.7)
EOSINOPHIL NFR BLD AUTO: 5.8 % — SIGNIFICANT CHANGE UP (ref 0–8)
GLUCOSE SERPL-MCNC: 132 MG/DL — HIGH (ref 70–99)
GLUCOSE UR QL: NEGATIVE — SIGNIFICANT CHANGE UP
HCT VFR BLD CALC: 37.5 % — LOW (ref 42–52)
HGB BLD-MCNC: 12.6 G/DL — LOW (ref 14–18)
IMM GRANULOCYTES NFR BLD AUTO: 0.4 % — HIGH (ref 0.1–0.3)
KETONES UR-MCNC: NEGATIVE — SIGNIFICANT CHANGE UP
LACTATE SERPL-SCNC: 1.8 MMOL/L — SIGNIFICANT CHANGE UP (ref 0.5–2.2)
LEUKOCYTE ESTERASE UR-ACNC: NEGATIVE — SIGNIFICANT CHANGE UP
LIDOCAIN IGE QN: 507 U/L — HIGH (ref 7–60)
LYMPHOCYTES # BLD AUTO: 1.51 K/UL — SIGNIFICANT CHANGE UP (ref 1.2–3.4)
LYMPHOCYTES # BLD AUTO: 21.8 % — SIGNIFICANT CHANGE UP (ref 20.5–51.1)
MCHC RBC-ENTMCNC: 28.7 PG — SIGNIFICANT CHANGE UP (ref 27–31)
MCHC RBC-ENTMCNC: 33.6 G/DL — SIGNIFICANT CHANGE UP (ref 32–37)
MCV RBC AUTO: 85.4 FL — SIGNIFICANT CHANGE UP (ref 80–94)
MONOCYTES # BLD AUTO: 0.63 K/UL — HIGH (ref 0.1–0.6)
MONOCYTES NFR BLD AUTO: 9.1 % — SIGNIFICANT CHANGE UP (ref 1.7–9.3)
NEUTROPHILS # BLD AUTO: 4.31 K/UL — SIGNIFICANT CHANGE UP (ref 1.4–6.5)
NEUTROPHILS NFR BLD AUTO: 62.3 % — SIGNIFICANT CHANGE UP (ref 42.2–75.2)
NITRITE UR-MCNC: NEGATIVE — SIGNIFICANT CHANGE UP
NRBC # BLD: 0 /100 WBCS — SIGNIFICANT CHANGE UP (ref 0–0)
PH UR: 6 — SIGNIFICANT CHANGE UP (ref 5–8)
PLATELET # BLD AUTO: 243 K/UL — SIGNIFICANT CHANGE UP (ref 130–400)
POTASSIUM SERPL-MCNC: 4.3 MMOL/L — SIGNIFICANT CHANGE UP (ref 3.5–5)
POTASSIUM SERPL-SCNC: 4.3 MMOL/L — SIGNIFICANT CHANGE UP (ref 3.5–5)
PROT SERPL-MCNC: 7.4 G/DL — SIGNIFICANT CHANGE UP (ref 6–8)
PROT UR-MCNC: ABNORMAL
RBC # BLD: 4.39 M/UL — LOW (ref 4.7–6.1)
RBC # FLD: 13 % — SIGNIFICANT CHANGE UP (ref 11.5–14.5)
SODIUM SERPL-SCNC: 139 MMOL/L — SIGNIFICANT CHANGE UP (ref 135–146)
SP GR SPEC: >=1.03 — SIGNIFICANT CHANGE UP (ref 1.01–1.03)
TROPONIN T SERPL-MCNC: <0.01 NG/ML — SIGNIFICANT CHANGE UP
UROBILINOGEN FLD QL: 0.2 — SIGNIFICANT CHANGE UP (ref 0.2–0.2)
WBC # BLD: 6.92 K/UL — SIGNIFICANT CHANGE UP (ref 4.8–10.8)
WBC # FLD AUTO: 6.92 K/UL — SIGNIFICANT CHANGE UP (ref 4.8–10.8)

## 2019-02-01 RX ORDER — MORPHINE SULFATE 50 MG/1
4 CAPSULE, EXTENDED RELEASE ORAL ONCE
Qty: 0 | Refills: 0 | Status: DISCONTINUED | OUTPATIENT
Start: 2019-02-01 | End: 2019-02-01

## 2019-02-01 RX ORDER — TETANUS TOXOID, REDUCED DIPHTHERIA TOXOID AND ACELLULAR PERTUSSIS VACCINE, ADSORBED 5; 2.5; 8; 8; 2.5 [IU]/.5ML; [IU]/.5ML; UG/.5ML; UG/.5ML; UG/.5ML
0.5 SUSPENSION INTRAMUSCULAR ONCE
Qty: 0 | Refills: 0 | Status: COMPLETED | OUTPATIENT
Start: 2019-02-01 | End: 2019-02-01

## 2019-02-01 RX ORDER — METHOCARBAMOL 500 MG/1
1 TABLET, FILM COATED ORAL
Qty: 7 | Refills: 0 | OUTPATIENT
Start: 2019-02-01 | End: 2019-02-07

## 2019-02-01 RX ORDER — IBUPROFEN 200 MG
1 TABLET ORAL
Qty: 28 | Refills: 0 | OUTPATIENT
Start: 2019-02-01 | End: 2019-02-07

## 2019-02-01 RX ORDER — SODIUM CHLORIDE 9 MG/ML
1000 INJECTION, SOLUTION INTRAVENOUS
Qty: 0 | Refills: 0 | Status: DISCONTINUED | OUTPATIENT
Start: 2019-02-01 | End: 2019-02-02

## 2019-02-01 RX ORDER — SODIUM CHLORIDE 9 MG/ML
1000 INJECTION INTRAMUSCULAR; INTRAVENOUS; SUBCUTANEOUS ONCE
Qty: 0 | Refills: 0 | Status: COMPLETED | OUTPATIENT
Start: 2019-02-01 | End: 2019-02-01

## 2019-02-01 RX ADMIN — TETANUS TOXOID, REDUCED DIPHTHERIA TOXOID AND ACELLULAR PERTUSSIS VACCINE, ADSORBED 0.5 MILLILITER(S): 5; 2.5; 8; 8; 2.5 SUSPENSION INTRAMUSCULAR at 22:39

## 2019-02-01 RX ADMIN — SODIUM CHLORIDE 1000 MILLILITER(S): 9 INJECTION INTRAMUSCULAR; INTRAVENOUS; SUBCUTANEOUS at 21:55

## 2019-02-01 RX ADMIN — MORPHINE SULFATE 4 MILLIGRAM(S): 50 CAPSULE, EXTENDED RELEASE ORAL at 19:55

## 2019-02-01 RX ADMIN — MORPHINE SULFATE 4 MILLIGRAM(S): 50 CAPSULE, EXTENDED RELEASE ORAL at 23:10

## 2019-02-01 RX ADMIN — MORPHINE SULFATE 4 MILLIGRAM(S): 50 CAPSULE, EXTENDED RELEASE ORAL at 21:55

## 2019-02-01 RX ADMIN — SODIUM CHLORIDE 200 MILLILITER(S): 9 INJECTION, SOLUTION INTRAVENOUS at 23:21

## 2019-02-01 RX ADMIN — SODIUM CHLORIDE 2000 MILLILITER(S): 9 INJECTION INTRAMUSCULAR; INTRAVENOUS; SUBCUTANEOUS at 19:30

## 2019-02-01 NOTE — ED PROVIDER NOTE - PROGRESS NOTE DETAILS
A/P: Will get EKG, CXR, labs, IVF, pain control, urine, CT abd/pelvis with IV contrast and reassess. pt reports feeling much  better, aware of all labs and imaging, reports has had this pain before form back pain, received cortisone shot in the past that helped, will give muscle relaxant as disussed and neurology and rehab follow up, pt reports no symptoms at this time, all results reviewed, aware of signs and symptoms to return for, requesting tetanus shot as well as he reports he was supposed to keith one for hurting himself with a nail in the past but did not ghet it, will give dose here, no infection in ed. pt reports feeling much  better, aware of all labs and imaging, reports has had this pain before form back pain, received cortisone shot in the past that helped, will give muscle relaxant as discussed and neurology and rehab follow up, pt reports no symptoms at this time, all results reviewed, aware of signs and symptoms to return for, requesting tetanus shot as well as he reports he was supposed to keith one for hurting himself with a nail in the past but did not get it, will give dose here, no infection in ed. lipase still pending, lab reports lab went down had to re-run it, pt aware,pt endorsed to Dr. Dobson for follow up lipase. Pt s/o to me by Dr. Mckeon to follow up lipase, reassess and dispo. Pt resting comfortably in NAD. VSS. Lipase resulted- 507. On my evaluation pt continues to have pain, tender to palpation in epigastrium and LUQ. No RUQ tenderness. No SOB or CP. Will admit for further treatment and evaluation. Pt aware and agrees.

## 2019-02-01 NOTE — ED PROVIDER NOTE - CARE PLAN
Principal Discharge DX:	Back pain Principal Discharge DX:	Pancreatitis  Secondary Diagnosis:	Back pain

## 2019-02-01 NOTE — ED PROVIDER NOTE - MEDICAL DECISION MAKING DETAILS
Patient to be admitted to an inpatient floor for further treatment pancreatitis. Case discussed with and care endorsed to medical admitting resident. Pt aware and agrees.

## 2019-02-01 NOTE — ED PROVIDER NOTE - NS ED ROS FT
Constitutional:  No behavior changes, fevers/chills.    Head: No injury, headache, LOC, dizziness/LH.    Eyes:  No visual changes, eye pain, redness, or discharge; no injury; no foreign body sensation.    ENMT:  No ear pain or discharge, hearing problems; no pain or injuries to the nose, ears, mouth, or throat.    Neck:  No pain, stiffness, injury; no loss of range of motion.    Cardiac: No chest pain, palpitations, diaphoresis.    Chest/respiratory: No cough, wheezing, shortness of breath, chest tightness, or trouble breathing; no injuries.    GI: No nausea, vomiting, or diarrhea. no injuries. No changes in PO intake. No melena/brbpr. (+) abdominal pain.     :  No dysuria, hematuria, urgency, or injuries. No changes in urine output. No penial pain or discharge. No testicular pain, swelling or erythema. No urinary or bowel incontinence. (+) flank pain    MS: No pain, weakness, injury, numbness or tingling; no loss of range of motion. No edema. No calf pain/swelling/erythema. No saddle paresthesias. No direct trauma.     Back:  No pain or injury.    Skin:  No rashes or color changes; no lacerations or abrasions.  Social: No sick contacts, recent travel or rash. Constitutional:  No behavior changes, fevers/chills.  Head: No injury, headache, LOC, dizziness/LH.  Eyes:  No visual changes, eye pain, redness, or discharge; no injury; no foreign body sensation.  ENMT:  No ear pain or discharge, hearing problems; no pain or injuries to the nose, ears, mouth, or throat.  Neck:  No pain, stiffness, injury; no loss of range of motion.  Cardiac: No chest pain, palpitations, diaphoresis.  Chest/respiratory: No cough, wheezing, shortness of breath, chest tightness, or trouble breathing; no injuries.  GI: No nausea, vomiting, or diarrhea. no injuries. No changes in PO intake. No melena/brbpr. (+) abdominal pain.   :  No dysuria, hematuria, urgency, or injuries. No changes in urine output. No penial pain or discharge. No testicular pain, swelling or erythema. No urinary or bowel incontinence. (+) flank pain  MS: No pain, weakness, injury, numbness or tingling; no loss of range of motion. No edema. No calf pain/swelling/erythema. No saddle paresthesias. No direct trauma.   Back:  No pain or injury.  Skin:  No rashes or color changes; no lacerations or abrasions.  Social: No sick contacts, recent travel or rash. Constitutional:  No behavior changes, fevers/chills.  Head: No injury, headache, LOC, dizziness/LH.  Eyes:  No visual changes, eye pain, redness, or discharge; no injury; no foreign body sensation.  Neck:  No pain, stiffness, injury; no loss of range of motion.  Cardiac: No chest pain, palpitations, diaphoresis.  Chest/respiratory: No cough, wheezing, shortness of breath, chest tightness, or trouble breathing; no injuries.  GI: No nausea, vomiting, or diarrhea. no injuries. No changes in PO intake. No melena/brbpr. (+) abdominal pain.   :  No dysuria, hematuria, urgency, or injuries. No changes in urine output. No penile pain or discharge. No testicular pain, swelling or erythema. No urinary or bowel incontinence. (+) flank pain  MS: No weakness, injury, numbness or tingling; no loss of range of motion. No edema. No calf pain/swelling/erythema. No direct trauma.   Back:  No injury. No saddle paresthesias.   Skin:  No rashes or color changes; no lacerations or abrasions.  Social: No sick contacts, recent travel.

## 2019-02-01 NOTE — ED ADULT NURSE NOTE - OBJECTIVE STATEMENT
md HPI Objective Statement: 62 y/o M with PMH of DM on metformin, HLD, HTN and prostate CA in remission, presenting with L sided flank pain radiating to L abdomen, worse with position and better at rest x 3 days. 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 Pt is an ex-smoker.

## 2019-02-01 NOTE — ED PROVIDER NOTE - PHYSICAL EXAMINATION
Vital Signs: I have reviewed the initial vital signs.  Constitutional: WDWN in nad. Sitting on stretcher in nad.  Integumentary: No rash.  ENT: MMM  NECK: Supple, non-tender, no meningeal signs.  Cardiovascular: RRR, radial pulses 2/4 b/l. No JVD.  Respiratory: BS present b/l, ctabl, no wheezing or crackles, good air exchange, good resp effort and excursion, no accessory muscle use, no stridor.  Gastrointestinal: BS present throughout all 4 quadrants, soft, nd, (+)epigastric pain to palpation.  no rebound tenderness or guarding, (+) L cvat.  Musculoskeletal: FROM, no edema, no calf pain/swelling/erythema. No spinous tenderness to neck or back, no palpable shelves, (-)SLR  Neurologic: AAOx3, motor 5/5 and sensation intact throughout upper and lowe ext, CN II-XII intact, No facial droop or slurring of speech. No focal deficits. Vital Signs: I have reviewed the initial vital signs.  Constitutional: WDWN in nad. Sitting on stretcher speaking full sentences.  Integumentary: No rash.  ENT: MMM  NECK: Supple, non-tender, no meningeal signs.  Cardiovascular: RRR, radial pulses 2/4 b/l. No JVD.  Respiratory: BS present b/l, ctabl, no wheezing or crackles, good air exchange, good resp effort and excursion, no accessory muscle use, no stridor.  Gastrointestinal: BS present throughout all 4 quadrants, soft, nd, (+)epigastric pain to palpation.  no rebound tenderness or guarding, (+) L cvat. (-) Harris (-) Roving (-) Obturator (-) Psoas.   Musculoskeletal: FROM, no edema, no calf pain/swelling/erythema. No spinous tenderness to neck or back, no palpable shelves, (-)SLR b/l.  Neurologic: AAOx3, motor 5/5 and sensation intact throughout upper and lower ext, CN II-XII intact, No facial droop or slurring of speech. No focal deficits. No foot droop. Ambulatory with no ataxia.

## 2019-02-01 NOTE — ED PROVIDER NOTE - OBJECTIVE STATEMENT
64 y/o M with PMH of DM on metformin, HLD, HTN and prostate CA in remission, presenting with L sided flank pain radiating to L abdomen, worse with position and better at rest x 3 days. 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 Pt is an ex-smoker.

## 2019-02-02 LAB
CHOLEST SERPL-MCNC: 148 MG/DL — SIGNIFICANT CHANGE UP (ref 100–200)
CULTURE RESULTS: SIGNIFICANT CHANGE UP
GLUCOSE BLDC GLUCOMTR-MCNC: 104 MG/DL — HIGH (ref 70–99)
GLUCOSE BLDC GLUCOMTR-MCNC: 108 MG/DL — HIGH (ref 70–99)
GLUCOSE BLDC GLUCOMTR-MCNC: 114 MG/DL — HIGH (ref 70–99)
GLUCOSE BLDC GLUCOMTR-MCNC: 119 MG/DL — HIGH (ref 70–99)
GLUCOSE BLDC GLUCOMTR-MCNC: 139 MG/DL — HIGH (ref 70–99)
HDLC SERPL-MCNC: 50 MG/DL — SIGNIFICANT CHANGE UP
LIDOCAIN IGE QN: 42 U/L — SIGNIFICANT CHANGE UP (ref 7–60)
LIPID PNL WITH DIRECT LDL SERPL: 96 MG/DL — SIGNIFICANT CHANGE UP (ref 4–129)
SPECIMEN SOURCE: SIGNIFICANT CHANGE UP
TOTAL CHOLESTEROL/HDL RATIO MEASUREMENT: 3 RATIO — LOW (ref 4–5.5)
TRIGL SERPL-MCNC: 80 MG/DL — SIGNIFICANT CHANGE UP (ref 10–149)

## 2019-02-02 RX ORDER — PANTOPRAZOLE SODIUM 20 MG/1
40 TABLET, DELAYED RELEASE ORAL
Qty: 0 | Refills: 0 | Status: DISCONTINUED | OUTPATIENT
Start: 2019-02-02 | End: 2019-02-03

## 2019-02-02 RX ORDER — ENOXAPARIN SODIUM 100 MG/ML
40 INJECTION SUBCUTANEOUS DAILY
Qty: 0 | Refills: 0 | Status: DISCONTINUED | OUTPATIENT
Start: 2019-02-02 | End: 2019-02-03

## 2019-02-02 RX ORDER — NYSTATIN CREAM 100000 [USP'U]/G
1 CREAM TOPICAL
Qty: 0 | Refills: 0 | Status: DISCONTINUED | OUTPATIENT
Start: 2019-02-02 | End: 2019-02-03

## 2019-02-02 RX ORDER — CHLORHEXIDINE GLUCONATE 213 G/1000ML
1 SOLUTION TOPICAL
Qty: 0 | Refills: 0 | Status: DISCONTINUED | OUTPATIENT
Start: 2019-02-02 | End: 2019-02-03

## 2019-02-02 RX ORDER — ASPIRIN/CALCIUM CARB/MAGNESIUM 324 MG
81 TABLET ORAL DAILY
Qty: 0 | Refills: 0 | Status: DISCONTINUED | OUTPATIENT
Start: 2019-02-02 | End: 2019-02-03

## 2019-02-02 RX ORDER — CYCLOBENZAPRINE HYDROCHLORIDE 10 MG/1
5 TABLET, FILM COATED ORAL THREE TIMES A DAY
Qty: 0 | Refills: 0 | Status: DISCONTINUED | OUTPATIENT
Start: 2019-02-02 | End: 2019-02-03

## 2019-02-02 RX ORDER — MORPHINE SULFATE 50 MG/1
4 CAPSULE, EXTENDED RELEASE ORAL EVERY 4 HOURS
Qty: 0 | Refills: 0 | Status: DISCONTINUED | OUTPATIENT
Start: 2019-02-02 | End: 2019-02-03

## 2019-02-02 RX ORDER — INFLUENZA VIRUS VACCINE 15; 15; 15; 15 UG/.5ML; UG/.5ML; UG/.5ML; UG/.5ML
0.5 SUSPENSION INTRAMUSCULAR ONCE
Qty: 0 | Refills: 0 | Status: COMPLETED | OUTPATIENT
Start: 2019-02-02 | End: 2019-02-03

## 2019-02-02 RX ORDER — ACETAMINOPHEN 500 MG
650 TABLET ORAL EVERY 6 HOURS
Qty: 0 | Refills: 0 | Status: DISCONTINUED | OUTPATIENT
Start: 2019-02-02 | End: 2019-02-03

## 2019-02-02 RX ORDER — AMLODIPINE BESYLATE 2.5 MG/1
5 TABLET ORAL DAILY
Qty: 0 | Refills: 0 | Status: DISCONTINUED | OUTPATIENT
Start: 2019-02-02 | End: 2019-02-03

## 2019-02-02 RX ORDER — TAMSULOSIN HYDROCHLORIDE 0.4 MG/1
0.4 CAPSULE ORAL AT BEDTIME
Qty: 0 | Refills: 0 | Status: DISCONTINUED | OUTPATIENT
Start: 2019-02-02 | End: 2019-02-03

## 2019-02-02 RX ORDER — ATORVASTATIN CALCIUM 80 MG/1
40 TABLET, FILM COATED ORAL AT BEDTIME
Qty: 0 | Refills: 0 | Status: DISCONTINUED | OUTPATIENT
Start: 2019-02-02 | End: 2019-02-03

## 2019-02-02 RX ORDER — MORPHINE SULFATE 50 MG/1
2 CAPSULE, EXTENDED RELEASE ORAL ONCE
Qty: 0 | Refills: 0 | Status: DISCONTINUED | OUTPATIENT
Start: 2019-02-02 | End: 2019-02-02

## 2019-02-02 RX ORDER — SODIUM CHLORIDE 9 MG/ML
1000 INJECTION, SOLUTION INTRAVENOUS
Qty: 0 | Refills: 0 | Status: DISCONTINUED | OUTPATIENT
Start: 2019-02-02 | End: 2019-02-02

## 2019-02-02 RX ADMIN — MORPHINE SULFATE 2 MILLIGRAM(S): 50 CAPSULE, EXTENDED RELEASE ORAL at 03:51

## 2019-02-02 RX ADMIN — MORPHINE SULFATE 2 MILLIGRAM(S): 50 CAPSULE, EXTENDED RELEASE ORAL at 04:33

## 2019-02-02 RX ADMIN — ENOXAPARIN SODIUM 40 MILLIGRAM(S): 100 INJECTION SUBCUTANEOUS at 11:40

## 2019-02-02 RX ADMIN — ATORVASTATIN CALCIUM 40 MILLIGRAM(S): 80 TABLET, FILM COATED ORAL at 21:12

## 2019-02-02 RX ADMIN — MORPHINE SULFATE 4 MILLIGRAM(S): 50 CAPSULE, EXTENDED RELEASE ORAL at 16:40

## 2019-02-02 RX ADMIN — SODIUM CHLORIDE 200 MILLILITER(S): 9 INJECTION, SOLUTION INTRAVENOUS at 01:13

## 2019-02-02 RX ADMIN — MORPHINE SULFATE 4 MILLIGRAM(S): 50 CAPSULE, EXTENDED RELEASE ORAL at 16:24

## 2019-02-02 RX ADMIN — MORPHINE SULFATE 4 MILLIGRAM(S): 50 CAPSULE, EXTENDED RELEASE ORAL at 21:35

## 2019-02-02 RX ADMIN — TAMSULOSIN HYDROCHLORIDE 0.4 MILLIGRAM(S): 0.4 CAPSULE ORAL at 21:12

## 2019-02-02 RX ADMIN — MORPHINE SULFATE 4 MILLIGRAM(S): 50 CAPSULE, EXTENDED RELEASE ORAL at 21:16

## 2019-02-02 RX ADMIN — SODIUM CHLORIDE 250 MILLILITER(S): 9 INJECTION, SOLUTION INTRAVENOUS at 10:52

## 2019-02-02 RX ADMIN — Medication 81 MILLIGRAM(S): at 11:12

## 2019-02-02 RX ADMIN — MORPHINE SULFATE 4 MILLIGRAM(S): 50 CAPSULE, EXTENDED RELEASE ORAL at 11:03

## 2019-02-02 RX ADMIN — MORPHINE SULFATE 4 MILLIGRAM(S): 50 CAPSULE, EXTENDED RELEASE ORAL at 11:18

## 2019-02-02 RX ADMIN — AMLODIPINE BESYLATE 5 MILLIGRAM(S): 2.5 TABLET ORAL at 11:15

## 2019-02-02 NOTE — H&P ADULT - PSH
History of appendectomy    Prostate cancer  s/p history of seeding  S/P hip replacement, left  9/2015 yes

## 2019-02-02 NOTE — H&P ADULT - NSHPPHYSICALEXAM_GEN_ALL_CORE
GEN:  DIANEENT:  CV:  RESP:  ABD:  EXT:  NEURO: GEN: NAD  HEENT: NCAT  CV: RRR  RESP: CTAB  ABD: epigastric tenderness, soft  EXT: no C/C/E  NEURO: no focal deficits

## 2019-02-02 NOTE — H&P ADULT - NSHPLABSRESULTS_GEN_ALL_CORE
12.6<L>  6.92  )-----------( 243      ( 02-01 @ 20:08 )             37.5<L>    02-01    139  |  99  |  9<L>  ----------------------------<  132<H>  4.3   |  24  |  0.6<L>    Ca    9.2      01 Feb 2019 20:08    TPro  7.4  /  Alb  4.4  /  TBili  0.3  /  DBili  <0.2  /  AST  11  /  ALT  13  /  AlkPhos  109  02-01    Lipase 507    IMAGING:  < from: CT Abdomen and Pelvis w/ IV Cont (02.01.19 @ 20:53) >    IMPRESSION:        No evidence of acute abdominal pathology.    < end of copied text >

## 2019-02-02 NOTE — H&P ADULT - ASSESSMENT
63 yo M w/ hx of HTN, DLD, DM, prostate CA s/p seeding, OA, hx of pancreatitis (most recent episode in 12/2015) presents with LUQ pain.    #Abdominal pain likely 2/2 acute pancreatitis  -CT A/P negative  -triglyceride WNL  -will check RUQ sono    #HTN - c/w amolidpine  #DLD - c/w statin  #DM - monitor fingerstick glucose, start ISS if consistently >180  #OA - c/w Tylenol prn  #Prostate CA s/p seeding - c/w Flomax    #Activity - as tolerated  #DVT ppx - Lovenox  #Diet -   #GI ppx - PPI  #Code status - full code  #Dispo - from home 61 yo M w/ hx of HTN, DLD, DM, prostate CA s/p seeding, OA, hx of pancreatitis (most recent episode in 12/2015) presents with LUQ pain.    #Abdominal pain likely 2/2 acute pancreatitis  -differentials including gastritis, PUD  -elevated lipase, WNL this am  -CT A/P negative  -triglyceride WNL  -will check RUQ sono  -NPO since still considerable abdominal pain present  -c/w LR @ 250 cc/hr  -start PPI    #Flank pain likely MSK origin  -trial Robaxin    #HTN - c/w amolidpine  #DLD - c/w statin  #DM - monitor fingerstick glucose, start ISS if consistently >180  #OA - c/w Tylenol prn  #Prostate CA s/p seeding - c/w Flomax    #Activity - as tolerated  #DVT ppx - Lovenox  #Diet - NPO  #GI ppx - PPI  #Code status - full code  #Dispo - from home 61 yo M w/ hx of HTN, DLD, DM, prostate CA s/p seeding, OA, hx of pancreatitis (most recent episode in 12/2015) presents with LUQ pain.    #Abdominal pain likely 2/2 acute pancreatitis  -differentials including gastritis, PUD  -elevated lipase, WNL this am  -CT A/P negative  -triglyceride WNL  -will check RUQ sono for biliary sludge / microlithiasis  -NPO since still considerable abdominal pain present this am  -c/w LR @ 250 cc/hr  -start PPI    #Flank pain likely MSK origin  -trial Flexeril    #HTN - c/w amolidpine  #DLD - c/w statin  #DM - monitor fingerstick glucose, start ISS if consistently >180  #OA - c/w Tylenol prn  #Prostate CA s/p seeding - c/w Flomax    #Activity - as tolerated  #DVT ppx - Lovenox  #Diet - NPO  #GI ppx - PPI  #Code status - full code  #Dispo - from home 63 yo M w/ hx of HTN, DLD, DM, prostate CA s/p seeding, OA, hx of pancreatitis (most recent episode in 12/2015) presents with LUQ pain.    #Abdominal pain likely 2/2 acute pancreatitis  -differentials including gastritis, PUD  -elevated lipase, WNL this am  -CT A/P negative  -triglyceride WNL  -will check RUQ sono for biliary sludge / microlithiasis  -NPO since still considerable abdominal pain present this am  -c/w LR @ 250 cc/hr  -start PPI  -pain control w/ morphine    #Flank pain likely MSK origin  -trial Flexeril    #HTN - c/w amolidpine  #DLD - c/w statin  #DM - monitor fingerstick glucose, start ISS if consistently >180  #OA - c/w Tylenol prn  #Prostate CA s/p seeding - c/w Flomax    #Activity - as tolerated  #DVT ppx - Lovenox  #Diet - NPO  #GI ppx - PPI  #Code status - full code  #Dispo - from home

## 2019-02-02 NOTE — H&P ADULT - HISTORY OF PRESENT ILLNESS
61 yo M w/ hx of HTN, DLD, DM, prostate CA s/p seeding, OA, hx of pancreatitis (most recent episode in 12/2015) presents with LUQ pain.    Pt had EGD in 04/2018, which showed mild gastritis and duodenitis. IgG subclass testing negative 11/2015.    In ED, given 1L NS bolus then started on LR @ 200mL/hr, also given morphine for pain control, CT A/P unremarkable, lipase 507 61 yo M w/ hx of HTN, DLD, DM, prostate CA s/p seeding, OA, hx of pancreatitis (most recent episode in 12/2015) presents with epigastric pain. Pain started earlier this year, epigastric with no radiation, associated with eating, he has been taking ibuprofen 800mg x2-3 per day for this pain with mild relief. Pt had this pain last year but it went away after starting on Protonix, he stopped Protonix after the pain resolved. Pt had EGD in 04/2018, which showed mild gastritis and duodenitis. IgG subclass testing negative 11/2015. Admits to occasional etoh. Also admits to left flank pain, associated with movement. Denies fatigue, fever, chills, CP, SOB, N/V, diarrhea, dysuria.    In ED, given 1L NS bolus then started on LR @ 200mL/hr, also given morphine for pain control, CT A/P unremarkable, lipase 507 63 yo M w/ hx of HTN, DLD, DM, prostate CA s/p seeding, OA, hx of pancreatitis (most recent episode in 12/2015) presents with epigastric pain. Pain started earlier this year, markedly worse yesterday after breakfast of coffee and cake, epigastric with no radiation, associated with eating, he has been taking ibuprofen 800mg x2-3 per day for this pain with mild relief for over a month. Pt had this pain last year but it went away after starting on Protonix, he stopped Protonix after the pain resolved. Pt had EGD in 04/2018, which showed mild gastritis and duodenitis. IgG subclass testing negative 11/2015. Admits to occasional etoh. Also admits to left flank pain, associated with movement. Denies fatigue, fever, chills, CP, SOB, N/V, diarrhea, dysuria.    In ED, given 1L NS bolus then started on LR @ 200mL/hr, also given morphine for pain control, CT A/P unremarkable, lipase 507

## 2019-02-02 NOTE — H&P ADULT - ATTENDING COMMENTS
Patient was evaluated and examined by bedside,  c/o left sided  abdominal pain with radiation to left flank area, no nausea, no vomiting, had one loose bm yesterday, no fever, no chills     All labs, radiology studies  Vital Signs Last 24 Hrs  T(C): 35.8 (02 Feb 2019 13:11), Max: 36.6 (01 Feb 2019 17:48)  T(F): 96.5 (02 Feb 2019 13:11), Max: 97.8 (01 Feb 2019 17:48)  HR: 65 (02 Feb 2019 13:11) (62 - 84)  BP: 153/88 (02 Feb 2019 13:11) (117/69 - 153/88)  BP(mean): --  RR: 20 (02 Feb 2019 13:11) (17 - 20)  SpO2: 95% (02 Feb 2019 11:00) (95% - 100%)  GENERAL: NAD, AAOX3, patient is laying comfortably in bed  HEENT: AT, NC, PERRLA, SUPPLE, NO JVD, NO CB  LUNG: CTA B/L  CVS: normal S1, S2, RRR, NO M/G/R  ABDOMEN: soft, bowel sounds present, normoactive in all 4 quadrants, tender in left mid/lower quadrants, left flank  area, non-distended  EXT: no E/C/C, positive PP b/l extremities  NEURO: no acute focal neurological deficits  SKIN: no rash, no ecchymosis  I agree with medical plan outlined by Medical resident as stated above.    - Acute left quadrant abdominal pain/ left flank pain -? so far no GI or  etiology, CT abdomen/pelvis - no acute pathology to contribute to pain  -lipase normal  -u/a wnr  -f/up US abdomen  -consult GI  -start PPI tx.  -clear liquid diet as tolerated, will advance slowly    -h/o HTN/Dyslip/DM/prostate ca - resumed on home regimen tx.  -trops x 1 set negative

## 2019-02-03 ENCOUNTER — TRANSCRIPTION ENCOUNTER (OUTPATIENT)
Age: 64
End: 2019-02-03

## 2019-02-03 VITALS — OXYGEN SATURATION: 97 %

## 2019-02-03 LAB
ALBUMIN SERPL ELPH-MCNC: 4.8 G/DL — SIGNIFICANT CHANGE UP (ref 3.5–5.2)
ALP SERPL-CCNC: 117 U/L — HIGH (ref 30–115)
ALT FLD-CCNC: 14 U/L — SIGNIFICANT CHANGE UP (ref 0–41)
ANION GAP SERPL CALC-SCNC: 22 MMOL/L — HIGH (ref 7–14)
AST SERPL-CCNC: 15 U/L — SIGNIFICANT CHANGE UP (ref 0–41)
BILIRUB SERPL-MCNC: 1.1 MG/DL — SIGNIFICANT CHANGE UP (ref 0.2–1.2)
BUN SERPL-MCNC: 7 MG/DL — LOW (ref 10–20)
CALCIUM SERPL-MCNC: 9.6 MG/DL — SIGNIFICANT CHANGE UP (ref 8.5–10.1)
CHLORIDE SERPL-SCNC: 91 MMOL/L — LOW (ref 98–110)
CO2 SERPL-SCNC: 21 MMOL/L — SIGNIFICANT CHANGE UP (ref 17–32)
CREAT SERPL-MCNC: 0.7 MG/DL — SIGNIFICANT CHANGE UP (ref 0.7–1.5)
GLUCOSE BLDC GLUCOMTR-MCNC: 102 MG/DL — HIGH (ref 70–99)
GLUCOSE BLDC GLUCOMTR-MCNC: 139 MG/DL — HIGH (ref 70–99)
GLUCOSE SERPL-MCNC: 141 MG/DL — HIGH (ref 70–99)
HCT VFR BLD CALC: 41.8 % — LOW (ref 42–52)
HCV AB S/CO SERPL IA: 0.08 S/CO — SIGNIFICANT CHANGE UP
HCV AB SERPL-IMP: SIGNIFICANT CHANGE UP
HGB BLD-MCNC: 14.2 G/DL — SIGNIFICANT CHANGE UP (ref 14–18)
MCHC RBC-ENTMCNC: 28.6 PG — SIGNIFICANT CHANGE UP (ref 27–31)
MCHC RBC-ENTMCNC: 34 G/DL — SIGNIFICANT CHANGE UP (ref 32–37)
MCV RBC AUTO: 84.3 FL — SIGNIFICANT CHANGE UP (ref 80–94)
NRBC # BLD: 0 /100 WBCS — SIGNIFICANT CHANGE UP (ref 0–0)
PLATELET # BLD AUTO: 237 K/UL — SIGNIFICANT CHANGE UP (ref 130–400)
POTASSIUM SERPL-MCNC: 4 MMOL/L — SIGNIFICANT CHANGE UP (ref 3.5–5)
POTASSIUM SERPL-SCNC: 4 MMOL/L — SIGNIFICANT CHANGE UP (ref 3.5–5)
PROT SERPL-MCNC: 8.1 G/DL — HIGH (ref 6–8)
RBC # BLD: 4.96 M/UL — SIGNIFICANT CHANGE UP (ref 4.7–6.1)
RBC # FLD: 12.8 % — SIGNIFICANT CHANGE UP (ref 11.5–14.5)
SODIUM SERPL-SCNC: 134 MMOL/L — LOW (ref 135–146)
WBC # BLD: 8.95 K/UL — SIGNIFICANT CHANGE UP (ref 4.8–10.8)
WBC # FLD AUTO: 8.95 K/UL — SIGNIFICANT CHANGE UP (ref 4.8–10.8)

## 2019-02-03 RX ORDER — PANTOPRAZOLE SODIUM 20 MG/1
1 TABLET, DELAYED RELEASE ORAL
Qty: 30 | Refills: 0 | OUTPATIENT
Start: 2019-02-03 | End: 2019-03-04

## 2019-02-03 RX ADMIN — ENOXAPARIN SODIUM 40 MILLIGRAM(S): 100 INJECTION SUBCUTANEOUS at 12:18

## 2019-02-03 RX ADMIN — MORPHINE SULFATE 4 MILLIGRAM(S): 50 CAPSULE, EXTENDED RELEASE ORAL at 05:23

## 2019-02-03 RX ADMIN — NYSTATIN CREAM 1 APPLICATION(S): 100000 CREAM TOPICAL at 05:07

## 2019-02-03 RX ADMIN — AMLODIPINE BESYLATE 5 MILLIGRAM(S): 2.5 TABLET ORAL at 05:07

## 2019-02-03 RX ADMIN — PANTOPRAZOLE SODIUM 40 MILLIGRAM(S): 20 TABLET, DELAYED RELEASE ORAL at 05:07

## 2019-02-03 RX ADMIN — MORPHINE SULFATE 4 MILLIGRAM(S): 50 CAPSULE, EXTENDED RELEASE ORAL at 01:16

## 2019-02-03 RX ADMIN — MORPHINE SULFATE 4 MILLIGRAM(S): 50 CAPSULE, EXTENDED RELEASE ORAL at 05:54

## 2019-02-03 RX ADMIN — CHLORHEXIDINE GLUCONATE 1 APPLICATION(S): 213 SOLUTION TOPICAL at 05:07

## 2019-02-03 RX ADMIN — Medication 81 MILLIGRAM(S): at 12:18

## 2019-02-03 RX ADMIN — INFLUENZA VIRUS VACCINE 0.5 MILLILITER(S): 15; 15; 15; 15 SUSPENSION INTRAMUSCULAR at 13:02

## 2019-02-03 RX ADMIN — MORPHINE SULFATE 4 MILLIGRAM(S): 50 CAPSULE, EXTENDED RELEASE ORAL at 01:31

## 2019-02-03 NOTE — PROGRESS NOTE ADULT - SUBJECTIVE AND OBJECTIVE BOX
WILI ALFARO  [facility] 0[facility] ([facility])            Patient was evaluated and examined  by bedside, reports decreased epigastric and left sided abdominal pain, no dysuria, no diarrhea, no nausea, tolerating clear liquid diet well          REVIEW OF SYSTEMS:  please see pertinent positives mentioned above, all other 12 ROS negative      T(C): , Max: 36.2 (02-02-19 @ 21:10)  HR: 78 (02-03-19 @ 04:42)  BP: 120/67 (02-03-19 @ 04:42)  RR: 18 (02-03-19 @ 04:42)  SpO2: 99% (02-02-19 @ 19:32)  CAPILLARY BLOOD GLUCOSE      POCT Blood Glucose.: 139 mg/dL (03 Feb 2019 07:54)  POCT Blood Glucose.: 119 mg/dL (02 Feb 2019 21:51)  POCT Blood Glucose.: 104 mg/dL (02 Feb 2019 16:56)      PHYSICAL EXAM:  General: NAD, AAOX3, patient is laying comfortably in bed  HEENT: AT, NC, Supple, NO JVD, NO CB  Lungs: CTA B/L, no wheezing, no rhonchi  CVS: normal S1, S2, RRR, NO M/G/R  Abdomen: soft, bowel sounds present, tender in epigastric area and left abd. quadrants, non-distended  Extremities: no edema, no clubbing, no cyanosis, positive peripheral pulses b/l  Neuro: no acute focal neurological deficits  Skin: no rush, no ecchymosis      LAB  CBC  Date: 02-03-19 @ 08:36  Mean cell Zjpffjijwk30.6  Mean cell Hemoglobin Conc34.0  Mean cell Volum 84.3  Platelet count-Automate 237  RBC Count 4.96  Red Cell Distrib Width12.8  WBC Count8.95  % Albumin, Urine--  Hematocrit 41.8  Hemoglobin 14.2  CBC  Date: 02-01-19 @ 20:08  Mean cell Zfbrevzjei70.7  Mean cell Hemoglobin Conc33.6  Mean cell Volum 85.4  Platelet count-Automate 243  RBC Count 4.39  Red Cell Distrib Width13.0  WBC Count6.92  % Albumin, Urine--  Hematocrit 37.5  Hemoglobin 12.6    BMP  02-03-19 @ 08:36  Blood Gas Arterial-Calcium,Ionized--  Blood Urea Nitrogen, Serum 7 mg/dL<L> [10 - 20]  Carbon Dioxide, Serum21 mmol/L [17 - 32]  Chloride, Serum91 mmol/L<L> [98 - 110]  Creatinie, Serum0.7 mg/dL [0.7 - 1.5]  Glucose, [id number] mg/dL<H> [70 - 99]  Potassium, Serum4.0 mmol/L [3.5 - 5.0]  Sodium, Serum 134 mmol/L<L> [135 - 146]  BMP  02-01-19 @ 20:08  Blood Gas Arterial-Calcium,Ionized--  Blood Urea Nitrogen, Serum 9 mg/dL<L> [10 - 20]  Carbon Dioxide, Serum24 mmol/L [17 - 32]  Chloride, Serum99 mmol/L [98 - 110]  Creatinie, Serum0.6 mg/dL<L> [0.7 - 1.5]  Glucose, [id number] mg/dL<H> [70 - 99]  Potassium, Serum4.3 mmol/L [3.5 - 5.0]  Sodium, Serum 139 mmol/L [135 - 146]              Microbiology:    Culture - Urine (collected 02-01-19 @ 20:08)  Source: .Urine Clean Catch (Midstream)  Final Report (02-02-19 @ 21:38):    <10,000 CFU/ml Normal Urogenital fang present      Medications:  acetaminophen   Tablet .. 650 milliGRAM(s) Oral every 6 hours PRN  amLODIPine   Tablet 5 milliGRAM(s) Oral daily  aspirin  chewable 81 milliGRAM(s) Oral daily  atorvastatin 40 milliGRAM(s) Oral at bedtime  chlorhexidine 4% Liquid 1 Application(s) Topical <User Schedule>  cyclobenzaprine 5 milliGRAM(s) Oral three times a day PRN  enoxaparin Injectable 40 milliGRAM(s) SubCutaneous daily  influenza   Vaccine 0.5 milliLiter(s) IntraMuscular once  nystatin Powder 1 Application(s) Topical two times a day  pantoprazole    Tablet 40 milliGRAM(s) Oral before breakfast  tamsulosin 0.4 milliGRAM(s) Oral at bedtime        Assessment and Plan:  - Acute left quadrant abdominal pain/ left flank pain -? marijuanna use vs. mild gastritis,   CT abdomen/pelvis - no acute pathology to contribute to pain  -lipase normal  -u/a wnr  -f/up US abdomen  -patient was evaluated by  GI , recommended outpatient GI f/up, unless pt. can not tolerate diet  -start PPI tx.  -advance to carb. controlled diet today, if tolerating ok , will d/c home today    -h/o HTN/Dyslip/DM/prostate ca - resumed on home regimen tx.    -d/c plan: patient anticipated for d/c home today if tolerating diet well

## 2019-02-03 NOTE — DISCHARGE NOTE ADULT - SECONDARY DIAGNOSIS.
Hypertension, unspecified type Type 2 diabetes mellitus without complication, without long-term current use of insulin Other hyperlipidemia Prostate cancer

## 2019-02-03 NOTE — DISCHARGE NOTE ADULT - MEDICATION SUMMARY - MEDICATIONS TO TAKE
I will START or STAY ON the medications listed below when I get home from the hospital:    aspirin 81 mg oral tablet  -- 1 tab(s) by mouth once a day  -- Indication: For to prevent stroke    acetaminophen 325 mg oral tablet  -- 2 tab(s) by mouth every 6 hours, As needed, mild pain  -- Indication: For Pain control     Flomax 0.4 mg oral capsule  -- 1 cap(s) by mouth once a day (at bedtime)  -- Indication: For Prostate dz    metFORMIN 500 mg oral tablet  -- 1 tab(s) by mouth 2 times a day  -- Indication: For Dm    Lipitor 40 mg oral tablet  -- 1 tab(s) by mouth once a day (at bedtime)  -- Indication: For high cholesterol    amLODIPine 5 mg oral tablet  -- 1 tab(s) by mouth once a day  -- Indication: For htn    pantoprazole 40 mg oral delayed release tablet  -- 1 tab(s) by mouth once a day (before a meal)  -- Indication: For mild gastritis

## 2019-02-03 NOTE — DISCHARGE NOTE ADULT - CARE PROVIDER_API CALL
follow up with Gastroenterology specialist and Family doctor in 1 week post d/c home,   Phone: (   )    -  Fax: (   )    -

## 2019-02-03 NOTE — DISCHARGE NOTE ADULT - PROVIDER TOKENS
FREE:[LAST:[follow up with Gastroenterology specialist and Family doctor in 1 week post d/c home],PHONE:[(   )    -],FAX:[(   )    -]]

## 2019-02-03 NOTE — DISCHARGE NOTE ADULT - CARE PLAN
Principal Discharge DX:	Acute gastritis without hemorrhage, unspecified gastritis type  Goal:	resolution of symptoms, continue taking protonix  Assessment and plan of treatment:	stop using marijuana and alcohol, follow up with Gastroenterology specialist post d/c home  Secondary Diagnosis:	Hypertension, unspecified type  Goal:	continue taking medications as prescribed  Assessment and plan of treatment:	low salt diet  Secondary Diagnosis:	Type 2 diabetes mellitus without complication, without long-term current use of insulin  Goal:	continue monitoring blood sugar at home, with low carbohydrate diet  Assessment and plan of treatment:	resume taking home medical tx.  Secondary Diagnosis:	Other hyperlipidemia  Goal:	low cholesterol diet  Secondary Diagnosis:	Prostate cancer

## 2019-02-03 NOTE — DISCHARGE NOTE ADULT - PLAN OF CARE
resolution of symptoms, continue taking protonix stop using marijuana and alcohol, follow up with Gastroenterology specialist post d/c home continue taking medications as prescribed low salt diet continue monitoring blood sugar at home, with low carbohydrate diet resume taking home medical tx. low cholesterol diet

## 2019-02-03 NOTE — CONSULT NOTE ADULT - SUBJECTIVE AND OBJECTIVE BOX
GI HPI:  Patient is a 63y old  Male who presents with a chief complaint of pancreatitis (02 Feb 2019 08:07)  63 yo M w/ hx of HTN, DLD, DM, prostate CA s/p seeding, OA, hx of recurrent pancreatitis (most recent episode in 12/2015) presents with epigastric pain. Pain started earlier this year, markedly worse yesterday after breakfast of coffee and cake, epigastric , stabbing type, with no radiation, associated with eating and also movement ,  he has been taking ibuprofen 800mg x2-3 per day for this pain with mild relief for over a month. Pt had this pain last year but it went away after starting on Protonix, he stopped Protonix after the pain resolved. Pt had EGD in 04/2018, which showed mild gastritis and duodenitis 9no h.pylori).  Admits to occasional etoh. Also admits to left flank pain, associated with movement. Denies fatigue, fever, chills, CP, SOB, N/V, diarrhea, dysuria.    In ED, given 1L NS bolus then started on LR @ 200mL/hr, also given morphine for pain control, CT A/P unremarkable, lipase 507 (02 Feb 2019 08:07)      PAST MEDICAL & SURGICAL HISTORY  Pancreatitis: recurrent  Osteoarthritis  Hyperlipidemia  Prostate cancer: s/p history of seeding  Hypertension  Diabetes mellitus, type II  S/P hip replacement, left: 9/2015  History of appendectomy  Prostate cancer: s/p history of seeding      FAMILY HISTORY:  FAMILY HISTORY:  No pertinent family history in first degree relatives      SOCIAL HISTORY:  smoker: denies   Alcohol: occasional   Drug: + marijuana use for many years     ALLERGIES:  No Known Allergies      MEDICATIONS:  MEDICATIONS  (STANDING):  amLODIPine   Tablet 5 milliGRAM(s) Oral daily  aspirin  chewable 81 milliGRAM(s) Oral daily  atorvastatin 40 milliGRAM(s) Oral at bedtime  chlorhexidine 4% Liquid 1 Application(s) Topical <User Schedule>  enoxaparin Injectable 40 milliGRAM(s) SubCutaneous daily  influenza   Vaccine 0.5 milliLiter(s) IntraMuscular once  nystatin Powder 1 Application(s) Topical two times a day  pantoprazole    Tablet 40 milliGRAM(s) Oral before breakfast  tamsulosin 0.4 milliGRAM(s) Oral at bedtime    MEDICATIONS  (PRN):  acetaminophen   Tablet .. 650 milliGRAM(s) Oral every 6 hours PRN Mild Pain (1 - 3)  cyclobenzaprine 5 milliGRAM(s) Oral three times a day PRN Muscle Spasm  morphine  - Injectable 4 milliGRAM(s) IV Push every 4 hours PRN Severe Pain (7 - 10)      HOME MEDICATIONS:  Home Medications:  acetaminophen 325 mg oral tablet: 2 tab(s) orally every 6 hours, As needed, mild pain (02 Feb 2019 08:25)  amLODIPine 5 mg oral tablet: 1 tab(s) orally once a day (02 Feb 2019 08:25)  aspirin 81 mg oral tablet: 1 tab(s) orally once a day (02 Feb 2019 08:25)  Flomax 0.4 mg oral capsule: 1 cap(s) orally once a day (at bedtime) (02 Feb 2019 08:25)  Lipitor 40 mg oral tablet: 1 tab(s) orally once a day (at bedtime) (02 Feb 2019 08:25)  metFORMIN 500 mg oral tablet: 1 tab(s) orally 2 times a day (02 Feb 2019 08:25)      ROS:     REVIEW OF SYSTEMS  General:  No fevers  Eyes:  No reported pain   ENT:  No sore throat   NECK: No stiffness   CV:  No chest pain   Resp:  No shortness of breath  GI:  See HPI  :  No dysuria  Muscle:  No weakness  Neuro:  No tingling  Endocrine:  No polyuria  Heme:  No ecchymosis          VITALS:   T(F): 96.3 (02-03 @ 04:42), Max: 97.8 (02-01 @ 17:48)  HR: 78 (02-03 @ 04:42) (62 - 84)  BP: 120/67 (02-03 @ 04:42) (117/69 - 153/88)  BP(mean): --  RR: 18 (02-03 @ 04:42) (17 - 20)  SpO2: 99% (02-02 @ 19:32) (95% - 100%)    I&O's Summary      PHYSICAL EXAM:  Gen: Pt appears comfortable   EYES: No scleral icterus   LUNG: Clear to auscultation bilaterally;   HEART: RRR; s1 and s2 heard   ABDOMEN: Soft, +BS, no abdominal distension, no Abdominal Tenderness, No guarding, No Harris Sign   Neuro: AAO x3   Ext: no edema     LABS:                        14.2   8.95  )-----------( 237      ( 03 Feb 2019 08:36 )             41.8       LIVER FUNCTIONS - ( 01 Feb 2019 20:08 )  Alb: 4.4 g/dL / Pro: 7.4 g/dL / ALK PHOS: 109 U/L / ALT: 13 U/L / AST: 11 U/L / GGT: x           02-01    139  |  99  |  9<L>  ----------------------------<  132<H>  4.3   |  24  |  0.6<L>    Ca    9.2      01 Feb 2019 20:08      CARDIAC MARKERS ( 01 Feb 2019 20:08 )  x     / <0.01 ng/mL / x     / x     / x          02-02 Chol 148 LDL 96 HDL 50 Trig 80    Previous EGD: 8/2018 - Gastritis , duodenitis (path - no h.pylori )     Previous colonoscopy: 2011 , good prep , one diminutive colon polyp (path - TA )       RADIOLOGY:    < from: CT Abdomen and Pelvis w/ IV Cont (02.01.19 @ 20:53) >    IMPRESSION:        No evidence of acute abdominal pathology.    Chronic findings as described.    < end of copied text >

## 2019-02-03 NOTE — DISCHARGE NOTE ADULT - PATIENT PORTAL LINK FT
You can access the [facility] Patient Portal, offered by [facility], by registering with the following website: [email or web address]

## 2019-02-03 NOTE — CONSULT NOTE ADULT - ASSESSMENT
61 yo M w/ hx of HTN, DLD, DM, prostate CA s/p seeding, OA, hx of recurrent pancreatitis (most recent episode in 12/2015), Gastritis, marijuana use , presents with epigastric pain. Pain started earlier this year, markedly worse yesterday after breakfast of coffee and cake, epigastric , stabbing type, with no radiation, associated with eating and also movement ,  he has been taking ibuprofen 800mg x2-3 per day for this pain with mild relief for over a month.  Denies nausea, vomiting, fever , diarrhea, constipation. Passing flatus .   Denies weight loss, dysphagia, melena, brbpr.     On exam VS - stable, abd - soft   On Labs - no leukocytosis , LFT - WNL , Lipase elevated to  507, LA - 1.8  On CT abd - no acute pathology , Prior USG - no gallstones     - Epigastric Abdominal Pain - possible sec to PUD vs marijuana use  Likely not related to pancreatitis   Currently tolerating liquid diet     Rec   Protonix 40 q12   Advance diet to GI soft  , low fat diet  Avoid NSAIDS   Lifestyle modifications , avoid marijuana use   Outpatient follow up in GI clinic     Please recall GI if pt unable to tolerate advancement of diet , at that time will consider in patient EGD evaluation for possible PUD 61 yo M w/ hx of HTN, DLD, DM, prostate CA s/p seeding, OA, hx of recurrent pancreatitis (most recent episode in 12/2015), Gastritis, marijuana use , presents with epigastric pain. Pain started earlier this year, markedly worse yesterday after breakfast of coffee and cake, epigastric , stabbing type, with no radiation, associated with eating and also movement ,  he has been taking ibuprofen 800mg x2-3 per day for this pain with mild relief for over a month.  Denies nausea, vomiting, fever , diarrhea. Passing flatus .   Denies weight loss, dysphagia, melena, brbpr.     On exam VS - stable, abd - soft   On Labs - no leukocytosis , LFT - WNL , Lipase elevated to  507, LA - 1.8  On CT abd - no acute pathology , Prior USG - no gallstones     - Epigastric Abdominal Pain - possible sec to PUD vs marijuana use  Likely not related to pancreatitis   Currently tolerating liquid diet     Rec   Protonix 40 q12   Advance diet to GI soft  , low fat diet  Avoid NSAIDS   Lifestyle modifications , avoid marijuana use   avoid constipation , daily bowel regimen   Outpatient follow up in GI clinic     Please recall GI if pt unable to tolerate advancement of diet , at that time will consider in patient EGD evaluation for possible PUD     - Diminutive colon polyp - TA in 2011  Rec outpatient surveillance colonoscopy

## 2019-02-06 DIAGNOSIS — Z79.82 LONG TERM (CURRENT) USE OF ASPIRIN: ICD-10-CM

## 2019-02-06 DIAGNOSIS — M19.90 UNSPECIFIED OSTEOARTHRITIS, UNSPECIFIED SITE: ICD-10-CM

## 2019-02-06 DIAGNOSIS — E78.5 HYPERLIPIDEMIA, UNSPECIFIED: ICD-10-CM

## 2019-02-06 DIAGNOSIS — Z87.891 PERSONAL HISTORY OF NICOTINE DEPENDENCE: ICD-10-CM

## 2019-02-06 DIAGNOSIS — Z85.46 PERSONAL HISTORY OF MALIGNANT NEOPLASM OF PROSTATE: ICD-10-CM

## 2019-02-06 DIAGNOSIS — Z23 ENCOUNTER FOR IMMUNIZATION: ICD-10-CM

## 2019-02-06 DIAGNOSIS — K85.90 ACUTE PANCREATITIS WITHOUT NECROSIS OR INFECTION, UNSPECIFIED: ICD-10-CM

## 2019-02-06 DIAGNOSIS — F12.90 CANNABIS USE, UNSPECIFIED, UNCOMPLICATED: ICD-10-CM

## 2019-02-06 DIAGNOSIS — K29.70 GASTRITIS, UNSPECIFIED, WITHOUT BLEEDING: ICD-10-CM

## 2019-02-06 DIAGNOSIS — I10 ESSENTIAL (PRIMARY) HYPERTENSION: ICD-10-CM

## 2019-02-06 DIAGNOSIS — E11.9 TYPE 2 DIABETES MELLITUS WITHOUT COMPLICATIONS: ICD-10-CM

## 2019-02-06 DIAGNOSIS — Z96.642 PRESENCE OF LEFT ARTIFICIAL HIP JOINT: ICD-10-CM

## 2019-02-11 ENCOUNTER — INPATIENT (INPATIENT)
Facility: HOSPITAL | Age: 64
LOS: 1 days | Discharge: HOME | End: 2019-02-13
Attending: INTERNAL MEDICINE | Admitting: INTERNAL MEDICINE

## 2019-02-11 VITALS
DIASTOLIC BLOOD PRESSURE: 82 MMHG | TEMPERATURE: 98 F | WEIGHT: 192.9 LBS | OXYGEN SATURATION: 98 % | SYSTOLIC BLOOD PRESSURE: 117 MMHG | HEIGHT: 67.5 IN | HEART RATE: 94 BPM | RESPIRATION RATE: 18 BRPM

## 2019-02-11 DIAGNOSIS — Z96.642 PRESENCE OF LEFT ARTIFICIAL HIP JOINT: Chronic | ICD-10-CM

## 2019-02-11 DIAGNOSIS — C61 MALIGNANT NEOPLASM OF PROSTATE: Chronic | ICD-10-CM

## 2019-02-11 DIAGNOSIS — Z98.890 OTHER SPECIFIED POSTPROCEDURAL STATES: Chronic | ICD-10-CM

## 2019-02-11 RX ORDER — SODIUM CHLORIDE 9 MG/ML
2000 INJECTION, SOLUTION INTRAVENOUS ONCE
Qty: 0 | Refills: 0 | Status: COMPLETED | OUTPATIENT
Start: 2019-02-11 | End: 2019-02-11

## 2019-02-11 RX ORDER — MORPHINE SULFATE 50 MG/1
6 CAPSULE, EXTENDED RELEASE ORAL ONCE
Qty: 0 | Refills: 0 | Status: DISCONTINUED | OUTPATIENT
Start: 2019-02-11 | End: 2019-02-11

## 2019-02-11 RX ORDER — METOCLOPRAMIDE HCL 10 MG
10 TABLET ORAL ONCE
Qty: 0 | Refills: 0 | Status: COMPLETED | OUTPATIENT
Start: 2019-02-11 | End: 2019-02-11

## 2019-02-12 LAB
ALBUMIN SERPL ELPH-MCNC: 4.5 G/DL — SIGNIFICANT CHANGE UP (ref 3.5–5.2)
ALP SERPL-CCNC: 92 U/L — SIGNIFICANT CHANGE UP (ref 30–115)
ALT FLD-CCNC: 12 U/L — SIGNIFICANT CHANGE UP (ref 0–41)
ANION GAP SERPL CALC-SCNC: 20 MMOL/L — HIGH (ref 7–14)
AST SERPL-CCNC: 17 U/L — SIGNIFICANT CHANGE UP (ref 0–41)
BASE EXCESS BLDV CALC-SCNC: 0.5 MMOL/L — SIGNIFICANT CHANGE UP (ref -2–2)
BILIRUB DIRECT SERPL-MCNC: 0.2 MG/DL — SIGNIFICANT CHANGE UP (ref 0–0.2)
BILIRUB INDIRECT FLD-MCNC: 0.9 MG/DL — SIGNIFICANT CHANGE UP (ref 0.2–1.2)
BILIRUB SERPL-MCNC: 1.1 MG/DL — SIGNIFICANT CHANGE UP (ref 0.2–1.2)
BUN SERPL-MCNC: 12 MG/DL — SIGNIFICANT CHANGE UP (ref 10–20)
CA-I SERPL-SCNC: 1.16 MMOL/L — SIGNIFICANT CHANGE UP (ref 1.12–1.3)
CALCIUM SERPL-MCNC: 9 MG/DL — SIGNIFICANT CHANGE UP (ref 8.5–10.1)
CHLORIDE SERPL-SCNC: 96 MMOL/L — LOW (ref 98–110)
CO2 SERPL-SCNC: 23 MMOL/L — SIGNIFICANT CHANGE UP (ref 17–32)
CREAT SERPL-MCNC: 0.8 MG/DL — SIGNIFICANT CHANGE UP (ref 0.7–1.5)
GAS PNL BLDV: 137 MMOL/L — SIGNIFICANT CHANGE UP (ref 136–145)
GAS PNL BLDV: SIGNIFICANT CHANGE UP
GLUCOSE BLDC GLUCOMTR-MCNC: 110 MG/DL — HIGH (ref 70–99)
GLUCOSE BLDC GLUCOMTR-MCNC: 110 MG/DL — HIGH (ref 70–99)
GLUCOSE SERPL-MCNC: 95 MG/DL — SIGNIFICANT CHANGE UP (ref 70–99)
HCO3 BLDV-SCNC: 26 MMOL/L — SIGNIFICANT CHANGE UP (ref 22–29)
HCT VFR BLD CALC: 39.9 % — LOW (ref 42–52)
HCT VFR BLDA CALC: 50.8 % — HIGH (ref 34–44)
HGB BLD CALC-MCNC: 16.6 G/DL — SIGNIFICANT CHANGE UP (ref 14–18)
HGB BLD-MCNC: 13.5 G/DL — LOW (ref 14–18)
LACTATE BLDV-MCNC: 1.3 MMOL/L — SIGNIFICANT CHANGE UP (ref 0.5–1.6)
LIDOCAIN IGE QN: 10 U/L — SIGNIFICANT CHANGE UP (ref 7–60)
MCHC RBC-ENTMCNC: 28.5 PG — SIGNIFICANT CHANGE UP (ref 27–31)
MCHC RBC-ENTMCNC: 33.8 G/DL — SIGNIFICANT CHANGE UP (ref 32–37)
MCV RBC AUTO: 84.2 FL — SIGNIFICANT CHANGE UP (ref 80–94)
NRBC # BLD: 0 /100 WBCS — SIGNIFICANT CHANGE UP (ref 0–0)
PCO2 BLDV: 42 MMHG — SIGNIFICANT CHANGE UP (ref 41–51)
PH BLDV: 7.39 — SIGNIFICANT CHANGE UP (ref 7.26–7.43)
PLATELET # BLD AUTO: 231 K/UL — SIGNIFICANT CHANGE UP (ref 130–400)
PO2 BLDV: 50 MMHG — HIGH (ref 20–40)
POTASSIUM BLDV-SCNC: 3.3 MMOL/L — SIGNIFICANT CHANGE UP (ref 3.3–5.6)
POTASSIUM SERPL-MCNC: 4 MMOL/L — SIGNIFICANT CHANGE UP (ref 3.5–5)
POTASSIUM SERPL-SCNC: 4 MMOL/L — SIGNIFICANT CHANGE UP (ref 3.5–5)
PROT SERPL-MCNC: 7.5 G/DL — SIGNIFICANT CHANGE UP (ref 6–8)
RBC # BLD: 4.74 M/UL — SIGNIFICANT CHANGE UP (ref 4.7–6.1)
RBC # FLD: 12.6 % — SIGNIFICANT CHANGE UP (ref 11.5–14.5)
SAO2 % BLDV: 82 % — SIGNIFICANT CHANGE UP
SODIUM SERPL-SCNC: 139 MMOL/L — SIGNIFICANT CHANGE UP (ref 135–146)
WBC # BLD: 7.17 K/UL — SIGNIFICANT CHANGE UP (ref 4.8–10.8)
WBC # FLD AUTO: 7.17 K/UL — SIGNIFICANT CHANGE UP (ref 4.8–10.8)

## 2019-02-12 RX ORDER — METRONIDAZOLE 500 MG
500 TABLET ORAL ONCE
Qty: 0 | Refills: 0 | Status: COMPLETED | OUTPATIENT
Start: 2019-02-12 | End: 2019-02-12

## 2019-02-12 RX ORDER — ASPIRIN/CALCIUM CARB/MAGNESIUM 324 MG
81 TABLET ORAL DAILY
Qty: 0 | Refills: 0 | Status: DISCONTINUED | OUTPATIENT
Start: 2019-02-12 | End: 2019-02-13

## 2019-02-12 RX ORDER — ENOXAPARIN SODIUM 100 MG/ML
40 INJECTION SUBCUTANEOUS EVERY 24 HOURS
Qty: 0 | Refills: 0 | Status: DISCONTINUED | OUTPATIENT
Start: 2019-02-12 | End: 2019-02-13

## 2019-02-12 RX ORDER — METRONIDAZOLE 500 MG
1 TABLET ORAL
Qty: 21 | Refills: 0 | OUTPATIENT
Start: 2019-02-12 | End: 2019-02-18

## 2019-02-12 RX ORDER — MOXIFLOXACIN HYDROCHLORIDE TABLETS, 400 MG 400 MG/1
1 TABLET, FILM COATED ORAL
Qty: 14 | Refills: 0 | OUTPATIENT
Start: 2019-02-12 | End: 2019-02-18

## 2019-02-12 RX ORDER — SODIUM CHLORIDE 9 MG/ML
1000 INJECTION, SOLUTION INTRAVENOUS
Qty: 0 | Refills: 0 | Status: DISCONTINUED | OUTPATIENT
Start: 2019-02-12 | End: 2019-02-13

## 2019-02-12 RX ORDER — MORPHINE SULFATE 50 MG/1
6 CAPSULE, EXTENDED RELEASE ORAL ONCE
Qty: 0 | Refills: 0 | Status: DISCONTINUED | OUTPATIENT
Start: 2019-02-12 | End: 2019-02-12

## 2019-02-12 RX ORDER — ATORVASTATIN CALCIUM 80 MG/1
40 TABLET, FILM COATED ORAL AT BEDTIME
Qty: 0 | Refills: 0 | Status: DISCONTINUED | OUTPATIENT
Start: 2019-02-12 | End: 2019-02-13

## 2019-02-12 RX ORDER — PANTOPRAZOLE SODIUM 20 MG/1
40 TABLET, DELAYED RELEASE ORAL
Qty: 0 | Refills: 0 | Status: DISCONTINUED | OUTPATIENT
Start: 2019-02-12 | End: 2019-02-13

## 2019-02-12 RX ORDER — CHLORHEXIDINE GLUCONATE 213 G/1000ML
1 SOLUTION TOPICAL
Qty: 0 | Refills: 0 | Status: DISCONTINUED | OUTPATIENT
Start: 2019-02-12 | End: 2019-02-13

## 2019-02-12 RX ORDER — METRONIDAZOLE 500 MG
500 TABLET ORAL EVERY 8 HOURS
Qty: 0 | Refills: 0 | Status: DISCONTINUED | OUTPATIENT
Start: 2019-02-12 | End: 2019-02-13

## 2019-02-12 RX ORDER — CIPROFLOXACIN LACTATE 400MG/40ML
400 VIAL (ML) INTRAVENOUS ONCE
Qty: 0 | Refills: 0 | Status: COMPLETED | OUTPATIENT
Start: 2019-02-12 | End: 2019-02-12

## 2019-02-12 RX ORDER — MORPHINE SULFATE 50 MG/1
4 CAPSULE, EXTENDED RELEASE ORAL ONCE
Qty: 0 | Refills: 0 | Status: DISCONTINUED | OUTPATIENT
Start: 2019-02-12 | End: 2019-02-12

## 2019-02-12 RX ORDER — TAMSULOSIN HYDROCHLORIDE 0.4 MG/1
0.4 CAPSULE ORAL AT BEDTIME
Qty: 0 | Refills: 0 | Status: DISCONTINUED | OUTPATIENT
Start: 2019-02-12 | End: 2019-02-13

## 2019-02-12 RX ORDER — MORPHINE SULFATE 50 MG/1
2 CAPSULE, EXTENDED RELEASE ORAL EVERY 4 HOURS
Qty: 0 | Refills: 0 | Status: DISCONTINUED | OUTPATIENT
Start: 2019-02-12 | End: 2019-02-13

## 2019-02-12 RX ORDER — CIPROFLOXACIN LACTATE 400MG/40ML
400 VIAL (ML) INTRAVENOUS EVERY 12 HOURS
Qty: 0 | Refills: 0 | Status: DISCONTINUED | OUTPATIENT
Start: 2019-02-12 | End: 2019-02-13

## 2019-02-12 RX ORDER — KETOROLAC TROMETHAMINE 30 MG/ML
30 SYRINGE (ML) INJECTION ONCE
Qty: 0 | Refills: 0 | Status: DISCONTINUED | OUTPATIENT
Start: 2019-02-12 | End: 2019-02-12

## 2019-02-12 RX ORDER — AMLODIPINE BESYLATE 2.5 MG/1
5 TABLET ORAL DAILY
Qty: 0 | Refills: 0 | Status: DISCONTINUED | OUTPATIENT
Start: 2019-02-12 | End: 2019-02-13

## 2019-02-12 RX ADMIN — Medication 100 MILLIGRAM(S): at 13:55

## 2019-02-12 RX ADMIN — Medication 200 MILLIGRAM(S): at 05:20

## 2019-02-12 RX ADMIN — SODIUM CHLORIDE 1000 MILLILITER(S): 9 INJECTION, SOLUTION INTRAVENOUS at 00:07

## 2019-02-12 RX ADMIN — Medication 10 MILLIGRAM(S): at 00:06

## 2019-02-12 RX ADMIN — MORPHINE SULFATE 6 MILLIGRAM(S): 50 CAPSULE, EXTENDED RELEASE ORAL at 00:07

## 2019-02-12 RX ADMIN — MORPHINE SULFATE 2 MILLIGRAM(S): 50 CAPSULE, EXTENDED RELEASE ORAL at 11:46

## 2019-02-12 RX ADMIN — MORPHINE SULFATE 4 MILLIGRAM(S): 50 CAPSULE, EXTENDED RELEASE ORAL at 03:40

## 2019-02-12 RX ADMIN — SODIUM CHLORIDE 75 MILLILITER(S): 9 INJECTION, SOLUTION INTRAVENOUS at 13:53

## 2019-02-12 RX ADMIN — AMLODIPINE BESYLATE 5 MILLIGRAM(S): 2.5 TABLET ORAL at 13:54

## 2019-02-12 RX ADMIN — MORPHINE SULFATE 6 MILLIGRAM(S): 50 CAPSULE, EXTENDED RELEASE ORAL at 07:17

## 2019-02-12 RX ADMIN — MORPHINE SULFATE 2 MILLIGRAM(S): 50 CAPSULE, EXTENDED RELEASE ORAL at 17:44

## 2019-02-12 RX ADMIN — Medication 30 MILLIGRAM(S): at 05:34

## 2019-02-12 RX ADMIN — ATORVASTATIN CALCIUM 40 MILLIGRAM(S): 80 TABLET, FILM COATED ORAL at 22:41

## 2019-02-12 RX ADMIN — Medication 100 MILLIGRAM(S): at 22:41

## 2019-02-12 RX ADMIN — Medication 100 MILLIGRAM(S): at 05:20

## 2019-02-12 RX ADMIN — Medication 200 MILLIGRAM(S): at 17:44

## 2019-02-12 RX ADMIN — ENOXAPARIN SODIUM 40 MILLIGRAM(S): 100 INJECTION SUBCUTANEOUS at 13:55

## 2019-02-12 RX ADMIN — TAMSULOSIN HYDROCHLORIDE 0.4 MILLIGRAM(S): 0.4 CAPSULE ORAL at 22:41

## 2019-02-12 RX ADMIN — MORPHINE SULFATE 2 MILLIGRAM(S): 50 CAPSULE, EXTENDED RELEASE ORAL at 22:41

## 2019-02-12 RX ADMIN — Medication 81 MILLIGRAM(S): at 13:53

## 2019-02-12 NOTE — H&P ADULT - NSHPSOCIALHISTORY_GEN_ALL_CORE
Smokes Marijuana for recreation  No tobacco smoking  Non alcoholic  No IV drug abuse    Lives in senior assisted living

## 2019-02-12 NOTE — ED PROVIDER NOTE - PHYSICAL EXAMINATION
Vital Signs: I have reviewed the initial vital signs.  Constitutional: NAD, well-nourished, appears stated age.  HEENT: Airway patent, moist MM, no erythema/swelling/deformity of oral structures. EOMI, PERRLA.  CV: regular rate, regular rhythm, well-perfused extremities, 2+ b/l DP and radial pulses equal.  Lungs: BCTA, no increased WOB.  ABD: + tenderness to left lateral abd and LLQ. + focal guarding. No rebound, no pulsatile mass, no hernias.   MSK: Neck supple, nontender, nl ROM, no stepoff. Chest nontender. Back nontender in TLS spine or to b/l bony structures or flanks. Ext nontender, nl rom, no deformity.   INTEG: Skin warm, dry, no rash.  NEURO: A&Ox3, normal strength, nl sensation throughout, normal speech.   PSYCH: Calm, cooperative, normal affect and interaction.

## 2019-02-12 NOTE — H&P ADULT - NSHPPHYSICALEXAM_GEN_ALL_CORE
PHYSICAL EXAM:      Constitutional: obese male lying in the bed c/o abdominal pain    Respiratory: lungs B/L clear on auscultation    Cardiovascular: S1S2 normal, no murmur heard    Gastrointestinal: Abd soft, non distended, BS+, tenderness + in LLQ    Extremities: No pedal edema    Neurological: AAOX3, No FND

## 2019-02-12 NOTE — ED PROVIDER NOTE - CARE PLAN
Assessment and plan of treatment:	Eval for pancreatitis vs diverticulitis vs colitis or other intraabdominal emergency causing focal abd tenderness and diarrhea. Eval for DKA as well given DM and poor PO intake. Labs, imaging, fluids, symptom control, reassess. Principal Discharge DX:	Colitis  Assessment and plan of treatment:	Eval for pancreatitis vs diverticulitis vs colitis or other intraabdominal emergency causing focal abd tenderness and diarrhea. Eval for DKA as well given DM and poor PO intake. Labs, imaging, fluids, symptom control, reassess.

## 2019-02-12 NOTE — H&P ADULT - ASSESSMENT
# Abdominal pain with bloody diarrhea: CT abdomen shows Colitis without diverticulosis.  DD includes infectious vs Colon cancer vs polyp induced vs R/O IBD.  - get stool studies to R/O infectious etiology including  C diff, ESR, CRP, Fecal calprotectin.  - start on IV cipro and Flagyl  -LAst colonoscopy showed one tubular adenoma (dimunitive) in 2011, so surely needs a repeat Colonoscopy at some point.  -Pain control with Morphine 2mg every 4hrs prn.  -NPo as of now    #DM II: Hold Metformin, monitor FS, goal 110-180.  PAtient is NPO also    # Prostate cancer in remission.  Last PSA check was normal according to patient  He has a follow up in next month with Dr. Rolon.  c/w flomax    # HTN: SBP goal < 140, Hold Norvasc 5mg as of now. As patient is having constant diarrhea and BP is 110/70mmHg.  Can restart if it goes above 140mmHG    # DLD: c/w ASA, lipitor    Full code  DVT PPX: Lovenox  OOB  Diet: NPO  CHG bath daily

## 2019-02-12 NOTE — H&P ADULT - FAMILY HISTORY
Father  Still living? No  Family history of diabetes mellitus in father, Age at diagnosis: Age Unknown

## 2019-02-12 NOTE — ED PROVIDER NOTE - PROGRESS NOTE DETAILS
Found to have colitis. No pancreatitis. tolerating PO. Pain controlled. ok to discharge with abx and strict return precautions with Rx abx

## 2019-02-12 NOTE — ED PROVIDER NOTE - NSFOLLOWUPINSTRUCTIONS_ED_ALL_ED_FT
Abdominal Pain    Many things can cause abdominal pain. Many times, abdominal pain is not caused by a disease and will improve without treatment. Your health care provider will do a physical exam to determine if there is a dangerous cause of your pain; blood tests and imaging may help determine the cause of your pain. However, in many cases, no cause may be found and you may need further testing as an outpatient. Monitor your abdominal pain for any changes.     SEEK IMMEDIATE MEDICAL CARE IF YOU HAVE ANY OF THE FOLLOWING SYMPTOMS: worsening abdominal pain, uncontrollable vomiting, profuse diarrhea, inability to have bowel movements or pass gas, black or bloody stools, fever accompanying chest pain or back pain, or fainting. These symptoms may represent a serious problem that is an emergency. Do not wait to see if the symptoms will go away. Get medical help right away. Call 911 and do not drive yourself to the hospital.    Diarrhea    Diarrhea is frequent loose or watery bowel movements that has many causes. Diarrhea can make you feel weak and cause you to become dehydrated. Diarrhea typically lasts 2–3 days, but can last longer if it is a sign of something more serious. Drink clear fluids to prevent dehydration. Eat bland, easy-to-digest foods as tolerated.     SEEK IMMEDIATE MEDICAL CARE IF YOU HAVE ANY OF THE FOLLOWING SYMPTOMS: high fevers, lightheadedness/dizziness, chest pain, black or bloody stools, shortness of breath, severe abdominal or back pain, or any signs of dehydration.

## 2019-02-12 NOTE — ED PROVIDER NOTE - OBJECTIVE STATEMENT
63 y M PMH DM, HTN, HLD, h/o appendectomy, prior pancreatitis, pw abd pain to LLQ, left lateral abd, worsening x 2 days, with diarrhea and intolerance of PO, feeling dehydrated. Different from prior pancreatitis as he has not had diarrhea in the past from pancreatitis. No fever, chills, dysuria, hematuria, numbness, tingling, weakness, testicular pain.

## 2019-02-12 NOTE — H&P ADULT - NSHPLABSRESULTS_GEN_ALL_CORE
Colonoscopy in 2011: Internal/External Hemorrhoids and diminutive single polyp (Tubular adenoma) Colonoscopy in 2011: Internal/External Hemorrhoids and diminutive single polyp (Tubular adenoma)                            13.5   7.17  )-----------( 231      ( 12 Feb 2019 00:30 )             39.9       02-12    139  |  96<L>  |  12  ----------------------------<  95  4.0   |  23  |  0.8    Ca    9.0      12 Feb 2019 00:30    TPro  7.5  /  Alb  4.5  /  TBili  1.1  /  DBili  0.2  /  AST  17  /  ALT  12  /  AlkPhos  92  02-12                      Lactate Trend            CAPILLARY BLOOD GLUCOSE      POCT Blood Glucose.: 110 mg/dL (12 Feb 2019 08:02)

## 2019-02-12 NOTE — H&P ADULT - ATTENDING COMMENTS
Patient seen and examined independently. Agree with resident note/ history / physical exam and plan of care with following exceptions/additions/updates. Case discussed with house-staff, nursing and patient/pt decision maker.     pt still has abd pain, has diarrhea, no bleeding. no n/v , unable to tolerate diet due to pain. pain is mostly right side of his abd, no radiation. pain is severe. constant.   Vital Signs Last 24 Hrs  T(C): 35.9 (12 Feb 2019 09:00), Max: 36.8 (12 Feb 2019 00:38)  T(F): 96.7 (12 Feb 2019 09:00), Max: 98.2 (12 Feb 2019 00:38)  HR: 69 (12 Feb 2019 09:00) (69 - 94)  BP: 119/68 (12 Feb 2019 09:00) (110/73 - 119/68)  RR: 18 (12 Feb 2019 09:00) (18 - 18)  SpO2: 98% (12 Feb 2019 09:00) (98% - 98%)  Physical exam:   constitutional NAD, AAOX3, Respiratory  lungs CTA, CVS heart RRR, GI: abdomen Soft tender on the right side, slightly distended, no rebound tenderness,  BS+, skin: intact  neuro exam non focal.                           13.5   7.17  )-----------( 231      ( 12 Feb 2019 00:30 )             39.9   02-12    139  |  96<L>  |  12  ----------------------------<  95  4.0   |  23  |  0.8    Ca    9.0      12 Feb 2019 00:30    TPro  7.5  /  Alb  4.5  /  TBili  1.1  /  DBili  0.2  /  AST  17  /  ALT  12  /  AlkPhos  92  02-12    < from: CT Abdomen and Pelvis w/ IV Cont (02.12.19 @ 03:00) >      Colitis extending from the distal descending colon to the rectum with   associated area.    < end of copied text >    a/p  colitis, cont abx, GI eval, check for c. diff, monitor electrolytes, IVF.     PAST MEDICAL & SURGICAL HISTORY: cont meds.   Pancreatitis: recurrent  Osteoarthritis  Hyperlipidemia  Prostate cancer: s/p history of seeding  Hypertension  Diabetes mellitus, type II  S/P hip replacement, left: 9/2015  History of appendectomy

## 2019-02-12 NOTE — ED PROVIDER NOTE - PLAN OF CARE
Eval for pancreatitis vs diverticulitis vs colitis or other intraabdominal emergency causing focal abd tenderness and diarrhea. Eval for DKA as well given DM and poor PO intake. Labs, imaging, fluids, symptom control, reassess.

## 2019-02-12 NOTE — ED PROVIDER NOTE - NS ED ROS FT
Constitutional: (-) fever, (-) chills  Eyes/ENT: (-) blurry vision, (-) epistaxis, (-) sore throat  Cardiovascular: (-) chest pain, (-) syncope  Respiratory: (-) cough, (-) shortness of breath  Gastrointestinal: (+) abdominal pain, (+) vomiting, (+) diarrhea  Musculoskeletal: (-) neck pain, (-) back pain, (-) joint pain  Integumentary: (-) rash, (-) edema  Neurological: (-) headache, (-) altered mental status  : (-) dysuria, hematuria.  Allergic/Immunologic: (-) pruritus, rash

## 2019-02-12 NOTE — ED PROVIDER NOTE - MEDICAL DECISION MAKING DETAILS
presented with abd pain, diarrhea, vomiting. labs and imaging support colitis. Abx started, pain contorlled. Patient to be discharged from ED. Any available test results were discussed with patient and/or family. Verbal instructions given, including instructions to return to ED immediately for any new, worsening, or concerning symptoms. Patient endorsed understanding. Written discharge instructions additionally given, including follow-up plan. presented with abd pain, diarrhea, vomiting. labs and imaging support colitis. Abx started, pain controlled. Patient to be discharged from ED. Any available test results were discussed with patient and/or family. Verbal instructions given, including instructions to return to ED immediately for any new, worsening, or concerning symptoms. Patient endorsed understanding. Written discharge instructions additionally given, including follow-up plan.

## 2019-02-12 NOTE — H&P ADULT - HISTORY OF PRESENT ILLNESS
63 yo M w/ hx of HTN, DLD, DM, prostate CA s/p seeding on remission, OA, hx of pancreatitis (most recent episode in 12/2015) presents with looses tools with LLQ pain for last 5 days.  Patient was recently discharged with epigastric pain likely 2/2 gastritis 10 days ago and was doing fine in senior living and 5 days ago he started having LLQ abdominal pain radiating LUQ, sudden onset, sharp, 8/10, mostly constant and aggravated by movement. It was also a/s with 5-10 loose stools, mixed with some blood specks and also seen in toilet paper but no bela blood. He is also having decreased appetite He is not c/o rectal pain or mass protruding outside. He reports around 80 pounds weight loss in 6 months. So he decided to come to the Ed. He denies fever, chills, nausea, vomiting. No recent Abx use in the system, no laxatives. His last colonoscopy was in 2011 and showed one tubular adenoma.     In the Ed he was hemodynamically stable and was given Cipro/Flagyl and RL 2L.For pain He was given Morphine 16mg, ketorolac 30mg once.

## 2019-02-13 ENCOUNTER — TRANSCRIPTION ENCOUNTER (OUTPATIENT)
Age: 64
End: 2019-02-13

## 2019-02-13 VITALS
SYSTOLIC BLOOD PRESSURE: 117 MMHG | DIASTOLIC BLOOD PRESSURE: 69 MMHG | TEMPERATURE: 97 F | OXYGEN SATURATION: 97 % | HEART RATE: 74 BPM | RESPIRATION RATE: 18 BRPM

## 2019-02-13 DIAGNOSIS — Z71.89 OTHER SPECIFIED COUNSELING: ICD-10-CM

## 2019-02-13 LAB
GLUCOSE BLDC GLUCOMTR-MCNC: 115 MG/DL — HIGH (ref 70–99)
GLUCOSE BLDC GLUCOMTR-MCNC: 93 MG/DL — SIGNIFICANT CHANGE UP (ref 70–99)

## 2019-02-13 RX ORDER — MOXIFLOXACIN HYDROCHLORIDE TABLETS, 400 MG 400 MG/1
1 TABLET, FILM COATED ORAL
Qty: 24 | Refills: 0 | OUTPATIENT
Start: 2019-02-13 | End: 2019-02-24

## 2019-02-13 RX ORDER — METRONIDAZOLE 500 MG
1 TABLET ORAL
Qty: 36 | Refills: 0 | OUTPATIENT
Start: 2019-02-13 | End: 2019-02-24

## 2019-02-13 RX ADMIN — PANTOPRAZOLE SODIUM 40 MILLIGRAM(S): 20 TABLET, DELAYED RELEASE ORAL at 06:12

## 2019-02-13 RX ADMIN — Medication 200 MILLIGRAM(S): at 06:12

## 2019-02-13 RX ADMIN — ENOXAPARIN SODIUM 40 MILLIGRAM(S): 100 INJECTION SUBCUTANEOUS at 11:59

## 2019-02-13 RX ADMIN — AMLODIPINE BESYLATE 5 MILLIGRAM(S): 2.5 TABLET ORAL at 06:11

## 2019-02-13 RX ADMIN — Medication 100 MILLIGRAM(S): at 06:12

## 2019-02-13 RX ADMIN — Medication 81 MILLIGRAM(S): at 11:59

## 2019-02-13 NOTE — DISCHARGE NOTE ADULT - CARE PROVIDERS DIRECT ADDRESSES
,[email or web address]

## 2019-02-13 NOTE — CONSULT NOTE ADULT - ASSESSMENT
63 yo M w/ hx of HTN, DLD, DM, prostate CA s/p seeding on remission, OA, hx of pancreatitis (most recent episode in 12/2015) presents with looses tools with LLQ pain for last 5 days.    # LLQ pain/ Colitis on CT scan/ No Wbc/ Lactate normal/ Diarrhea resolved rule out infectious (Cdiff) vs ischemic vs inflammatory   Patient feeling better  Advance diet   IV hydration   Pain control   Check C DIff PCR and GI PCR when pt have a BM   Continue cipro and flagyl for now   Will need colonoscopy as outpatient in 8 weeks 61 yo M w/ hx of HTN, DLD, DM, prostate CA s/p seeding on remission, OA, hx of pancreatitis (most recent episode in 12/2015) presents with looses tools with LLQ pain for last 5 days.    # LLQ pain/ Colitis on CT scan/ No Wbc/ Lactate normal/ Diarrhea resolved rule out infectious (Cdiff) vs ischemic vs inflammatory   Patient feeling better  Advance diet   IV hydration   Pain control   Check C DIff PCR and GI PCR when pt have a BM   Continue cipro and flagyl for now   Will need colonoscopy as outpatient in 8 weeks

## 2019-02-13 NOTE — DISCHARGE NOTE ADULT - CARE PLAN
Principal Discharge DX:	Colitis  Goal:	Medical management  Assessment and plan of treatment:	The CT scan showed colitis of the descending colon to the rectum. You were started on IV antibiotics and now tolerating oral intake. Resume the antibiotics that were sent to your pharmacy for 12 more days. Follow up with your PMD for further recommendations.  Secondary Diagnosis:	Hyperlipidemia  Goal:	Medical management and follow up  Assessment and plan of treatment:	Resume your medications as instructed. Follow up with your PMD for further recommendations.  Secondary Diagnosis:	Hypertension  Goal:	Medical management and follow up  Assessment and plan of treatment:	Resume your medications as instructed. Follow up with your PMD for further recommendations.  Secondary Diagnosis:	Diabetes mellitus, type II  Goal:	Medical management and follow up  Assessment and plan of treatment:	Resume your medications as instructed. Follow up with your PMD for further recommendations.  Secondary Diagnosis:	Pancreatitis  Goal:	Follow up  Assessment and plan of treatment:	Avoid alcohol intake in the setting of your prior pancreatitis. Follow up with your PMD and Gastroenterologist for further recommendations.  Secondary Diagnosis:	Prostate cancer  Goal:	Follow up  Assessment and plan of treatment:	Stable. Follow up with your hematologist oncologist and urologist for further recommendations.

## 2019-02-13 NOTE — DISCHARGE NOTE ADULT - MEDICATION SUMMARY - MEDICATIONS TO TAKE
I will START or STAY ON the medications listed below when I get home from the hospital:    Flagyl 500 mg oral tablet  -- 1 tab(s) by mouth 3 times a day  -- Indication: For Colitis    acetaminophen 325 mg oral tablet  -- 2 tab(s) by mouth every 6 hours, As needed, mild pain  -- Indication: For Pain    aspirin 81 mg oral tablet  -- 1 tab(s) by mouth once a day  -- Indication: For HTN    Flomax 0.4 mg oral capsule  -- 1 cap(s) by mouth once a day (at bedtime)  -- Indication: For BPH    metFORMIN 500 mg oral tablet  -- 1 tab(s) by mouth 2 times a day  -- Indication: For DM    Lipitor 40 mg oral tablet  -- 1 tab(s) by mouth once a day (at bedtime)  -- Indication: For DLD    amLODIPine 5 mg oral tablet  -- 1 tab(s) by mouth once a day  -- Indication: For HTN    pantoprazole 40 mg oral delayed release tablet  -- 1 tab(s) by mouth once a day (before a meal)  -- Indication: For GERD    Cipro 500 mg oral tablet  -- 1 tab(s) by mouth every 12 hours  -- Indication: For Colitis

## 2019-02-13 NOTE — DISCHARGE NOTE ADULT - HOSPITAL COURSE
61 yo M w/ hx of HTN, DLD, DM, prostate CA s/p seeding on remission, OA, hx of pancreatitis (most recent episode in 12/2015) presents with looses tools with LLQ pain for last 5 days.  Patient was recently discharged with epigastric pain likely 2/2 gastritis 10 days ago and was doing fine in senior living and 5 days ago he started having LLQ abdominal pain radiating LUQ, sudden onset, sharp, 8/10, mostly constant and aggravated by movement. It was also a/s with 5-10 loose stools, mixed with some blood specks and also seen in toilet paper but no bela blood. He is also having decreased appetite He is not c/o rectal pain or mass protruding outside. He reports around 80 pounds weight loss in 6 months. So he decided to come to the Ed. He denies fever, chills, nausea, vomiting. No recent Abx use in the system, no laxatives. His last colonoscopy was in 2011 and showed one tubular adenoma.     In the Ed he was hemodynamically stable and was given Cipro/Flagyl and RL 2L.For pain He was given Morphine 16mg, ketorolac 30mg once.    CT scan showed colitis, started on IV antibiotics, tolerating PO intake.

## 2019-02-13 NOTE — DISCHARGE NOTE ADULT - OTHER SIGNIFICANT FINDINGS
< from: CT Abdomen and Pelvis w/ IV Cont (02.12.19 @ 03:00) >  PELVIC ORGANS: Pelvic surgical clips versus brachytherapy seeds.   Partially obscured by streak artifact from left hip prosthesis.    PERITONEUM/MESENTERY/BOWEL: There is colitis extending from the distal   descending colon to the rectum. Liquid stool is noted throughout the   colon compatible with diarrhea. Appendectomy. No pneumoperitoneum. No   bowel obstruction.    BONES/SOFT TISSUES: Left total hip arthroplasty. Right hip, sacroiliac   joints and spine degenerative changes.    OTHER: Normal caliber abdominal aorta mild atherosclerotic changes      IMPRESSION:        Colitis extending from the distal descending colon to the rectum with   associated area.    < end of copied text >

## 2019-02-13 NOTE — DISCHARGE NOTE ADULT - PROVIDER TOKENS
PROVIDER:[TOKEN:[[id number]]],PROVIDER:[TOKEN:[[id number]]] PROVIDER:[TOKEN:[[id number]]],PROVIDER:[TOKEN:[[id number]]],PROVIDER:[TOKEN:[[id number]]]

## 2019-02-13 NOTE — PROGRESS NOTE ADULT - SUBJECTIVE AND OBJECTIVE BOX
HPI:  61 yo M w/ hx of HTN, DLD, DM, prostate CA s/p seeding on remission, OA, hx of pancreatitis (most recent episode in 12/2015) presents with looses tools with LLQ pain for last 5 days.  Patient was recently discharged with epigastric pain likely 2/2 gastritis 10 days ago and was doing fine in senior living and 5 days ago he started having LLQ abdominal pain radiating LUQ, sudden onset, sharp, 8/10, mostly constant and aggravated by movement. It was also a/s with 5-10 loose stools, mixed with some blood specks and also seen in toilet paper but no bela blood. He is also having decreased appetite He is not c/o rectal pain or mass protruding outside. He reports around 80 pounds weight loss in 6 months. So he decided to come to the Ed. He denies fever, chills, nausea, vomiting. No recent Abx use in the system, no laxatives. His last colonoscopy was in 2011 and showed one tubular adenoma.     In the Ed he was hemodynamically stable and was given Cipro/Flagyl and RL 2L.For pain He was given Morphine 16mg, ketorolac 30mg once. (12 Feb 2019 10:34)    pt is feeling much better today, pain is controlled, diarrhea has stopped and he is able to tolerated diet.     Vital Signs Last 24 Hrs  T(C): 36.1 (13 Feb 2019 07:59), Max: 37.6 (12 Feb 2019 23:40)  T(F): 96.9 (13 Feb 2019 07:59), Max: 99.6 (12 Feb 2019 23:40)  HR: 74 (13 Feb 2019 07:59) (66 - 74)  BP: 117/69 (13 Feb 2019 07:59) (111/68 - 128/80)  RR: 18 (13 Feb 2019 07:59) (18 - 18)  SpO2: 97% (13 Feb 2019 07:59) (97% - 100%)    Physical exam:   constitutional NAD, AAOX3, Respiratory  lungs CTA, CVS heart RRR, GI: abdomen Soft NT, ND, BS+, skin: intact  neuro exam non focal.                         13.5   7.17  )-----------( 231      ( 12 Feb 2019 00:30 )             39.9   02-12    139  |  96<L>  |  12  ----------------------------<  95  4.0   |  23  |  0.8    Ca    9.0      12 Feb 2019 00:30    TPro  7.5  /  Alb  4.5  /  TBili  1.1  /  DBili  0.2  /  AST  17  /  ALT  12  /  AlkPhos  92  02-12    a/p    1- colitis, cont flagyl and cipro po for total of 14 days. tolerating diet  2- pancreatitis, no pain, outpt fu  3-comorbidites: cont meds  Hyperlipidemia  Prostate cancer: s/p history of seeding  Hypertension  Diabetes mellitus, type II  S/P hip replacement, left: 9/2015  History of appendectomy  Prostate cancer: s/p history of seeding    dc pt home,   time spent 35 min  fu with pmd.

## 2019-02-13 NOTE — DISCHARGE NOTE ADULT - PATIENT PORTAL LINK FT
You can access the [facility] Patient Portal, offered by [facility], by registering with the following website: [email or web address]

## 2019-02-13 NOTE — DISCHARGE NOTE ADULT - ADDITIONAL INSTRUCTIONS
FOLLOW UP WITH YOUR PRIMARY MEDICAL DOCTOR  FOLLOW UP WITH YOUR GASTROENTEROLOGIST  FOLLOW UP WITH HEMATOLOGIST ONCOLOGIST AND UROLOGIST

## 2019-02-13 NOTE — CONSULT NOTE ADULT - SUBJECTIVE AND OBJECTIVE BOX
GI HPI:  63 yo M w/ hx of HTN, DLD, DM, prostate CA s/p seeding on remission, OA, hx of pancreatitis (most recent episode in 12/2015) presents with looses tools with LLQ pain for last 5 days.  Patient was recently discharged with epigastric pain likely 2/2 gastritis 10 days ago and was doing fine in senior living and 5 days ago he started having LLQ abdominal pain sudden onset, Crampy like , 8/10, intermittent t and aggravated by movement. It was also a/s with 5-10 loose stools, mixed with some blood specks and also seen in toilet paper but no bela blood. The diarrhea resolved and last BM was 2 days ago He is also having decreased appetiteHe reports around 80 pounds intentional  weight loss in 6 months.. No recent Abx use in the system.  Previous colonoscopy: 2011 showed 1 tubular adenoma     PAST MEDICAL & SURGICAL HISTORY  Pancreatitis: recurrent  Osteoarthritis  Hyperlipidemia  Prostate cancer: s/p history of seeding  Hypertension  Diabetes mellitus, type II  S/P hip replacement, left: 9/2015  History of appendectomy  Prostate cancer: s/p history of seeding        SOCIAL HISTORY:  smoker: x  smoker  Alcohol: occasional alcohol use   Drug: Denies use of drugs   FAMILY HISTORY:  FAMILY HISTORY:  Family history of diabetes mellitus in father (Father)      ALLERGIES:  No Known Allergies      MEDICATIONS:  MEDICATIONS  (STANDING):  amLODIPine   Tablet 5 milliGRAM(s) Oral daily  aspirin  chewable 81 milliGRAM(s) Oral daily  atorvastatin 40 milliGRAM(s) Oral at bedtime  chlorhexidine 4% Liquid 1 Application(s) Topical <User Schedule>  ciprofloxacin   IVPB 400 milliGRAM(s) IV Intermittent every 12 hours  enoxaparin Injectable 40 milliGRAM(s) SubCutaneous every 24 hours  lactated ringers. 1000 milliLiter(s) (75 mL/Hr) IV Continuous <Continuous>  metroNIDAZOLE  IVPB 500 milliGRAM(s) IV Intermittent every 8 hours  pantoprazole    Tablet 40 milliGRAM(s) Oral before breakfast  tamsulosin 0.4 milliGRAM(s) Oral at bedtime    MEDICATIONS  (PRN):  morphine  - Injectable 2 milliGRAM(s) IV Push every 4 hours PRN Severe Pain (7 - 10)      HOME MEDICATIONS:  Home Medications:  acetaminophen 325 mg oral tablet: 2 tab(s) orally every 6 hours, As needed, mild pain (12 Feb 2019 11:23)  amLODIPine 5 mg oral tablet: 1 tab(s) orally once a day (12 Feb 2019 11:23)  aspirin 81 mg oral tablet: 1 tab(s) orally once a day (12 Feb 2019 11:23)  Flomax 0.4 mg oral capsule: 1 cap(s) orally once a day (at bedtime) (12 Feb 2019 11:23)  Lipitor 40 mg oral tablet: 1 tab(s) orally once a day (at bedtime) (12 Feb 2019 11:23)  metFORMIN 500 mg oral tablet: 1 tab(s) orally 2 times a day (12 Feb 2019 11:23)      ROS:     REVIEW OF SYSTEMS  General:  No fevers  Eyes:  No reported pain   ENT:  No sore throat   NECK: No stiffness   CV:  No chest pain   Resp:  No shortness of breath  GI:  See HPI  :  No dysuria  Muscle:  No weakness  Neuro:  No tingling  Endocrine:  No polyuria  Heme:  No ecchymosis          VITALS:   T(F): 96.9 (02-13 @ 07:59), Max: 99.6 (02-12 @ 23:40)  HR: 74 (02-13 @ 07:59) (66 - 94)  BP: 117/69 (02-13 @ 07:59) (110/73 - 128/80)  BP(mean): --  RR: 18 (02-13 @ 07:59) (18 - 18)  SpO2: 97% (02-13 @ 07:59) (97% - 100%)    I&O's Summary      PHYSICAL EXAM:  GENERAL:  Appears in no distress  HEENT:  Conjunctivae  anicteric  CHEST:  Full & symmetric excursion  HEART:  N S1, S2  ABDOMEN:  Soft, mild LLQ tender, + distended, no masses   EXTREMITIES  no  edema  SKIN:  No rash  NEURO:  Alert        LABS:                        13.5   7.17  )-----------( 231      ( 12 Feb 2019 00:30 )             39.9       LIVER FUNCTIONS - ( 12 Feb 2019 00:30 )  Alb: 4.5 g/dL / Pro: 7.5 g/dL / ALK PHOS: 92 U/L / ALT: 12 U/L / AST: 17 U/L / GGT: x           02-12    139  |  96<L>  |  12  ----------------------------<  95  4.0   |  23  |  0.8    Ca    9.0      12 Feb 2019 00:30          02-02 Chol 148 LDL 96 HDL 50 Trig 80          RADIOLOGY:  < from: CT Abdomen and Pelvis w/ IV Cont (02.12.19 @ 03:00) >    EXAM:  CT ABDOMEN AND PELVIS IC            PROCEDURE DATE:  02/12/2019            INTERPRETATION:  CLINICAL STATEMENT: Left abdominal pain. Diarrhea.      TECHNIQUE: Contiguous axial CT images were obtained from the lower chest   to the pubic symphysis following administration of 100cc Optiray 320   intravenous contrast.  Oral contrast was not administered.  Reformatted   images in the coronal and sagittal planes were acquired.    COMPARISON CT: Abdominal pelvic CT performed 11 days prior on February 1, 2019.      FINDINGS:    LOWER CHEST: Unremarkable.    HEPATOBILIARY: Unremarkable.     SPLEEN: Unremarkable.    PANCREAS: Unremarkable.    ADRENAL GLANDS: Unremarkable.    KIDNEYS: Unremarkable.    ABDOMINOPELVIC NODES: Unremarkable.    PELVIC ORGANS: Pelvic surgical clips versus brachytherapy seeds.   Partially obscured by streak artifact from left hip prosthesis.    PERITONEUM/MESENTERY/BOWEL: There is colitis extending from the distal   descending colon to the rectum. Liquid stool is noted throughout the   colon compatible with diarrhea. Appendectomy. No pneumoperitoneum. No   bowel obstruction.    BONES/SOFT TISSUES: Left total hip arthroplasty. Right hip, sacroiliac   joints and spine degenerative changes.    OTHER: Normal caliber abdominal aorta mild atherosclerotic changes      IMPRESSION:        Colitis extending from the distal descending colon to the rectum with   associated area.              CHELSEY SCHULTZ M.D., RESIDENT RADIOLOGIST  This document has been electronically signed.  INA SANTIAGO M.D., ATTENDING RADIOLOGIST  This document has been electronically signed. Feb 12 2019  5:53AM              < end of copied text >

## 2019-02-13 NOTE — DISCHARGE NOTE ADULT - CARE PROVIDER_API CALL
Jean López)  Urology  [address] [place] [address], [address]  [place] [address]  Phone: [phone]  Fax: [phone]  Follow Up Time:     Travis Scruggs)  Gastroenterology; Internal Medicine  [address] [place] [address]  Phone: [phone]  Fax: [phone]  Follow Up Time: Jean López)  Urology  [address] [place] [address], [address]  [place] [address]  Phone: [phone]  Fax: [phone]  Follow Up Time:     Travis Srcuggs)  Gastroenterology; Internal Medicine  [address] [place] [address]  Phone: [phone]  Fax: [phone]  Follow Up Time:     Andry Bustillos ()  Medicine  Physicians  [address] [place] [address]  Phone: [phone]  Fax: [phone]  Follow Up Time:

## 2019-02-13 NOTE — DISCHARGE NOTE ADULT - PLAN OF CARE
Medical management The CT scan showed colitis of the descending colon to the rectum. You were started on IV antibiotics and now tolerating oral intake. Resume the antibiotics that were sent to your pharmacy for 12 more days. Follow up with your PMD for further recommendations. Medical management and follow up Resume your medications as instructed. Follow up with your PMD for further recommendations. Follow up Avoid alcohol intake in the setting of your prior pancreatitis. Follow up with your PMD and Gastroenterologist for further recommendations. Stable. Follow up with your hematologist oncologist and urologist for further recommendations.

## 2019-02-18 DIAGNOSIS — K86.1 OTHER CHRONIC PANCREATITIS: ICD-10-CM

## 2019-02-18 DIAGNOSIS — K29.70 GASTRITIS, UNSPECIFIED, WITHOUT BLEEDING: ICD-10-CM

## 2019-02-18 DIAGNOSIS — R10.9 UNSPECIFIED ABDOMINAL PAIN: ICD-10-CM

## 2019-02-18 DIAGNOSIS — Z96.642 PRESENCE OF LEFT ARTIFICIAL HIP JOINT: ICD-10-CM

## 2019-02-18 DIAGNOSIS — R63.4 ABNORMAL WEIGHT LOSS: ICD-10-CM

## 2019-02-18 DIAGNOSIS — E11.9 TYPE 2 DIABETES MELLITUS WITHOUT COMPLICATIONS: ICD-10-CM

## 2019-02-18 DIAGNOSIS — Z79.82 LONG TERM (CURRENT) USE OF ASPIRIN: ICD-10-CM

## 2019-02-18 DIAGNOSIS — Z79.84 LONG TERM (CURRENT) USE OF ORAL HYPOGLYCEMIC DRUGS: ICD-10-CM

## 2019-02-18 DIAGNOSIS — E78.5 HYPERLIPIDEMIA, UNSPECIFIED: ICD-10-CM

## 2019-02-18 DIAGNOSIS — Z85.46 PERSONAL HISTORY OF MALIGNANT NEOPLASM OF PROSTATE: ICD-10-CM

## 2019-02-18 DIAGNOSIS — K52.9 NONINFECTIVE GASTROENTERITIS AND COLITIS, UNSPECIFIED: ICD-10-CM

## 2019-02-18 DIAGNOSIS — I10 ESSENTIAL (PRIMARY) HYPERTENSION: ICD-10-CM

## 2019-02-18 DIAGNOSIS — M19.90 UNSPECIFIED OSTEOARTHRITIS, UNSPECIFIED SITE: ICD-10-CM

## 2019-02-19 DIAGNOSIS — Z76.89 PERSONS ENCOUNTERING HEALTH SERVICES IN OTHER SPECIFIED CIRCUMSTANCES: ICD-10-CM

## 2019-03-01 PROCEDURE — G9005: CPT

## 2019-03-13 ENCOUNTER — APPOINTMENT (OUTPATIENT)
Dept: INTERNAL MEDICINE | Facility: CLINIC | Age: 64
End: 2019-03-13

## 2019-03-13 ENCOUNTER — OUTPATIENT (OUTPATIENT)
Dept: OUTPATIENT SERVICES | Facility: HOSPITAL | Age: 64
LOS: 1 days | Discharge: HOME | End: 2019-03-13

## 2019-03-13 VITALS
WEIGHT: 202 LBS | HEIGHT: 67.5 IN | BODY MASS INDEX: 31.34 KG/M2 | HEART RATE: 76 BPM | DIASTOLIC BLOOD PRESSURE: 76 MMHG | SYSTOLIC BLOOD PRESSURE: 108 MMHG

## 2019-03-13 DIAGNOSIS — K52.9 NONINFECTIVE GASTROENTERITIS AND COLITIS, UNSPECIFIED: ICD-10-CM

## 2019-03-13 DIAGNOSIS — Z98.890 OTHER SPECIFIED POSTPROCEDURAL STATES: Chronic | ICD-10-CM

## 2019-03-13 DIAGNOSIS — Z96.642 PRESENCE OF LEFT ARTIFICIAL HIP JOINT: Chronic | ICD-10-CM

## 2019-03-13 DIAGNOSIS — E11.9 TYPE 2 DIABETES MELLITUS WITHOUT COMPLICATIONS: ICD-10-CM

## 2019-03-13 DIAGNOSIS — E78.5 HYPERLIPIDEMIA, UNSPECIFIED: ICD-10-CM

## 2019-03-13 DIAGNOSIS — C61 MALIGNANT NEOPLASM OF PROSTATE: Chronic | ICD-10-CM

## 2019-03-13 RX ORDER — IBUPROFEN 800 MG/1
800 TABLET, FILM COATED ORAL EVERY 8 HOURS
Qty: 42 | Refills: 0 | Status: DISCONTINUED | COMMUNITY
Start: 2017-12-24 | End: 2019-03-13

## 2019-03-13 NOTE — PHYSICAL EXAM
[No Acute Distress] : no acute distress [Clear to Auscultation] : lungs were clear to auscultation bilaterally [Normal Rate] : normal rate  [Regular Rhythm] : with a regular rhythm [Normal S1, S2] : normal S1 and S2 [No Murmur] : no murmur heard [No Edema] : there was no peripheral edema [No Extremity Clubbing/Cyanosis] : no extremity clubbing/cyanosis [Soft] : abdomen soft [Non Tender] : non-tender [No HSM] : no HSM [No CVA Tenderness] : no CVA  tenderness [Normal Gait] : normal gait

## 2019-03-14 NOTE — ASSESSMENT
[FreeTextEntry1] : 63  yom with pmh of bph, prostate ca s/p radiation therapy, htn, dm, dld , left hip replacement, OA presents for follow-up after recent admission for colitis.\par \par 1) Htn\par - c/w amlodipine 5mg\par \par 2) DM\par - c/w metformin 500 mg bid\par - Will check a1c today + referral to podiatry.\par - Patient has a scheduled appointment with ophthalmo\par \par 3) BPH/ prostate cancer\par - c/w flomax\par - Will check PSA today\par \par 4) b/l Hip pain/ s/p right hip replacement\par - c/w ibuprofen prn\par - c/womeprazole as pt taking high dose of ibuprofen\par \par 5) dyslipidemia\par - c/w lipitor 40mg; check lipid profile\par \par 6) descending colon colitis.\par - improvement of symptoms with ciprofloxacin and metronidazole\par - will request referral to GI for colonoscopy 8 weeks after acute episode\par \par 7) HCM\par Had flu shot this year.\par As patient smokes 1ppd  for more than 30 years, will request a low dose CT chest without contrast to screen for lung cancer. \par

## 2019-03-14 NOTE — HISTORY OF PRESENT ILLNESS
[FreeTextEntry1] : Regular follow-up [de-identified] : 61 yo M w/ hx of HTN, DLD, DM, prostate CA s/p seeding on remission, OA, hx of pancreatitis (most recent episode in 12/2015), recent admission in February with looses tools with LLQ pain for last 5 days. A CT of abdomen showed colitis, was treated with coprofloxacin + metronidazole and discharged to complete course with a planned colonoscopy in 8 weeks.\par \par Patient noted that he lost his bottles and only completed 5-6 days of antibiotics, however pain and diarrhea subsided.\par Also he is asking to be screened for sexually transmitted diseases as he had unprotected sex with 4 different partners in the last year. Otherwise he denies any new symptom\par

## 2019-03-14 NOTE — REVIEW OF SYSTEMS
[Constipation] : constipation [Diarrhea] : diarrhea [Hesitancy] : hesitancy [Frequency] : frequency [Joint Pain] : joint pain [Negative] : Neurological [Fever] : no fever [Night Sweats] : no night sweats [Chest Pain] : no chest pain [Paroysmal Nocturnal Dyspnea] : no paroysmal nocturnal dyspnea [Shortness Of Breath] : no shortness of breath [Wheezing] : no wheezing [Cough] : no cough [Abdominal Pain] : no abdominal pain [Nausea] : no nausea [Vomiting] : no vomiting [Dysuria] : no dysuria [Dizziness] : no dizziness [Insomnia] : no insomnia [Easy Bruising] : no easy bruising

## 2019-03-20 ENCOUNTER — RX CHANGE (OUTPATIENT)
Age: 64
End: 2019-03-20

## 2019-03-25 NOTE — ED ADULT NURSE NOTE - PAIN: PRESENCE, MLM
Testosterone 300mg injection given in right buttock. Patient aware to return to clinic in 3 weeks for next injection. Patient tolerated without incident. See MAR for documentation. complains of pain/discomfort

## 2019-03-28 ENCOUNTER — INPATIENT (INPATIENT)
Facility: HOSPITAL | Age: 64
LOS: 4 days | Discharge: HOME | End: 2019-04-02
Attending: INTERNAL MEDICINE | Admitting: INTERNAL MEDICINE
Payer: MEDICAID

## 2019-03-28 VITALS
SYSTOLIC BLOOD PRESSURE: 122 MMHG | RESPIRATION RATE: 18 BRPM | OXYGEN SATURATION: 98 % | HEART RATE: 78 BPM | TEMPERATURE: 98 F | DIASTOLIC BLOOD PRESSURE: 74 MMHG

## 2019-03-28 DIAGNOSIS — F10.11 ALCOHOL ABUSE, IN REMISSION: ICD-10-CM

## 2019-03-28 DIAGNOSIS — C61 MALIGNANT NEOPLASM OF PROSTATE: Chronic | ICD-10-CM

## 2019-03-28 DIAGNOSIS — C61 MALIGNANT NEOPLASM OF PROSTATE: ICD-10-CM

## 2019-03-28 DIAGNOSIS — R07.9 CHEST PAIN, UNSPECIFIED: ICD-10-CM

## 2019-03-28 DIAGNOSIS — I25.10 ATHEROSCLEROTIC HEART DISEASE OF NATIVE CORONARY ARTERY WITHOUT ANGINA PECTORIS: ICD-10-CM

## 2019-03-28 DIAGNOSIS — Z98.890 OTHER SPECIFIED POSTPROCEDURAL STATES: Chronic | ICD-10-CM

## 2019-03-28 DIAGNOSIS — Z87.891 PERSONAL HISTORY OF NICOTINE DEPENDENCE: ICD-10-CM

## 2019-03-28 DIAGNOSIS — Z79.82 LONG TERM (CURRENT) USE OF ASPIRIN: ICD-10-CM

## 2019-03-28 DIAGNOSIS — M19.90 UNSPECIFIED OSTEOARTHRITIS, UNSPECIFIED SITE: ICD-10-CM

## 2019-03-28 DIAGNOSIS — Z96.642 PRESENCE OF LEFT ARTIFICIAL HIP JOINT: Chronic | ICD-10-CM

## 2019-03-28 DIAGNOSIS — G89.3 NEOPLASM RELATED PAIN (ACUTE) (CHRONIC): ICD-10-CM

## 2019-03-28 DIAGNOSIS — Z83.3 FAMILY HISTORY OF DIABETES MELLITUS: ICD-10-CM

## 2019-03-28 DIAGNOSIS — R31.0 GROSS HEMATURIA: ICD-10-CM

## 2019-03-28 DIAGNOSIS — I10 ESSENTIAL (PRIMARY) HYPERTENSION: ICD-10-CM

## 2019-03-28 DIAGNOSIS — E78.5 HYPERLIPIDEMIA, UNSPECIFIED: ICD-10-CM

## 2019-03-28 DIAGNOSIS — Z82.49 FAMILY HISTORY OF ISCHEMIC HEART DISEASE AND OTHER DISEASES OF THE CIRCULATORY SYSTEM: ICD-10-CM

## 2019-03-28 DIAGNOSIS — Z98.890 OTHER SPECIFIED POSTPROCEDURAL STATES: ICD-10-CM

## 2019-03-28 DIAGNOSIS — Z96.642 PRESENCE OF LEFT ARTIFICIAL HIP JOINT: ICD-10-CM

## 2019-03-28 DIAGNOSIS — D64.9 ANEMIA, UNSPECIFIED: ICD-10-CM

## 2019-03-28 DIAGNOSIS — E11.9 TYPE 2 DIABETES MELLITUS WITHOUT COMPLICATIONS: ICD-10-CM

## 2019-03-28 DIAGNOSIS — Z79.84 LONG TERM (CURRENT) USE OF ORAL HYPOGLYCEMIC DRUGS: ICD-10-CM

## 2019-03-28 DIAGNOSIS — M46.1 SACROILIITIS, NOT ELSEWHERE CLASSIFIED: ICD-10-CM

## 2019-03-28 DIAGNOSIS — K86.1 OTHER CHRONIC PANCREATITIS: ICD-10-CM

## 2019-03-28 LAB
ALBUMIN SERPL ELPH-MCNC: 4.4 G/DL — SIGNIFICANT CHANGE UP (ref 3.5–5.2)
ALP SERPL-CCNC: 85 U/L — SIGNIFICANT CHANGE UP (ref 30–115)
ALT FLD-CCNC: 12 U/L — SIGNIFICANT CHANGE UP (ref 0–41)
ANION GAP SERPL CALC-SCNC: 12 MMOL/L — SIGNIFICANT CHANGE UP (ref 7–14)
AST SERPL-CCNC: 12 U/L — SIGNIFICANT CHANGE UP (ref 0–41)
BASOPHILS # BLD AUTO: 0.03 K/UL — SIGNIFICANT CHANGE UP (ref 0–0.2)
BASOPHILS NFR BLD AUTO: 0.4 % — SIGNIFICANT CHANGE UP (ref 0–1)
BILIRUB SERPL-MCNC: 0.5 MG/DL — SIGNIFICANT CHANGE UP (ref 0.2–1.2)
BUN SERPL-MCNC: 13 MG/DL — SIGNIFICANT CHANGE UP (ref 10–20)
CALCIUM SERPL-MCNC: 9.3 MG/DL — SIGNIFICANT CHANGE UP (ref 8.5–10.1)
CHLORIDE SERPL-SCNC: 107 MMOL/L — SIGNIFICANT CHANGE UP (ref 98–110)
CO2 SERPL-SCNC: 25 MMOL/L — SIGNIFICANT CHANGE UP (ref 17–32)
CREAT SERPL-MCNC: 0.7 MG/DL — SIGNIFICANT CHANGE UP (ref 0.7–1.5)
EOSINOPHIL # BLD AUTO: 0.27 K/UL — SIGNIFICANT CHANGE UP (ref 0–0.7)
EOSINOPHIL NFR BLD AUTO: 4 % — SIGNIFICANT CHANGE UP (ref 0–8)
GLUCOSE SERPL-MCNC: 116 MG/DL — HIGH (ref 70–99)
HCT VFR BLD CALC: 36.1 % — LOW (ref 42–52)
HGB BLD-MCNC: 11.9 G/DL — LOW (ref 14–18)
IMM GRANULOCYTES NFR BLD AUTO: 0.4 % — HIGH (ref 0.1–0.3)
LACTATE SERPL-SCNC: 1.5 MMOL/L — SIGNIFICANT CHANGE UP (ref 0.5–2.2)
LIDOCAIN IGE QN: 14 U/L — SIGNIFICANT CHANGE UP (ref 7–60)
LYMPHOCYTES # BLD AUTO: 1.75 K/UL — SIGNIFICANT CHANGE UP (ref 1.2–3.4)
LYMPHOCYTES # BLD AUTO: 26.2 % — SIGNIFICANT CHANGE UP (ref 20.5–51.1)
MCHC RBC-ENTMCNC: 28.7 PG — SIGNIFICANT CHANGE UP (ref 27–31)
MCHC RBC-ENTMCNC: 33 G/DL — SIGNIFICANT CHANGE UP (ref 32–37)
MCV RBC AUTO: 87 FL — SIGNIFICANT CHANGE UP (ref 80–94)
MONOCYTES # BLD AUTO: 0.71 K/UL — HIGH (ref 0.1–0.6)
MONOCYTES NFR BLD AUTO: 10.6 % — HIGH (ref 1.7–9.3)
NEUTROPHILS # BLD AUTO: 3.89 K/UL — SIGNIFICANT CHANGE UP (ref 1.4–6.5)
NEUTROPHILS NFR BLD AUTO: 58.4 % — SIGNIFICANT CHANGE UP (ref 42.2–75.2)
PLATELET # BLD AUTO: 236 K/UL — SIGNIFICANT CHANGE UP (ref 130–400)
POTASSIUM SERPL-MCNC: 4.2 MMOL/L — SIGNIFICANT CHANGE UP (ref 3.5–5)
POTASSIUM SERPL-SCNC: 4.2 MMOL/L — SIGNIFICANT CHANGE UP (ref 3.5–5)
PROT SERPL-MCNC: 6.9 G/DL — SIGNIFICANT CHANGE UP (ref 6–8)
RBC # BLD: 4.15 M/UL — LOW (ref 4.7–6.1)
SODIUM SERPL-SCNC: 144 MMOL/L — SIGNIFICANT CHANGE UP (ref 135–146)
TROPONIN T SERPL-MCNC: <0.01 NG/ML — SIGNIFICANT CHANGE UP
WBC # BLD: 6.68 K/UL — SIGNIFICANT CHANGE UP (ref 4.8–10.8)
WBC # FLD AUTO: 6.68 K/UL — SIGNIFICANT CHANGE UP (ref 4.8–10.8)

## 2019-03-28 RX ORDER — MORPHINE SULFATE 50 MG/1
6 CAPSULE, EXTENDED RELEASE ORAL ONCE
Qty: 0 | Refills: 0 | Status: DISCONTINUED | OUTPATIENT
Start: 2019-03-28 | End: 2019-03-28

## 2019-03-28 RX ORDER — ONDANSETRON 8 MG/1
4 TABLET, FILM COATED ORAL ONCE
Qty: 0 | Refills: 0 | Status: COMPLETED | OUTPATIENT
Start: 2019-03-28 | End: 2019-03-28

## 2019-03-28 RX ORDER — SODIUM CHLORIDE 9 MG/ML
1000 INJECTION INTRAMUSCULAR; INTRAVENOUS; SUBCUTANEOUS ONCE
Qty: 0 | Refills: 0 | Status: COMPLETED | OUTPATIENT
Start: 2019-03-28 | End: 2019-03-28

## 2019-03-28 RX ADMIN — ONDANSETRON 4 MILLIGRAM(S): 8 TABLET, FILM COATED ORAL at 23:00

## 2019-03-28 RX ADMIN — MORPHINE SULFATE 6 MILLIGRAM(S): 50 CAPSULE, EXTENDED RELEASE ORAL at 23:00

## 2019-03-28 RX ADMIN — SODIUM CHLORIDE 2000 MILLILITER(S): 9 INJECTION INTRAMUSCULAR; INTRAVENOUS; SUBCUTANEOUS at 23:00

## 2019-03-28 NOTE — ED PROVIDER NOTE - PROGRESS NOTE DETAILS
when I offered pt to have hiv testing, he also requested to have gc testing.  will order gc urine test.  pt has no penile discharge, no current dysuria.  pt had hematuria 3 days ago only pt signed out to Dr. Wilkerson to follow up CT, labs, reassess and dispo pt signed out to Dr. Wilkerson to follow up CT, labs, EKG, reassess and dispo

## 2019-03-28 NOTE — ED PROVIDER NOTE - OBJECTIVE STATEMENT
62 yo m with pmh of htn, dm, prostate ca presents with hematuria that occurred 3 days ago and now 2 days of left flank pain.  no fevers, no chills, no vomiting, no diarrhea.  no sob, no trouble breathing.  earlier today when pt was having the pain he had one brief episode of upper abd/lower chest discomfort.  no other chest pain, no sob.  no current cp.  no pleuritic pain. 62 yo m with pmh of htn, dm, prostate ca presents with hematuria that occurred 3 days ago and now 2 days of left flank pain.  no fevers, no chills, no vomiting, no diarrhea.  no dysuria.  no current hematuria.  no testicle pain.  no sob, no trouble breathing.  earlier today when pt was having the pain he had one brief episode of upper abd/lower chest discomfort.  no other chest pain, no sob.  no current cp.  no pleuritic pain.

## 2019-03-28 NOTE — ED PROVIDER NOTE - CLINICAL SUMMARY MEDICAL DECISION MAKING FREE TEXT BOX
I personally evaluated the patient. I reviewed the Resident’s or Physician Assistant’s note (as assigned above), and agree with the findings and plan except as documented in my note. labs and imaging reviewed. Patient endorsed left side chest pain on discharge. Patient accepted edou admission for chest pain. patient ekg displays 1st degree av block.

## 2019-03-28 NOTE — ED PROVIDER NOTE - PHYSICAL EXAMINATION
CONSTITUTIONAL: Well-developed; well-nourished; in no acute distress, nontoxic appearing  SKIN: skin exam is warm and dry,  HEAD: Normocephalic; atraumatic.  EYES: conjunctiva and sclera clear.  ENT: MMM, no nasal congestion  NECK: Supple; non tender.  ROM intact.  CARD: S1, S2 normal, no murmur  RESP: No wheezes, rales or rhonchi. Good air movement bilaterally  ABD: soft; non-distended; mild LLQ tenderness, no cva tenderness, no midline tenderness  : no testicle tenderness, no penile discharge, no penile bleeding  EXT: Normal ROM.   NEURO: awake, alert, following commands, oriented, grossly unremarkable. No Focal deficits. GCS 15.   PSYCH: Cooperative, appropriate.

## 2019-03-28 NOTE — ED PROVIDER NOTE - NS ED ROS FT
Constitutional:  no fevers, no chills, no malaise  ENMT: No neck pain or stiffness, no nasal congestion, no ear pain, no throat pain  Cardiac:  No chest pain  Respiratory:  No cough or sob  GI:  see hpi  :  see hpi  MS:  + flank pain  Neuro:  No headache, no dizziness, no change in mental status  Skin:  No skin rash  Except as documented in the HPI,  all other systems are negative

## 2019-03-29 LAB
APPEARANCE UR: CLEAR — SIGNIFICANT CHANGE UP
BACTERIA # UR AUTO: ABNORMAL /HPF
BILIRUB UR-MCNC: NEGATIVE — SIGNIFICANT CHANGE UP
C TRACH RRNA SPEC QL NAA+PROBE: SIGNIFICANT CHANGE UP
COLOR SPEC: YELLOW — SIGNIFICANT CHANGE UP
DIFF PNL FLD: NEGATIVE — SIGNIFICANT CHANGE UP
EPI CELLS # UR: ABNORMAL /HPF
GLUCOSE BLDC GLUCOMTR-MCNC: 103 MG/DL — HIGH (ref 70–99)
GLUCOSE BLDC GLUCOMTR-MCNC: 95 MG/DL — SIGNIFICANT CHANGE UP (ref 70–99)
GLUCOSE UR QL: NEGATIVE — SIGNIFICANT CHANGE UP
HIV 1 & 2 AB SERPL IA.RAPID: SIGNIFICANT CHANGE UP
KETONES UR-MCNC: ABNORMAL
LEUKOCYTE ESTERASE UR-ACNC: ABNORMAL
N GONORRHOEA RRNA SPEC QL NAA+PROBE: SIGNIFICANT CHANGE UP
NITRITE UR-MCNC: NEGATIVE — SIGNIFICANT CHANGE UP
PH UR: 6 — SIGNIFICANT CHANGE UP (ref 5–8)
PROT UR-MCNC: NEGATIVE — SIGNIFICANT CHANGE UP
SP GR SPEC: >=1.03 — SIGNIFICANT CHANGE UP (ref 1.01–1.03)
SPECIMEN SOURCE: SIGNIFICANT CHANGE UP
TROPONIN T SERPL-MCNC: <0.01 NG/ML — SIGNIFICANT CHANGE UP
UROBILINOGEN FLD QL: 1 (ref 0.2–0.2)
WBC UR QL: ABNORMAL /HPF

## 2019-03-29 RX ORDER — TAMSULOSIN HYDROCHLORIDE 0.4 MG/1
0.4 CAPSULE ORAL AT BEDTIME
Qty: 0 | Refills: 0 | Status: DISCONTINUED | OUTPATIENT
Start: 2019-03-29 | End: 2019-04-02

## 2019-03-29 RX ORDER — ATORVASTATIN CALCIUM 80 MG/1
1 TABLET, FILM COATED ORAL
Qty: 0 | Refills: 0 | COMMUNITY

## 2019-03-29 RX ORDER — AMLODIPINE BESYLATE 2.5 MG/1
5 TABLET ORAL DAILY
Qty: 0 | Refills: 0 | Status: DISCONTINUED | OUTPATIENT
Start: 2019-03-29 | End: 2019-04-02

## 2019-03-29 RX ORDER — MORPHINE SULFATE 50 MG/1
6 CAPSULE, EXTENDED RELEASE ORAL ONCE
Qty: 0 | Refills: 0 | Status: DISCONTINUED | OUTPATIENT
Start: 2019-03-29 | End: 2019-03-29

## 2019-03-29 RX ORDER — IBUPROFEN 200 MG
600 TABLET ORAL ONCE
Qty: 0 | Refills: 0 | Status: DISCONTINUED | OUTPATIENT
Start: 2019-03-29 | End: 2019-03-29

## 2019-03-29 RX ORDER — MORPHINE SULFATE 50 MG/1
4 CAPSULE, EXTENDED RELEASE ORAL ONCE
Qty: 0 | Refills: 0 | Status: DISCONTINUED | OUTPATIENT
Start: 2019-03-29 | End: 2019-03-29

## 2019-03-29 RX ORDER — MORPHINE SULFATE 50 MG/1
2 CAPSULE, EXTENDED RELEASE ORAL ONCE
Qty: 0 | Refills: 0 | Status: DISCONTINUED | OUTPATIENT
Start: 2019-03-29 | End: 2019-03-29

## 2019-03-29 RX ORDER — ATORVASTATIN CALCIUM 80 MG/1
40 TABLET, FILM COATED ORAL AT BEDTIME
Qty: 0 | Refills: 0 | Status: DISCONTINUED | OUTPATIENT
Start: 2019-03-29 | End: 2019-04-02

## 2019-03-29 RX ORDER — ASPIRIN/CALCIUM CARB/MAGNESIUM 324 MG
325 TABLET ORAL ONCE
Qty: 0 | Refills: 0 | Status: COMPLETED | OUTPATIENT
Start: 2019-03-29 | End: 2019-03-29

## 2019-03-29 RX ORDER — ASPIRIN/CALCIUM CARB/MAGNESIUM 324 MG
81 TABLET ORAL DAILY
Qty: 0 | Refills: 0 | Status: DISCONTINUED | OUTPATIENT
Start: 2019-03-29 | End: 2019-04-02

## 2019-03-29 RX ADMIN — Medication 325 MILLIGRAM(S): at 15:11

## 2019-03-29 RX ADMIN — MORPHINE SULFATE 4 MILLIGRAM(S): 50 CAPSULE, EXTENDED RELEASE ORAL at 00:47

## 2019-03-29 RX ADMIN — MORPHINE SULFATE 2 MILLIGRAM(S): 50 CAPSULE, EXTENDED RELEASE ORAL at 21:40

## 2019-03-29 RX ADMIN — MORPHINE SULFATE 6 MILLIGRAM(S): 50 CAPSULE, EXTENDED RELEASE ORAL at 14:45

## 2019-03-29 RX ADMIN — MORPHINE SULFATE 2 MILLIGRAM(S): 50 CAPSULE, EXTENDED RELEASE ORAL at 22:00

## 2019-03-29 NOTE — H&P ADULT - NSICDXFAMILYHX_GEN_ALL_CORE_FT
FAMILY HISTORY:  Father  Still living? No  Family history of diabetes mellitus in father, Age at diagnosis: Age Unknown

## 2019-03-29 NOTE — H&P ADULT - HISTORY OF PRESENT ILLNESS
62 y/o M with PMH of HTN, DM II, prostate ca s/p seeds? presented for L. flank pain for 2 days with hematuria and one episode of sharp left chest pain.     Denies nausea, vomiting, shortness of breath, diaphoresis, diarrhea or dysuria.    63 yold male to Ed Pmhx Dm, Htn, Hld, prostate cancer s/p seeds, pancreatitis, OA c/o Left side lower abdominal pain chronically but worse x 2 day with hematuria; pt with moderate sharp sudden onset left sided chest pain lasting few seconds today; pt denies n/v/sob/diaphoresis; pt denies radiation of chest pain to back, arm, legs; pt's last cardiac workup was 3 yrs ago(wnl as per pt); pt seen in main Ed - ct abdomen negative for abdominal pathology and placed in obs for cp evaluation;    62 y/o m with pmh of htn, dm, prostate ca presents with hematuria that occurred 3 days ago and now 2 days of left flank pain. no fevers, no chills, no vomiting, no diarrhea.  no dysuria.  no current hematuria.  no testicle pain.  no sob, no trouble breathing.  earlier today when pt was having the pain he had one brief episode of upper abd/lower chest discomfort.  no other chest pain, no sob.  no current cp.  no pleuritic pain. 62 y/o M with PMH of HTN, DM II, HLD prostate cancer s/p seeds, presented for acute on chronic L. chest pain, L. flank pain, and dysuria. The patient states he has been having intermittent symptoms for the past 3 months, but states the pain became acutely worse over the past 2 days, which prompted him to come to the ED for evaluation. He states he has sharp L. sided chest pain that radiates to his left flank. The episodes last 10-20 minutes and are exacerbated by movement and relieved by rest. No radiation elsewhere. No nausea, vomiting, diaphoresis, or shortness of breath. He also complains of pain with urination and intermittent gross hematuria. He admits to not following up with his urologist after treatment of his prostate cancer (approximately 10 years ago). The last episode of hematuria was 2 days ago.     He was admitted to the observation unit for the chest pain. Troponin was negative over two sets, so the patient was sent for a nuclear exercise stress test, which was positive for a small reversible defect in the inferolateral wall of the left ventricle consistent with ischemia. He is now being admitted to medicine for further cardiovascular evaluation. Currently not having chest or flank pain.

## 2019-03-29 NOTE — ED ADULT NURSE REASSESSMENT NOTE - NS ED NURSE REASSESS COMMENT FT1
Patient non compliant with tle monitoring despite education and encouragement. Frequently found of monitor and does not alert staff to restart

## 2019-03-29 NOTE — ED CDU PROVIDER DISPOSITION NOTE - CLINICAL COURSE
Patient was sent to EDOU for further evaluation of atypical, chest pains, ekgs times two no evidence of ischemic changes, enzymes times two normal, Nuclear stress testing abnormal and consistent with ischemia, Patient admitted to telemetry for further cardiac evaluation and possible cath,

## 2019-03-29 NOTE — H&P ADULT - NSHPSOCIALHISTORY_GEN_ALL_CORE
Former cigarette smoker. 35 pack year history. Quit 13 years ago. Current marijuana smoker.  Former heavy alcohol drinker, quit drinking heavily in his 30s, but currently socially drinks on weekends.  Former cocaine user in his 20s. Quit in his 30s.

## 2019-03-29 NOTE — ED CDU PROVIDER INITIAL DAY NOTE - PHYSICAL EXAMINATION
Constitutional: Well developed, well nourished. NAD  Head: Normocephalic, atraumatic.  Eyes: PERRL, EOMI.  ENT: No nasal discharge. Mucous membranes dry.  Neck: Supple. Painless ROM.  Cardiovascular:  Regular rate and rhythm.   Pulmonary: . Lungs clear to auscultation bilaterally.   Abdominal: Soft. Nondistended. No rebound, guarding, rigidity.  Extremities. Pelvis stable. No lower extremity edema, symmetric calves.  Skin: No rashes, cyanosis.  Neuro: AAOx3. No focal neurological deficits.  Psych: Normal mood. Normal affect.

## 2019-03-29 NOTE — ED CDU PROVIDER INITIAL DAY NOTE - ATTENDING CONTRIBUTION TO CARE
Seen with PA agree with above, lungs- clear, abdomen- soft no tenderness to any region, neuro- non focal , s1s2 no gallops or murmurs, full workup in progress will continue to re-evaluate

## 2019-03-29 NOTE — CONSULT NOTE ADULT - SUBJECTIVE AND OBJECTIVE BOX
HPI:  The patient is a 63-year-old male with a PMH of HTN, type II DM, DLD, gastritis/duodenitis, pancreatitis, and prostate cancer who presented with abdominal pain.    PAST MEDICAL & SURGICAL HISTORY  Pancreatitis: recurrent  Osteoarthritis  Hyperlipidemia  Prostate cancer: s/p history of seeding  Hypertension  Diabetes mellitus, type II  S/P hip replacement, left: 9/2015  History of appendectomy  Prostate cancer: s/p history of seeding    FAMILY HISTORY:  FAMILY HISTORY:  Family history of diabetes mellitus in father (Father)    SOCIAL HISTORY:  []smoker  []Alcohol  []Drug    ALLERGIES:  No Known Allergies    HOME MEDICATIONS:  Home Medications:  acetaminophen 325 mg oral tablet: 2 tab(s) orally every 6 hours, As needed, mild pain (12 Feb 2019 11:23)  amLODIPine 5 mg oral tablet: 1 tab(s) orally once a day (12 Feb 2019 11:23)  aspirin 81 mg oral tablet: 1 tab(s) orally once a day (12 Feb 2019 11:23)  Flomax 0.4 mg oral capsule: 1 cap(s) orally once a day (at bedtime) (12 Feb 2019 11:23)  Lipitor 40 mg oral tablet: 1 tab(s) orally once a day (at bedtime) (12 Feb 2019 11:23)  metFORMIN 500 mg oral tablet: 1 tab(s) orally 2 times a day (12 Feb 2019 11:23)    VITALS:   T(F): 98.4 (03-29 @ 07:23), Max: 98.4 (03-29 @ 07:23)  HR: 57 (03-29 @ 07:23) (57 - 78)  BP: 135/65 (03-29 @ 07:23) (118/63 - 135/65)  BP(mean): --  RR: 18 (03-29 @ 07:23) (16 - 18)  SpO2: 99% (03-29 @ 07:23) (98% - 99%)    REVIEW OF SYSTEMS:  CONSTITUTIONAL: No fevers or chills  EYES/ENT: No visual changes  NECK: No pain  RESPIRATORY:  No shortness of breath  CARDIOVASCULAR: (+) chest pain  GASTROINTESTINAL: (+) abdominal pain  GENITOURINARY: (+) hematuria  NEUROLOGICAL: No weakness  SKIN: no rashes    PHYSICAL EXAM:  NEURO:   GEN:   NECK:   LUNGS:   CARDIOVASCULAR:   ABD:   EXT:   SKIN:     LABS:                        11.9   6.68  )-----------( 236      ( 28 Mar 2019 22:42 )             36.1     03-28    144  |  107  |  13  ----------------------------<  116<H>  4.2   |  25  |  0.7    Ca    9.3      28 Mar 2019 22:42    TPro  6.9  /  Alb  4.4  /  TBili  0.5  /  DBili  x   /  AST  12  /  ALT  12  /  AlkPhos  85  03-28    Troponin T, Serum: <0.01 ng/mL (03-29-19 @ 03:40)  Troponin T, Serum: <0.01 ng/mL (03-28-19 @ 22:42)  Lactate, Blood: 1.5 mmol/L (03-28-19 @ 22:42)    02-02 Chol 148 LDL 96 HDL 50 Trig 80      RADIOLOGY:  -CXR: no evidence of acute cardio-pulmonary disease    -TTE (7/2012): EF 55-65%    -STRESS TEST (3/29/19): small reversible defect in the infero-lateral wall of the LV consistent with ischemia; transient ischemic dilatation    ECG: normal sinus rhythm, left axis deviation, LAFB, poor R-wave progression    TELEMETRY EVENTS: HPI:  The patient is a 63-year-old male with a PMH of HTN, type II DM, DLD, gastritis/duodenitis, pancreatitis, BPH, and prostate cancer who initially presented with left flank pain.  He also reported having a several-month history of chest pain described as left-sided, sharp in quality, lasting most of the day, and non-radiating.  The chest pain can occur at any time; he reports experiencing the pain about twice a month.  It is aggravated on exertion and alleviated with rest.  Otherwise, he denied having palpitations or shortness of breath.  To note, the patient has been seen in the ED multiple times in the past for abdominal pain localized in the left quadrants.    PAST MEDICAL & SURGICAL HISTORY  Pancreatitis: recurrent  Osteoarthritis  Hyperlipidemia  Prostate cancer: s/p history of seeding  Hypertension  Diabetes mellitus, type II  S/P hip replacement, left: 9/2015  History of appendectomy  Prostate cancer: s/p history of seeding    FAMILY HISTORY:  FAMILY HISTORY:  Family history of diabetes mellitus in father (Father)  Family history of MI in sister ("in early 60s")    SOCIAL HISTORY:  [-] former smoker (quit at age 50)  [+] occasional alcohol  [+] marijuana use    ALLERGIES:  No Known Allergies    HOME MEDICATIONS:  Home Medications:  acetaminophen 325 mg oral tablet: 2 tab(s) orally every 6 hours, As needed, mild pain (12 Feb 2019 11:23)  amLODIPine 5 mg oral tablet: 1 tab(s) orally once a day (12 Feb 2019 11:23)  aspirin 81 mg oral tablet: 1 tab(s) orally once a day (12 Feb 2019 11:23)  Flomax 0.4 mg oral capsule: 1 cap(s) orally once a day (at bedtime) (12 Feb 2019 11:23)  Lipitor 40 mg oral tablet: 1 tab(s) orally once a day (at bedtime) (12 Feb 2019 11:23)  metFORMIN 500 mg oral tablet: 1 tab(s) orally 2 times a day (12 Feb 2019 11:23)    VITALS:   T(F): 98.4 (03-29 @ 07:23), Max: 98.4 (03-29 @ 07:23)  HR: 57 (03-29 @ 07:23) (57 - 78)  BP: 135/65 (03-29 @ 07:23) (118/63 - 135/65)  BP(mean): --  RR: 18 (03-29 @ 07:23) (16 - 18)  SpO2: 99% (03-29 @ 07:23) (98% - 99%)    REVIEW OF SYSTEMS:  CONSTITUTIONAL: No fevers or chills  EYES/ENT: No visual changes  NECK: No pain  RESPIRATORY:  No shortness of breath  CARDIOVASCULAR: (+) chest pain  GASTROINTESTINAL: (+) abdominal pain  GENITOURINARY: (+) hematuria  NEUROLOGICAL: No weakness  SKIN: no rashes    PHYSICAL EXAM:  NEURO: AAO x3  GEN: in no acute distress  NECK: supple  LUNGS: CTA B/L  CARDIOVASCULAR: RRR, S1/S2  ABD: soft, non-tender  EXT: no lower extremity edema  SKIN: intact    LABS:                        11.9   6.68  )-----------( 236      ( 28 Mar 2019 22:42 )             36.1     03-28    144  |  107  |  13  ----------------------------<  116<H>  4.2   |  25  |  0.7    Ca    9.3      28 Mar 2019 22:42    TPro  6.9  /  Alb  4.4  /  TBili  0.5  /  DBili  x   /  AST  12  /  ALT  12  /  AlkPhos  85  03-28    Troponin T, Serum: <0.01 ng/mL (03-29-19 @ 03:40)  Troponin T, Serum: <0.01 ng/mL (03-28-19 @ 22:42)  Lactate, Blood: 1.5 mmol/L (03-28-19 @ 22:42)    02-02 Chol 148 LDL 96 HDL 50 Trig 80    RADIOLOGY:  -CXR: no evidence of acute cardio-pulmonary disease    -TTE (7/2012): EF 55-65%    -STRESS TEST (3/29/19): small reversible defect in the infero-lateral wall of the LV consistent with ischemia; transient ischemic dilatation    ECG: normal sinus rhythm, left axis deviation, LAFB, poor R-wave progression    TELEMETRY EVENTS: HPI:  The patient is a 63-year-old male with a PMH of HTN, type II DM, DLD, gastritis/duodenitis, pancreatitis, BPH, and prostate cancer who initially presented with left flank pain and gross hematuria.  He also reported having a several-month history of chest pain described as left-sided, sharp in quality, lasting most of the day, and non-radiating.  The chest pain can occur at any time; he reports experiencing the pain about twice a month.  It is aggravated on exertion and alleviated with rest.  Otherwise, he denied having palpitations or shortness of breath.  To note, the patient has been seen in the ED multiple times in the past for abdominal pain localized in the left quadrants.    PAST MEDICAL & SURGICAL HISTORY  Pancreatitis: recurrent  Osteoarthritis  Hyperlipidemia  Prostate cancer: s/p history of seeding  Hypertension  Diabetes mellitus, type II  S/P hip replacement, left: 9/2015  History of appendectomy  Prostate cancer: s/p history of seeding    FAMILY HISTORY:  FAMILY HISTORY:  Family history of diabetes mellitus in father (Father)  Family history of MI in sister ("in early 60s")    SOCIAL HISTORY:  [-] former smoker (quit at age 50)  [+] occasional alcohol  [+] marijuana use    ALLERGIES:  No Known Allergies    HOME MEDICATIONS:  Home Medications:  acetaminophen 325 mg oral tablet: 2 tab(s) orally every 6 hours, As needed, mild pain (12 Feb 2019 11:23)  amLODIPine 5 mg oral tablet: 1 tab(s) orally once a day (12 Feb 2019 11:23)  aspirin 81 mg oral tablet: 1 tab(s) orally once a day (12 Feb 2019 11:23)  Flomax 0.4 mg oral capsule: 1 cap(s) orally once a day (at bedtime) (12 Feb 2019 11:23)  Lipitor 40 mg oral tablet: 1 tab(s) orally once a day (at bedtime) (12 Feb 2019 11:23)  metFORMIN 500 mg oral tablet: 1 tab(s) orally 2 times a day (12 Feb 2019 11:23)    VITALS:   T(F): 98.4 (03-29 @ 07:23), Max: 98.4 (03-29 @ 07:23)  HR: 57 (03-29 @ 07:23) (57 - 78)  BP: 135/65 (03-29 @ 07:23) (118/63 - 135/65)  BP(mean): --  RR: 18 (03-29 @ 07:23) (16 - 18)  SpO2: 99% (03-29 @ 07:23) (98% - 99%)    REVIEW OF SYSTEMS:  CONSTITUTIONAL: No fevers or chills  EYES/ENT: No visual changes  NECK: No pain  RESPIRATORY:  No shortness of breath  CARDIOVASCULAR: (+) chest pain  GASTROINTESTINAL: (+) abdominal pain  GENITOURINARY: (+) hematuria  NEUROLOGICAL: No weakness  SKIN: no rashes    PHYSICAL EXAM:  NEURO: AAO x3  GEN: in no acute distress  NECK: supple  LUNGS: CTA B/L  CARDIOVASCULAR: RRR, S1/S2  ABD: soft, non-tender  EXT: no lower extremity edema  SKIN: intact    LABS:                        11.9   6.68  )-----------( 236      ( 28 Mar 2019 22:42 )             36.1     03-28    144  |  107  |  13  ----------------------------<  116<H>  4.2   |  25  |  0.7    Ca    9.3      28 Mar 2019 22:42    TPro  6.9  /  Alb  4.4  /  TBili  0.5  /  DBili  x   /  AST  12  /  ALT  12  /  AlkPhos  85  03-28    Troponin T, Serum: <0.01 ng/mL (03-29-19 @ 03:40)  Troponin T, Serum: <0.01 ng/mL (03-28-19 @ 22:42)  Lactate, Blood: 1.5 mmol/L (03-28-19 @ 22:42)    02-02 Chol 148 LDL 96 HDL 50 Trig 80    RADIOLOGY:  -CXR: no evidence of acute cardio-pulmonary disease    -TTE (7/2012): EF 55-65%    -STRESS TEST (3/29/19): small reversible defect in the infero-lateral wall of the LV consistent with ischemia; transient ischemic dilatation    - ECG: normal sinus rhythm, left axis deviation, LAFB, poor R-wave progression    - TELEMETRY EVENTS: None

## 2019-03-29 NOTE — CONSULT NOTE ADULT - ATTENDING COMMENTS
Seen / examined and above reviewed.    No cardiac history.  Risk factors include DM, HTN, HLD, former smoking.    Presented with left flank pain and gross hematuria.  Mild anemia (worse compared to 2/2019).  History of BPH.    Additionally experiencing left chest pain.  Some atypical features, but there is an exertional component.  Hemodynamics stable.  EKG: NSR, poss LAFB, NT.  Ruled-out MI.    MPI reviewed.  Mild inferolateral ischemia with TID.    Overall, high risk for underlying CAD.  Ultimately, cath indicated.  Plan for Monday.    RECOMMEND:  - Start Toprol 25mg q24.  - Cont statin.  - Consider CT w/o contrast to assess for nephrolithiasis.  - Urology consult for hematuria evaluation.  Will need to define prior PCI if indicated.

## 2019-03-29 NOTE — H&P ADULT - NSICDXPASTMEDICALHX_GEN_ALL_CORE_FT
PAST MEDICAL HISTORY:  Diabetes mellitus, type II     Hyperlipidemia     Hypertension     Osteoarthritis     Pancreatitis recurrent    Prostate cancer s/p history of seeding

## 2019-03-29 NOTE — ED ADULT NURSE REASSESSMENT NOTE - NS ED NURSE REASSESS COMMENT FT1
Patient assessed VSS. Patient updated on plan of care and understanding was verbalized. Patient pain currently improved since arrival but still present discussed with MD Abbott will give Toradol

## 2019-03-29 NOTE — H&P ADULT - ATTENDING COMMENTS
Patient is seen and examined independently at bedside. I agree with the resident's note, history, physical exam and plan as above.     T(F): 96.1 (03-30-19 @ 05:06), Max: 96.7 (03-29-19 @ 20:05)  HR: 64 (03-30-19 @ 05:06) (64 - 68)  BP: 136/90 (03-30-19 @ 05:06) (117/77 - 136/90)  RR: 18 (03-30-19 @ 05:06) (18 - 18)    EKG reviewed - sinus rhythm with sinus arrhythmia, T wave abnormality in inferolateral leads, left anterior fasicular block    # Atypical chest pain with positive NM stress test  - on telemetry  - cardiac cath coming week  - c/w aspirin and lipitor    # Left pelvic and lower rib pain likely musculoskeletal in nature  - apply lidocaine patch for now, muscle relaxant if needed  - US negative for hematuria, CT Abdomen/Pelvis negative for acute etiology    # HTN, stable - c/w amlodipine  # DM II - monitor fingerstick glucose, start insulin regimen if fsg>180  # DLD - c/w lipitor  # BPH - c/w flomax    All labs, radiologic studies, vitals, orders and medications list reviewed.

## 2019-03-29 NOTE — H&P ADULT - ASSESSMENT
64 y/o M with PMH of HTN, DM II, HLD prostate cancer s/p seeds, presented for acute on chronic L. chest pain, L. flank pain, and dysuria. 62 y/o M with PMH of HTN, DM II, HLD prostate cancer s/p seeds, presented for acute on chronic L. chest pain, L. flank pain, and dysuria.    1.) Chest pain and positive stress test:    - Currently not in pain.    - Cardiology is following: Plan for cardiac catheterization next week. Continue aspirin and statin.    - Continue aspirin 81mg and atorvastatin 40mg.    - Continue telemetry monitoring.    2.) L. flank pain, dysuria, and intermittent hematuria in the setting of history of prostate cancer    - f/u CT abd/pelvis.    - Consider urology consult.    3.) HTN / HLD:    - Continue amlodipine and atorvastatin.    4.) DM II:    - Hold metformin.    - Start basal / bolus insulin.    - Monitor blood glucose.    - Carbohydrate consistent, heart healthy diet.    5.) GI / DVT PPx: not indicated / lovenox    6.) Disposition:     - Full Code.    - From Home. 62 y/o M with PMH of HTN, DM II, HLD prostate cancer s/p seeds, presented for acute on chronic L. chest pain, L. flank pain, and dysuria.    1.) Chest pain and positive stress test:    - Currently not in pain.    - Cardiology is following: Plan for cardiac catheterization next week. Continue aspirin and statin.    - Continue aspirin 81mg and atorvastatin 40mg.    - Continue telemetry monitoring.    2.) L. flank pain, dysuria, and intermittent hematuria in the setting of history of prostate cancer    - f/u CT abd/pelvis.    - Consider urology consult.    3.) HTN / HLD:    - Continue amlodipine and atorvastatin.    4.) DM II:    - Hold metformin.    - Blood glucose controlled on admission.    - Monitor blood glucose.    - Start basal / bolus insulin if blood glucose becomes persistently greater than 180.    - Carbohydrate consistent, heart healthy diet.    5.) GI / DVT PPx: not indicated / lovenox    6.) Disposition:     - Full Code.    - From Home.

## 2019-03-29 NOTE — H&P ADULT - NSHPLABSRESULTS_GEN_ALL_CORE
CBC Full  -  ( 28 Mar 2019 22:42 )  WBC Count : 6.68 K/uL  RBC Count : 4.15 M/uL  Hemoglobin : 11.9 g/dL  Hematocrit : 36.1 %  Platelet Count - Automated : 236 K/uL  Mean Cell Volume : 87.0 fL  Mean Cell Hemoglobin : 28.7 pg  Mean Cell Hemoglobin Concentration : 33.0 g/dL  Auto Neutrophil # : 3.89 K/uL  Auto Lymphocyte # : 1.75 K/uL  Auto Monocyte # : 0.71 K/uL  Auto Eosinophil # : 0.27 K/uL  Auto Basophil # : 0.03 K/uL  Auto Neutrophil % : 58.4 %  Auto Lymphocyte % : 26.2 %  Auto Monocyte % : 10.6 %  Auto Eosinophil % : 4.0 %  Auto Basophil % : 0.4 %    BMP: 03-28-19 @ 22:42  144 | 107 | 13   -----------------< 116  4.2  | 25 | 0.7  eGFR(AA): 116, eGFR (non-AA): 100  Ca 9.3, Mg --, P --    LFTs: 03-28-19 @ 22:42  TP  6.9  | 4.4 Alb   ---------------  TB  0.5  | --  DB   ---------------  ALT 12  | 12  AST            ^          85  ALK    Cardiac Enzymes: 03-29-19 @ 03:40  Trop T: <0.01  Trop I:   CKMB:    CK:     Cardiac Enzymes: 03-28-19 @ 22:42  Trop T: <0.01  Trop I:   CKMB:    CK:    Urinalysis (03.29.19 @ 00:00)    Glucose Qualitative, Urine: Negative    Blood, Urine: Negative    pH Urine: 6.0    Color: Yellow    Urine Appearance: Clear    Bilirubin: Negative    Ketone - Urine: Trace    Specific Gravity: >=1.030    Protein, Urine: Negative    Urobilinogen: 1.0    Nitrite: Negative    Leukocyte Esterase Concentration: Small  Urine Microscopic-Add On (NC) (03.29.19 @ 00:00)    White Blood Cell - Urine: 6-10 /HPF    Bacteria: Few /HPF    Epithelial Cells: Moderate /HPF    NM Nuclear Stress Multiple (03.29.19 @ 10:48):  1.   Small reversible defect in the inferolateral wall of the left ventricle consistent with ischemia.  2.  Transient ischemic dilatation (t.i.d. index 1.4) which makes the finding of ischemia more clinically significant.  3.  Normal left ventricular wall motion and wall thickening.  4.  Left ventricular ejection fraction calculated as 64% which is within the range of normal.     CT Abdomen and Pelvis w/ IV Cont (03.29.19 @ 00:29):  No definite CT evidence of acute intra-abdominal or pelvic pathology.  Underdistention versus urinary bladder wall thickening, limited in evaluation secondary to underdistention and artifact from left hip prosthesis.    X-ray Chest 1 View-PORTABLE IMMEDIATE (03.28.19 @ 23:24):  No radiographic evidence of acute cardiopulmonary disease.

## 2019-03-29 NOTE — CONSULT NOTE ADULT - ASSESSMENT
The patient is a 63-year-old male with a PMH of HTN, type II DM, DLD, gastritis/duodenitis, pancreatitis, and prostate cancer who presented with abdominal pain. The patient is a 63-year-old male with a PMH of HTN, type II DM, DLD, gastritis/duodenitis, pancreatitis, BPH, and prostate cancer s/p seeding therapy who initially presented with left flank pain.  He also reported having a several-month history of chest pain described as left-sided, sharp in quality, lasting most of the day, and non-radiating.  The chest pain can occur at any time; he reports experiencing the pain about twice a month.  It is aggravated on exertion and alleviated with rest.  Otherwise, he denied having palpitations or shortness of breath.  To note, the patient has been seen in the ED multiple times in the past for abdominal pain localized in the left quadrants.    1. Chest pain  - troponin <0.01 x2  - EKG: NSR with left axis deviation, LAFB, poor R-wave progression  - exercise nuclear stress test (3/29/19): small reversible defect in the infero-lateral wall of the LV consistent with ischemia; transient ischemic dilatation  - SCOT score is 2 (8% all-cause mortality risk)  - continue ASA, statin    2. DLD  - 10-years ASCVD risk is 16.9%  - recommend high-intensity statin The patient is a 63-year-old male with a PMH of HTN, type II DM, DLD, gastritis/duodenitis, pancreatitis, BPH, and prostate cancer s/p seeding therapy who initially presented with left flank pain and gross hematuria.  He also reported having a several-month history of chest pain described as left-sided, sharp in quality, lasting most of the day, and non-radiating.  The chest pain can occur at any time; he reports experiencing the pain about twice a month.  It is aggravated on exertion and alleviated with rest.  Otherwise, he denied having palpitations or shortness of breath.  To note, the patient has been seen in the ED multiple times in the past for abdominal pain localized in the left quadrants.    1. Chest pain  - suspicious for ischemic heart disease  - troponin <0.01 x2  - EKG: NSR with left axis deviation, LAFB, poor R-wave progression  - exercise nuclear stress test (3/29/19): small reversible defect in the infero-lateral wall of the LV consistent with ischemia; transient ischemic dilatation  - SCOT score is 2 (8% all-cause mortality risk)  - continue ASA and statin  - plan for cardiac catheterization next week    2. DLD  - 10-years ASCVD risk is 16.9%  - recommend high-intensity statin    3. Left flank pain and gross hematuria  - consider CT abdomen/pelvis non-contrast to r/o nephrolithiasis

## 2019-03-29 NOTE — ED CDU PROVIDER INITIAL DAY NOTE - OBJECTIVE STATEMENT
63 yold male to Ed [facility] Dm, Htn, Hld, prostate cancer s/p seeds, pancreatitis, OA c/o Left side lower abdominal pain chronically but worse x 2 day with hematuria; pt with moderate sharp sudden onset left sided chest pain lasting few seconds today; pt denies n/v/sob/diaphoresis; pt denies radiation of chest pain to back, arm, legs; pt's last cardiac workup was 3 yrs ago(wnl as per pt); pt seen in main Ed - ct abdomen negative for abdominal pathology and placed in obs for cp evaluation;

## 2019-03-29 NOTE — H&P ADULT - NSICDXPASTSURGICALHX_GEN_ALL_CORE_FT
PAST SURGICAL HISTORY:  History of appendectomy     Prostate cancer s/p history of seeding    S/P hip replacement, left 9/2015

## 2019-03-29 NOTE — ED CDU PROVIDER DISPOSITION NOTE - ATTENDING CONTRIBUTION TO CARE
Please see clinical course, Nuclear stress testing abnormal and will need further cardiac evaluation

## 2019-03-29 NOTE — H&P ADULT - NSHPPHYSICALEXAM_GEN_ALL_CORE
Height (cm): 170.18 (03-29-19 @ 15:49)  Weight (kg): 89.5 (03-29-19 @ 15:49)  BMI (kg/m2): 30.9 (03-29-19 @ 15:49)  BSA (m2): 2.01 (03-29-19 @ 15:49)  T(F): 95.7 (03-29-19 @ 15:49), Max: 98.4 (03-29-19 @ 07:23)  HR: 67 (03-29-19 @ 15:49) (57 - 78)  BP: 129/84 (03-29-19 @ 15:49) (118/63 - 135/65)  RR: 18 (03-29-19 @ 15:49) (16 - 18)  SpO2: 99% (03-29-19 @ 07:23) (98% - 99%) Height (cm): 170.18 (03-29-19 @ 15:49)  Weight (kg): 89.5 (03-29-19 @ 15:49)  BMI (kg/m2): 30.9 (03-29-19 @ 15:49)  BSA (m2): 2.01 (03-29-19 @ 15:49)  T(F): 95.7 (03-29-19 @ 15:49), Max: 98.4 (03-29-19 @ 07:23)  HR: 67 (03-29-19 @ 15:49) (57 - 78)  BP: 129/84 (03-29-19 @ 15:49) (118/63 - 135/65)  RR: 18 (03-29-19 @ 15:49) (16 - 18)  SpO2: 99% (03-29-19 @ 07:23) (98% - 99%)    Physical Exam:  General: Not in distress.   HEENT: Moist mucus membranes. PERRLA.  Cardio: Regular rate and rhythm, S1, S2, no murmur, rub, or gallop.  Pulm: Clear to auscultation bilaterally. No wheezing, rales, or rhonchi.  Abdomen: Soft, non-tender, non-distended. Normoactive bowel sounds.  Extremities: No cyanosis or edema bilaterally. No calf tenderness to palpation.  Neuro: A&O x3. No focal deficits. CN II - XII grossly intact. Height (cm): 170.18 (03-29-19 @ 15:49)  Weight (kg): 89.5 (03-29-19 @ 15:49)  BMI (kg/m2): 30.9 (03-29-19 @ 15:49)  BSA (m2): 2.01 (03-29-19 @ 15:49)  T(F): 95.7 (03-29-19 @ 15:49), Max: 98.4 (03-29-19 @ 07:23)  HR: 67 (03-29-19 @ 15:49) (57 - 78)  BP: 129/84 (03-29-19 @ 15:49) (118/63 - 135/65)  RR: 18 (03-29-19 @ 15:49) (16 - 18)  SpO2: 99% (03-29-19 @ 07:23) (98% - 99%)    Physical Exam:  Constitutional: Not in distress.   Eyes: EOMI, PERRLA, conjunctiva and sclera clear.  HEMNT: NCAT, Moist mucus membranes. No lesions.  Cardio: Regular rate and rhythm, S1, S2, no murmur, rub, or gallop.  Respiratory: Clear to auscultation bilaterally. No wheezing, rales, or rhonchi.  Gastrointestinal: Soft, non-tender, non-distended. Normoactive bowel sounds.  Extremities: No cyanosis or edema bilaterally. No calf tenderness to palpation.  Neuro: A&O x3. No focal deficits. CN II - XII grossly intact.  Musculoskeletal: No joint swelling or tenderness. No neck or back pain. Left pelvic border tenderness and left lateral rib border tenderness.  Lymph: No lymphadenopathy noted.  Skin: No rashes or lesions.

## 2019-03-30 LAB
ANION GAP SERPL CALC-SCNC: 12 MMOL/L — SIGNIFICANT CHANGE UP (ref 7–14)
APTT BLD: 37.2 SEC — SIGNIFICANT CHANGE UP (ref 27–39.2)
BUN SERPL-MCNC: 10 MG/DL — SIGNIFICANT CHANGE UP (ref 10–20)
CALCIUM SERPL-MCNC: 9.6 MG/DL — SIGNIFICANT CHANGE UP (ref 8.5–10.1)
CHLORIDE SERPL-SCNC: 100 MMOL/L — SIGNIFICANT CHANGE UP (ref 98–110)
CK MB CFR SERPL CALC: 1.8 NG/ML — SIGNIFICANT CHANGE UP (ref 0.6–6.3)
CK SERPL-CCNC: 130 U/L — SIGNIFICANT CHANGE UP (ref 0–225)
CO2 SERPL-SCNC: 25 MMOL/L — SIGNIFICANT CHANGE UP (ref 17–32)
CREAT SERPL-MCNC: 0.6 MG/DL — LOW (ref 0.7–1.5)
GLUCOSE BLDC GLUCOMTR-MCNC: 103 MG/DL — HIGH (ref 70–99)
GLUCOSE BLDC GLUCOMTR-MCNC: 105 MG/DL — HIGH (ref 70–99)
GLUCOSE BLDC GLUCOMTR-MCNC: 116 MG/DL — HIGH (ref 70–99)
GLUCOSE BLDC GLUCOMTR-MCNC: 143 MG/DL — HIGH (ref 70–99)
GLUCOSE SERPL-MCNC: 109 MG/DL — HIGH (ref 70–99)
HCT VFR BLD CALC: 37.7 % — LOW (ref 42–52)
HGB BLD-MCNC: 12.7 G/DL — LOW (ref 14–18)
INR BLD: 1.18 RATIO — SIGNIFICANT CHANGE UP (ref 0.65–1.3)
MCHC RBC-ENTMCNC: 28.6 PG — SIGNIFICANT CHANGE UP (ref 27–31)
MCHC RBC-ENTMCNC: 33.7 G/DL — SIGNIFICANT CHANGE UP (ref 32–37)
MCV RBC AUTO: 84.9 FL — SIGNIFICANT CHANGE UP (ref 80–94)
NRBC # BLD: 0 /100 WBCS — SIGNIFICANT CHANGE UP (ref 0–0)
PLATELET # BLD AUTO: 250 K/UL — SIGNIFICANT CHANGE UP (ref 130–400)
POTASSIUM SERPL-MCNC: 4.3 MMOL/L — SIGNIFICANT CHANGE UP (ref 3.5–5)
POTASSIUM SERPL-SCNC: 4.3 MMOL/L — SIGNIFICANT CHANGE UP (ref 3.5–5)
PROTHROM AB SERPL-ACNC: 13.5 SEC — HIGH (ref 9.95–12.87)
RBC # BLD: 4.44 M/UL — LOW (ref 4.7–6.1)
RBC # FLD: 12.7 % — SIGNIFICANT CHANGE UP (ref 11.5–14.5)
SODIUM SERPL-SCNC: 137 MMOL/L — SIGNIFICANT CHANGE UP (ref 135–146)
TROPONIN T SERPL-MCNC: <0.01 NG/ML — SIGNIFICANT CHANGE UP
TROPONIN T SERPL-MCNC: <0.01 NG/ML — SIGNIFICANT CHANGE UP
WBC # BLD: 6.54 K/UL — SIGNIFICANT CHANGE UP (ref 4.8–10.8)
WBC # FLD AUTO: 6.54 K/UL — SIGNIFICANT CHANGE UP (ref 4.8–10.8)

## 2019-03-30 RX ORDER — HEPARIN SODIUM 5000 [USP'U]/ML
5000 INJECTION INTRAVENOUS; SUBCUTANEOUS EVERY 12 HOURS
Qty: 0 | Refills: 0 | Status: DISCONTINUED | OUTPATIENT
Start: 2019-03-30 | End: 2019-04-02

## 2019-03-30 RX ORDER — MORPHINE SULFATE 50 MG/1
1 CAPSULE, EXTENDED RELEASE ORAL EVERY 6 HOURS
Qty: 0 | Refills: 0 | Status: DISCONTINUED | OUTPATIENT
Start: 2019-03-30 | End: 2019-03-30

## 2019-03-30 RX ORDER — MORPHINE SULFATE 50 MG/1
2 CAPSULE, EXTENDED RELEASE ORAL ONCE
Qty: 0 | Refills: 0 | Status: DISCONTINUED | OUTPATIENT
Start: 2019-03-30 | End: 2019-03-30

## 2019-03-30 RX ORDER — LIDOCAINE 4 G/100G
2 CREAM TOPICAL DAILY
Qty: 0 | Refills: 0 | Status: DISCONTINUED | OUTPATIENT
Start: 2019-03-30 | End: 2019-04-02

## 2019-03-30 RX ORDER — CYCLOBENZAPRINE HYDROCHLORIDE 10 MG/1
10 TABLET, FILM COATED ORAL ONCE
Qty: 0 | Refills: 0 | Status: COMPLETED | OUTPATIENT
Start: 2019-03-30 | End: 2019-03-30

## 2019-03-30 RX ORDER — MORPHINE SULFATE 50 MG/1
2 CAPSULE, EXTENDED RELEASE ORAL EVERY 4 HOURS
Qty: 0 | Refills: 0 | Status: DISCONTINUED | OUTPATIENT
Start: 2019-03-30 | End: 2019-04-02

## 2019-03-30 RX ORDER — METHOCARBAMOL 500 MG/1
750 TABLET, FILM COATED ORAL ONCE
Qty: 0 | Refills: 0 | Status: COMPLETED | OUTPATIENT
Start: 2019-03-30 | End: 2019-03-30

## 2019-03-30 RX ORDER — CYCLOBENZAPRINE HYDROCHLORIDE 10 MG/1
10 TABLET, FILM COATED ORAL AT BEDTIME
Qty: 0 | Refills: 0 | Status: DISCONTINUED | OUTPATIENT
Start: 2019-03-30 | End: 2019-04-02

## 2019-03-30 RX ORDER — METOPROLOL TARTRATE 50 MG
12.5 TABLET ORAL
Qty: 0 | Refills: 0 | Status: DISCONTINUED | OUTPATIENT
Start: 2019-03-30 | End: 2019-04-02

## 2019-03-30 RX ADMIN — ATORVASTATIN CALCIUM 40 MILLIGRAM(S): 80 TABLET, FILM COATED ORAL at 21:52

## 2019-03-30 RX ADMIN — MORPHINE SULFATE 1 MILLIGRAM(S): 50 CAPSULE, EXTENDED RELEASE ORAL at 12:57

## 2019-03-30 RX ADMIN — HEPARIN SODIUM 5000 UNIT(S): 5000 INJECTION INTRAVENOUS; SUBCUTANEOUS at 18:07

## 2019-03-30 RX ADMIN — MORPHINE SULFATE 1 MILLIGRAM(S): 50 CAPSULE, EXTENDED RELEASE ORAL at 18:06

## 2019-03-30 RX ADMIN — Medication 12.5 MILLIGRAM(S): at 12:59

## 2019-03-30 RX ADMIN — Medication 81 MILLIGRAM(S): at 12:58

## 2019-03-30 RX ADMIN — LIDOCAINE 2 PATCH: 4 CREAM TOPICAL at 18:06

## 2019-03-30 RX ADMIN — LIDOCAINE 2 PATCH: 4 CREAM TOPICAL at 03:39

## 2019-03-30 RX ADMIN — CYCLOBENZAPRINE HYDROCHLORIDE 10 MILLIGRAM(S): 10 TABLET, FILM COATED ORAL at 21:52

## 2019-03-30 RX ADMIN — TAMSULOSIN HYDROCHLORIDE 0.4 MILLIGRAM(S): 0.4 CAPSULE ORAL at 21:52

## 2019-03-30 RX ADMIN — AMLODIPINE BESYLATE 5 MILLIGRAM(S): 2.5 TABLET ORAL at 06:35

## 2019-03-30 RX ADMIN — METHOCARBAMOL 750 MILLIGRAM(S): 500 TABLET, FILM COATED ORAL at 03:38

## 2019-03-30 RX ADMIN — CYCLOBENZAPRINE HYDROCHLORIDE 10 MILLIGRAM(S): 10 TABLET, FILM COATED ORAL at 18:08

## 2019-03-30 RX ADMIN — MORPHINE SULFATE 2 MILLIGRAM(S): 50 CAPSULE, EXTENDED RELEASE ORAL at 04:37

## 2019-03-30 RX ADMIN — MORPHINE SULFATE 2 MILLIGRAM(S): 50 CAPSULE, EXTENDED RELEASE ORAL at 03:38

## 2019-03-30 NOTE — PROGRESS NOTE ADULT - SUBJECTIVE AND OBJECTIVE BOX
SUBJECTIVE:    Patient is a 63y old Male who presents with a chief complaint of Chest Pain, L. Flank Pain, Dysuria (29 Mar 2019 16:27)    Currently admitted to medicine with the primary diagnosis of Chest pain     Today is hospital day 1d. This morning he is resting comfortably in bed and reports no new issues or overnight events.     PAST MEDICAL & SURGICAL HISTORY  Pancreatitis: recurrent  Osteoarthritis  Hyperlipidemia  Prostate cancer: s/p history of seeding  Hypertension  Diabetes mellitus, type II  S/P hip replacement, left: 9/2015  History of appendectomy  Prostate cancer: s/p history of seeding    SOCIAL HISTORY:  Negative for smoking/alcohol/drug use.     ALLERGIES:  No Known Allergies    MEDICATIONS:  STANDING MEDICATIONS  amLODIPine   Tablet 5 milliGRAM(s) Oral daily  aspirin  chewable 81 milliGRAM(s) Oral daily  atorvastatin 40 milliGRAM(s) Oral at bedtime  lidocaine   Patch 2 Patch Transdermal daily  tamsulosin 0.4 milliGRAM(s) Oral at bedtime    PRN MEDICATIONS    VITALS:   T(F): 96.1  HR: 64  BP: 136/90  RR: 18  SpO2: --    LABS:                        11.9   6.68  )-----------( 236      ( 28 Mar 2019 22:42 )             36.1     03-28    144  |  107  |  13  ----------------------------<  116<H>  4.2   |  25  |  0.7    Ca    9.3      28 Mar 2019 22:42    TPro  6.9  /  Alb  4.4  /  TBili  0.5  /  DBili  x   /  AST  12  /  ALT  12  /  AlkPhos  85  03-28      Urinalysis Basic - ( 29 Mar 2019 00:00 )    Color: Yellow / Appearance: Clear / SG: >=1.030 / pH: x  Gluc: x / Ketone: Trace  / Bili: Negative / Urobili: 1.0   Blood: x / Protein: Negative / Nitrite: Negative   Leuk Esterase: Small / RBC: x / WBC 6-10 /HPF   Sq Epi: x / Non Sq Epi: Moderate /HPF / Bacteria: Few /HPF      CARDIAC MARKERS ( 29 Mar 2019 03:40 )  x     / <0.01 ng/mL / x     / x     / x      CARDIAC MARKERS ( 28 Mar 2019 22:42 )  x     / <0.01 ng/mL / x     / x     / x          RADIOLOGY:    Nuclear Stress Multiple (03.29.19 @ 10:48):  	1.   Small reversible defect in the inferolateral wall of the left ventricle consistent with ischemia.  	2.  Transient ischemic dilatation (t.i.d. index 1.4) which makes the finding of ischemia more clinically significant.  	3.  Normal left ventricular wall motion and wall thickening.  	4.  Left ventricular ejection fraction calculated as 64% which is within the range of normal.     CT Abdomen and Pelvis w/ IV Cont (03.29.19 @ 00:29):  	No definite CT evidence of acute intra-abdominal or pelvic pathology.  	Underdistention versus urinary bladder wall thickening, limited in evaluation secondary to underdistention and artifact from left hip prosthesis.    X-ray Chest 1 View-PORTABLE IMMEDIATE (03.28.19 @ 23:24):  No radiographic evidence of acute cardiopulmonary disease.      PHYSICAL EXAM:  GEN: No acute distress  LUNGS: Clear to auscultation bilaterally   HEART: S1/S2 present. RRR.   ABD: Soft, non-tender, non-distended. Bowel sounds present  EXT: NC/NC/NE/2+PP/LORENZANA  NEURO: AAOX3 SUBJECTIVE:    Patient is a 63y old Male who presents with a chief complaint of Chest Pain, L. Flank Pain, Dysuria (29 Mar 2019 16:27)    Currently admitted to medicine with the primary diagnosis of Chest pain     Today is hospital day 1d. No new issues or overnight events. This morning he complains of left flank pain.     PAST MEDICAL & SURGICAL HISTORY  Pancreatitis: recurrent  Osteoarthritis  Hyperlipidemia  Prostate cancer: s/p history of seeding  Hypertension  Diabetes mellitus, type II  S/P hip replacement, left: 9/2015  History of appendectomy  Prostate cancer: s/p history of seeding    SOCIAL HISTORY:  Negative for smoking/alcohol/drug use.     ALLERGIES:  No Known Allergies    MEDICATIONS:  STANDING MEDICATIONS  amLODIPine   Tablet 5 milliGRAM(s) Oral daily  aspirin  chewable 81 milliGRAM(s) Oral daily  atorvastatin 40 milliGRAM(s) Oral at bedtime  lidocaine   Patch 2 Patch Transdermal daily  tamsulosin 0.4 milliGRAM(s) Oral at bedtime    PRN MEDICATIONS    VITALS:   T(F): 96.1  HR: 64  BP: 136/90  RR: 18  SpO2: --    LABS:                        11.9   6.68  )-----------( 236      ( 28 Mar 2019 22:42 )             36.1     03-28    144  |  107  |  13  ----------------------------<  116<H>  4.2   |  25  |  0.7    Ca    9.3      28 Mar 2019 22:42    TPro  6.9  /  Alb  4.4  /  TBili  0.5  /  DBili  x   /  AST  12  /  ALT  12  /  AlkPhos  85  03-28      Urinalysis Basic - ( 29 Mar 2019 00:00 )    Color: Yellow / Appearance: Clear / SG: >=1.030 / pH: x  Gluc: x / Ketone: Trace  / Bili: Negative / Urobili: 1.0   Blood: x / Protein: Negative / Nitrite: Negative   Leuk Esterase: Small / RBC: x / WBC 6-10 /HPF   Sq Epi: x / Non Sq Epi: Moderate /HPF / Bacteria: Few /HPF      CARDIAC MARKERS ( 29 Mar 2019 03:40 )  x     / <0.01 ng/mL / x     / x     / x      CARDIAC MARKERS ( 28 Mar 2019 22:42 )  x     / <0.01 ng/mL / x     / x     / x          RADIOLOGY:    Nuclear Stress Multiple (03.29.19 @ 10:48):  	1.   Small reversible defect in the inferolateral wall of the left ventricle consistent with ischemia.  	2.  Transient ischemic dilatation (t.i.d. index 1.4) which makes the finding of ischemia more clinically significant.  	3.  Normal left ventricular wall motion and wall thickening.  	4.  Left ventricular ejection fraction calculated as 64% which is within the range of normal.     CT Abdomen and Pelvis w/ IV Cont (03.29.19 @ 00:29):  	No definite CT evidence of acute intra-abdominal or pelvic pathology.  	Underdistention versus urinary bladder wall thickening, limited in evaluation secondary to underdistention and artifact from left hip prosthesis.    X-ray Chest 1 View-PORTABLE IMMEDIATE (03.28.19 @ 23:24):  No radiographic evidence of acute cardiopulmonary disease.      PHYSICAL EXAM:  GEN: No acute distress  LUNGS: Clear to auscultation bilaterally   HEART: S1/S2 present. RRR.   ABD: Soft, non-tender, non-distended. Bowel sounds present  EXT: NC/NC/NE/2+PP/LORENZANA  NEURO: AAOX3

## 2019-03-30 NOTE — PROGRESS NOTE ADULT - ASSESSMENT
64 y/o M with PMH of HTN, DM II, HLD prostate cancer s/p seeds, presented for acute on chronic L. chest pain, L. flank pain, and dysuria.    # Atypical chest pain with positive NM stress test  - Cardiology is following: Plan for cardiac catheterization next week  - c/w aspirin and Lipitor  - on telemetry    # Left pelvic and lower rib pain likely musculoskeletal in nature  - c/w Lidocaine patch   - CT Abdomen/Pelvis negative for acute etiology    # Hx of prostate cancer  - outpt follow up     # HTN / HLD  - c/w amlodipine and atorvastatin.    # DM II  - Monitor blood glucose  - Start basal / bolus insulin if blood glucose becomes persistently greater than 180.    # DVT Ppx  - c/w Lovenox    # DASH diet, carb consistent    # Disposition:   - as per cardiology     # Full code status 62 y/o M with PMH of HTN, DM II, HLD prostate cancer s/p seeds, presented for acute on chronic L. chest pain, L. flank pain, and dysuria.    # Atypical chest pain with positive NM stress test  - Cardiology is following: Plan for cardiac catheterization next week  - c/w aspirin and Lipitor  - on telemetry    # Left pelvic and lower rib pain likely musculoskeletal in nature  - c/w Lidocaine patch   - CT Abdomen/Pelvis negative for acute etiology    # Hx of prostate cancer  - outpt follow up     # HTN / HLD  - c/w amlodipine and atorvastatin.    # DM II  - Monitor blood glucose  - start basal / bolus insulin if blood glucose becomes persistently greater than 180.    # DVT Ppx  - c/w Lovenox    # DASH diet, carb consistent    # Disposition:   - as per cardiology     # Full code status 62 y/o M with PMH of HTN, DM II, HLD prostate cancer s/p seeds, presented for acute on chronic L. chest pain, L. flank pain, and dysuria.    # Atypical chest pain with positive NM stress test  - Cardiology is following: Plan for cardiac catheterization next week  - c/w aspirin and Lipitor  - start metoprolol titrate 12.5mg q12  - on telemetry    # Left pelvic and lower rib pain likely musculoskeletal in nature  - c/w Lidocaine patch   - CT Abdomen/Pelvis negative for acute etiology    # Hx of prostate cancer  - outpt follow up     # HTN / HLD  - c/w amlodipine and atorvastatin.    # DM II  - Monitor blood glucose  - start basal / bolus insulin if blood glucose becomes persistently greater than 180.    # DVT Ppx  - c/w Lovenox    # DASH diet, carb consistent    # Disposition:   - as per cardiology     # Full code status 64 y/o M with PMH of HTN, DM II, HLD prostate cancer s/p seeds, presented for acute on chronic L. chest pain, L. flank pain, and dysuria.    # Atypical chest pain with positive NM stress test  - Cardiology is following: Plan for cardiac catheterization Monday   - c/w aspirin and Lipitor  - start metoprolol titrate 12.5mg q12  - on telemetry    # Left pelvic and lower rib pain likely musculoskeletal in nature  - c/w Lidocaine patch   - Flexeril at bedtime and PRN  - PMR consult for steroid injection for possible sacroiliitis   - CT Abdomen/Pelvis negative for acute etiology    # Hx of prostate cancer  - outpt follow up     # HTN / HLD  - c/w amlodipine and atorvastatin.    # DM II  - Monitor blood glucose  - start basal / bolus insulin if blood glucose becomes persistently greater than 180.    # DVT Ppx  - c/w Heparin     # DASH diet, carb consistent    # Disposition:   - as per cardiology     # Full code status

## 2019-03-31 LAB
GLUCOSE BLDC GLUCOMTR-MCNC: 113 MG/DL — HIGH (ref 70–99)
GLUCOSE BLDC GLUCOMTR-MCNC: 125 MG/DL — HIGH (ref 70–99)
GLUCOSE BLDC GLUCOMTR-MCNC: 127 MG/DL — HIGH (ref 70–99)
GLUCOSE BLDC GLUCOMTR-MCNC: 147 MG/DL — HIGH (ref 70–99)

## 2019-03-31 RX ADMIN — MORPHINE SULFATE 2 MILLIGRAM(S): 50 CAPSULE, EXTENDED RELEASE ORAL at 18:45

## 2019-03-31 RX ADMIN — MORPHINE SULFATE 2 MILLIGRAM(S): 50 CAPSULE, EXTENDED RELEASE ORAL at 19:30

## 2019-03-31 RX ADMIN — MORPHINE SULFATE 2 MILLIGRAM(S): 50 CAPSULE, EXTENDED RELEASE ORAL at 23:30

## 2019-03-31 RX ADMIN — MORPHINE SULFATE 2 MILLIGRAM(S): 50 CAPSULE, EXTENDED RELEASE ORAL at 10:43

## 2019-03-31 RX ADMIN — Medication 81 MILLIGRAM(S): at 12:38

## 2019-03-31 RX ADMIN — LIDOCAINE 2 PATCH: 4 CREAM TOPICAL at 12:38

## 2019-03-31 RX ADMIN — ATORVASTATIN CALCIUM 40 MILLIGRAM(S): 80 TABLET, FILM COATED ORAL at 21:53

## 2019-03-31 RX ADMIN — HEPARIN SODIUM 5000 UNIT(S): 5000 INJECTION INTRAVENOUS; SUBCUTANEOUS at 18:48

## 2019-03-31 RX ADMIN — LIDOCAINE 2 PATCH: 4 CREAM TOPICAL at 18:49

## 2019-03-31 RX ADMIN — TAMSULOSIN HYDROCHLORIDE 0.4 MILLIGRAM(S): 0.4 CAPSULE ORAL at 21:53

## 2019-03-31 RX ADMIN — HEPARIN SODIUM 5000 UNIT(S): 5000 INJECTION INTRAVENOUS; SUBCUTANEOUS at 06:39

## 2019-03-31 RX ADMIN — Medication 12.5 MILLIGRAM(S): at 18:48

## 2019-03-31 RX ADMIN — Medication 12.5 MILLIGRAM(S): at 06:39

## 2019-03-31 RX ADMIN — AMLODIPINE BESYLATE 5 MILLIGRAM(S): 2.5 TABLET ORAL at 06:39

## 2019-03-31 RX ADMIN — MORPHINE SULFATE 2 MILLIGRAM(S): 50 CAPSULE, EXTENDED RELEASE ORAL at 22:49

## 2019-03-31 RX ADMIN — CYCLOBENZAPRINE HYDROCHLORIDE 10 MILLIGRAM(S): 10 TABLET, FILM COATED ORAL at 21:53

## 2019-03-31 NOTE — PROGRESS NOTE ADULT - ASSESSMENT
62 y/o M with PMH of HTN, DM II, HLD prostate cancer s/p seeds, presented for acute on chronic left lower back pain and atypical chest pain.     # Atypical chest pain with positive NM stress test  - Cardiology is following: Plan for cardiac catheterization Monday   - c/w aspirin and Lipitor  - c/w metoprolol titrate 12.5mg q12  - on telemetry    # Left lower back pain  - hx of left hip arthroplasty, positive RACHEL test as well, suspect sacroiliitis vs lumbar strain   - c/w Lidocaine patch   - Flexeril at bedtime and PRN  - Pain mgmt consult for steroid injection for possible sacroiliitis   - CT Abdomen/Pelvis negative for acute etiology, doubt nephrolithiasis given clinical picture      # Hx of prostate cancer  - in remission per patient     # HTN / HLD  - c/w amlodipine and atorvastatin.    # DM II  - Monitor blood glucose  - start basal / bolus insulin if blood glucose becomes persistently greater than 180.    # DVT Ppx  - c/w Heparin     # DASH diet, carb consistent    # Disposition:   - as per cardiology     # Full code status

## 2019-03-31 NOTE — PROGRESS NOTE ADULT - SUBJECTIVE AND OBJECTIVE BOX
Pt seen and examined at bedside. Continues to have left lower back pain. Denies any other complaints. No CP, SOB.     VITAL SIGNS (Last 24 hrs):  T(C): 36.1 (19 @ 05:46), Max: 36.2 (19 @ 20:37)  HR: 59 (19 @ 05:46) (59 - 66)  BP: 121/86 (19 @ 05:46) (121/86 - 131/85)  RR: 18 (19 @ 05:46) (18 - 18)  SpO2: --  Wt(kg): --  Daily     Daily Weight in k.1 (31 Mar 2019 05:46)    I&O's Summary      PHYSICAL EXAM:  GENERAL: NAD, well-developed  HEAD:  Atraumatic, Normocephalic  EYES: EOMI, PERRLA, conjunctiva and sclera clear  NECK: Supple, No JVD  CHEST/LUNG: Clear to auscultation bilaterally; No wheeze  HEART: Regular rate and rhythm; No murmurs, rubs, or gallops  ABDOMEN: Soft, Nontender, Nondistended; Bowel sounds present  EXTREMITIES:  2+ Peripheral Pulses, No clubbing, cyanosis, or edema  PSYCH: AAOx3  NEUROLOGY: non-focal  SKIN: No rashes or lesions    Labs Reviewed  Spoke to patient in regards to abnormal labs.    CBC Full  -  ( 30 Mar 2019 16:44 )  WBC Count : 6.54 K/uL  Hemoglobin : 12.7 g/dL  Hematocrit : 37.7 %  Platelet Count - Automated : 250 K/uL  Mean Cell Volume : 84.9 fL  Mean Cell Hemoglobin : 28.6 pg  Mean Cell Hemoglobin Concentration : 33.7 g/dL  Auto Neutrophil # : x  Auto Lymphocyte # : x  Auto Monocyte # : x  Auto Eosinophil # : x  Auto Basophil # : x  Auto Neutrophil % : x  Auto Lymphocyte % : x  Auto Monocyte % : x  Auto Eosinophil % : x  Auto Basophil % : x    BMP:     @ 09:32    Blood Urea Nitrogen - 10  Calcium - 9.6  Carbond Dioxide - 25  Chloride - 100  Creatinine - 0.6  Glucose - 109  Potassium - 4.3  Sodium - 137      Hemoglobin A1c -   PT/INR - ( 30 Mar 2019 09:32 )   PT: 13.50 sec;   INR: 1.18 ratio         PTT - ( 30 Mar 2019 09:32 )  PTT:37.2 sec       MEDICATIONS  (STANDING):  amLODIPine   Tablet 5 milliGRAM(s) Oral daily  aspirin  chewable 81 milliGRAM(s) Oral daily  atorvastatin 40 milliGRAM(s) Oral at bedtime  cyclobenzaprine 10 milliGRAM(s) Oral at bedtime  heparin  Injectable 5000 Unit(s) SubCutaneous every 12 hours  lidocaine   Patch 2 Patch Transdermal daily  metoprolol tartrate 12.5 milliGRAM(s) Oral two times a day  tamsulosin 0.4 milliGRAM(s) Oral at bedtime    MEDICATIONS  (PRN):  morphine  - Injectable 2 milliGRAM(s) IV Push every 4 hours PRN Severe Pain (7 - 10)

## 2019-04-01 DIAGNOSIS — C61 MALIGNANT NEOPLASM OF PROSTATE: ICD-10-CM

## 2019-04-01 LAB
ALLERGY+IMMUNOLOGY DIAG STUDY NOTE: SIGNIFICANT CHANGE UP
ANION GAP SERPL CALC-SCNC: 11 MMOL/L — SIGNIFICANT CHANGE UP (ref 7–14)
APTT BLD: 32.4 SEC — SIGNIFICANT CHANGE UP (ref 27–39.2)
BUN SERPL-MCNC: 16 MG/DL — SIGNIFICANT CHANGE UP (ref 10–20)
CALCIUM SERPL-MCNC: 9.2 MG/DL — SIGNIFICANT CHANGE UP (ref 8.5–10.1)
CHLORIDE SERPL-SCNC: 104 MMOL/L — SIGNIFICANT CHANGE UP (ref 98–110)
CO2 SERPL-SCNC: 21 MMOL/L — SIGNIFICANT CHANGE UP (ref 17–32)
CREAT SERPL-MCNC: 0.7 MG/DL — SIGNIFICANT CHANGE UP (ref 0.7–1.5)
GLUCOSE BLDC GLUCOMTR-MCNC: 107 MG/DL — HIGH (ref 70–99)
GLUCOSE BLDC GLUCOMTR-MCNC: 139 MG/DL — HIGH (ref 70–99)
GLUCOSE BLDC GLUCOMTR-MCNC: 93 MG/DL — SIGNIFICANT CHANGE UP (ref 70–99)
GLUCOSE SERPL-MCNC: 114 MG/DL — HIGH (ref 70–99)
HCT VFR BLD CALC: 37.8 % — LOW (ref 42–52)
HGB BLD-MCNC: 12.6 G/DL — LOW (ref 14–18)
INR BLD: 1.07 RATIO — SIGNIFICANT CHANGE UP (ref 0.65–1.3)
MCHC RBC-ENTMCNC: 28.7 PG — SIGNIFICANT CHANGE UP (ref 27–31)
MCHC RBC-ENTMCNC: 33.3 G/DL — SIGNIFICANT CHANGE UP (ref 32–37)
MCV RBC AUTO: 86.1 FL — SIGNIFICANT CHANGE UP (ref 80–94)
NRBC # BLD: 0 /100 WBCS — SIGNIFICANT CHANGE UP (ref 0–0)
PLATELET # BLD AUTO: 234 K/UL — SIGNIFICANT CHANGE UP (ref 130–400)
POTASSIUM SERPL-MCNC: 4.5 MMOL/L — SIGNIFICANT CHANGE UP (ref 3.5–5)
POTASSIUM SERPL-SCNC: 4.5 MMOL/L — SIGNIFICANT CHANGE UP (ref 3.5–5)
PROTHROM AB SERPL-ACNC: 12.3 SEC — SIGNIFICANT CHANGE UP (ref 9.95–12.87)
RBC # BLD: 4.39 M/UL — LOW (ref 4.7–6.1)
RBC # FLD: 12.8 % — SIGNIFICANT CHANGE UP (ref 11.5–14.5)
SODIUM SERPL-SCNC: 136 MMOL/L — SIGNIFICANT CHANGE UP (ref 135–146)
TYPE + AB SCN PNL BLD: SIGNIFICANT CHANGE UP
WBC # BLD: 5.52 K/UL — SIGNIFICANT CHANGE UP (ref 4.8–10.8)
WBC # FLD AUTO: 5.52 K/UL — SIGNIFICANT CHANGE UP (ref 4.8–10.8)

## 2019-04-01 PROCEDURE — 93454 CORONARY ARTERY ANGIO S&I: CPT | Mod: 26

## 2019-04-01 RX ORDER — SODIUM CHLORIDE 9 MG/ML
1000 INJECTION INTRAMUSCULAR; INTRAVENOUS; SUBCUTANEOUS
Qty: 0 | Refills: 0 | Status: DISCONTINUED | OUTPATIENT
Start: 2019-04-01 | End: 2019-04-02

## 2019-04-01 RX ADMIN — MORPHINE SULFATE 2 MILLIGRAM(S): 50 CAPSULE, EXTENDED RELEASE ORAL at 09:18

## 2019-04-01 RX ADMIN — Medication 81 MILLIGRAM(S): at 11:03

## 2019-04-01 RX ADMIN — MORPHINE SULFATE 2 MILLIGRAM(S): 50 CAPSULE, EXTENDED RELEASE ORAL at 05:36

## 2019-04-01 RX ADMIN — ATORVASTATIN CALCIUM 40 MILLIGRAM(S): 80 TABLET, FILM COATED ORAL at 21:26

## 2019-04-01 RX ADMIN — Medication 12.5 MILLIGRAM(S): at 05:37

## 2019-04-01 RX ADMIN — LIDOCAINE 2 PATCH: 4 CREAM TOPICAL at 11:03

## 2019-04-01 RX ADMIN — LIDOCAINE 2 PATCH: 4 CREAM TOPICAL at 00:01

## 2019-04-01 RX ADMIN — AMLODIPINE BESYLATE 5 MILLIGRAM(S): 2.5 TABLET ORAL at 05:37

## 2019-04-01 RX ADMIN — SODIUM CHLORIDE 100 MILLILITER(S): 9 INJECTION INTRAMUSCULAR; INTRAVENOUS; SUBCUTANEOUS at 21:27

## 2019-04-01 RX ADMIN — MORPHINE SULFATE 2 MILLIGRAM(S): 50 CAPSULE, EXTENDED RELEASE ORAL at 21:29

## 2019-04-01 RX ADMIN — MORPHINE SULFATE 2 MILLIGRAM(S): 50 CAPSULE, EXTENDED RELEASE ORAL at 21:59

## 2019-04-01 RX ADMIN — LIDOCAINE 2 PATCH: 4 CREAM TOPICAL at 21:34

## 2019-04-01 RX ADMIN — LIDOCAINE 2 PATCH: 4 CREAM TOPICAL at 19:24

## 2019-04-01 RX ADMIN — TAMSULOSIN HYDROCHLORIDE 0.4 MILLIGRAM(S): 0.4 CAPSULE ORAL at 21:26

## 2019-04-01 RX ADMIN — CYCLOBENZAPRINE HYDROCHLORIDE 10 MILLIGRAM(S): 10 TABLET, FILM COATED ORAL at 21:26

## 2019-04-01 RX ADMIN — MORPHINE SULFATE 2 MILLIGRAM(S): 50 CAPSULE, EXTENDED RELEASE ORAL at 10:57

## 2019-04-01 RX ADMIN — MORPHINE SULFATE 2 MILLIGRAM(S): 50 CAPSULE, EXTENDED RELEASE ORAL at 06:10

## 2019-04-01 NOTE — PROGRESS NOTE ADULT - SUBJECTIVE AND OBJECTIVE BOX
SUBJECTIVE:    Today is hospital day 3d.   OVERNIGHT EVENTS: none  Today, patient is doing well.  No complaints.  Asking for pain medications as prescribed      MEDICATIONS:  STANDING MEDICATIONS  amLODIPine   Tablet 5 milliGRAM(s) Oral daily  aspirin  chewable 81 milliGRAM(s) Oral daily  atorvastatin 40 milliGRAM(s) Oral at bedtime  cyclobenzaprine 10 milliGRAM(s) Oral at bedtime  heparin  Injectable 5000 Unit(s) SubCutaneous every 12 hours  lidocaine   Patch 2 Patch Transdermal daily  metoprolol tartrate 12.5 milliGRAM(s) Oral two times a day  sodium chloride 0.9%. 1000 milliLiter(s) IV Continuous <Continuous>  tamsulosin 0.4 milliGRAM(s) Oral at bedtime    PRN MEDICATIONS  morphine  - Injectable 2 milliGRAM(s) IV Push every 4 hours PRN    VITALS:   T(F): 97.1  HR: 69  BP: 114/70  RR: 20  SpO2: 98%          LABS:                        12.6   5.52  )-----------( 234      ( 01 Apr 2019 06:30 )             37.8     04-01    136  |  104  |  16  ----------------------------<  114<H>  4.5   |  21  |  0.7    Ca    9.2      01 Apr 2019 06:30      PT/INR - ( 01 Apr 2019 06:30 )   PT: 12.30 sec;   INR: 1.07 ratio         PTT - ( 01 Apr 2019 06:30 )  PTT:32.4 sec              RADIOLOGY:    PHYSICAL EXAM:  GEN: awake, alert  HEAD:  Atraumatic, normocephaic  Eyes:  EOMI, Sclera white  LUNGS: ctab  HEART: RRR  ABD: soft non tender, non distended  EXT: no edema  NEURO: oriented x 3

## 2019-04-01 NOTE — CONSULT NOTE ADULT - ASSESSMENT
REVIEW OF SYSTEMS    General:	NAD   Skin/Breast:	Neg  Ophthalmologic:	Neg  ENMT: Neg	  Respiratory and Thorax: Neg	  Cardiovascular:	Neg  Gastrointestinal:	Neg  Genitourinary:	Neg  Musculoskeletal:	Neg  Neurological:	Neg  Psychiatric:	Neg  Hematology/Lymphatics:	Neg  Endocrine:	Neg        PHYSICAL EXAM:    GENERAL: NAD, well-groomed, well-developed  HEAD:  Atraumatic, Normocephalic  EYES: EOMI, PERRLA, conjunctiva and sclera clear  ENMT: No tonsillar erythema, exudates, or enlargement; Moist mucous membranes, Good dentition, No lesions  NECK: Supple, No JVD, Normal thyroid  NERVOUS SYSTEM:  Alert & Oriented X3, Good concentration; Motor Strength 5/5 B/L upper and lower extremities  CHEST/LUNG: Clear to percussion bilaterally; No rales, rhonchi, wheezing, or rubs  HEART: Regular rate  No murmurs, rubs, or gallops  ABDOMEN: Soft, Nontender, Nondistended; Bowel sounds present  EXTREMITIES: No clubbing, cyanosis, or edema  LYMPH: No lymphadenopathy noted  SKIN: No rashes or lesions      Patient lying on stretcher supine. Patient states pain is 9/10 before pain meds and  2/10 after pain meds. Pain is  6/10 now. Pain is described as sharp, constant, non-radiating. complains of pain to left side chest, left lower back and left hip. No respiratory distress noted. Neg N/V at this time. Patient with prostate cancer, but is not complaining of pelvic pain. back pain may be related to prostate as referred pain.     He states he has sharp L. sided chest pain that radiates to his left flank. The episodes last 10-20 minutes and are exacerbated by movement and relieved by rest. No radiation elsewhere. No nausea, vomiting, diaphoresis, or shortness of breath. He also complains of pain with urination and intermittent gross hematuria. He admits to not following up with his urologist after treatment of his prostate cancer (approximately 10 years ago). The last episode of hematuria was 2 days ago.     patient states morphine is giving him relief. patient for cardiac work up/angiogram.

## 2019-04-01 NOTE — PROGRESS NOTE ADULT - SUBJECTIVE AND OBJECTIVE BOX
PREOPERATIVE DAY OF PROCEDURE EVALUATION:  I have personally seen and examined the patient.  Clinically stable.  Proceed with cath per my consult.  (Signed electronically by Linden Cao)  04-01-19 @ 17:58

## 2019-04-01 NOTE — CONSULT NOTE ADULT - PROBLEM SELECTOR RECOMMENDATION 9
Cyclobenzaprine 10 milliGRAM(s) Oral at bedtime  Morphine  - Injectable 2 milliGRAM(s) IV Push every 4 hours PRN  Acetaminophen 650mg PO Q6hrs GERA  Warm compress to lower back and left hip Q1hrs [id number] PRN   Lidoderm Cream 4% to lower back and left hip 2x/day  Neurontin 100mg PO Q12hrs gera

## 2019-04-01 NOTE — PROGRESS NOTE ADULT - SUBJECTIVE AND OBJECTIVE BOX
PRE-OP DIAGNOSIS:  Chest OPain, Abnormal Stress Test    POST-OP DIAGNOSIS:  Same    PROCEDURE:  Coronary Angiogram    PHYSICIAN: Linden Cao MD  ASSISTANT: Fellow    ANESTHESIA TYPE:  [ X] Sedation  [ X] Local/Regional  [   ]General Anesthesia    ESTIMATED BLOOD LOSS: < 10 mL    IV CONTRAST: 70 mL    COMPLICATIONS: None    POST-OP CONDITION:  [X ] Good  [   ] Fair  [   ] Serious  [   ] Critical    ACCESS:  Left Radial --> D-Stat    FINDINGS:  Dominance: Right    Mild CAD  (see report for details)    INTERVENTION: None    IMPLANTS (if applicable): N/A    SPECIMENS REMOVED (if applicable): None    IMPRESSION:  Mild CAD    PLAN:  Medical therapy and risk factor modification.

## 2019-04-01 NOTE — PROGRESS NOTE ADULT - ASSESSMENT
64 y/o M with PMH of HTN, DM II, HLD prostate cancer s/p seeds, presented for acute on chronic left lower back pain and atypical chest pain.     # Atypical chest pain with positive NM stress test  - Cardiology is following: Plan for cardiac catheterization today   - c/w aspirin and Lipitor  - c/w metoprolol titrate 12.5mg q12  -    # Left lower back pain  - hx of left hip arthroplasty,  lumbar strain --patient is chr pain seeking  - c/w Lidocaine patch   - Flexeril at bedtime and PRN  -- CT Abdomen/Pelvis negative for acute etiology, doubt nephrolithiasis given clinical picture  -- no hematuria-- has resolved and has been walking around    # Hx of prostate cancer  - in remission per patient     # HTN / HLD  - c/w amlodipine and atorvastatin.    # DM II  - Monitor blood glucose  - start basal / bolus insulin if blood glucose becomes persistently greater than 180.    # DVT Ppx  - c/w Heparin     # DASH diet, carb consistent    # Disposition:   - as per cath report    # Full code status

## 2019-04-01 NOTE — CONSULT NOTE ADULT - SUBJECTIVE AND OBJECTIVE BOX
Chief Complaint:     64 y/o M with PMH of HTN, DM II, HLD prostate cancer s/p seeds, presented for acute on chronic L. chest pain, L. flank pain, and dysuria. The patient states his pain worse over the last few days,  which lead him to come to the ED for evaluation.     Allergies    No Known Allergies    Intolerances        PAST MEDICAL & SURGICAL HISTORY:  Pancreatitis: recurrent  Osteoarthritis  Hyperlipidemia  Prostate cancer: s/p history of seeding  Hypertension  Diabetes mellitus, type II  S/P hip replacement, left: 9/2015  History of appendectomy  Prostate cancer: s/p history of seeding        SOCIAL HISTORY:  Denies Smoking, Alcohol, or Drug Use    No Known Allergies      PAIN MEDICATIONS:  cyclobenzaprine 10 milliGRAM(s) Oral at bedtime  morphine  - Injectable 2 milliGRAM(s) IV Push every 4 hours PRN    Heme:  aspirin  chewable 81 milliGRAM(s) Oral daily  heparin  Injectable 5000 Unit(s) SubCutaneous every 12 hours    Antibiotics:    Cardiovascular:  amLODIPine   Tablet 5 milliGRAM(s) Oral daily  metoprolol tartrate 12.5 milliGRAM(s) Oral two times a day  tamsulosin 0.4 milliGRAM(s) Oral at bedtime    GI:    Endocrine:  atorvastatin 40 milliGRAM(s) Oral at bedtime    All Other Medications:  lidocaine   Patch 2 Patch Transdermal daily      Vital Signs Last 24 Hrs  T(C): 36.2 (01 Apr 2019 13:05), Max: 36.2 (31 Mar 2019 20:33)  T(F): 97.1 (01 Apr 2019 13:05), Max: 97.2 (31 Mar 2019 20:33)  HR: 69 (01 Apr 2019 13:05) (65 - 69)  BP: 114/70 (01 Apr 2019 13:05) (111/74 - 114/70)  BP(mean): --  RR: 20 (01 Apr 2019 13:05) (18 - 20)  SpO2: 98% (01 Apr 2019 08:03) (98% - 98%)      03-31-19 @ 07:01  -  04-01-19 @ 07:00  --------------------------------------------------------  IN: 540 mL / OUT: 900 mL / NET: -360 mL        LABS:                          12.6   5.52  )-----------( 234      ( 01 Apr 2019 06:30 )             37.8     04-01    136  |  104  |  16  ----------------------------<  114<H>  4.5   |  21  |  0.7    Ca    9.2      01 Apr 2019 06:30      PT/INR - ( 01 Apr 2019 06:30 )   PT: 12.30 sec;   INR: 1.07 ratio         PTT - ( 01 Apr 2019 06:30 )  PTT:32.4 sec      RADIOLOGY:    Drug Screen:        [ ]  [place]  Reviewed on

## 2019-04-01 NOTE — PROGRESS NOTE ADULT - ASSESSMENT
64 y/o M with PMH of HTN, DM II, HLD prostate cancer s/p seeds, presented for acute on chronic L. chest pain, L. flank pain, and dysuria.  Pt was admitted for chest pain and positive stress test.      # Non obstructive CAD  - s/p cath today - which showed non obstructive CAD  - c/w aspirin and Lipitor  - c/w metoprolol titrate 12.5mg q12    # Left pelvic and lower rib pain likely musculoskeletal in nature  - c/w Lidocaine patch   - Flexeril at bedtime and PRN  - PMR consult for steroid injection for possible sacroiliitis   - CT Abdomen/Pelvis negative for acute etiology    # Hx of prostate cancer  - outpt follow up     # HTN / HLD  - c/w amlodipine and atorvastatin.    # DM II  - Monitor blood glucose  - start basal / bolus insulin if blood glucose becomes persistently greater than 180.    # DVT Ppx  - c/w Heparin     # DASH diet, carb consistent    # Full code status

## 2019-04-01 NOTE — PROGRESS NOTE ADULT - SUBJECTIVE AND OBJECTIVE BOX
SUBJECTIVE:    Patient is a 63y old Male who presents with a chief complaint of Chest Pain, L. Flank Pain, Dysuria (01 Apr 2019 13:37)    Currently admitted to medicine with the primary diagnosis of Chest pain     Today is hospital day 3d.     PAST MEDICAL & SURGICAL HISTORY  Pancreatitis: recurrent  Osteoarthritis  Hyperlipidemia  Prostate cancer: s/p history of seeding  Hypertension  Diabetes mellitus, type II  S/P hip replacement, left: 9/2015  History of appendectomy  Prostate cancer: s/p history of seeding    ALLERGIES:  No Known Allergies    MEDICATIONS:  STANDING MEDICATIONS  amLODIPine   Tablet 5 milliGRAM(s) Oral daily  aspirin  chewable 81 milliGRAM(s) Oral daily  atorvastatin 40 milliGRAM(s) Oral at bedtime  cyclobenzaprine 10 milliGRAM(s) Oral at bedtime  heparin  Injectable 5000 Unit(s) SubCutaneous every 12 hours  lidocaine   Patch 2 Patch Transdermal daily  metoprolol tartrate 12.5 milliGRAM(s) Oral two times a day  tamsulosin 0.4 milliGRAM(s) Oral at bedtime    PRN MEDICATIONS  morphine  - Injectable 2 milliGRAM(s) IV Push every 4 hours PRN    VITALS:   T(F): 97.1  HR: 69  BP: 114/70  RR: 20  SpO2: 98%    LABS:                        12.6   5.52  )-----------( 234      ( 01 Apr 2019 06:30 )             37.8     04-01    136  |  104  |  16  ----------------------------<  114<H>  4.5   |  21  |  0.7    Ca    9.2      01 Apr 2019 06:30      PT/INR - ( 01 Apr 2019 06:30 )   PT: 12.30 sec;   INR: 1.07 ratio         PTT - ( 01 Apr 2019 06:30 )  PTT:32.4 sec          CARDIAC MARKERS ( 30 Mar 2019 16:44 )  x     / <0.01 ng/mL / x     / x     / x          RADIOLOGY:    PHYSICAL EXAM:  GEN: No acute distress  LUNGS: Clear to auscultation bilaterally   HEART: S1/S2 present. RRR.   ABD/ GI: Soft, non-tender, non-distended. Bowel sounds present  EXT: NC/NC/NE/2+PP/LORENZANA  NEURO: AAOX3

## 2019-04-02 ENCOUNTER — TRANSCRIPTION ENCOUNTER (OUTPATIENT)
Age: 64
End: 2019-04-02

## 2019-04-02 VITALS
HEART RATE: 78 BPM | SYSTOLIC BLOOD PRESSURE: 136 MMHG | TEMPERATURE: 98 F | DIASTOLIC BLOOD PRESSURE: 80 MMHG | RESPIRATION RATE: 20 BRPM

## 2019-04-02 LAB
GLUCOSE BLDC GLUCOMTR-MCNC: 106 MG/DL — HIGH (ref 70–99)
GLUCOSE BLDC GLUCOMTR-MCNC: 110 MG/DL — HIGH (ref 70–99)

## 2019-04-02 RX ORDER — METFORMIN HYDROCHLORIDE 850 MG/1
1 TABLET ORAL
Qty: 0 | Refills: 0 | COMMUNITY

## 2019-04-02 RX ADMIN — MORPHINE SULFATE 2 MILLIGRAM(S): 50 CAPSULE, EXTENDED RELEASE ORAL at 02:30

## 2019-04-02 RX ADMIN — HEPARIN SODIUM 5000 UNIT(S): 5000 INJECTION INTRAVENOUS; SUBCUTANEOUS at 05:28

## 2019-04-02 RX ADMIN — AMLODIPINE BESYLATE 5 MILLIGRAM(S): 2.5 TABLET ORAL at 05:28

## 2019-04-02 RX ADMIN — Medication 81 MILLIGRAM(S): at 11:34

## 2019-04-02 RX ADMIN — LIDOCAINE 2 PATCH: 4 CREAM TOPICAL at 11:35

## 2019-04-02 RX ADMIN — MORPHINE SULFATE 2 MILLIGRAM(S): 50 CAPSULE, EXTENDED RELEASE ORAL at 03:00

## 2019-04-02 RX ADMIN — Medication 12.5 MILLIGRAM(S): at 05:28

## 2019-04-02 NOTE — PROGRESS NOTE ADULT - SUBJECTIVE AND OBJECTIVE BOX
SUBJECTIVE:    Patient is a 63y old Male who presents with a chief complaint of Chest Pain, L. Flank Pain, Dysuria (01 Apr 2019 19:30)    Currently admitted to medicine with the primary diagnosis of Chest pain     Today is hospital day 4d.     PAST MEDICAL & SURGICAL HISTORY  Pancreatitis: recurrent  Osteoarthritis  Hyperlipidemia  Prostate cancer: s/p history of seeding  Hypertension  Diabetes mellitus, type II  S/P hip replacement, left: 9/2015  History of appendectomy  Prostate cancer: s/p history of seeding    ALLERGIES:  No Known Allergies    MEDICATIONS:  STANDING MEDICATIONS  amLODIPine   Tablet 5 milliGRAM(s) Oral daily  aspirin  chewable 81 milliGRAM(s) Oral daily  atorvastatin 40 milliGRAM(s) Oral at bedtime  cyclobenzaprine 10 milliGRAM(s) Oral at bedtime  heparin  Injectable 5000 Unit(s) SubCutaneous every 12 hours  lidocaine   Patch 2 Patch Transdermal daily  metoprolol tartrate 12.5 milliGRAM(s) Oral two times a day  sodium chloride 0.9%. 1000 milliLiter(s) IV Continuous <Continuous>  tamsulosin 0.4 milliGRAM(s) Oral at bedtime    PRN MEDICATIONS    VITALS:   T(F): 96.8  HR: 70  BP: 114/78  RR: 20  SpO2: 97%    LABS:                        12.6   5.52  )-----------( 234      ( 01 Apr 2019 06:30 )             37.8     04-01    136  |  104  |  16  ----------------------------<  114<H>  4.5   |  21  |  0.7    Ca    9.2      01 Apr 2019 06:30      PT/INR - ( 01 Apr 2019 06:30 )   PT: 12.30 sec;   INR: 1.07 ratio         PTT - ( 01 Apr 2019 06:30 )  PTT:32.4 sec              RADIOLOGY:    PHYSICAL EXAM:  GEN: No acute distress  LUNGS: Clear to auscultation bilaterally   HEART: S1/S2 present. RRR.   ABD/ GI: Soft, non-tender, non-distended. Bowel sounds present  EXT: NC/NC/NE/2+PP/LORENZANA  NEURO: AAOX3

## 2019-04-02 NOTE — PROGRESS NOTE ADULT - ASSESSMENT
· Assessment		  62 y/o M with PMH of HTN, DM II, HLD prostate cancer s/p seeds, presented for acute on chronic left lower back pain and atypical chest pain.     # Atypical chest pain with positive NM stress test  - Cardiology is following: cardiac catheterization revealed mild CAD  -medical management advised  - c/w aspirin and Lipitor  - c/w metoprolol titrate 12.5mg q12  -    # Left lower back pain  - hx of left hip arthroplasty,  lumbar strain --patient is chr pain seeking  - c/w Lidocaine patch   - Flexeril at bedtime and PRN  -- CT Abdomen/Pelvis negative for acute etiology, doubt nephrolithiasis given clinical picture  -- no hematuria-- has resolved and has been walking around    # Hx of prostate cancer  - in remission per patient     # HTN / HLD  - c/w amlodipine and atorvastatin.    # DM II  - metformin to be started after 2 days    DC home today--spent more than 30mins

## 2019-04-02 NOTE — DISCHARGE NOTE NURSING/CASE MANAGEMENT/SOCIAL WORK - NSDCDPATPORTLINK_GEN_ALL_CORE
You can access the [email or web address] Patient Portal, offered by [facility], by registering with the following website: [email or web address]

## 2019-04-02 NOTE — DISCHARGE NOTE PROVIDER - NSDCCPCAREPLAN_GEN_ALL_CORE_FT
PRINCIPAL DISCHARGE DIAGNOSIS  Diagnosis: Chest pain  Assessment and Plan of Treatment: Resolution.  Please follow up with your doctors.  Your chest pain was not due to your heart vessels.  However, you do have coronary artery disease and you should take the medications and follow up with your doctors.      SECONDARY DISCHARGE DIAGNOSES  Diagnosis: Abnormal stress test  Assessment and Plan of Treatment: You had a catheterization and it was negative for obstruction in your coronary vessels.  You do have mild coronary artery disease but we did not find any obstruction.

## 2019-04-02 NOTE — DISCHARGE NOTE PROVIDER - HOSPITAL COURSE
62 y/o M with PMH of HTN, DM II, HLD prostate cancer s/p seeds, presented for acute on chronic L. chest pain, L. flank pain, and dysuria. Patient was admitted for atypical chest pain.  He had a stress test that was positive with small reversible defect in the inferolateral wall of the LV consistent with ischemia.  Pt's flank pain resolved.  UA positive for epithelial cells and urine gonorrhea, chlamydia negative. Pt was taken to the cath lab and was found to have non-obstructive CAD (no stents were placed). Pt will be discharged on aspirin, metoprolol and high dose statin.

## 2019-04-02 NOTE — PROGRESS NOTE ADULT - REASON FOR ADMISSION
Chest Pain, L. Flank Pain, Dysuria
Chest Pain
Chest Pain, L. Flank Pain, Dysuria

## 2019-04-02 NOTE — DISCHARGE NOTE PROVIDER - CARE PROVIDER_API CALL
Linden Cao (MD)  Cardiovascular Disease; Internal Medicine  [address] [place] [address]  Phone: [phone]  Fax: [phone]  Follow Up Time:     Andry Bustillos (DO)  Medicine  Physicians  [address] [place] [address]  Phone: [phone]  Fax: [phone]  Follow Up Time:

## 2019-04-10 ENCOUNTER — LABORATORY RESULT (OUTPATIENT)
Age: 64
End: 2019-04-10

## 2019-04-11 LAB
25(OH)D3 SERPL-MCNC: 9 NG/ML
ALBUMIN SERPL ELPH-MCNC: 4.5 G/DL
ALP BLD-CCNC: 86 U/L
ALT SERPL-CCNC: 13 U/L
ANION GAP SERPL CALC-SCNC: 15 MMOL/L
APPEARANCE: CLEAR
AST SERPL-CCNC: 12 U/L
BASOPHILS # BLD AUTO: 0.04 K/UL
BASOPHILS NFR BLD AUTO: 0.8 %
BILIRUB SERPL-MCNC: 0.6 MG/DL
BILIRUBIN URINE: NEGATIVE
BLOOD URINE: NEGATIVE
BUN SERPL-MCNC: 10 MG/DL
C TRACH RRNA SPEC QL NAA+PROBE: NOT DETECTED
CALCIUM SERPL-MCNC: 9 MG/DL
CHLORIDE SERPL-SCNC: 102 MMOL/L
CHOLEST SERPL-MCNC: 136 MG/DL
CHOLEST/HDLC SERPL: 2.6 RATIO
CO2 SERPL-SCNC: 23 MMOL/L
COLOR: YELLOW
CREAT SERPL-MCNC: 0.6 MG/DL
CREAT SPEC-SCNC: 131 MG/DL
EOSINOPHIL # BLD AUTO: 0.18 K/UL
EOSINOPHIL NFR BLD AUTO: 3.7 %
GLUCOSE QUALITATIVE U: NEGATIVE MG/DL
GLUCOSE SERPL-MCNC: 93 MG/DL
HBV CORE IGG+IGM SER QL: REACTIVE
HBV SURFACE AG SER QL: NONREACTIVE
HCT VFR BLD CALC: 36.4 %
HCV AB SER QL: NONREACTIVE
HCV S/CO RATIO: 0.05 S/CO
HDLC SERPL-MCNC: 52 MG/DL
HGB BLD-MCNC: 11.7 G/DL
HIV1+2 AB SPEC QL IA.RAPID: NONREACTIVE
IMM GRANULOCYTES NFR BLD AUTO: 0.2 %
KETONES URINE: NEGATIVE
LDLC SERPL CALC-MCNC: 80 MG/DL
LEUKOCYTE ESTERASE URINE: NEGATIVE
LYMPHOCYTES # BLD AUTO: 1.2 K/UL
LYMPHOCYTES NFR BLD AUTO: 24.3 %
MAN DIFF?: NORMAL
MCHC RBC-ENTMCNC: 28.5 PG
MCHC RBC-ENTMCNC: 32.1 G/DL
MCV RBC AUTO: 88.8 FL
MICROALBUMIN 24H UR DL<=1MG/L-MCNC: <1.2 MG/DL
MICROALBUMIN/CREAT 24H UR-RTO: NORMAL MG/G
MONOCYTES # BLD AUTO: 0.45 K/UL
MONOCYTES NFR BLD AUTO: 9.1 %
N GONORRHOEA RRNA SPEC QL NAA+PROBE: NOT DETECTED
NEUTROPHILS # BLD AUTO: 3.05 K/UL
NEUTROPHILS NFR BLD AUTO: 61.9 %
NITRITE URINE: NEGATIVE
PH URINE: 6
PLATELET # BLD AUTO: 233 K/UL
POTASSIUM SERPL-SCNC: 4.4 MMOL/L
PROT SERPL-MCNC: 7 G/DL
PROTEIN URINE: NEGATIVE MG/DL
PSA SERPL-MCNC: 0.71 NG/ML
RBC # BLD: 4.1 M/UL
RBC # FLD: 13.1 %
SODIUM SERPL-SCNC: 140 MMOL/L
SOURCE AMPLIFICATION: NORMAL
SPECIFIC GRAVITY URINE: 1.02
TRIGL SERPL-MCNC: 82 MG/DL
TSH SERPL-ACNC: 1.17 UIU/ML
UROBILINOGEN URINE: 0.2 MG/DL (ref 0.2–?)
WBC # FLD AUTO: 4.93 K/UL

## 2019-04-12 LAB — T PALLIDUM AB SER QL IA: NEGATIVE

## 2019-04-16 ENCOUNTER — APPOINTMENT (OUTPATIENT)
Dept: PODIATRY | Facility: CLINIC | Age: 64
End: 2019-04-16

## 2019-04-19 ENCOUNTER — APPOINTMENT (OUTPATIENT)
Dept: GASTROENTEROLOGY | Facility: CLINIC | Age: 64
End: 2019-04-19

## 2019-05-23 ENCOUNTER — RX CHANGE (OUTPATIENT)
Age: 64
End: 2019-05-23

## 2019-05-23 DIAGNOSIS — Z72.0 TOBACCO USE: ICD-10-CM

## 2019-05-24 ENCOUNTER — APPOINTMENT (OUTPATIENT)
Dept: GASTROENTEROLOGY | Facility: CLINIC | Age: 64
End: 2019-05-24

## 2019-06-29 ENCOUNTER — EMERGENCY (EMERGENCY)
Facility: HOSPITAL | Age: 64
LOS: 0 days | Discharge: HOME | End: 2019-06-29
Attending: EMERGENCY MEDICINE | Admitting: EMERGENCY MEDICINE
Payer: MEDICAID

## 2019-06-29 VITALS
RESPIRATION RATE: 22 BRPM | TEMPERATURE: 98 F | SYSTOLIC BLOOD PRESSURE: 111 MMHG | OXYGEN SATURATION: 97 % | HEART RATE: 79 BPM | DIASTOLIC BLOOD PRESSURE: 67 MMHG

## 2019-06-29 VITALS
SYSTOLIC BLOOD PRESSURE: 118 MMHG | OXYGEN SATURATION: 96 % | RESPIRATION RATE: 20 BRPM | DIASTOLIC BLOOD PRESSURE: 73 MMHG | TEMPERATURE: 97 F

## 2019-06-29 DIAGNOSIS — Z79.82 LONG TERM (CURRENT) USE OF ASPIRIN: ICD-10-CM

## 2019-06-29 DIAGNOSIS — C61 MALIGNANT NEOPLASM OF PROSTATE: Chronic | ICD-10-CM

## 2019-06-29 DIAGNOSIS — I10 ESSENTIAL (PRIMARY) HYPERTENSION: ICD-10-CM

## 2019-06-29 DIAGNOSIS — Z96.642 PRESENCE OF LEFT ARTIFICIAL HIP JOINT: Chronic | ICD-10-CM

## 2019-06-29 DIAGNOSIS — Z79.84 LONG TERM (CURRENT) USE OF ORAL HYPOGLYCEMIC DRUGS: ICD-10-CM

## 2019-06-29 DIAGNOSIS — M25.552 PAIN IN LEFT HIP: ICD-10-CM

## 2019-06-29 DIAGNOSIS — Z98.890 OTHER SPECIFIED POSTPROCEDURAL STATES: Chronic | ICD-10-CM

## 2019-06-29 DIAGNOSIS — Z79.899 OTHER LONG TERM (CURRENT) DRUG THERAPY: ICD-10-CM

## 2019-06-29 DIAGNOSIS — E11.9 TYPE 2 DIABETES MELLITUS WITHOUT COMPLICATIONS: ICD-10-CM

## 2019-06-29 DIAGNOSIS — E78.5 HYPERLIPIDEMIA, UNSPECIFIED: ICD-10-CM

## 2019-06-29 DIAGNOSIS — M25.559 PAIN IN UNSPECIFIED HIP: ICD-10-CM

## 2019-06-29 LAB
ALBUMIN SERPL ELPH-MCNC: 4.3 G/DL — SIGNIFICANT CHANGE UP (ref 3.5–5.2)
ALP SERPL-CCNC: 99 U/L — SIGNIFICANT CHANGE UP (ref 30–115)
ALT FLD-CCNC: 10 U/L — SIGNIFICANT CHANGE UP (ref 0–41)
ANION GAP SERPL CALC-SCNC: 12 MMOL/L — SIGNIFICANT CHANGE UP (ref 7–14)
APPEARANCE UR: CLEAR — SIGNIFICANT CHANGE UP
AST SERPL-CCNC: 11 U/L — SIGNIFICANT CHANGE UP (ref 0–41)
BASOPHILS # BLD AUTO: 0.03 K/UL — SIGNIFICANT CHANGE UP (ref 0–0.2)
BASOPHILS NFR BLD AUTO: 0.5 % — SIGNIFICANT CHANGE UP (ref 0–1)
BILIRUB SERPL-MCNC: 0.7 MG/DL — SIGNIFICANT CHANGE UP (ref 0.2–1.2)
BILIRUB UR-MCNC: NEGATIVE — SIGNIFICANT CHANGE UP
BUN SERPL-MCNC: 11 MG/DL — SIGNIFICANT CHANGE UP (ref 10–20)
CALCIUM SERPL-MCNC: 9.1 MG/DL — SIGNIFICANT CHANGE UP (ref 8.5–10.1)
CHLORIDE SERPL-SCNC: 102 MMOL/L — SIGNIFICANT CHANGE UP (ref 98–110)
CO2 SERPL-SCNC: 24 MMOL/L — SIGNIFICANT CHANGE UP (ref 17–32)
COLOR SPEC: YELLOW — SIGNIFICANT CHANGE UP
CREAT SERPL-MCNC: 0.6 MG/DL — LOW (ref 0.7–1.5)
DIFF PNL FLD: NEGATIVE — SIGNIFICANT CHANGE UP
EOSINOPHIL # BLD AUTO: 0.23 K/UL — SIGNIFICANT CHANGE UP (ref 0–0.7)
EOSINOPHIL NFR BLD AUTO: 3.7 % — SIGNIFICANT CHANGE UP (ref 0–8)
GLUCOSE SERPL-MCNC: 105 MG/DL — HIGH (ref 70–99)
GLUCOSE UR QL: NEGATIVE MG/DL — SIGNIFICANT CHANGE UP
HCT VFR BLD CALC: 36.3 % — LOW (ref 42–52)
HGB BLD-MCNC: 12.2 G/DL — LOW (ref 14–18)
IMM GRANULOCYTES NFR BLD AUTO: 0.3 % — SIGNIFICANT CHANGE UP (ref 0.1–0.3)
KETONES UR-MCNC: NEGATIVE — SIGNIFICANT CHANGE UP
LEUKOCYTE ESTERASE UR-ACNC: NEGATIVE — SIGNIFICANT CHANGE UP
LYMPHOCYTES # BLD AUTO: 1.46 K/UL — SIGNIFICANT CHANGE UP (ref 1.2–3.4)
LYMPHOCYTES # BLD AUTO: 23.3 % — SIGNIFICANT CHANGE UP (ref 20.5–51.1)
MCHC RBC-ENTMCNC: 29.7 PG — SIGNIFICANT CHANGE UP (ref 27–31)
MCHC RBC-ENTMCNC: 33.6 G/DL — SIGNIFICANT CHANGE UP (ref 32–37)
MCV RBC AUTO: 88.3 FL — SIGNIFICANT CHANGE UP (ref 80–94)
MONOCYTES # BLD AUTO: 0.68 K/UL — HIGH (ref 0.1–0.6)
MONOCYTES NFR BLD AUTO: 10.9 % — HIGH (ref 1.7–9.3)
NEUTROPHILS # BLD AUTO: 3.84 K/UL — SIGNIFICANT CHANGE UP (ref 1.4–6.5)
NEUTROPHILS NFR BLD AUTO: 61.3 % — SIGNIFICANT CHANGE UP (ref 42.2–75.2)
NITRITE UR-MCNC: NEGATIVE — SIGNIFICANT CHANGE UP
NRBC # BLD: 0 /100 WBCS — SIGNIFICANT CHANGE UP (ref 0–0)
PH UR: 6.5 — SIGNIFICANT CHANGE UP (ref 5–8)
PLATELET # BLD AUTO: 211 K/UL — SIGNIFICANT CHANGE UP (ref 130–400)
POTASSIUM SERPL-MCNC: 4 MMOL/L — SIGNIFICANT CHANGE UP (ref 3.5–5)
POTASSIUM SERPL-SCNC: 4 MMOL/L — SIGNIFICANT CHANGE UP (ref 3.5–5)
PROT SERPL-MCNC: 6.9 G/DL — SIGNIFICANT CHANGE UP (ref 6–8)
PROT UR-MCNC: NEGATIVE MG/DL — SIGNIFICANT CHANGE UP
RBC # BLD: 4.11 M/UL — LOW (ref 4.7–6.1)
RBC # FLD: 13.2 % — SIGNIFICANT CHANGE UP (ref 11.5–14.5)
SODIUM SERPL-SCNC: 138 MMOL/L — SIGNIFICANT CHANGE UP (ref 135–146)
SP GR SPEC: 1.01 — SIGNIFICANT CHANGE UP (ref 1.01–1.03)
UROBILINOGEN FLD QL: 0.2 MG/DL — SIGNIFICANT CHANGE UP (ref 0.2–0.2)
WBC # BLD: 6.26 K/UL — SIGNIFICANT CHANGE UP (ref 4.8–10.8)
WBC # FLD AUTO: 6.26 K/UL — SIGNIFICANT CHANGE UP (ref 4.8–10.8)

## 2019-06-29 PROCEDURE — 73701 CT LOWER EXTREMITY W/DYE: CPT | Mod: 26,LT

## 2019-06-29 PROCEDURE — 74177 CT ABD & PELVIS W/CONTRAST: CPT | Mod: 26

## 2019-06-29 PROCEDURE — 99284 EMERGENCY DEPT VISIT MOD MDM: CPT

## 2019-06-29 RX ORDER — SODIUM CHLORIDE 9 MG/ML
1000 INJECTION INTRAMUSCULAR; INTRAVENOUS; SUBCUTANEOUS ONCE
Refills: 0 | Status: COMPLETED | OUTPATIENT
Start: 2019-06-29 | End: 2019-06-29

## 2019-06-29 RX ORDER — IBUPROFEN 200 MG
4 TABLET ORAL
Qty: 84 | Refills: 0
Start: 2019-06-29 | End: 2019-07-05

## 2019-06-29 RX ORDER — KETOROLAC TROMETHAMINE 30 MG/ML
30 SYRINGE (ML) INJECTION ONCE
Refills: 0 | Status: DISCONTINUED | OUTPATIENT
Start: 2019-06-29 | End: 2019-06-29

## 2019-06-29 RX ADMIN — Medication 30 MILLIGRAM(S): at 04:10

## 2019-06-29 RX ADMIN — SODIUM CHLORIDE 2000 MILLILITER(S): 9 INJECTION INTRAMUSCULAR; INTRAVENOUS; SUBCUTANEOUS at 04:51

## 2019-06-29 NOTE — ED PROVIDER NOTE - PHYSICAL EXAMINATION
CONSTITUTIONAL: well-appearing, in NAD  SKIN: Warm dry, normal skin turgor, well healed scar over L hip.  HEAD: NCAT  NECK: Supple; non tender. Full ROM.  CARD: RRR, no murmurs.  RESP: clear to ausculation b/l. No crackles or wheezing.  ABD: soft, non-tender, non-distended, no rebound or guarding.  EXT: Full ROM, no bony tenderness, no pedal edema, no calf tenderness, tenderness over L lateral gluteal region.  : chaperoned by nurse Shan: normal  anatomy, no swelling, + cremaster b/l, no inguinal wall laxity.  NEURO: normal motor. normal sensory.   PSYCH: Cooperative, appropriate.

## 2019-06-29 NOTE — ED ADULT NURSE NOTE - OBJECTIVE STATEMENT
Patient is a 64 yo male with history of left hip replacement c/o constant, aching left hip pain radiates to left shoulder, head, & foot x few days. Denies fall and injury.

## 2019-06-29 NOTE — ED PROVIDER NOTE - PROGRESS NOTE DETAILS
Endorsed to Dr. Torres Hari: patient signed out to me, re-evaluated; ambulated well in ER; left hip no overlying swelling, erythema, ecchymosis; ROM left hip intact; patient aware of results, advised to follow up with ortho. Patient understands and agrees.

## 2019-06-29 NOTE — ED PROVIDER NOTE - CLINICAL SUMMARY MEDICAL DECISION MAKING FREE TEXT BOX
63 male here for left hip pain after replacement. Has occupation with high amount of exercise.  Workup in ED included labs and imaging which revealed no acute findings. Will discharge with outpatient management.

## 2019-06-29 NOTE — ED PROVIDER NOTE - CARE PROVIDER_API CALL
Seamus Leung)  Orthopaedic Surgery  [address] [place] [id number]  Phone: [phone]  Fax: [phone]  Follow Up Time: 1-3 Days

## 2019-06-29 NOTE — ED PROVIDER NOTE - NS ED ROS FT
Constitutional:  (-) fever, (-) chills, (-) lethargy  ENMT: (-) nasal discharge, (-) sore throat. (-) neck pain or stiffness  Cardiac: (-) chest pain (-) palpitations  Respiratory:  (-) cough (-) respiratory distress.   GI:  (-) nausea (-) vomiting (-) diarrhea (-) abdominal pain.  :  (-) dysuria (-) frequency (-) burning.  MS:  (-) back pain (+) L hip pain.  Neuro:  (-) headache (-) numbness (-) tingling (-) focal weakness  Skin:  (-) rash  Except as documented in the HPI,  all other systems are negative

## 2019-06-29 NOTE — ED PROVIDER NOTE - ATTENDING CONTRIBUTION TO CARE
I personally evaluated the patient. I reviewed the Resident’s or Physician Assistant’s note (as assigned above), and agree with the findings and plan except as documented in my note.  63 yr M with hx of HTN, DM, HLD, and left hip replacement 14 years ago with complaints of left hip pain after hitting his hip on the corner of a counter. Reports associated left flank pain and difficulty walking. No fever, no bruising, no urinary complaints. VS reviewed, pt well appearing, NAD. Head ncat, normal s1s2 without any murmurs, Lungs CTAB with normal work of breathing. abd +BS, s/nd/nt, + ttp of the left hip with painful ROM,  exam as per MLP note, extremities wnl, neuro exam grossly normal. No acute skin rashes. Plan is labs, UA, imaging and reassess.

## 2019-06-29 NOTE — ED ADULT TRIAGE NOTE - CHIEF COMPLAINT QUOTE
pt complaining of left hip pain that it travels down to his feet. pt denies any trauma to the area pt complaining of left hip pain that it travels up to his shoulder and down to his feet. pt denies any trauma to the area pt complaining of left hip pain that it travels up to his shoulder and head and down to his feet. pt denies any trauma to the area

## 2019-06-29 NOTE — ED ADULT NURSE NOTE - NSIMPLEMENTINTERV_GEN_ALL_ED
Implemented All Fall with Harm Risk Interventions:  [place] to call system. Call bell, personal items and telephone within reach. Instruct patient to call for assistance. Room bathroom lighting operational. Non-slip footwear when patient is off stretcher. Physically safe environment: no spills, clutter or unnecessary equipment. Stretcher in lowest position, wheels locked, appropriate side rails in place. Provide visual cue, wrist band, yellow gown, etc. Monitor gait and stability. Monitor for mental status changes and reorient to person, place, and time. Review medications for side effects contributing to fall risk. Reinforce activity limits and safety measures with patient and family. Provide visual clues: red socks.

## 2019-06-29 NOTE — ED ADULT NURSE NOTE - CHIEF COMPLAINT QUOTE
pt complaining of left hip pain that it travels up to his shoulder and head and down to his feet. pt denies any trauma to the area

## 2019-06-29 NOTE — ED PROVIDER NOTE - OBJECTIVE STATEMENT
63 y.o M w/ PMHx L hip replacement, HTN, DM, prostate CA, HLD p/w L hip pain x1 week. Pain has been getting worse until tonight where he could not sleep from the pain. Pt states pain is in the area where he had his hip replaced about 14 years ago. No back pain, no dysuria, no diarrhea, no constipation, no saddle anesthesia, no IVDA, no fever, no swelling, no rash.

## 2019-06-29 NOTE — ED ADULT NURSE NOTE - CHPI ED NUR SYMPTOMS NEG
no decreased eating/drinking/no chills/no weakness/no nausea/no fever/no vomiting/no tingling/no dizziness

## 2019-09-03 ENCOUNTER — OUTPATIENT (OUTPATIENT)
Dept: OUTPATIENT SERVICES | Facility: HOSPITAL | Age: 64
LOS: 1 days | Discharge: HOME | End: 2019-09-03

## 2019-09-03 ENCOUNTER — APPOINTMENT (OUTPATIENT)
Dept: INTERNAL MEDICINE | Facility: CLINIC | Age: 64
End: 2019-09-03

## 2019-09-03 VITALS
DIASTOLIC BLOOD PRESSURE: 91 MMHG | WEIGHT: 190 LBS | HEIGHT: 67.5 IN | BODY MASS INDEX: 29.47 KG/M2 | HEART RATE: 80 BPM | TEMPERATURE: 97.7 F | SYSTOLIC BLOOD PRESSURE: 138 MMHG

## 2019-09-03 DIAGNOSIS — Z98.890 OTHER SPECIFIED POSTPROCEDURAL STATES: Chronic | ICD-10-CM

## 2019-09-03 DIAGNOSIS — C61 MALIGNANT NEOPLASM OF PROSTATE: Chronic | ICD-10-CM

## 2019-09-03 DIAGNOSIS — Z96.642 PRESENCE OF LEFT ARTIFICIAL HIP JOINT: Chronic | ICD-10-CM

## 2019-09-03 DIAGNOSIS — Z00.00 ENCOUNTER FOR GENERAL ADULT MEDICAL EXAMINATION W/OUT ABNORMAL FINDINGS: ICD-10-CM

## 2019-09-03 NOTE — PHYSICAL EXAM
[No Acute Distress] : no acute distress [Clear to Auscultation] : lungs were clear to auscultation bilaterally [Normal Rate] : normal rate  [Normal S1, S2] : normal S1 and S2 [Regular Rhythm] : with a regular rhythm [No Extremity Clubbing/Cyanosis] : no extremity clubbing/cyanosis [No Edema] : there was no peripheral edema [No Murmur] : no murmur heard [Soft] : abdomen soft [Non Tender] : non-tender [No HSM] : no HSM [No CVA Tenderness] : no CVA  tenderness [Normal Gait] : normal gait

## 2019-09-10 DIAGNOSIS — Z00.00 ENCOUNTER FOR GENERAL ADULT MEDICAL EXAMINATION WITHOUT ABNORMAL FINDINGS: ICD-10-CM

## 2019-09-23 ENCOUNTER — APPOINTMENT (OUTPATIENT)
Dept: PODIATRY | Facility: CLINIC | Age: 64
End: 2019-09-23

## 2019-09-30 NOTE — HISTORY OF PRESENT ILLNESS
[FreeTextEntry1] : Regular follow-up [de-identified] : 63 yo M w/ hx of HTN, DLD, DM, prostate CA s/p seeding on remission, OA, hx of pancreatitis (most recent episode in 12/2015) is here for follow up visit. ROS is negative. \par

## 2019-09-30 NOTE — REVIEW OF SYSTEMS
[Constipation] : constipation [Diarrhea] : diarrhea [Hesitancy] : hesitancy [Frequency] : frequency [Joint Pain] : joint pain [Negative] : Neurological [Fever] : no fever [Chest Pain] : no chest pain [Night Sweats] : no night sweats [Paroysmal Nocturnal Dyspnea] : no paroysmal nocturnal dyspnea [Shortness Of Breath] : no shortness of breath [Wheezing] : no wheezing [Abdominal Pain] : no abdominal pain [Cough] : no cough [Nausea] : no nausea [Vomiting] : no vomiting [Dysuria] : no dysuria [Insomnia] : no insomnia [Dizziness] : no dizziness [Easy Bruising] : no easy bruising

## 2019-09-30 NOTE — ASSESSMENT
[FreeTextEntry1] : 61 yo M w/ hx of HTN, DLD, DM, prostate CA s/p seeding on remission, OA, hx of pancreatitis (most recent episode in 12/2015) is here for follow up visit. ROS is negative. \par \par 1) Htn: stable \par - c/w amlodipine 5mg\par \par 2) DM\par - c/w metformin 500 mg bid\par -  a1c 4/2019 is 6.6  \par - appointment with podiatry. in 10 days-  up to date with ophthalmology \par \par 3) BPH/ prostate cancer: stable \par - c/w flomax\par \par 4) b/l Hip pain/ s/p right hip replacement\par - c/w ibuprofen prn\par - c/omeprazole as pt taking high dose of ibuprofen\par \par 5) dyslipidemia\par - c/w lipitor 40mg\par \par #  HCM\par - colonoscopy in 2011 with one tubular adenoma; GI referral for repeat colonoscopy\par - tdap- in 2012

## 2019-12-10 ENCOUNTER — APPOINTMENT (OUTPATIENT)
Dept: INTERNAL MEDICINE | Facility: CLINIC | Age: 64
End: 2019-12-10

## 2020-01-03 ENCOUNTER — OUTPATIENT (OUTPATIENT)
Dept: OUTPATIENT SERVICES | Facility: HOSPITAL | Age: 65
LOS: 1 days | Discharge: HOME | End: 2020-01-03

## 2020-01-03 ENCOUNTER — APPOINTMENT (OUTPATIENT)
Dept: GASTROENTEROLOGY | Facility: CLINIC | Age: 65
End: 2020-01-03
Payer: MEDICAID

## 2020-01-03 ENCOUNTER — LABORATORY RESULT (OUTPATIENT)
Age: 65
End: 2020-01-03

## 2020-01-03 VITALS
WEIGHT: 205 LBS | BODY MASS INDEX: 32.18 KG/M2 | HEIGHT: 67 IN | SYSTOLIC BLOOD PRESSURE: 120 MMHG | DIASTOLIC BLOOD PRESSURE: 84 MMHG

## 2020-01-03 DIAGNOSIS — Z98.890 OTHER SPECIFIED POSTPROCEDURAL STATES: Chronic | ICD-10-CM

## 2020-01-03 DIAGNOSIS — C61 MALIGNANT NEOPLASM OF PROSTATE: Chronic | ICD-10-CM

## 2020-01-03 DIAGNOSIS — Z08 ENCOUNTER FOR FOLLOW-UP EXAMINATION AFTER COMPLETED TREATMENT FOR MALIGNANT NEOPLASM: ICD-10-CM

## 2020-01-03 DIAGNOSIS — Z96.642 PRESENCE OF LEFT ARTIFICIAL HIP JOINT: Chronic | ICD-10-CM

## 2020-01-03 DIAGNOSIS — Z85.038 ENCOUNTER FOR FOLLOW-UP EXAMINATION AFTER COMPLETED TREATMENT FOR MALIGNANT NEOPLASM: ICD-10-CM

## 2020-01-03 PROCEDURE — 99204 OFFICE O/P NEW MOD 45 MIN: CPT

## 2020-01-03 NOTE — PHYSICAL EXAM
[General Appearance - In No Acute Distress] : in no acute distress [General Appearance - Alert] : alert [Heart Rate And Rhythm] : heart rate was normal and rhythm regular [Auscultation Breath Sounds / Voice Sounds] : lungs were clear to auscultation bilaterally [Heart Sounds] : normal S1 and S2 [Murmurs] : no murmurs [Heart Sounds Gallop] : no gallops [Bowel Sounds] : normal bowel sounds [Heart Sounds Pericardial Friction Rub] : no pericardial rub [Abdomen Tenderness] : non-tender [] : no hepato-splenomegaly [Abdomen Soft] : soft [Abdomen Mass (___ Cm)] : no abdominal mass palpated

## 2020-01-03 NOTE — ASSESSMENT
[FreeTextEntry1] : Patient is a 65 y/o M with PMH of DM, hypertension, DLD, prostate cancer in remission, history of pancreatitis (12/15) presented for routine screening colonoscopy. \par \par Plan:\par #Surveillance colonoscopy\par -Patient has constipation for last few months, on laxatives PRN\par -Started on Miralax once daily\par -Previous colonoscopy showed one tubular adenoma in 2011. no family history of colon cancer\par -Golytely and dulcolax bowel preparation ordered\par -Scheduled for colonoscopy. Hold aspirin 81mg 7 days before the procedure\par

## 2020-01-03 NOTE — HISTORY OF PRESENT ILLNESS
[de-identified] : Patient is a 65 y/o M with PMH of DM, hypertension, DLD, prostate cancer in remission, history of pancreatitis (12/15) presented for routine screening colonoscopy. Patient has been complaining of constipation for last few months for which he is using laxatives over the counter. No c/o abdominal pain, diarrhea, weight loss, nausea, vomiting. No family history of colon cancer or other GI malignancies. Not a smoker, used to smoke marijuana in the past.

## 2020-01-06 LAB
PSA FREE FLD-MCNC: 3 %
PSA FREE SERPL-MCNC: 0.03 NG/ML
PSA SERPL-MCNC: 1 NG/ML

## 2020-02-13 ENCOUNTER — APPOINTMENT (OUTPATIENT)
Dept: UROLOGY | Facility: CLINIC | Age: 65
End: 2020-02-13
Payer: MEDICAID

## 2020-02-13 ENCOUNTER — OUTPATIENT (OUTPATIENT)
Dept: OUTPATIENT SERVICES | Facility: HOSPITAL | Age: 65
LOS: 1 days | Discharge: HOME | End: 2020-02-13

## 2020-02-13 VITALS
SYSTOLIC BLOOD PRESSURE: 117 MMHG | DIASTOLIC BLOOD PRESSURE: 83 MMHG | HEART RATE: 8 BPM | WEIGHT: 215 LBS | BODY MASS INDEX: 33.74 KG/M2 | HEIGHT: 67 IN

## 2020-02-13 DIAGNOSIS — Z96.642 PRESENCE OF LEFT ARTIFICIAL HIP JOINT: Chronic | ICD-10-CM

## 2020-02-13 DIAGNOSIS — R39.12 POOR URINARY STREAM: ICD-10-CM

## 2020-02-13 DIAGNOSIS — Z98.890 OTHER SPECIFIED POSTPROCEDURAL STATES: Chronic | ICD-10-CM

## 2020-02-13 DIAGNOSIS — C61 MALIGNANT NEOPLASM OF PROSTATE: Chronic | ICD-10-CM

## 2020-02-13 PROCEDURE — 99203 OFFICE O/P NEW LOW 30 MIN: CPT

## 2020-02-21 NOTE — PHYSICAL EXAM
[General Appearance - Well Developed] : well developed [Normal Appearance] : normal appearance [General Appearance - Well Nourished] : well nourished [Well Groomed] : well groomed [Edema] : no peripheral edema [General Appearance - In No Acute Distress] : no acute distress [Exaggerated Use Of Accessory Muscles For Inspiration] : no accessory muscle use [Respiration, Rhythm And Depth] : normal respiratory rhythm and effort [Abdomen Tenderness] : non-tender [Abdomen Soft] : soft [Urethral Meatus] : meatus normal [Scrotum] : the scrotum was normal [Urinary Bladder Findings] : the bladder was normal on palpation [Normal Station and Gait] : the gait and station were normal for the patient's age [] : no rash [No Focal Deficits] : no focal deficits [Oriented To Time, Place, And Person] : oriented to person, place, and time [Mood] : the mood was normal [Affect] : the affect was normal [Not Anxious] : not anxious

## 2020-02-21 NOTE — HISTORY OF PRESENT ILLNESS
[FreeTextEntry1] : This is a 64 year male who history of prostate cancer -- treated with radiation about 5-10 years ago\par \par last PSA in Jan 2020-- 1.0\par April 2019 0.71\par \par 3 weeks ago had trouble urinating --- went to ER was given laxative which helped \par states that now very happy with his urination

## 2020-03-24 ENCOUNTER — OUTPATIENT (OUTPATIENT)
Dept: OUTPATIENT SERVICES | Facility: HOSPITAL | Age: 65
LOS: 1 days | Discharge: HOME | End: 2020-03-24

## 2020-03-24 ENCOUNTER — APPOINTMENT (OUTPATIENT)
Dept: INTERNAL MEDICINE | Facility: CLINIC | Age: 65
End: 2020-03-24
Payer: MEDICAID

## 2020-03-24 VITALS — BODY MASS INDEX: 34.37 KG/M2 | WEIGHT: 219 LBS | HEIGHT: 67 IN

## 2020-03-24 VITALS — HEART RATE: 85 BPM | SYSTOLIC BLOOD PRESSURE: 147 MMHG | DIASTOLIC BLOOD PRESSURE: 83 MMHG

## 2020-03-24 DIAGNOSIS — C61 MALIGNANT NEOPLASM OF PROSTATE: Chronic | ICD-10-CM

## 2020-03-24 DIAGNOSIS — Z98.890 OTHER SPECIFIED POSTPROCEDURAL STATES: Chronic | ICD-10-CM

## 2020-03-24 DIAGNOSIS — N40.0 BENIGN PROSTATIC HYPERPLASIA WITHOUT LOWER URINARY TRACT SYMPMS: ICD-10-CM

## 2020-03-24 DIAGNOSIS — Z96.642 PRESENCE OF LEFT ARTIFICIAL HIP JOINT: Chronic | ICD-10-CM

## 2020-03-24 DIAGNOSIS — Z87.891 PERSONAL HISTORY OF NICOTINE DEPENDENCE: ICD-10-CM

## 2020-03-24 DIAGNOSIS — N52.9 MALE ERECTILE DYSFUNCTION, UNSPECIFIED: ICD-10-CM

## 2020-03-24 PROCEDURE — 99213 OFFICE O/P EST LOW 20 MIN: CPT | Mod: GC

## 2020-03-30 DIAGNOSIS — E11.9 TYPE 2 DIABETES MELLITUS WITHOUT COMPLICATIONS: ICD-10-CM

## 2020-03-30 DIAGNOSIS — N40.0 BENIGN PROSTATIC HYPERPLASIA WITHOUT LOWER URINARY TRACT SYMPTOMS: ICD-10-CM

## 2020-03-30 DIAGNOSIS — E66.9 OBESITY, UNSPECIFIED: ICD-10-CM

## 2020-03-30 DIAGNOSIS — I10 ESSENTIAL (PRIMARY) HYPERTENSION: ICD-10-CM

## 2020-03-30 DIAGNOSIS — N52.9 MALE ERECTILE DYSFUNCTION, UNSPECIFIED: ICD-10-CM

## 2020-05-13 ENCOUNTER — APPOINTMENT (OUTPATIENT)
Dept: ENDOCRINOLOGY | Facility: CLINIC | Age: 65
End: 2020-05-13

## 2020-05-19 ENCOUNTER — APPOINTMENT (OUTPATIENT)
Dept: PODIATRY | Facility: CLINIC | Age: 65
End: 2020-05-19

## 2020-05-19 NOTE — REVIEW OF SYSTEMS
[Abdominal Pain] : abdominal pain [Constipation] : constipation [Fever] : no fever [Chills] : no chills [Fatigue] : no fatigue [Vision Problems] : no vision problems [Hearing Loss] : no hearing loss [Sore Throat] : no sore throat [Chest Pain] : no chest pain [Palpitations] : no palpitations [Orthopena] : no orthopnea [Shortness Of Breath] : no shortness of breath [Wheezing] : no wheezing [Cough] : no cough [Nausea] : no nausea [Diarrhea] : no diarrhea [Vomiting] : no vomiting [Dysuria] : no dysuria [Skin Rash] : no skin rash [Headache] : no headache [Dizziness] : no dizziness

## 2020-05-19 NOTE — PHYSICAL EXAM
[No Acute Distress] : no acute distress [Well Nourished] : well nourished [Well Developed] : well developed [Well-Appearing] : well-appearing [Normal Sclera/Conjunctiva] : normal sclera/conjunctiva [Normal Outer Ear/Nose] : the outer ears and nose were normal in appearance [No JVD] : no jugular venous distention [No Respiratory Distress] : no respiratory distress  [No Accessory Muscle Use] : no accessory muscle use [Clear to Auscultation] : lungs were clear to auscultation bilaterally [Normal Rate] : normal rate  [Regular Rhythm] : with a regular rhythm [Normal S1, S2] : normal S1 and S2 [No Murmur] : no murmur heard [No Edema] : there was no peripheral edema [Soft] : abdomen soft [Non-distended] : non-distended [No Masses] : no abdominal mass palpated [Normal Bowel Sounds] : normal bowel sounds [No Rash] : no rash [No Focal Deficits] : no focal deficits [Normal Affect] : the affect was normal [de-identified] : ttp LLQ

## 2020-05-19 NOTE — HISTORY OF PRESENT ILLNESS
[FreeTextEntry1] : LLQ abdominal pain [de-identified] : 65 yo M w/ hx of HTN, DLD, DM, prostate CA s/p seeding on remission, OA, hx of pancreatitis (most recent episode in 12/2015) presents here w/ c/o LLQ abdominal pain started about 1 month ago. Whenever pt gets up from lying position is when the pain presents and resolves after few minutes. Also complaints of constipation. Pt was supposed to get colonoscopy with GI but cancelled due to covid. \par No other complaints.

## 2020-05-19 NOTE — PLAN
[FreeTextEntry1] : 63 yo M w/ hx of HTN, DLD, DM, prostate CA s/p seeding on remission, OA, hx of pancreatitis (most recent episode in 12/2015) presents here w/ c/o LLQ abdominal pain started about 1 month ago.\par \par # Htn - elevated\par -  amlodipine 5 mg qd\par - pt non compliant w/ meds\par - strict compliance advised. if still elevated next visit then may titrate up\par \par # DM\par - c/w metformin 500 mg bid for now, pt refused to increase the dose\par - a1c 3/20 7.0\par - optho and podiatry referral \par \par # BPH/ prostate cancer: stable \par - c/w flomax\par - requesting PSA check\par \par # dyslipidemia\par - c/w lipitor 40mg\par \par # HCM\par - colonoscopy in 2011 with one tubular adenoma - next one as per GI (cancelled due to COVID)\par - tdap- in 2012. \par - flushot 1/[facility] at [facility]\par - f/u in 3 months

## 2020-05-29 ENCOUNTER — APPOINTMENT (OUTPATIENT)
Dept: GASTROENTEROLOGY | Facility: CLINIC | Age: 65
End: 2020-05-29

## 2020-06-01 ENCOUNTER — OUTPATIENT (OUTPATIENT)
Dept: OUTPATIENT SERVICES | Facility: HOSPITAL | Age: 65
LOS: 1 days | End: 2020-06-01
Payer: MEDICAID

## 2020-06-01 DIAGNOSIS — Z96.642 PRESENCE OF LEFT ARTIFICIAL HIP JOINT: Chronic | ICD-10-CM

## 2020-06-01 DIAGNOSIS — C61 MALIGNANT NEOPLASM OF PROSTATE: Chronic | ICD-10-CM

## 2020-06-01 DIAGNOSIS — Z98.890 OTHER SPECIFIED POSTPROCEDURAL STATES: Chronic | ICD-10-CM

## 2020-06-02 ENCOUNTER — APPOINTMENT (OUTPATIENT)
Dept: INTERNAL MEDICINE | Facility: CLINIC | Age: 65
End: 2020-06-02

## 2020-06-08 DIAGNOSIS — Z71.89 OTHER SPECIFIED COUNSELING: ICD-10-CM

## 2020-06-15 PROCEDURE — G9001: CPT

## 2020-08-18 ENCOUNTER — OUTPATIENT (OUTPATIENT)
Dept: OUTPATIENT SERVICES | Facility: HOSPITAL | Age: 65
LOS: 1 days | Discharge: HOME | End: 2020-08-18
Payer: MEDICAID

## 2020-08-18 ENCOUNTER — RESULT REVIEW (OUTPATIENT)
Age: 65
End: 2020-08-18

## 2020-08-18 DIAGNOSIS — R39.12 POOR URINARY STREAM: ICD-10-CM

## 2020-08-18 DIAGNOSIS — Z98.890 OTHER SPECIFIED POSTPROCEDURAL STATES: Chronic | ICD-10-CM

## 2020-08-18 DIAGNOSIS — Z96.642 PRESENCE OF LEFT ARTIFICIAL HIP JOINT: Chronic | ICD-10-CM

## 2020-08-18 DIAGNOSIS — C61 MALIGNANT NEOPLASM OF PROSTATE: Chronic | ICD-10-CM

## 2020-08-18 PROCEDURE — 76857 US EXAM PELVIC LIMITED: CPT | Mod: 26

## 2020-08-20 ENCOUNTER — APPOINTMENT (OUTPATIENT)
Dept: UROLOGY | Facility: CLINIC | Age: 65
End: 2020-08-20

## 2020-08-28 ENCOUNTER — OUTPATIENT (OUTPATIENT)
Dept: OUTPATIENT SERVICES | Facility: HOSPITAL | Age: 65
LOS: 1 days | Discharge: HOME | End: 2020-08-28

## 2020-08-28 ENCOUNTER — LABORATORY RESULT (OUTPATIENT)
Age: 65
End: 2020-08-28

## 2020-08-28 DIAGNOSIS — Z96.642 PRESENCE OF LEFT ARTIFICIAL HIP JOINT: Chronic | ICD-10-CM

## 2020-08-28 DIAGNOSIS — C61 MALIGNANT NEOPLASM OF PROSTATE: Chronic | ICD-10-CM

## 2020-08-28 DIAGNOSIS — Z98.890 OTHER SPECIFIED POSTPROCEDURAL STATES: Chronic | ICD-10-CM

## 2020-08-28 DIAGNOSIS — Z11.59 ENCOUNTER FOR SCREENING FOR OTHER VIRAL DISEASES: ICD-10-CM

## 2020-08-31 ENCOUNTER — RESULT REVIEW (OUTPATIENT)
Age: 65
End: 2020-08-31

## 2020-08-31 ENCOUNTER — TRANSCRIPTION ENCOUNTER (OUTPATIENT)
Age: 65
End: 2020-08-31

## 2020-08-31 ENCOUNTER — OUTPATIENT (OUTPATIENT)
Dept: OUTPATIENT SERVICES | Facility: HOSPITAL | Age: 65
LOS: 1 days | Discharge: HOME | End: 2020-08-31
Payer: MEDICAID

## 2020-08-31 VITALS
SYSTOLIC BLOOD PRESSURE: 122 MMHG | RESPIRATION RATE: 18 BRPM | HEART RATE: 62 BPM | DIASTOLIC BLOOD PRESSURE: 74 MMHG | OXYGEN SATURATION: 99 %

## 2020-08-31 VITALS
WEIGHT: 205.03 LBS | HEIGHT: 67 IN | TEMPERATURE: 98 F | HEART RATE: 70 BPM | SYSTOLIC BLOOD PRESSURE: 148 MMHG | DIASTOLIC BLOOD PRESSURE: 86 MMHG | RESPIRATION RATE: 16 BRPM

## 2020-08-31 DIAGNOSIS — C61 MALIGNANT NEOPLASM OF PROSTATE: Chronic | ICD-10-CM

## 2020-08-31 DIAGNOSIS — Z98.890 OTHER SPECIFIED POSTPROCEDURAL STATES: Chronic | ICD-10-CM

## 2020-08-31 DIAGNOSIS — Z96.642 PRESENCE OF LEFT ARTIFICIAL HIP JOINT: Chronic | ICD-10-CM

## 2020-08-31 PROCEDURE — 88305 TISSUE EXAM BY PATHOLOGIST: CPT | Mod: 26

## 2020-08-31 PROCEDURE — 45385 COLONOSCOPY W/LESION REMOVAL: CPT

## 2020-08-31 PROCEDURE — 45380 COLONOSCOPY AND BIOPSY: CPT | Mod: 59

## 2020-09-01 LAB — SURGICAL PATHOLOGY STUDY: SIGNIFICANT CHANGE UP

## 2020-09-03 ENCOUNTER — APPOINTMENT (OUTPATIENT)
Dept: INTERNAL MEDICINE | Facility: CLINIC | Age: 65
End: 2020-09-03

## 2020-09-03 DIAGNOSIS — Z79.84 LONG TERM (CURRENT) USE OF ORAL HYPOGLYCEMIC DRUGS: ICD-10-CM

## 2020-09-03 DIAGNOSIS — E11.9 TYPE 2 DIABETES MELLITUS WITHOUT COMPLICATIONS: ICD-10-CM

## 2020-09-03 DIAGNOSIS — D12.4 BENIGN NEOPLASM OF DESCENDING COLON: ICD-10-CM

## 2020-09-03 DIAGNOSIS — Z12.11 ENCOUNTER FOR SCREENING FOR MALIGNANT NEOPLASM OF COLON: ICD-10-CM

## 2020-09-03 DIAGNOSIS — Z79.82 LONG TERM (CURRENT) USE OF ASPIRIN: ICD-10-CM

## 2020-09-03 DIAGNOSIS — K64.8 OTHER HEMORRHOIDS: ICD-10-CM

## 2020-09-03 DIAGNOSIS — I10 ESSENTIAL (PRIMARY) HYPERTENSION: ICD-10-CM

## 2020-09-03 DIAGNOSIS — M19.90 UNSPECIFIED OSTEOARTHRITIS, UNSPECIFIED SITE: ICD-10-CM

## 2020-09-03 DIAGNOSIS — E78.5 HYPERLIPIDEMIA, UNSPECIFIED: ICD-10-CM

## 2020-09-03 DIAGNOSIS — Z85.46 PERSONAL HISTORY OF MALIGNANT NEOPLASM OF PROSTATE: ICD-10-CM

## 2020-09-06 ENCOUNTER — EMERGENCY (EMERGENCY)
Facility: HOSPITAL | Age: 65
LOS: 0 days | Discharge: HOME | End: 2020-09-06
Attending: EMERGENCY MEDICINE | Admitting: EMERGENCY MEDICINE
Payer: MEDICARE

## 2020-09-06 VITALS
DIASTOLIC BLOOD PRESSURE: 74 MMHG | TEMPERATURE: 98 F | HEART RATE: 71 BPM | RESPIRATION RATE: 18 BRPM | OXYGEN SATURATION: 98 % | SYSTOLIC BLOOD PRESSURE: 121 MMHG

## 2020-09-06 VITALS
OXYGEN SATURATION: 99 % | HEIGHT: 67 IN | SYSTOLIC BLOOD PRESSURE: 131 MMHG | RESPIRATION RATE: 18 BRPM | DIASTOLIC BLOOD PRESSURE: 77 MMHG | WEIGHT: 205.03 LBS | TEMPERATURE: 98 F | HEART RATE: 79 BPM

## 2020-09-06 DIAGNOSIS — Z87.891 PERSONAL HISTORY OF NICOTINE DEPENDENCE: ICD-10-CM

## 2020-09-06 DIAGNOSIS — Z90.49 ACQUIRED ABSENCE OF OTHER SPECIFIED PARTS OF DIGESTIVE TRACT: ICD-10-CM

## 2020-09-06 DIAGNOSIS — Z20.828 CONTACT WITH AND (SUSPECTED) EXPOSURE TO OTHER VIRAL COMMUNICABLE DISEASES: ICD-10-CM

## 2020-09-06 DIAGNOSIS — Z98.890 OTHER SPECIFIED POSTPROCEDURAL STATES: Chronic | ICD-10-CM

## 2020-09-06 DIAGNOSIS — E78.5 HYPERLIPIDEMIA, UNSPECIFIED: ICD-10-CM

## 2020-09-06 DIAGNOSIS — I10 ESSENTIAL (PRIMARY) HYPERTENSION: ICD-10-CM

## 2020-09-06 DIAGNOSIS — Z79.899 OTHER LONG TERM (CURRENT) DRUG THERAPY: ICD-10-CM

## 2020-09-06 DIAGNOSIS — R42 DIZZINESS AND GIDDINESS: ICD-10-CM

## 2020-09-06 DIAGNOSIS — C61 MALIGNANT NEOPLASM OF PROSTATE: Chronic | ICD-10-CM

## 2020-09-06 DIAGNOSIS — Z96.642 PRESENCE OF LEFT ARTIFICIAL HIP JOINT: ICD-10-CM

## 2020-09-06 DIAGNOSIS — Z96.642 PRESENCE OF LEFT ARTIFICIAL HIP JOINT: Chronic | ICD-10-CM

## 2020-09-06 DIAGNOSIS — E11.9 TYPE 2 DIABETES MELLITUS WITHOUT COMPLICATIONS: ICD-10-CM

## 2020-09-06 DIAGNOSIS — Z79.82 LONG TERM (CURRENT) USE OF ASPIRIN: ICD-10-CM

## 2020-09-06 DIAGNOSIS — Z79.84 LONG TERM (CURRENT) USE OF ORAL HYPOGLYCEMIC DRUGS: ICD-10-CM

## 2020-09-06 LAB
ALBUMIN SERPL ELPH-MCNC: 4.5 G/DL — SIGNIFICANT CHANGE UP (ref 3.5–5.2)
ALP SERPL-CCNC: 94 U/L — SIGNIFICANT CHANGE UP (ref 30–115)
ALT FLD-CCNC: 20 U/L — SIGNIFICANT CHANGE UP (ref 0–41)
ANION GAP SERPL CALC-SCNC: 14 MMOL/L — SIGNIFICANT CHANGE UP (ref 7–14)
AST SERPL-CCNC: 22 U/L — SIGNIFICANT CHANGE UP (ref 0–41)
BASOPHILS # BLD AUTO: 0.04 K/UL — SIGNIFICANT CHANGE UP (ref 0–0.2)
BASOPHILS NFR BLD AUTO: 0.6 % — SIGNIFICANT CHANGE UP (ref 0–1)
BILIRUB SERPL-MCNC: 1.2 MG/DL — SIGNIFICANT CHANGE UP (ref 0.2–1.2)
BUN SERPL-MCNC: 10 MG/DL — SIGNIFICANT CHANGE UP (ref 10–20)
CALCIUM SERPL-MCNC: 9.1 MG/DL — SIGNIFICANT CHANGE UP (ref 8.5–10.1)
CHLORIDE SERPL-SCNC: 106 MMOL/L — SIGNIFICANT CHANGE UP (ref 98–110)
CO2 SERPL-SCNC: 19 MMOL/L — SIGNIFICANT CHANGE UP (ref 17–32)
CREAT SERPL-MCNC: 0.7 MG/DL — SIGNIFICANT CHANGE UP (ref 0.7–1.5)
EOSINOPHIL # BLD AUTO: 0.29 K/UL — SIGNIFICANT CHANGE UP (ref 0–0.7)
EOSINOPHIL NFR BLD AUTO: 4.6 % — SIGNIFICANT CHANGE UP (ref 0–8)
ERYTHROCYTE [SEDIMENTATION RATE] IN BLOOD: 10 MM/HR — SIGNIFICANT CHANGE UP (ref 0–10)
GLUCOSE SERPL-MCNC: 126 MG/DL — HIGH (ref 70–99)
HCT VFR BLD CALC: 40.2 % — LOW (ref 42–52)
HGB BLD-MCNC: 13.2 G/DL — LOW (ref 14–18)
IMM GRANULOCYTES NFR BLD AUTO: 0.5 % — HIGH (ref 0.1–0.3)
LYMPHOCYTES # BLD AUTO: 1.56 K/UL — SIGNIFICANT CHANGE UP (ref 1.2–3.4)
LYMPHOCYTES # BLD AUTO: 24.9 % — SIGNIFICANT CHANGE UP (ref 20.5–51.1)
MAGNESIUM SERPL-MCNC: 2.2 MG/DL — SIGNIFICANT CHANGE UP (ref 1.8–2.4)
MCHC RBC-ENTMCNC: 28.4 PG — SIGNIFICANT CHANGE UP (ref 27–31)
MCHC RBC-ENTMCNC: 32.8 G/DL — SIGNIFICANT CHANGE UP (ref 32–37)
MCV RBC AUTO: 86.6 FL — SIGNIFICANT CHANGE UP (ref 80–94)
MONOCYTES # BLD AUTO: 0.64 K/UL — HIGH (ref 0.1–0.6)
MONOCYTES NFR BLD AUTO: 10.2 % — HIGH (ref 1.7–9.3)
NEUTROPHILS # BLD AUTO: 3.71 K/UL — SIGNIFICANT CHANGE UP (ref 1.4–6.5)
NEUTROPHILS NFR BLD AUTO: 59.2 % — SIGNIFICANT CHANGE UP (ref 42.2–75.2)
NRBC # BLD: 0 /100 WBCS — SIGNIFICANT CHANGE UP (ref 0–0)
NT-PROBNP SERPL-SCNC: 56 PG/ML — SIGNIFICANT CHANGE UP (ref 0–300)
PLATELET # BLD AUTO: 247 K/UL — SIGNIFICANT CHANGE UP (ref 130–400)
POTASSIUM SERPL-MCNC: 3.8 MMOL/L — SIGNIFICANT CHANGE UP (ref 3.5–5)
POTASSIUM SERPL-SCNC: 3.8 MMOL/L — SIGNIFICANT CHANGE UP (ref 3.5–5)
PROT SERPL-MCNC: 7.3 G/DL — SIGNIFICANT CHANGE UP (ref 6–8)
RBC # BLD: 4.64 M/UL — LOW (ref 4.7–6.1)
RBC # FLD: 13.6 % — SIGNIFICANT CHANGE UP (ref 11.5–14.5)
SODIUM SERPL-SCNC: 139 MMOL/L — SIGNIFICANT CHANGE UP (ref 135–146)
TROPONIN T SERPL-MCNC: <0.01 NG/ML — SIGNIFICANT CHANGE UP
WBC # BLD: 6.27 K/UL — SIGNIFICANT CHANGE UP (ref 4.8–10.8)
WBC # FLD AUTO: 6.27 K/UL — SIGNIFICANT CHANGE UP (ref 4.8–10.8)

## 2020-09-06 PROCEDURE — 99220: CPT

## 2020-09-06 PROCEDURE — 70498 CT ANGIOGRAPHY NECK: CPT | Mod: 26

## 2020-09-06 PROCEDURE — 70496 CT ANGIOGRAPHY HEAD: CPT | Mod: 26

## 2020-09-06 PROCEDURE — 71045 X-RAY EXAM CHEST 1 VIEW: CPT | Mod: 26

## 2020-09-06 PROCEDURE — 93010 ELECTROCARDIOGRAM REPORT: CPT

## 2020-09-06 PROCEDURE — 99282 EMERGENCY DEPT VISIT SF MDM: CPT

## 2020-09-06 PROCEDURE — 70450 CT HEAD/BRAIN W/O DYE: CPT | Mod: 26,59

## 2020-09-06 RX ORDER — METOCLOPRAMIDE HCL 10 MG
10 TABLET ORAL ONCE
Refills: 0 | Status: COMPLETED | OUTPATIENT
Start: 2020-09-06 | End: 2020-09-06

## 2020-09-06 RX ORDER — IBUPROFEN 200 MG
600 TABLET ORAL ONCE
Refills: 0 | Status: COMPLETED | OUTPATIENT
Start: 2020-09-06 | End: 2020-09-06

## 2020-09-06 RX ORDER — MECLIZINE HCL 12.5 MG
50 TABLET ORAL ONCE
Refills: 0 | Status: COMPLETED | OUTPATIENT
Start: 2020-09-06 | End: 2020-09-06

## 2020-09-06 RX ORDER — SODIUM CHLORIDE 9 MG/ML
1000 INJECTION, SOLUTION INTRAVENOUS ONCE
Refills: 0 | Status: COMPLETED | OUTPATIENT
Start: 2020-09-06 | End: 2020-09-06

## 2020-09-06 RX ADMIN — Medication 10 MILLIGRAM(S): at 05:44

## 2020-09-06 RX ADMIN — Medication 50 MILLIGRAM(S): at 05:45

## 2020-09-06 RX ADMIN — Medication 600 MILLIGRAM(S): at 04:43

## 2020-09-06 RX ADMIN — Medication 600 MILLIGRAM(S): at 05:15

## 2020-09-06 RX ADMIN — SODIUM CHLORIDE 1000 MILLILITER(S): 9 INJECTION, SOLUTION INTRAVENOUS at 04:45

## 2020-09-06 NOTE — CONSULT NOTE ADULT - SUBJECTIVE AND OBJECTIVE BOX
Neurology Consult    65 yo M PMH DM, HTN, HLD presents to ED with multiple medical complaints. Pt had colonoscopy done on Monday and since has been experiencing presyncopal episodes associated with throat pain, dizziness, and left sided neck and face pain and headache. Pt denies fall or syncopal events. Denies chest pain, SOB, fevers. Pt reports ataxia and feeling like he is falling over towards the left side when he walks. No numbness or weakness. Pt had abnormal stress test in 2019.  Currently patient is feeling better. The pain in his neck and face is improving. Patient said he had recent history of diarrhea, nausea and vomiting with mild left sided full ear fullness.       PAST MEDICAL & SURGICAL HISTORY:  Pancreatitis: recurrent  Osteoarthritis  Hyperlipidemia  Prostate cancer: s/p history of seeding  Hypertension  Diabetes mellitus, type II  S/P hip replacement, left: 9/2015  History of appendectomy  Prostate cancer: s/p history of seeding      FAMILY HISTORY:  Family history of diabetes mellitus in father (Father)      Social History: (-) x 3    Allergies    No Known Allergies    Intolerances        MEDICATIONS  (STANDING):    MEDICATIONS  (PRN):      Review of systems:    Constitutional: as per HPI  Eyes: No eye pain or discharge  ENMT:  No difficulty hearing; No sinus or throat pain  Neck: No pain or stiffness  Respiratory: No cough, wheezing, chills or hemoptysis  Cardiovascular: No chest pain, palpitations, shortness of breath, dyspnea on exertion  Gastrointestinal: No abdominal pain, nausea, vomiting or hematemesis; No diarrhea or constipation.   Genitourinary: No dysuria, frequency, hematuria or incontinence  Neurological: As per HPI  Skin: No rashes or lesions   Endocrine: No heat or cold intolerance; No hair loss  Musculoskeletal: No joint pain or swelling  Psychiatric: No depression, anxiety, mood swings  Heme/Lymph: No easy bruising or bleeding gums    Vital Signs Last 24 Hrs  T(C): 36.6 (06 Sep 2020 07:34), Max: 36.7 (06 Sep 2020 03:09)  T(F): 97.9 (06 Sep 2020 07:34), Max: 98 (06 Sep 2020 03:09)  HR: 76 (06 Sep 2020 07:34) (76 - 79)  BP: 127/75 (06 Sep 2020 07:34) (127/75 - 131/77)  BP(mean): --  RR: 16 (06 Sep 2020 07:34) (16 - 18)  SpO2: 99% (06 Sep 2020 07:34) (99% - 99%)    Examination:  General:  Appearance is consistent with chronologic age.  No abnormal facies.  Gross skin survey within normal limits.    Cognitive/Language:  The patient is oriented to person, place, time and date.  Recent and remote memory intact.  Fund of knowledge is intact and normal.  Language with normal repetition, comprehension and naming.  Nondysarthric.    Face:  Facial sensation normal V1 - 3, no facial asymmetry.    Ears/Nose/Throat:  Hearing grossly intact b/l.  Palate elevates midline.  Tongue and uvula midline.   Motor examination:   Normal tone, bulk and range of motion.  No tenderness, twitching, tremors or involuntary movements.  Sensory examination:   Intact to light touch and pinprick, pain, temperature and proprioception and vibration in all extremities.  Respiratory:  no audible wheezing or inspiratory stridor.  no use of accessory muscles.   Cardiac: pulse palpable, no audible bruits  Abdomen: supple, no guarding, no TTP    Labs:   CBC Full  -  ( 06 Sep 2020 04:32 )  WBC Count : 6.27 K/uL  RBC Count : 4.64 M/uL  Hemoglobin : 13.2 g/dL  Hematocrit : 40.2 %  Platelet Count - Automated : 247 K/uL  Mean Cell Volume : 86.6 fL  Mean Cell Hemoglobin : 28.4 pg  Mean Cell Hemoglobin Concentration : 32.8 g/dL  Auto Neutrophil # : 3.71 K/uL  Auto Lymphocyte # : 1.56 K/uL  Auto Monocyte # : 0.64 K/uL  Auto Eosinophil # : 0.29 K/uL  Auto Basophil # : 0.04 K/uL  Auto Neutrophil % : 59.2 %  Auto Lymphocyte % : 24.9 %  Auto Monocyte % : 10.2 %  Auto Eosinophil % : 4.6 %  Auto Basophil % : 0.6 %    09-06    139  |  106  |  10  ----------------------------<  126<H>  3.8   |  19  |  0.7    Ca    9.1      06 Sep 2020 04:32  Mg     2.2     09-06    TPro  7.3  /  Alb  4.5  /  TBili  1.2  /  DBili  x   /  AST  22  /  ALT  20  /  AlkPhos  94  09-06    LIVER FUNCTIONS - ( 06 Sep 2020 04:32 )  Alb: 4.5 g/dL / Pro: 7.3 g/dL / ALK PHOS: 94 U/L / ALT: 20 U/L / AST: 22 U/L / GGT: x                   Neuroimaging:  NCHCT: CT Head No Cont:   EXAM:  CT BRAIN        < from: CT Angio Neck w/ IV Cont (09.06.20 @ 10:25) >  IMPRESSION:    No evidence of intracranial vessel stenosis, aneurysm or occlusion.    No evidence of carotid or vertebral artery stenosis.    < end of copied text >      PROCEDURE DATE:  09/06/2020            INTERPRETATION:  Clinical History / Reason for exam: Unilateral headache. Presyncope.    TECHNIQUE: Contiguous axial CT images were obtained from the base of the skull to the vertex without administration of intravenous contrast. Coronal and sagittal reformatted images were constructed.    COMPARISON:CT head 6/30/2018.      FINDINGS:    The ventricles and cortical sulci are appropriate for the patient's stated age.    Gray-white matter differentiation is preserved.    There is no evidence for intracranial hemorrhage, significant space-occupying process, or recent territorial infarction.    The calvarium is intact. Trace ethmoid sinus mucosal thickening. There is soft tissue filling the bilateral external auditory canals likely representing cerumen. Tympanomastoid cavities are otherwise unremarkable.      IMPRESSION:    No CT evidence for acute intracranial pathology.            LIO VELASQUEZ M.D., RESIDENT RADIOLOGIST  This document has been electronically signed.  LUIS LOZA M.D., ATTENDING RADIOLOGIST  This document has been electronically signed. Sep  6 2020 11:58AM             (09-06-20 @ 09:57)      09-06-20 @ 14:58

## 2020-09-06 NOTE — ED PROVIDER NOTE - ATTENDING CONTRIBUTION TO CARE
63 yo M pmh as stated in chart pw dizziness. Dizziness worse with head movements, +L sided headache and neck pain. No head trauma, no inciting event, no cp, no sob, no palpitations, no n/v, no vision change, no hearing changes, no back pain, no abd pain.     CONSTITUTIONAL: Well-developed; well-nourished; in no acute distress. Sitting up and providing appropriate history and physical examination  SKIN: skin exam is warm and dry, no acute rash.  HEAD: Normocephalic; atraumatic.  EYES: PERRL, 3 mm bilateral, no nystagmus, EOM intact; conjunctiva and sclera clear.  ENT: No nasal discharge; airway clear.  NECK: Supple; non tender. + full passive ROM in all directions. No JVD  CARD: S1, S2 normal; no murmurs, gallops, or rubs. Regular rate and rhythm. + Symmetric Strong Pulses  RESP: No wheezes, rales or rhonchi. Good air movement bilaterally  ABD: soft; non-distended; non-tender. No Rebound, No Guarding, No signs of peritonitis, No CVA tenderness. No pulsatile abdominal mass. + Strong and Symmetric Pulses  EXT: Normal ROM. No clubbing, cyanosis or edema. Dp and Pt Pulses intact. Cap refill less than 3 seconds  NEURO: CN 2-12 intact, normal finger to nose, normal romberg, stable gait, no sensory or motor deficits, Alert, oriented, grossly unremarkable. No Focal deficits. GCS 15. NIH 0  PSYCH: Cooperative, appropriate.

## 2020-09-06 NOTE — ED CDU PROVIDER INITIAL DAY NOTE - OBJECTIVE STATEMENT
65 y/o M, PMHx HTN, DM, Dyslipidemia, Prostate Cancer, presents to the ED with complaints of dizziness x 5 days. Patient underwent a colonoscopy on 8/31 and states that since procedure, he has had intermittent episodes of dizziness. He admits to exacerbation of his symptoms upon positional changes and head movement; denies associated chest pain, dyspnea, nausea, vomiting, fever, chills, neck pain, back pain and cephalgia.

## 2020-09-06 NOTE — ED CDU PROVIDER DISPOSITION NOTE - CLINICAL COURSE
Patient presented with dizziness to ED. Work up in ED negative for acute findings but patient remained symptomatic despite tx in ED. Therefore neuro consulted and recommended obs placement for MRI. However, prior to MRI patient refused to stay - stating she no longer wished to proceed with her work up. Patient able to ambulate with steady gait, AAOx3, no SI/HI/hallucinations, verbalizes understanding of consequences of incomplete work up including but not limited to stroke, permanent disability, seizure, coma, death. Will sign out AMA.    The patient wishes to leave against medical advice.  I have discussed the risks, benefits and alternatives (including the possibility of worsening of disease, pain, permanent disability, and/or death) with the patient and his/her family (if available).  The patient voices understanding of these risks, benefits, and alternatives and still wishes to sign out against medical advice.  The patient is awake, alert, oriented  x 3 and has demonstrated capacity to refuse/direct care.  I have advised the patient that they can and should return immediately should they develop any worse/different/additional symptoms, or if they change their mind and want to continue their care.

## 2020-09-06 NOTE — ED CDU PROVIDER INITIAL DAY NOTE - MEDICAL DECISION MAKING DETAILS
none Patient presented with intractable dizziness to ED. Work up in ED including CT head and CTA head/neck negative for emergent findings. Patient remained symptomatic after tx and therefore neuro consulted and recommended obs placement for MRI. Patient HD stable at this time. Plan is to obtain MRI, serial neuro exam, follow up final neuro recs, re-eval.

## 2020-09-06 NOTE — ED CDU PROVIDER DISPOSITION NOTE - PROVIDER TOKENS
PROVIDER:[TOKEN:[[id number]],FOLLOWUP:[Urgent]],PROVIDER:[TOKEN:[[id number]],FOLLOWUP:[1-3 Days]]

## 2020-09-06 NOTE — ED CDU PROVIDER DISPOSITION NOTE - CARE PROVIDER_API CALL
Andry Bustillos ()  Medicine  Physicians  [address] [place] [address]  Phone: [phone]  Fax: [phone]  Follow Up Time: Urgent    Shekhar Gtz)  Neurology  [address] [place] [address]  Phone: [phone]  Fax: [phone]  Follow Up Time: 1-3 Days

## 2020-09-06 NOTE — ED PROVIDER NOTE - OBJECTIVE STATEMENT
65 yo M PMHx DM, HTN, HLD presents to ED with multiple medical complaints. Pt had colonoscopy done on Monday and since has been experiencing presyncopal episodes associated with throat pain, dizziness, and left sided neck and face pain and headache. Pt denies fall or syncopal events. Denies chest pain, SOB, fevers. Pt reports ataxia and feeling like he is falling over towards the left side when he walks. No numbness or weakness. 65 yo M PMHx DM, HTN, HLD presents to ED with multiple medical complaints. Pt had colonoscopy done on Monday and since has been experiencing presyncopal episodes associated with throat pain, dizziness, and left sided neck and face pain and headache. Pt denies fall or syncopal events. Denies chest pain, SOB, fevers. Pt reports ataxia and feeling like he is falling over towards the left side when he walks. No numbness or weakness. Pt had abnormal stress test in 2019.

## 2020-09-06 NOTE — CONSULT NOTE ADULT - ATTENDING COMMENTS
I have personally seen and examined this patient in the ED I have fully participated in the care of this patient.  I have reviewed all pertinent clinical information, including history, physical exam, plan and note. Patient reports resolution of facial and neck pain. Exam is unremarkable. CT head and CTA showed no abnormalities. Recommend MRI brain and cervical spine of pain returned. Can be done as an outpatient.   I have reviewed all pertinent clinical information and reviewed all relevant imaging and diagnostic studies personally.  Recommendations as above.  Agree with above assessment except as noted.

## 2020-09-06 NOTE — ED CDU PROVIDER INITIAL DAY NOTE - PROGRESS NOTE DETAILS
Patient states that he is feeling much improved and would like to go home. He exhibits normal gait in the ED and states his symptoms have resolved. Discussed option to stay overnight under Observation status for MRI Head/Cervical Spine however patient declines. Strict return precautions discussed; will provide information of neurologist who saw him and he will f/u with his PMD this week as well.

## 2020-09-06 NOTE — ED ADULT NURSE NOTE - OBJECTIVE STATEMENT
Pt is 64 yr old male c/o near syncopal episode and diarrhea for the last two days. Pt states "I almost fell down but caught myself on the wall". NKDA. PMH HTN DM. Pt c/o unilateral left sided headache and neck pain/throbbing. Pt denies fever, chills, hematuria, dysuria.

## 2020-09-06 NOTE — ED PROVIDER NOTE - CLINICAL SUMMARY MEDICAL DECISION MAKING FREE TEXT BOX
Patient presented with worsening dizziness. Otherwise afebrile, HD stable, neurovascularly intact but difficulty ambulating 2/2 dizziness which was described more as vertigo. Obtained labs which were grossly unremarkable including no significant leukocytosis, anemia, signs of dehydration/VALERIE, transaminitis or significant electrolyte abnormalities. CT head and CTA head/neck negative for acute findings. However, despite tx in ED patient continued to be symptomatic. Therefore consulted neurology who saw patient in ED - recommended obs placement for MRI. Patient agreeable with plan. HD stable at time of obs placement.

## 2020-09-06 NOTE — ED ADULT NURSE NOTE - NSIMPLEMENTINTERV_GEN_ALL_ED
Implemented All Fall Risk Interventions:  [place] to call system. Call bell, personal items and telephone within reach. Instruct patient to call for assistance. Room bathroom lighting operational. Non-slip footwear when patient is off stretcher. Physically safe environment: no spills, clutter or unnecessary equipment. Stretcher in lowest position, wheels locked, appropriate side rails in place. Provide visual cue, wrist band, yellow gown, etc. Monitor gait and stability. Monitor for mental status changes and reorient to person, place, and time. Review medications for side effects contributing to fall risk. Reinforce activity limits and safety measures with patient and family.

## 2020-09-06 NOTE — ED CDU PROVIDER DISPOSITION NOTE - PATIENT PORTAL LINK FT
You can access the FollowMyHealth Patient Portal offered by [facility] by registering at the following website: [email or web address]. By joining [employer]’s FollowMyHealth portal, you will also be able to view your health information using other applications (apps) compatible with our system.

## 2020-09-06 NOTE — ED PROVIDER NOTE - NS ED ROS FT
Review of Systems:  CONSTITUTIONAL: No fever, No diaphoresis, No weight change  SKIN: No rash  HEMATOLOGIC: No abnormal bleeding or bruising  EYES: No eye pain, No blurred vision  ENT: No change in hearing, +sore throat, No neck pain, No rhinorrhea, +left ear pain  RESPIRATORY: No shortness of breath, No cough  CARDIAC: No chest pain, No palpitations  GI: No abdominal pain, No nausea, No vomiting, No diarrhea, No constipation, No bright red blood per rectum or melena. No flank pain.  : No dysuria, frequency, hematuria.   MUSCULOSKELETAL: No joint paint, No swelling, No back pain  NEUROLOGIC: No numbness, No focal weakness, +headache, +dizziness  All other systems negative, unless specified in HPI

## 2020-09-06 NOTE — ED CDU PROVIDER INITIAL DAY NOTE - NEURO NEGATIVE STATEMENT, MLM
+ dizziness - resolved; no loss of consciousness, no gait abnormality, no headache, no sensory deficits, and no weakness.

## 2020-09-06 NOTE — ED PROVIDER NOTE - PHYSICAL EXAMINATION
CONSTITUTIONAL: Well-developed; well-nourished; in no acute distress.   SKIN: warm, dry  HEAD: Normocephalic; atraumatic.  EYES: no conjunctival injection. PERRLA. EOMI. No nystagmus.   ENT: No nasal discharge; airway clear. TMs with cerumen bilaterally. Pharynx without erythema or exudates.   NECK: Supple; non tender.  CARD: S1, S2 normal; no murmurs, gallops, or rubs. Regular rate and rhythm.   RESP: No wheezes, rales or rhonchi.  ABD: soft ntnd. no CVA tenderness.   EXT: Normal ROM.  No clubbing, cyanosis or edema.   LYMPH: No acute cervical adenopathy.  NEURO: AOx3, CN 2-12 grossly intact. +5/5 strength and sensation wnl in all extremities. No pronator drift, no dysarthria, +ataxia.    PSYCH: Cooperative, appropriate.

## 2020-09-06 NOTE — CONSULT NOTE ADULT - ASSESSMENT
# Suspected Ventriculitis- Dizziness with episodes of diarrhea  -Now improving. CTA negative for any intracranial pathology.  -Given the complains of left sided temporal headache and neck pain-Rule out temporal arteritis-Although low on suspicion. Recommend getting ESR and CRP.   -May get MRI of the head and cervical spine if the pain worsens.  -Patient can be discharged if remains stable once the labs are done.   -Management discussed with the patient and the ED team. # Suspected cervicalgia and vestibulitis with  Dizziness with episodes of diarrhea  -Now improving. CTA negative for vascular pathology   -Given the complains of left sided temporal headache and neck pain-Rule out temporal arteritis-Although low on suspicion. Recommend getting ESR and CRP.   -May get MRI of the head and cervical spine if the pain worsens.  -Patient can be discharged if remains stable once the labs are done.   -Management discussed with the patient and the ED team.

## 2020-09-06 NOTE — ED CDU PROVIDER DISPOSITION NOTE - NSFOLLOWUPINSTRUCTIONS_ED_ALL_ED_FT
Near-Syncope    Near-syncope is when you suddenly become weak or dizzy, or you feel like you might pass out (faint). During an episode of near-syncope, you may:    Feel dizzy or light-headed.  Feel nauseous.  See all white or all black in your field of vision.  Have cold, clammy skin.     This condition is caused by a sudden decrease in blood flow to the brain. This decrease can result from various causes, but most of those causes are not dangerous. However, near-syncope can be a sign of a serious medical problem, so it is important to seek medical care.     If you fainted, get medical help right away. Call your local emergency services (911 in the [place] Do not drive yourself to the hospital.     HOME CARE INSTRUCTIONS  Pay attention to any changes in your symptoms. Take these actions to help with your condition:    Have someone stay with you until you feel stable.    Do not drive, use machinery, or play sports until your health care provider says it is okay.    Keep all follow-up visits as told by your health care provider. This is important.    If you start to feel like you might faint, lie down right away and raise (elevate) your feet above the level of your heart. Breathe deeply and steadily. Wait until all of the symptoms have passed.   Drink enough fluid to keep your urine clear or pale yellow.    If you are taking blood pressure or heart medicine, get up slowly and take several minutes to sit and then stand. This can reduce dizziness.    Take over-the-counter and prescription medicines only as told by your health care provider.      SEEK IMMEDIATE MEDICAL CARE IF:  You have a severe headache.    You have unusual pain in your chest, abdomen, or back.    You are bleeding from your mouth or rectum, or you have black or tarry stool.    You have a very fast or irregular heartbeat (palpitations).    You faint once or repeatedly.   You have a seizure.   You are confused.    You have trouble walking.    You have severe weakness.    You have vision problems.      These symptoms may represent a serious problem that is an emergency. Do not wait to see if your symptoms will go away. Get medical help right away. Call your local emergency services (911 in the [place] Do not drive yourself to the hospital.    ADDITIONAL NOTES AND INSTRUCTIONS    Please follow up with your Primary MD in 24-48 hr.  Seek immediate medical care for any new/worsening signs or symptoms.

## 2020-09-07 LAB
CRP SERPL-MCNC: 0.34 MG/DL — SIGNIFICANT CHANGE UP (ref 0–0.4)
SARS-COV-2 RNA SPEC QL NAA+PROBE: SIGNIFICANT CHANGE UP

## 2020-10-01 ENCOUNTER — APPOINTMENT (OUTPATIENT)
Dept: INTERNAL MEDICINE | Facility: CLINIC | Age: 65
End: 2020-10-01
Payer: MEDICAID

## 2020-10-01 ENCOUNTER — OUTPATIENT (OUTPATIENT)
Dept: OUTPATIENT SERVICES | Facility: HOSPITAL | Age: 65
LOS: 1 days | Discharge: HOME | End: 2020-10-01

## 2020-10-01 VITALS
HEART RATE: 73 BPM | SYSTOLIC BLOOD PRESSURE: 135 MMHG | BODY MASS INDEX: 33.27 KG/M2 | WEIGHT: 212 LBS | TEMPERATURE: 97 F | DIASTOLIC BLOOD PRESSURE: 89 MMHG | HEIGHT: 67 IN

## 2020-10-01 DIAGNOSIS — C61 MALIGNANT NEOPLASM OF PROSTATE: Chronic | ICD-10-CM

## 2020-10-01 DIAGNOSIS — I25.10 ATHEROSCLEROTIC HEART DISEASE OF NATIVE CORONARY ARTERY W/OUT ANGINA PECTORIS: ICD-10-CM

## 2020-10-01 DIAGNOSIS — Z98.890 OTHER SPECIFIED POSTPROCEDURAL STATES: Chronic | ICD-10-CM

## 2020-10-01 DIAGNOSIS — Z96.642 PRESENCE OF LEFT ARTIFICIAL HIP JOINT: Chronic | ICD-10-CM

## 2020-10-01 LAB
25(OH)D3 SERPL-MCNC: 10 NG/ML
ALBUMIN SERPL ELPH-MCNC: 4.5 G/DL
ALP BLD-CCNC: 93 U/L
ALT SERPL-CCNC: 35 U/L
ANION GAP SERPL CALC-SCNC: 10 MMOL/L
AST SERPL-CCNC: 25 U/L
BASOPHILS # BLD AUTO: 0.03 K/UL
BASOPHILS NFR BLD AUTO: 0.4 %
BILIRUB SERPL-MCNC: 0.6 MG/DL
BUN SERPL-MCNC: 8 MG/DL
CALCIUM SERPL-MCNC: 8.9 MG/DL
CHLORIDE SERPL-SCNC: 103 MMOL/L
CHOLEST SERPL-MCNC: 157 MG/DL
CHOLEST/HDLC SERPL: 3 RATIO
CO2 SERPL-SCNC: 24 MMOL/L
CREAT SERPL-MCNC: 0.6 MG/DL
EOSINOPHIL # BLD AUTO: 0.18 K/UL
EOSINOPHIL NFR BLD AUTO: 2.7 %
ESTIMATED AVERAGE GLUCOSE: 174 MG/DL
GLUCOSE SERPL-MCNC: 126 MG/DL
HBA1C MFR BLD HPLC: 7.7 %
HCT VFR BLD CALC: 38.3 %
HDLC SERPL-MCNC: 53 MG/DL
HGB BLD-MCNC: 12.6 G/DL
IMM GRANULOCYTES NFR BLD AUTO: 0.3 %
LDLC SERPL CALC-MCNC: 98 MG/DL
LYMPHOCYTES # BLD AUTO: 1.25 K/UL
LYMPHOCYTES NFR BLD AUTO: 18.5 %
MAN DIFF?: NORMAL
MCHC RBC-ENTMCNC: 28.4 PG
MCHC RBC-ENTMCNC: 32.9 G/DL
MCV RBC AUTO: 86.5 FL
MONOCYTES # BLD AUTO: 0.54 K/UL
MONOCYTES NFR BLD AUTO: 8 %
NEUTROPHILS # BLD AUTO: 4.72 K/UL
NEUTROPHILS NFR BLD AUTO: 70.1 %
PLATELET # BLD AUTO: 231 K/UL
POTASSIUM SERPL-SCNC: 4.2 MMOL/L
PROT SERPL-MCNC: 7.1 G/DL
PSA FREE FLD-MCNC: 3 %
PSA FREE SERPL-MCNC: 0.02 NG/ML
PSA SERPL-MCNC: 0.7 NG/ML
RBC # BLD: 4.43 M/UL
RBC # FLD: 13.2 %
SODIUM SERPL-SCNC: 137 MMOL/L
TRIGL SERPL-MCNC: 79 MG/DL
TSH SERPL-ACNC: 1.15 UIU/ML
WBC # FLD AUTO: 6.74 K/UL

## 2020-10-01 PROCEDURE — 99212 OFFICE O/P EST SF 10 MIN: CPT | Mod: GC

## 2020-10-01 RX ORDER — POLYETHYLENE GLYCOL 3350 17 G/17G
17 POWDER, FOR SOLUTION ORAL DAILY
Qty: 30 | Refills: 3 | Status: DISCONTINUED | COMMUNITY
Start: 2020-01-03 | End: 2020-10-01

## 2020-10-01 RX ORDER — LACTULOSE 10 G/15ML
10 SOLUTION ORAL DAILY
Qty: 450 | Refills: 1 | Status: DISCONTINUED | COMMUNITY
Start: 2019-09-03 | End: 2020-10-01

## 2020-10-01 RX ORDER — POLYETHYLENE GLYCOL 3350, SODIUM SULFATE ANHYDROUS, SODIUM BICARBONATE, SODIUM CHLORIDE, POTASSIUM CHLORIDE 227.1; 21.5; 6.36; 5.53; .754 G/L; G/L; G/L; G/L; G/L
227.1 POWDER, FOR SOLUTION ORAL
Qty: 1 | Refills: 0 | Status: DISCONTINUED | COMMUNITY
Start: 2020-01-03 | End: 2020-10-01

## 2020-10-01 RX ORDER — POLYETHYLENE GLYCOL 3350, SODIUM SULFATE ANHYDROUS, SODIUM BICARBONATE, SODIUM CHLORIDE, POTASSIUM CHLORIDE 227.1; 21.5; 6.36; 5.53; .754 G/L; G/L; G/L; G/L; G/L
227.1 POWDER, FOR SOLUTION ORAL
Qty: 1 | Refills: 0 | Status: DISCONTINUED | COMMUNITY
Start: 2020-08-28 | End: 2020-10-01

## 2020-10-01 RX ORDER — OMEPRAZOLE 20 MG/1
20 TABLET, DELAYED RELEASE ORAL
Qty: 30 | Refills: 5 | Status: DISCONTINUED | COMMUNITY
Start: 2018-04-04 | End: 2020-10-01

## 2020-10-02 DIAGNOSIS — E11.9 TYPE 2 DIABETES MELLITUS WITHOUT COMPLICATIONS: ICD-10-CM

## 2020-10-02 DIAGNOSIS — E78.5 HYPERLIPIDEMIA, UNSPECIFIED: ICD-10-CM

## 2020-10-02 DIAGNOSIS — I10 ESSENTIAL (PRIMARY) HYPERTENSION: ICD-10-CM

## 2020-10-02 DIAGNOSIS — M54.9 DORSALGIA, UNSPECIFIED: ICD-10-CM

## 2020-10-02 DIAGNOSIS — E66.9 OBESITY, UNSPECIFIED: ICD-10-CM

## 2020-10-07 NOTE — HISTORY OF PRESENT ILLNESS
[FreeTextEntry1] : Back pain [de-identified] : 65 year old male with PMHx of non-obstructive CAD, DL, HTN, DM2, Prostate Ca s/p seeding (in remission), Hx of pancreatitis (last episode 2015) presenting for follow-up\par Still complaining of left sided lower back pain, worse on ambulation, occurs 2-3 times a week, relieved by Ibuprofen\par Has occasional episodes of left sided headache, once a month, also relieved by Ibuprofen

## 2020-10-07 NOTE — REVIEW OF SYSTEMS
[Back Pain] : back pain [Headache] : headache [Fever] : no fever [Chills] : no chills [Discharge] : no discharge [Pain] : no pain [Earache] : no earache [Chest Pain] : no chest pain [Palpitations] : no palpitations [Claudication] : no  leg claudication [Shortness Of Breath] : no shortness of breath [Wheezing] : no wheezing [Cough] : no cough [Abdominal Pain] : no abdominal pain [Nausea] : no nausea [Joint Stiffness] : no joint stiffness [Itching] : no itching [Dizziness] : no dizziness [Fainting] : no fainting

## 2020-10-07 NOTE — PHYSICAL EXAM
[No Acute Distress] : no acute distress [Well Nourished] : well nourished [Well Developed] : well developed [No Respiratory Distress] : no respiratory distress  [Clear to Auscultation] : lungs were clear to auscultation bilaterally [Normal Rate] : normal rate  [Regular Rhythm] : with a regular rhythm [Normal S1, S2] : normal S1 and S2 [No Murmur] : no murmur heard [Soft] : abdomen soft [Non Tender] : non-tender [Coordination Grossly Intact] : coordination grossly intact [de-identified] : Left lower back, tenderness on palpation of illiac crest

## 2020-10-07 NOTE — ASSESSMENT
[FreeTextEntry1] : 65 year old male with PMHx of non-obstructive CAD, DL, HTN, DM2, Prostate Ca s/p seeding (in remission), Hx of pancreatitis (last episode 2015) presenting for follow-up\par \par #DM2, uncontrolled\par Will increase Metformin to 1000mg BID\par Advised to reduce weight and exercise\par \par #Back pain\par Appears musculoskeletal in nature\par A trial of PT\par Continue Ibuprofen\par \par #Tension Headache\par CTA done in ED negative for intracranial pathology\par Ibuprofen PRN\par \par #HTN\par Continue Amlodipine 5mg qd\par \par #Non-obstructive CAD\par Continue Aspirin 81mg, Lipitor 40mg qd\par \par #HCM\par Colonoscopy in 2020, with 3 adenomas, the largest 8mm, repeat in 3 years\par tdap- in 2012. \par flushot, [facility]\par f/u in 3 months, with repeat HBa1c

## 2020-10-11 NOTE — ED PROVIDER NOTE - NS ED MD TWO NIGHTS YN
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, lobular carcinoma left breast) post-bilateral mastectomy  - Osteoporosis on Reclast    Dx Code Description Dx Date   Z85.3 Personal history of malignant neoplasm of breast     Z08 Encounter for follow-up examination after completed treatment for malignant neoplasm     Z92.21 Personal history of antineoplastic chemotherapy     Z92.3 Personal history of irradiation     Z17.0 Estrogen receptor positive status [ER+]     Z90.13 Acquired absence of bilateral breasts and nipples     M81.8 Other osteoporosis without current pathological fracture     Z79.83 Long-term (current) use of bisphosphonate      HISTORY: She is feeling well and offers no complaints.  She is due for Reclast 5 mg IV today.    REVIEW OF SYSTEMS:   General: No new complaints  Cardiovascular:  No chest pains or palpitations  Respiratory:  No cough or shortness of breath  GI: Appetite good; no nausea, no vomiting   : No urinary symptoms  CNS: No headaches or diplopia  Musculoskeletal: No joint aches or pains    PAIN: No complaints of pain    SOCIAL HISTORY: Unchanged  SMOKING HISTORY: Nonsmoker  FAMILY HISTORY: Unchanged    PHYSICAL EXAMINATION  GENERAL: Performance status 0  VITAL SIGNS:  Weight 134 lbs.; /62 mmHg  HEENT/NECK: ENT exam is unremarkable; no palpable adenopathy in the cervical or supraclavicular region  BREAST/AXILLAE: No axillary adenopathy.  Right and left mastectomy scars unremarkable, no masses palpable.  CARDIOVASCULAR:  First and second heart sounds normal, no murmurs  RESPIRATORY: No rhonchi or crepitations   ABDOMEN:  Soft and nontender; no hepatosplenomegaly appreciated.  EXTREMITIES: No pedal edema; no calf  tenderness  NEURO: No cranial nerve paralysis or motor deficits  PAIN ASSESSMENT: 0  MUSCULOSKELETAL: Unremarkable  DISTRESS SCORE: 0  PSYCH: Does not appear anxious or depressed.     CURRENT MEDICATIONS:    Drug Name Dose Route Frequency   Toprol XL 25 mg PO QD   Feratab 65 mg PO Twice a Day   Zestril 5 mg PO QD   Amlodipine 2.5 mg Oral Twice a Day   Vitamin C 500 mg Oral Daily   Simvastatin 20 mg Oral Daily   Calcium 600 + D(3)   Oral Daily       ASSESSMENT:  History of bilateral breast carcinoma, Stage I, post-bilateral mastectomy and adjuvant letrozole for 5 years    PLAN:  CBC and CMP done recently were unremarkable.  She will get Reclast 5 mg IV today.  Follow up in 1 year.        Electronically signed  ANAMARIA PENDLETON MD  05/31/17; 12:43 PM     Yes

## 2020-10-23 ENCOUNTER — EMERGENCY (EMERGENCY)
Facility: HOSPITAL | Age: 65
LOS: 0 days | Discharge: HOME | End: 2020-10-24
Attending: EMERGENCY MEDICINE | Admitting: EMERGENCY MEDICINE
Payer: MEDICARE

## 2020-10-23 VITALS
SYSTOLIC BLOOD PRESSURE: 129 MMHG | OXYGEN SATURATION: 99 % | HEART RATE: 89 BPM | HEIGHT: 67 IN | DIASTOLIC BLOOD PRESSURE: 74 MMHG | TEMPERATURE: 98 F | RESPIRATION RATE: 18 BRPM
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
## 2020-10-23 DIAGNOSIS — R07.9 CHEST PAIN, UNSPECIFIED: ICD-10-CM

## 2020-10-23 DIAGNOSIS — C61 MALIGNANT NEOPLASM OF PROSTATE: Chronic | ICD-10-CM

## 2020-10-23 DIAGNOSIS — E11.9 TYPE 2 DIABETES MELLITUS WITHOUT COMPLICATIONS: ICD-10-CM
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
## 2020-10-23 DIAGNOSIS — R07.2 PRECORDIAL PAIN: ICD-10-CM

## 2020-10-23 DIAGNOSIS — I10 ESSENTIAL (PRIMARY) HYPERTENSION: ICD-10-CM

## 2020-10-23 DIAGNOSIS — E78.5 HYPERLIPIDEMIA, UNSPECIFIED: ICD-10-CM

## 2020-10-23 DIAGNOSIS — Z87.891 PERSONAL HISTORY OF NICOTINE DEPENDENCE: ICD-10-CM

## 2020-10-23 DIAGNOSIS — Z23 ENCOUNTER FOR IMMUNIZATION: ICD-10-CM

## 2020-10-23 DIAGNOSIS — Z20.828 CONTACT WITH AND (SUSPECTED) EXPOSURE TO OTHER VIRAL COMMUNICABLE DISEASES: ICD-10-CM
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
## 2020-10-23 DIAGNOSIS — Z98.890 OTHER SPECIFIED POSTPROCEDURAL STATES: Chronic | ICD-10-CM

## 2020-10-23 DIAGNOSIS — Z96.642 PRESENCE OF LEFT ARTIFICIAL HIP JOINT: Chronic | ICD-10-CM

## 2020-10-23 DIAGNOSIS — S61.211A LACERATION WITHOUT FOREIGN BODY OF LEFT INDEX FINGER WITHOUT DAMAGE TO NAIL, INITIAL ENCOUNTER: ICD-10-CM

## 2020-10-23 LAB
ALBUMIN SERPL ELPH-MCNC: 4.6 G/DL — SIGNIFICANT CHANGE UP (ref 3.5–5.2)
ALP SERPL-CCNC: 95 U/L — SIGNIFICANT CHANGE UP (ref 30–115)
ALT FLD-CCNC: 23 U/L — SIGNIFICANT CHANGE UP (ref 0–41)
ANION GAP SERPL CALC-SCNC: 11 MMOL/L — SIGNIFICANT CHANGE UP (ref 7–14)
AST SERPL-CCNC: 29 U/L — SIGNIFICANT CHANGE UP (ref 0–41)
BASOPHILS # BLD AUTO: 0.02 K/UL — SIGNIFICANT CHANGE UP (ref 0–0.2)
BASOPHILS NFR BLD AUTO: 0.3 % — SIGNIFICANT CHANGE UP (ref 0–1)
BILIRUB SERPL-MCNC: 1 MG/DL — SIGNIFICANT CHANGE UP (ref 0.2–1.2)
BUN SERPL-MCNC: 16 MG/DL — SIGNIFICANT CHANGE UP (ref 10–20)
CALCIUM SERPL-MCNC: 9.4 MG/DL — SIGNIFICANT CHANGE UP (ref 8.5–10.1)
CHLORIDE SERPL-SCNC: 105 MMOL/L — SIGNIFICANT CHANGE UP (ref 98–110)
CO2 SERPL-SCNC: 22 MMOL/L — SIGNIFICANT CHANGE UP (ref 17–32)
CREAT SERPL-MCNC: 0.7 MG/DL — SIGNIFICANT CHANGE UP (ref 0.7–1.5)
EOSINOPHIL # BLD AUTO: 0.15 K/UL — SIGNIFICANT CHANGE UP (ref 0–0.7)
EOSINOPHIL NFR BLD AUTO: 2.3 % — SIGNIFICANT CHANGE UP (ref 0–8)
GLUCOSE SERPL-MCNC: 125 MG/DL — HIGH (ref 70–99)
HCT VFR BLD CALC: 42.5 % — SIGNIFICANT CHANGE UP (ref 42–52)
HGB BLD-MCNC: 14 G/DL — SIGNIFICANT CHANGE UP (ref 14–18)
IMM GRANULOCYTES NFR BLD AUTO: 0.5 % — HIGH (ref 0.1–0.3)
LYMPHOCYTES # BLD AUTO: 1.36 K/UL — SIGNIFICANT CHANGE UP (ref 1.2–3.4)
LYMPHOCYTES # BLD AUTO: 20.7 % — SIGNIFICANT CHANGE UP (ref 20.5–51.1)
MCHC RBC-ENTMCNC: 28.9 PG — SIGNIFICANT CHANGE UP (ref 27–31)
MCHC RBC-ENTMCNC: 32.9 G/DL — SIGNIFICANT CHANGE UP (ref 32–37)
MCV RBC AUTO: 87.8 FL — SIGNIFICANT CHANGE UP (ref 80–94)
MONOCYTES # BLD AUTO: 0.6 K/UL — SIGNIFICANT CHANGE UP (ref 0.1–0.6)
MONOCYTES NFR BLD AUTO: 9.1 % — SIGNIFICANT CHANGE UP (ref 1.7–9.3)
NEUTROPHILS # BLD AUTO: 4.41 K/UL — SIGNIFICANT CHANGE UP (ref 1.4–6.5)
NEUTROPHILS NFR BLD AUTO: 67.1 % — SIGNIFICANT CHANGE UP (ref 42.2–75.2)
NRBC # BLD: 0 /100 WBCS — SIGNIFICANT CHANGE UP (ref 0–0)
PLATELET # BLD AUTO: 242 K/UL — SIGNIFICANT CHANGE UP (ref 130–400)
POTASSIUM SERPL-MCNC: 4.5 MMOL/L — SIGNIFICANT CHANGE UP (ref 3.5–5)
POTASSIUM SERPL-SCNC: 4.5 MMOL/L — SIGNIFICANT CHANGE UP (ref 3.5–5)
PROT SERPL-MCNC: 7.2 G/DL — SIGNIFICANT CHANGE UP (ref 6–8)
RAPID RVP RESULT: SIGNIFICANT CHANGE UP
RBC # BLD: 4.84 M/UL — SIGNIFICANT CHANGE UP (ref 4.7–6.1)
RBC # FLD: 12.8 % — SIGNIFICANT CHANGE UP (ref 11.5–14.5)
SARS-COV-2 RNA SPEC QL NAA+PROBE: SIGNIFICANT CHANGE UP
SODIUM SERPL-SCNC: 138 MMOL/L — SIGNIFICANT CHANGE UP (ref 135–146)
TROPONIN T SERPL-MCNC: <0.01 NG/ML — SIGNIFICANT CHANGE UP
WBC # BLD: 6.57 K/UL — SIGNIFICANT CHANGE UP (ref 4.8–10.8)
WBC # FLD AUTO: 6.57 K/UL — SIGNIFICANT CHANGE UP (ref 4.8–10.8)

## 2020-10-23 PROCEDURE — 99220: CPT | Mod: 25

## 2020-10-23 PROCEDURE — 93010 ELECTROCARDIOGRAM REPORT: CPT

## 2020-10-23 PROCEDURE — 71045 X-RAY EXAM CHEST 1 VIEW: CPT | Mod: 26

## 2020-10-23 PROCEDURE — 12001 RPR S/N/AX/GEN/TRNK 2.5CM/<: CPT

## 2020-10-23 RX ORDER — TETANUS TOXOID, REDUCED DIPHTHERIA TOXOID AND ACELLULAR PERTUSSIS VACCINE, ADSORBED 5; 2.5; 8; 8; 2.5 [IU]/.5ML; [IU]/.5ML; UG/.5ML; UG/.5ML; UG/.5ML
0.5 SUSPENSION INTRAMUSCULAR ONCE
Refills: 0 | Status: COMPLETED | OUTPATIENT
Start: 2020-10-23 | End: 2020-10-23

## 2020-10-23 RX ORDER — KETOROLAC TROMETHAMINE 30 MG/ML
15 SYRINGE (ML) INJECTION ONCE
Refills: 0 | Status: DISCONTINUED | OUTPATIENT
Start: 2020-10-23 | End: 2020-10-23

## 2020-10-23 RX ORDER — ASPIRIN/CALCIUM CARB/MAGNESIUM 324 MG
162 TABLET ORAL ONCE
Refills: 0 | Status: COMPLETED | OUTPATIENT
Start: 2020-10-23 | End: 2020-10-23

## 2020-10-23 RX ORDER — METHOCARBAMOL 500 MG/1
1500 TABLET, FILM COATED ORAL ONCE
Refills: 0 | Status: COMPLETED | OUTPATIENT
Start: 2020-10-23 | End: 2020-10-23

## 2020-10-23 RX ADMIN — Medication 15 MILLIGRAM(S): at 23:35

## 2020-10-23 RX ADMIN — Medication 162 MILLIGRAM(S): at 20:51

## 2020-10-23 RX ADMIN — TETANUS TOXOID, REDUCED DIPHTHERIA TOXOID AND ACELLULAR PERTUSSIS VACCINE, ADSORBED 0.5 MILLILITER(S): 5; 2.5; 8; 8; 2.5 SUSPENSION INTRAMUSCULAR at 20:58

## 2020-10-23 NOTE — ED CDU PROVIDER DISPOSITION NOTE - CARE PROVIDER_API CALL
Andry Bustillos (DO)  Medicine  Physicians  [address] [place] [address]  Phone: [phone]  Fax: [phone]  Follow Up Time: 1-3 Days    Gonzalo Ervin (MD)  Cardiovascular Disease; Internal Medicine; Interventional Cardiology  [address] [place] [address]  Phone: [phone]  Fax: [phone]  Follow Up Time: 1-3 Days

## 2020-10-23 NOTE — ED CDU PROVIDER DISPOSITION NOTE - CARE PROVIDERS DIRECT ADDRESSES
[email or web address]

## 2020-10-23 NOTE — ED CDU PROVIDER INITIAL DAY NOTE - PHYSICAL EXAMINATION
CONSTITUTIONAL: Well-appearing; well-nourished; in no apparent distress.   EYES: PERRL; EOM intact.   CARDIOVASCULAR: Normal S1, S2; no murmurs, rubs, or gallops.   RESPIRATORY: Normal chest excursion with respiration; breath sounds clear and equal bilaterally; no wheezes, rhonchi, or rales.  GI/: Normal bowel sounds; non-distended; non-tender; no palpable organomegaly.   MS: No calf swelling and tenderness.  SKIN: left 2nd finger wound covered in clean dry dressing.   NEURO/PSYCH: A & O x 4; grossly unremarkable.

## 2020-10-23 NOTE — ED PROVIDER NOTE - PHYSICAL EXAMINATION
CONSTITUTIONAL: Well-developed; well-nourished; in no acute distress.   SKIN: warm, dry. 2cm superficial laceration to radial aspect of L. 2nd digit.   HEAD: Normocephalic; atraumatic.  EYES: PERRL, EOMI, no conjunctival erythema  ENT: No nasal discharge; airway clear.  NECK: Supple; non tender.  CARD: S1, S2 normal; no murmurs, gallops, or rubs. Regular rate and rhythm.   RESP: No wheezes, rales or rhonchi.  ABD: soft ntnd  EXT: Normal ROM.  No clubbing, cyanosis or edema.   LYMPH: No acute cervical adenopathy.  NEURO: Alert, oriented, grossly unremarkable  PSYCH: Cooperative, appropriate.

## 2020-10-23 NOTE — ED CDU PROVIDER INITIAL DAY NOTE - OBJECTIVE STATEMENT
66 yo male hx of HTN/HLD/DM/mild CAD placed in obs for ACS workup. patient came to ED with mild chest pain and laceration of left index finger earlier today. reported more and sharp chest pain during laceration repair while he was in ED. denies diaphoresis/weakness and sob assoc with chest pain. denies chest pain now in EDOU. Denies exogenous hormone use/recent hospitalization/hx of DVT. denies recent illness/fever/chill/HA/dizziness/sob/abd pain/n/v/d/urinary sxs.     As per old chart, patient had abnormal stress in March 2019 and had cardiac cath on 04/01 which found mild CAD. admitted he hasn't followed up with cardiologist since then.

## 2020-10-23 NOTE — ED PROVIDER NOTE - CLINICAL SUMMARY MEDICAL DECISION MAKING FREE TEXT BOX
65M with PMH HTN, HLD, T2DM, previous smoker, presents to [facility] for non-exertional chest pain for 4 days, non-radiating, with no exacerbating or alleviating factors. He denies any recent stress tests, CCTA, or catheterizations. Labs and imaging reviewed. EKG no ischemic changes, CXR clear, trop neg x1, and patient placed in observation for nuclear stress test.

## 2020-10-23 NOTE — ED PROVIDER NOTE - CCCP TRG CHIEF CMPLNT
27 yo female c hx of schizophrenia, autism presents to ED accompanied by mother c/o right knee pain.  Pt states 3 hours ago heard a "pop" had severe pain and dropped to the floor.  Pt has had similar pain in her left knee, states her  knee caps are "sensitive."  Pt denies any other joint pain, fever, cp, sob, abd pain. chest pain

## 2020-10-23 NOTE — ED ADULT NURSE NOTE - CHPI ED NUR SYMPTOMS NEG
no back pain/no chills/no diaphoresis/no shortness of breath/no congestion/no vomiting/no fever/no dizziness/no nausea/no syncope

## 2020-10-23 NOTE — ED CDU PROVIDER DISPOSITION NOTE - SECONDARY DIAGNOSIS.
Laceration of left index finger, foreign body presence unspecified, nail damage status unspecified, initial encounter

## 2020-10-23 NOTE — ED PROVIDER NOTE - OBJECTIVE STATEMENT
65y M w/ PMH of HTN, HLD, DM, and prostate ca in remission presents with substernal CP without radiation for 4 days. Non -exertional. No alleviating/worsening factors. No recent cardiac workup/surveillance. Also suffered laceration to L. 2nd digit with  earlier today. Denies fever, chills, cough, sore throat, SOB, abd pain, n/v/d, or numbness/tingling.

## 2020-10-23 NOTE — ED PROVIDER NOTE - ATTENDING CONTRIBUTION TO CARE
65M with PMH HTN, HLD, T2DM, previous smoker. I personally evaluated the patient. I reviewed the Resident’s or Physician Assistant’s note (as assigned above), and agree with the findings and plan except as documented in my note.

## 2020-10-23 NOTE — ED CDU PROVIDER INITIAL DAY NOTE - NS_HEARTSCLOWRISKCHEST_ED_A_ED
Nursing Note by Maribell Deleon at 06/23/18 10:08 AM     Author:  Maribell Deleon Service:  (none) Author Type:       Filed:  06/23/18 10:09 AM Encounter Date:  6/1/2018 Status:  Signed     :  Maribell Deleon ()            Received signed forms from Dr. Mukherjee and scanned into folder[LA1.1M]      Revision History        User Key Date/Time User Provider Type Action    > LA1.1 06/23/18 10:09 AM Maribell Deleon  Sign    M - Manual             Launch Heart Score Calculator…

## 2020-10-23 NOTE — ED CDU PROVIDER DISPOSITION NOTE - PATIENT PORTAL LINK FT
You can access the FollowMyHealth Patient Portal offered by [facility] by registering at the following website: [email or web address]. By joining [employer]’s FollowMyHealth portal, you will also be able to view your health information using other applications (apps) compatible with our system.

## 2020-10-23 NOTE — ED ADULT NURSE NOTE - OBJECTIVE STATEMENT
patient c/o chest pain denies radiation. denies fever/chills denies n/v/d pt also c/o  left 2nd finger laceration.

## 2020-10-23 NOTE — ED CDU PROVIDER DISPOSITION NOTE - CLINICAL COURSE
pt p/w CP and finger laceration. lac repaired, wound care instructions provided, f/u 10-14days for suture removal. CP, ekg/trop x 2, stress test neg. pt asymptomatic. dc home w cards f/u 1-2 weeks, strict return precautions provided.

## 2020-10-23 NOTE — ED CDU PROVIDER DISPOSITION NOTE - NSFOLLOWUPINSTRUCTIONS_ED_ALL_ED_FT
Laceration    Sutures will need to be removed in 10 days.    WHAT YOU NEED TO KNOW:    A laceration is an injury to the skin and the soft tissue underneath it. Lacerations happen when you are cut or hit by something. They can happen anywhere on the body.     DISCHARGE INSTRUCTIONS:    Return to the emergency department if:   You have heavy bleeding or bleeding that does not stop after 10 minutes of holding firm, direct pressure over the wound.   Your wound opens up.     Contact your healthcare provider if:   You have a fever or chills.   Your laceration is red, warm, or swollen.  You have red streaks on your skin coming from your wound.  You have white or yellow drainage from the wound that smells bad.  You have pain that gets worse, even after treatment.   You have questions or concerns about your condition or care.     Medicines:   Prescription pain medicine may be given. Ask how to take this medicine safely.   Antibiotics help treat or prevent a bacterial infection.     Take your medicine as directed. Contact your healthcare provider if you think your medicine is not helping or if you have side effects. Tell him or her if you are allergic to any medicine. Keep a list of the medicines, vitamins, and herbs you take. Include the amounts, and when and why you take them. Bring the list or the pill bottles to follow-up visits. Carry your medicine list with you in case of an emergency.    Care for your wound as directed:     Do not get your wound wet until your healthcare provider says it is okay. Do not soak your wound in water. Do not go swimming until your healthcare provider says it is okay. Carefully wash the wound with soap and water. Gently pat the area dry or allow it to air dry.     Change your bandages when they get wet, dirty, or after washing. Apply new, clean bandages as directed. Do not apply elastic bandages or tape too tight. Do not put powders or lotions over your incision.     Apply antibiotic ointment as directed. Your healthcare provider may give you antibiotic ointment to put over your wound if you have stitches. If you have strips of tape over your incision, let them dry up and fall off on their own. If they do not fall off within 14 days, gently remove them. If you have glue over your wound, do not remove or pick at it. If your glue comes off, do not replace it with glue that you have at home.       Check your wound every day for signs of infection such as swelling, redness, or pus.     Self-care:     Apply ice on your wound for 15 to 20 minutes every hour or as directed. Use an ice pack, or put crushed ice in a plastic bag. Cover it with a towel. Ice helps prevent tissue damage and decreases swelling and pain.    Use a splint as directed. A splint will decrease movement and stress on your wound. It may help it heal faster. A splint may be used for lacerations over joints or areas of your body that bend. Ask your healthcare provider how to apply and remove a splint.     Decrease scarring of your wound by applying ointments as directed. Do not apply ointments until your healthcare provider says it is okay. You may need to wait until your wound is healed. Ask which ointment to buy and how often to use it. After your wound is healed, use sunscreen over the area when you are out in the sun. You should do this for at least 6 months to 1 year after your injury.     Follow up with your healthcare provider as directed: You may need to follow up in 24 to 48 hours to have your wound checked for infection. You will need to return in 3 to 14 days if you have stitches or staples so they can be removed. Care for your wound as directed to prevent infection and help it heal. Write down your questions so you remember to ask them during your visits. Laceration    Sutures will need to be removed in 10 days.    WHAT YOU NEED TO KNOW:    A laceration is an injury to the skin and the soft tissue underneath it. Lacerations happen when you are cut or hit by something. They can happen anywhere on the body.     DISCHARGE INSTRUCTIONS:    Return to the emergency department if:   You have heavy bleeding or bleeding that does not stop after 10 minutes of holding firm, direct pressure over the wound.   Your wound opens up.     Contact your healthcare provider if:   You have a fever or chills.   Your laceration is red, warm, or swollen.  You have red streaks on your skin coming from your wound.  You have white or yellow drainage from the wound that smells bad.  You have pain that gets worse, even after treatment.   You have questions or concerns about your condition or care.     Medicines:   Prescription pain medicine may be given. Ask how to take this medicine safely.   Antibiotics help treat or prevent a bacterial infection.     Take your medicine as directed. Contact your healthcare provider if you think your medicine is not helping or if you have side effects. Tell him or her if you are allergic to any medicine. Keep a list of the medicines, vitamins, and herbs you take. Include the amounts, and when and why you take them. Bring the list or the pill bottles to follow-up visits. Carry your medicine list with you in case of an emergency.    Care for your wound as directed:     Do not get your wound wet until your healthcare provider says it is okay. Do not soak your wound in water. Do not go swimming until your healthcare provider says it is okay. Carefully wash the wound with soap and water. Gently pat the area dry or allow it to air dry.     Change your bandages when they get wet, dirty, or after washing. Apply new, clean bandages as directed. Do not apply elastic bandages or tape too tight. Do not put powders or lotions over your incision.     Apply antibiotic ointment as directed. Your healthcare provider may give you antibiotic ointment to put over your wound if you have stitches. If you have strips of tape over your incision, let them dry up and fall off on their own. If they do not fall off within 14 days, gently remove them. If you have glue over your wound, do not remove or pick at it. If your glue comes off, do not replace it with glue that you have at home.       Check your wound every day for signs of infection such as swelling, redness, or pus.     Self-care:     Apply ice on your wound for 15 to 20 minutes every hour or as directed. Use an ice pack, or put crushed ice in a plastic bag. Cover it with a towel. Ice helps prevent tissue damage and decreases swelling and pain.    Use a splint as directed. A splint will decrease movement and stress on your wound. It may help it heal faster. A splint may be used for lacerations over joints or areas of your body that bend. Ask your healthcare provider how to apply and remove a splint.     Decrease scarring of your wound by applying ointments as directed. Do not apply ointments until your healthcare provider says it is okay. You may need to wait until your wound is healed. Ask which ointment to buy and how often to use it. After your wound is healed, use sunscreen over the area when you are out in the sun. You should do this for at least 6 months to 1 year after your injury.     Follow up with your healthcare provider as directed: You may need to follow up in 24 to 48 hours to have your wound checked for infection. You will need to return in 3 to 14 days if you have stitches or staples so they can be removed. Care for your wound as directed to prevent infection and help it heal. Write down your questions so you remember to ask them during your visits.    Chest Pain    Chest pain can be caused by many different conditions which may or may not be dangerous. Causes include heartburn, lung infections, heart attack, blood clot in lungs, skin infections, strain or damage to muscle, cartilage, or bones, etc. In addition to a history and physical examination, an electrocardiogram (ECG) or other lab tests may have been performed to determine the cause of your chest pain. Follow up with your primary care provider or with a cardiologist as instructed.     SEEK IMMEDIATE MEDICAL CARE IF YOU HAVE ANY OF THE FOLLOWING SYMPTOMS: worsening chest pain, coughing up blood, unexplained back/neck/jaw pain, severe abdominal pain, dizziness or lightheadedness, fainting, shortness of breath, sweaty or clammy skin, vomiting, or racing heart beat. These symptoms may represent a serious problem that is an emergency. Do not wait to see if the symptoms will go away. Get medical help right away. Call 911 and do not drive yourself to the hospital.

## 2020-10-23 NOTE — ED CDU PROVIDER INITIAL DAY NOTE - ATTENDING CONTRIBUTION TO CARE
Pt reports three days of intermittent lower chest pain that occurs with movement. Pt was working in [employer] when he cut his left hand yesterday and presented to the ED for laceration repair. On ROS was found to have chest pain and placed into observation for evaluation. On exam S1S12 RRR, lungs clear, abdomen is soft and diffusely tender, no rash, no swelling or tenderness of the lower extremity. + HTN, +DM, +elevated cholesterol.

## 2020-10-23 NOTE — ED PROVIDER NOTE - NS ED ROS FT
Eyes:  No visual changes, eye pain or discharge.  ENMT:  No hearing changes, pain, no sore throat or runny nose, no difficulty swallowing  Cardiac:  No SOB or edema. No chest pain with exertion. + chest pain  Respiratory:  No cough or respiratory distress. No hemoptysis. No history of asthma or RAD.  GI:  No nausea, vomiting, diarrhea or abdominal pain.  :  No dysuria, frequency or burning.  MS:  No myalgia, muscle weakness, joint pain or back pain.  Neuro:  No headache or weakness.  No LOC.  Skin:  No skin rash. + laceration  Endocrine: + history of diabetes.

## 2020-10-23 NOTE — ED CDU PROVIDER DISPOSITION NOTE - PROVIDER TOKENS
PROVIDER:[TOKEN:[[id number]],FOLLOWUP:[1-3 Days]],PROVIDER:[TOKEN:[[id number]],FOLLOWUP:[1-3 Days]]

## 2020-10-24 VITALS
DIASTOLIC BLOOD PRESSURE: 80 MMHG | SYSTOLIC BLOOD PRESSURE: 135 MMHG | HEART RATE: 72 BPM | OXYGEN SATURATION: 99 % | RESPIRATION RATE: 17 BRPM

## 2020-10-24 LAB
LIDOCAIN IGE QN: 28 U/L — SIGNIFICANT CHANGE UP (ref 7–60)
SARS-COV-2 RNA SPEC QL NAA+PROBE: SIGNIFICANT CHANGE UP
TROPONIN T SERPL-MCNC: <0.01 NG/ML — SIGNIFICANT CHANGE UP

## 2020-10-24 PROCEDURE — 93016 CV STRESS TEST SUPVJ ONLY: CPT

## 2020-10-24 PROCEDURE — 93018 CV STRESS TEST I&R ONLY: CPT

## 2020-10-24 PROCEDURE — 74177 CT ABD & PELVIS W/CONTRAST: CPT | Mod: 26

## 2020-10-24 PROCEDURE — 78452 HT MUSCLE IMAGE SPECT MULT: CPT | Mod: 26

## 2020-10-24 PROCEDURE — 93010 ELECTROCARDIOGRAM REPORT: CPT

## 2020-10-24 RX ADMIN — Medication 15 MILLIGRAM(S): at 00:05

## 2020-10-24 NOTE — ED CDU PROVIDER SUBSEQUENT DAY NOTE - ATTENDING CONTRIBUTION TO CARE
Pt seen on rounds this am for chest pain. Found to have abdominal tenderness. Will add CT scan, lipase and stress testing.

## 2020-10-24 NOTE — ED CDU PROVIDER SUBSEQUENT DAY NOTE - PROGRESS NOTE DETAILS
Patient with abdominal tenderness to palpation. CT scan ordered. Repeat trop, EKG and lipase ordered, will continue to monitor. Patient had nuclear stress test, awaiting results

## 2020-11-20 ENCOUNTER — APPOINTMENT (OUTPATIENT)
Dept: OPHTHALMOLOGY | Facility: CLINIC | Age: 65
End: 2020-11-20

## 2021-02-24 ENCOUNTER — EMERGENCY (EMERGENCY)
Facility: HOSPITAL | Age: 66
LOS: 0 days | Discharge: HOME | End: 2021-02-25
Attending: EMERGENCY MEDICINE | Admitting: EMERGENCY MEDICINE
Payer: MEDICARE

## 2021-02-24 VITALS
DIASTOLIC BLOOD PRESSURE: 73 MMHG | HEIGHT: 67 IN | SYSTOLIC BLOOD PRESSURE: 136 MMHG | RESPIRATION RATE: 18 BRPM | TEMPERATURE: 99 F | HEART RATE: 79 BPM | WEIGHT: 212.53 LBS | OXYGEN SATURATION: 95 %

## 2021-02-24 DIAGNOSIS — E11.9 TYPE 2 DIABETES MELLITUS WITHOUT COMPLICATIONS: ICD-10-CM

## 2021-02-24 DIAGNOSIS — H60.92 UNSPECIFIED OTITIS EXTERNA, LEFT EAR: ICD-10-CM

## 2021-02-24 DIAGNOSIS — Z96.642 PRESENCE OF LEFT ARTIFICIAL HIP JOINT: Chronic | ICD-10-CM

## 2021-02-24 DIAGNOSIS — Z79.899 OTHER LONG TERM (CURRENT) DRUG THERAPY: ICD-10-CM

## 2021-02-24 DIAGNOSIS — E78.5 HYPERLIPIDEMIA, UNSPECIFIED: ICD-10-CM

## 2021-02-24 DIAGNOSIS — Z79.84 LONG TERM (CURRENT) USE OF ORAL HYPOGLYCEMIC DRUGS: ICD-10-CM

## 2021-02-24 DIAGNOSIS — Z98.890 OTHER SPECIFIED POSTPROCEDURAL STATES: Chronic | ICD-10-CM

## 2021-02-24 DIAGNOSIS — Z20.822 CONTACT WITH AND (SUSPECTED) EXPOSURE TO COVID-19: ICD-10-CM

## 2021-02-24 DIAGNOSIS — H92.09 OTALGIA, UNSPECIFIED EAR: ICD-10-CM

## 2021-02-24 DIAGNOSIS — C61 MALIGNANT NEOPLASM OF PROSTATE: Chronic | ICD-10-CM

## 2021-02-24 DIAGNOSIS — I10 ESSENTIAL (PRIMARY) HYPERTENSION: ICD-10-CM

## 2021-02-24 PROCEDURE — 99285 EMERGENCY DEPT VISIT HI MDM: CPT

## 2021-02-25 VITALS
SYSTOLIC BLOOD PRESSURE: 140 MMHG | OXYGEN SATURATION: 97 % | TEMPERATURE: 99 F | DIASTOLIC BLOOD PRESSURE: 76 MMHG | RESPIRATION RATE: 18 BRPM | HEART RATE: 82 BPM

## 2021-02-25 LAB
ALBUMIN SERPL ELPH-MCNC: 4.6 G/DL — SIGNIFICANT CHANGE UP (ref 3.5–5.2)
ALP SERPL-CCNC: 76 U/L — SIGNIFICANT CHANGE UP (ref 30–115)
ALT FLD-CCNC: 23 U/L — SIGNIFICANT CHANGE UP (ref 0–41)
ANION GAP SERPL CALC-SCNC: 10 MMOL/L — SIGNIFICANT CHANGE UP (ref 7–14)
AST SERPL-CCNC: 33 U/L — SIGNIFICANT CHANGE UP (ref 0–41)
BASOPHILS # BLD AUTO: 0.04 K/UL — SIGNIFICANT CHANGE UP (ref 0–0.2)
BASOPHILS NFR BLD AUTO: 0.6 % — SIGNIFICANT CHANGE UP (ref 0–1)
BILIRUB SERPL-MCNC: 1.2 MG/DL — SIGNIFICANT CHANGE UP (ref 0.2–1.2)
BUN SERPL-MCNC: 11 MG/DL — SIGNIFICANT CHANGE UP (ref 10–20)
CALCIUM SERPL-MCNC: 9.1 MG/DL — SIGNIFICANT CHANGE UP (ref 8.5–10.1)
CHLORIDE SERPL-SCNC: 101 MMOL/L — SIGNIFICANT CHANGE UP (ref 98–110)
CO2 SERPL-SCNC: 22 MMOL/L — SIGNIFICANT CHANGE UP (ref 17–32)
CREAT SERPL-MCNC: 0.9 MG/DL — SIGNIFICANT CHANGE UP (ref 0.7–1.5)
EOSINOPHIL # BLD AUTO: 0.24 K/UL — SIGNIFICANT CHANGE UP (ref 0–0.7)
EOSINOPHIL NFR BLD AUTO: 3.5 % — SIGNIFICANT CHANGE UP (ref 0–8)
GLUCOSE SERPL-MCNC: 145 MG/DL — HIGH (ref 70–99)
HCT VFR BLD CALC: 39.3 % — LOW (ref 42–52)
HGB BLD-MCNC: 13.4 G/DL — LOW (ref 14–18)
IMM GRANULOCYTES NFR BLD AUTO: 0.6 % — HIGH (ref 0.1–0.3)
LACTATE SERPL-SCNC: 2.3 MMOL/L — HIGH (ref 0.7–2)
LYMPHOCYTES # BLD AUTO: 1.66 K/UL — SIGNIFICANT CHANGE UP (ref 1.2–3.4)
LYMPHOCYTES # BLD AUTO: 24.1 % — SIGNIFICANT CHANGE UP (ref 20.5–51.1)
MAGNESIUM SERPL-MCNC: 2.2 MG/DL — SIGNIFICANT CHANGE UP (ref 1.8–2.4)
MCHC RBC-ENTMCNC: 29.3 PG — SIGNIFICANT CHANGE UP (ref 27–31)
MCHC RBC-ENTMCNC: 34.1 G/DL — SIGNIFICANT CHANGE UP (ref 32–37)
MCV RBC AUTO: 86 FL — SIGNIFICANT CHANGE UP (ref 80–94)
MONOCYTES # BLD AUTO: 0.63 K/UL — HIGH (ref 0.1–0.6)
MONOCYTES NFR BLD AUTO: 9.1 % — SIGNIFICANT CHANGE UP (ref 1.7–9.3)
NEUTROPHILS # BLD AUTO: 4.28 K/UL — SIGNIFICANT CHANGE UP (ref 1.4–6.5)
NEUTROPHILS NFR BLD AUTO: 62.1 % — SIGNIFICANT CHANGE UP (ref 42.2–75.2)
NRBC # BLD: 0 /100 WBCS — SIGNIFICANT CHANGE UP (ref 0–0)
PLATELET # BLD AUTO: 238 K/UL — SIGNIFICANT CHANGE UP (ref 130–400)
POTASSIUM SERPL-MCNC: 5.1 MMOL/L — HIGH (ref 3.5–5)
POTASSIUM SERPL-SCNC: 5.1 MMOL/L — HIGH (ref 3.5–5)
PROT SERPL-MCNC: 7.5 G/DL — SIGNIFICANT CHANGE UP (ref 6–8)
RBC # BLD: 4.57 M/UL — LOW (ref 4.7–6.1)
RBC # FLD: 12.8 % — SIGNIFICANT CHANGE UP (ref 11.5–14.5)
SARS-COV-2 RNA SPEC QL NAA+PROBE: SIGNIFICANT CHANGE UP
SODIUM SERPL-SCNC: 133 MMOL/L — LOW (ref 135–146)
TROPONIN T SERPL-MCNC: <0.01 NG/ML — SIGNIFICANT CHANGE UP
WBC # BLD: 6.89 K/UL — SIGNIFICANT CHANGE UP (ref 4.8–10.8)
WBC # FLD AUTO: 6.89 K/UL — SIGNIFICANT CHANGE UP (ref 4.8–10.8)

## 2021-02-25 PROCEDURE — 70450 CT HEAD/BRAIN W/O DYE: CPT | Mod: 26,59

## 2021-02-25 PROCEDURE — 93010 ELECTROCARDIOGRAM REPORT: CPT

## 2021-02-25 PROCEDURE — 70498 CT ANGIOGRAPHY NECK: CPT | Mod: 26

## 2021-02-25 PROCEDURE — 71045 X-RAY EXAM CHEST 1 VIEW: CPT | Mod: 26

## 2021-02-25 PROCEDURE — 70496 CT ANGIOGRAPHY HEAD: CPT | Mod: 26

## 2021-02-25 RX ORDER — MOXIFLOXACIN HYDROCHLORIDE TABLETS, 400 MG 400 MG/1
1 TABLET, FILM COATED ORAL
Qty: 20 | Refills: 0
Start: 2021-02-25 | End: 2021-03-06

## 2021-02-25 RX ORDER — MORPHINE SULFATE 50 MG/1
6 CAPSULE, EXTENDED RELEASE ORAL ONCE
Refills: 0 | Status: DISCONTINUED | OUTPATIENT
Start: 2021-02-25 | End: 2021-02-25

## 2021-02-25 RX ORDER — CIPROFLOXACIN AND DEXAMETHASONE 3; 1 MG/ML; MG/ML
4 SUSPENSION/ DROPS AURICULAR (OTIC)
Qty: 7.5 | Refills: 0
Start: 2021-02-25 | End: 2021-03-06

## 2021-02-25 RX ADMIN — MORPHINE SULFATE 6 MILLIGRAM(S): 50 CAPSULE, EXTENDED RELEASE ORAL at 01:20

## 2021-02-25 NOTE — ED PROVIDER NOTE - NS ED ROS FT
Constitutional: no fever, chills, no recent weight loss, change in appetite or malaise  Eyes: no redness/discharge/pain/vision changes  ENT: no rhinorrhea/sore throat  Cardiac: No chest pain, SOB or edema.  Respiratory: No cough or respiratory distress  GI: No nausea, vomiting, diarrhea or abdominal pain.  : No dysuria, frequency, urgency or hematuria  MS: no pain to back or extremities, no loss of ROM, no weakness  Neuro: No weakness.   Skin: No skin rash.  Except as documented in the HPI, all other systems are negative.

## 2021-02-25 NOTE — ED PROVIDER NOTE - ATTENDING CONTRIBUTION TO CARE
I personally evaluated the patient. I reviewed the Resident’s or Physician Assistant’s note (as assigned above), and agree with the findings and plan except as documented in my note.  65 M with hx of HTN, DM, HLD and prostate ca in remission with complaints of left sided ear pain with associated left facial pain. Symptoms have been ongoing for the past 10 days. Pt reports that 2 weeks ago he started using an "easy swab" ear wax removal. Denies any fever, no drainage from the ear. No sore throat, nasal congestion, coughing, CP, SOB. VS reviewed, pt non-toxic appearing, NAD. Head ncat, MMM, + ttp with movement of the left tragus, + painful otoscopic exam, TM difficult to visualize, + ttp overlying the left mastoid bone and down to the left neck, no appreciable cervical lymphadenopathy, neck supple, no midline ttp, normal ROM, normal s1s2 without any murmurs, Lungs CTAB with normal work of breathing. abd +BS, s/nd/nt, extremities wnl, neuro exam grossly normal. No acute skin rashes. Plan is labs, imaging, pain control and reassess.

## 2021-02-25 NOTE — ED PROVIDER NOTE - SEPSIS ALERT QUESTION 2
----- Message from Peyman Encarnacion MD sent at 12/7/2017  8:02 AM EST -----  Call the patient with this report.    MRI of the left shoulder shows a contusion versus tendinitis but no rotator cuff tear with treatment with Advil and cold compresses is or has been ineffective then referral to orthopedics would be advised.  If you need or requests a referral then let us know and we will enter it.   This patient was evaluated for sepsis.  At this time, a diagnosis of sepsis is not supported by the overall clinical picture.

## 2021-02-25 NOTE — ED PROVIDER NOTE - PHYSICAL EXAMINATION
CONSTITUTIONAL: Well-appearing; well-nourished; in no apparent distress.   EYES: PERRL; EOM intact.   ENT: left ear with mild canal swelling, ttp and some whitish debris suggestive of OE; normal nose; no rhinorrhea; normal pharynx with no tonsillar hypertrophy.   NECK: Supple; non-tender; no cervical lymphadenopathy. No JVD.   CARDIOVASCULAR: Normal S1, S2; no murmurs, rubs, or gallops.   RESPIRATORY: Normal chest excursion with respiration; breath sounds clear and equal bilaterally; no wheezes, rhonchi, or rales.  MS: No evidence of trauma or deformity. Normal ROM in all four extremities; non-tender to palpation; distal pulses are normal.   SKIN: Normal for age and race; warm; dry; good turgor; no apparent lesions or exudate.   NEURO/PSYCH: A & O x 4; grossly unremarkable. mood and manner are appropriate.

## 2021-02-25 NOTE — ED PROVIDER NOTE - PROGRESS NOTE DETAILS
exam suggestive of OE, however given pts age and med hx, reported syncope 6 days ago as well as dizziness and balance issues with left sided HA- labs, imaging ordered. Stanley- Patient endorsed to me by YANIRA Baird pending imaging studies. CTAs/CTH negative for acute findings. Patient with left sided otitis externa, unable to visualize TM 2/2 wax. Do not have ear curette in ED/peroxide to remove wax and patient is very sensitive with ear manipulation. Given Ciprodex drops, Cipro PO, and ENT f/u. Strict return precaution signs/sxs discussed. Otherwise asymptomatic with no acute complaints. VS stable. Full DC instructions and precaution signs and symptoms discussed. Proper follow up ensured.  Medications administered and prescribed/OTC home meds discussed.  All questions and concerns from patient addressed.  Understanding of instructions verbalized.

## 2021-02-25 NOTE — ED PROVIDER NOTE - PATIENT PORTAL LINK FT
You can access the FollowMyHealth Patient Portal offered by [facility] by registering at the following website: [email or web address]. By joining [employer]’s FollowMyHealth portal, you will also be able to view your health information using other applications (apps) compatible with our system.

## 2021-02-25 NOTE — ED PROVIDER NOTE - NSFOLLOWUPCLINICS_GEN_ALL_ED_FT
[facility]  ENT  [address], [address] [place] [address]  Phone: [phone]  Fax:   Follow Up Time: 1-3 Days

## 2021-02-25 NOTE — ED PROVIDER NOTE - OBJECTIVE STATEMENT
pt with pmhx HTN, DM, prostate CA presents to the ED reporting pain to left ear/side of face for 1-2 weeks associated with dizziness, episodes of balance disturbance and one syncopal episode 6 days ago. pain is sharp, nonradiating, moderate. denies exacerbating or relieving factors. Denies fever/chill/chest pain/palpitation/sob/abd pain/n/v/d/ black stool/bloody stool/urinary sxs

## 2021-02-25 NOTE — ED PROVIDER NOTE - CLINICAL SUMMARY MEDICAL DECISION MAKING FREE TEXT BOX
Authored by Dr. Vonda Arias: pt s/o to me by Dr. Fenton - 65M h/o dm, htn, hl, prostate CA s/p seeds p/w L ear pain x 1.5 wks after trauma from OTC ear wax remover, accomp by intermitt dizziness & syncope 1 week ago in the context of L ear pain, likely dx OE, ekg no acute ischemia, labs wnl, s/o to me pending CTH/CTA head/neck which showed no acute findings, no signs of mastoiditis - pt reassessed, all results discussed & copies given, po/ggt abx for OE, return precautions discussed, close op ENT f/u

## 2021-03-26 ENCOUNTER — EMERGENCY (EMERGENCY)
Facility: HOSPITAL | Age: 66
LOS: 0 days | Discharge: HOME | End: 2021-03-26
Attending: EMERGENCY MEDICINE | Admitting: EMERGENCY MEDICINE
Payer: MEDICARE

## 2021-03-26 VITALS
HEART RATE: 96 BPM | TEMPERATURE: 99 F | SYSTOLIC BLOOD PRESSURE: 149 MMHG | WEIGHT: 210.1 LBS | RESPIRATION RATE: 16 BRPM | OXYGEN SATURATION: 98 % | DIASTOLIC BLOOD PRESSURE: 76 MMHG | HEIGHT: 67 IN

## 2021-03-26 DIAGNOSIS — Z79.2 LONG TERM (CURRENT) USE OF ANTIBIOTICS: ICD-10-CM

## 2021-03-26 DIAGNOSIS — I10 ESSENTIAL (PRIMARY) HYPERTENSION: ICD-10-CM

## 2021-03-26 DIAGNOSIS — E78.5 HYPERLIPIDEMIA, UNSPECIFIED: ICD-10-CM

## 2021-03-26 DIAGNOSIS — E11.9 TYPE 2 DIABETES MELLITUS WITHOUT COMPLICATIONS: ICD-10-CM

## 2021-03-26 DIAGNOSIS — Z96.642 PRESENCE OF LEFT ARTIFICIAL HIP JOINT: Chronic | ICD-10-CM

## 2021-03-26 DIAGNOSIS — R31.9 HEMATURIA, UNSPECIFIED: ICD-10-CM

## 2021-03-26 DIAGNOSIS — C61 MALIGNANT NEOPLASM OF PROSTATE: Chronic | ICD-10-CM

## 2021-03-26 DIAGNOSIS — Z79.899 OTHER LONG TERM (CURRENT) DRUG THERAPY: ICD-10-CM

## 2021-03-26 DIAGNOSIS — Z98.890 OTHER SPECIFIED POSTPROCEDURAL STATES: Chronic | ICD-10-CM

## 2021-03-26 DIAGNOSIS — N39.0 URINARY TRACT INFECTION, SITE NOT SPECIFIED: ICD-10-CM

## 2021-03-26 DIAGNOSIS — Z79.01 LONG TERM (CURRENT) USE OF ANTICOAGULANTS: ICD-10-CM

## 2021-03-26 LAB
APPEARANCE UR: ABNORMAL
BACTERIA # UR AUTO: ABNORMAL
BILIRUB UR-MCNC: NEGATIVE — SIGNIFICANT CHANGE UP
COLOR SPEC: YELLOW — SIGNIFICANT CHANGE UP
DIFF PNL FLD: ABNORMAL
EPI CELLS # UR: 2 /HPF — SIGNIFICANT CHANGE UP (ref 0–5)
GLUCOSE UR QL: NEGATIVE — SIGNIFICANT CHANGE UP
HYALINE CASTS # UR AUTO: 5 /LPF — SIGNIFICANT CHANGE UP (ref 0–7)
KETONES UR-MCNC: NEGATIVE — SIGNIFICANT CHANGE UP
LEUKOCYTE ESTERASE UR-ACNC: ABNORMAL
NITRITE UR-MCNC: POSITIVE
PH UR: 6 — SIGNIFICANT CHANGE UP (ref 5–8)
PROT UR-MCNC: ABNORMAL
RBC CASTS # UR COMP ASSIST: >720 /HPF — HIGH (ref 0–4)
SP GR SPEC: 1.03 — SIGNIFICANT CHANGE UP (ref 1.01–1.03)
UROBILINOGEN FLD QL: ABNORMAL
WBC UR QL: 499 /HPF — HIGH (ref 0–5)

## 2021-03-26 PROCEDURE — 99284 EMERGENCY DEPT VISIT MOD MDM: CPT

## 2021-03-26 RX ORDER — CEFPODOXIME PROXETIL 100 MG
1 TABLET ORAL
Qty: 14 | Refills: 0
Start: 2021-03-26 | End: 2021-04-01

## 2021-03-26 NOTE — ED PROVIDER NOTE - NS ED ROS FT
CONSTITUTIONAL: (-) fevers, (-) chills  GI: (-) nausea, (-) vomiting, (-) diarrhea, (-) constipation, (-) abdominal pain  : see HPI  SKIN: (-) rashes, (-) pallor, (-) jaundice    *all other systems negative except as documented above and in the HPI*

## 2021-03-26 NOTE — ED PROVIDER NOTE - PATIENT PORTAL LINK FT
You can access the FollowMyHealth Patient Portal offered by [facility] by registering at the following website: [email or web address]. By joining [employer]’s FollowMyHealth portal, you will also be able to view your health information using other applications (apps) compatible with our system.

## 2021-03-26 NOTE — ED ADULT TRIAGE NOTE - NS ED TRIAGE AVPU SCALE
Detail Level: Detailed Introduction Text (Please End With A Colon): The following procedure was deferred: Procedure To Be Performed At Next Visit: Biopsy by shave method Instructions (Optional): 8x8cm picture taken today Alert-The patient is alert, awake and responds to voice. The patient is oriented to time, place, and person. The triage nurse is able to obtain subjective information.

## 2021-03-26 NOTE — ED ADULT NURSE NOTE - NSIMPLEMENTINTERV_GEN_ALL_ED
Implemented All Universal Safety Interventions:  [place] to call system. Call bell, personal items and telephone within reach. Instruct patient to call for assistance. Room bathroom lighting operational. Non-slip footwear when patient is off stretcher. Physically safe environment: no spills, clutter or unnecessary equipment. Stretcher in lowest position, wheels locked, appropriate side rails in place.

## 2021-03-26 NOTE — ED PROVIDER NOTE - CLINICAL SUMMARY MEDICAL DECISION MAKING FREE TEXT BOX
hematuria, dysuria after sexual intercourse - gu exam wnl, labs nl, +UTI, STI testing sent - abx, all results d/w pt & copies given, strict return precautions discussed, rec outpt PCP f/u

## 2021-03-26 NOTE — ED PROVIDER NOTE - OBJECTIVE STATEMENT
65 year old male w hx of HTN, DM, prostate CA in remission s/p radiation and resection 5 years ago presents to the ED with intermittent mild dysuria and hematuria x 2 days. Patient states symptoms began after having sexual intercourse, also admits to small amount of clots at the end of his stream. Denies using protection or history of STIs. Denies fevers/chills, chest pain, sob, n/v/d, abd pain, constipation, obstipation, back/flank pain, black/bloody stools, penile dc, scrotal pain/swelling.

## 2021-03-26 NOTE — ED ADULT TRIAGE NOTE - CHIEF COMPLAINT QUOTE
pt was having intercourse last night, reports he "heard a pop while he was going at it". has had hematuria since last night. states a "clot came out of me before", reports pain upon urination.

## 2021-03-26 NOTE — ED PROVIDER NOTE - PHYSICAL EXAMINATION
VITALS:  I have reviewed the initial vital signs.  GENERAL: Well-developed, well-nourished, in no acute distress. Nontoxic.  HEENT: Sclera clear. No conjunctival pallor. EOMI, PERRLA. MMM.   NECK: supple w FROM.   CARDIO: RRR, nl S1 and S2. No murmurs, rubs, or gallops.  PULM: Normal effort. CTA b/l without wheezes, rales, or rhonchi.  MSK: Normal, steady gait.   GI: Normal bowel sounds. Abdomen soft and non-distended. Nontender in all four quadrants without rebound or guarding.   : No CVA tenderness b/l Chaperone Dr. Arias - normal uncircumcised male, no rash, no penile dc, no scrotal pain/swelling, no hernia.  SKIN: Warm, dry. No pallor. No rash.   NEURO: A&Ox3. Speech clear. No focal deficits.  PSYCH: Calm and cooperative.

## 2021-03-26 NOTE — ED PROVIDER NOTE - ATTENDING CONTRIBUTION TO CARE
65M h/o dm, htn, prostate CA s/p resection/RT in remission p/w hematuria & dysuria x 2d. States started after having sexual intercourse, accomp by sm clots @ end of stream. Does not use protection, denies any h/o STIs. Denies f/c, nvd, abd pain, flank pain, testicular pain or swelling.    PE:  middle-aged m nad  skin warm, dry  ncat  neck supple  rrr nl s1s2 no mrg  ctab no wrr  abd soft ntnd no palpable masses no rgr  gu- as per PA note  back non-tender no cvat  ext no cce dpi  neuro aaox3 grossly nf exam

## 2021-03-29 LAB
-  AMIKACIN: SIGNIFICANT CHANGE UP
-  AMOXICILLIN/CLAVULANIC ACID: SIGNIFICANT CHANGE UP
-  AMPICILLIN/SULBACTAM: SIGNIFICANT CHANGE UP
-  AMPICILLIN: SIGNIFICANT CHANGE UP
-  AZTREONAM: SIGNIFICANT CHANGE UP
-  CEFAZOLIN: SIGNIFICANT CHANGE UP
-  CEFEPIME: SIGNIFICANT CHANGE UP
-  CEFOXITIN: SIGNIFICANT CHANGE UP
-  CEFTRIAXONE: SIGNIFICANT CHANGE UP
-  CIPROFLOXACIN: SIGNIFICANT CHANGE UP
-  ERTAPENEM: SIGNIFICANT CHANGE UP
-  GENTAMICIN: SIGNIFICANT CHANGE UP
-  IMIPENEM: SIGNIFICANT CHANGE UP
-  LEVOFLOXACIN: SIGNIFICANT CHANGE UP
-  MEROPENEM: SIGNIFICANT CHANGE UP
-  NITROFURANTOIN: SIGNIFICANT CHANGE UP
-  PIPERACILLIN/TAZOBACTAM: SIGNIFICANT CHANGE UP
-  TIGECYCLINE: SIGNIFICANT CHANGE UP
-  TOBRAMYCIN: SIGNIFICANT CHANGE UP
-  TRIMETHOPRIM/SULFAMETHOXAZOLE: SIGNIFICANT CHANGE UP
C TRACH RRNA SPEC QL NAA+PROBE: SIGNIFICANT CHANGE UP
CULTURE RESULTS: SIGNIFICANT CHANGE UP
METHOD TYPE: SIGNIFICANT CHANGE UP
N GONORRHOEA RRNA SPEC QL NAA+PROBE: SIGNIFICANT CHANGE UP
ORGANISM # SPEC MICROSCOPIC CNT: SIGNIFICANT CHANGE UP
ORGANISM # SPEC MICROSCOPIC CNT: SIGNIFICANT CHANGE UP
SPECIMEN SOURCE: SIGNIFICANT CHANGE UP
SPECIMEN SOURCE: SIGNIFICANT CHANGE UP

## 2021-04-02 ENCOUNTER — APPOINTMENT (OUTPATIENT)
Dept: OPHTHALMOLOGY | Facility: CLINIC | Age: 66
End: 2021-04-02

## 2021-04-20 NOTE — ED ADULT NURSE NOTE - ED STAT RN HANDOFF WHERE
3b Cyclophosphamide Counseling:  I discussed with the patient the risks of cyclophosphamide including but not limited to hair loss, hormonal abnormalities, decreased fertility, abdominal pain, diarrhea, nausea and vomiting, bone marrow suppression and infection. The patient understands that monitoring is required while taking this medication.

## 2021-05-01 ENCOUNTER — EMERGENCY (EMERGENCY)
Facility: HOSPITAL | Age: 66
LOS: 0 days | Discharge: HOME | End: 2021-05-01
Attending: EMERGENCY MEDICINE | Admitting: EMERGENCY MEDICINE
Payer: MEDICARE

## 2021-05-01 VITALS
HEIGHT: 67 IN | RESPIRATION RATE: 17 BRPM | OXYGEN SATURATION: 100 % | HEART RATE: 81 BPM | SYSTOLIC BLOOD PRESSURE: 138 MMHG | TEMPERATURE: 98 F | WEIGHT: 207.45 LBS | DIASTOLIC BLOOD PRESSURE: 76 MMHG

## 2021-05-01 DIAGNOSIS — R31.0 GROSS HEMATURIA: ICD-10-CM

## 2021-05-01 DIAGNOSIS — Z85.46 PERSONAL HISTORY OF MALIGNANT NEOPLASM OF PROSTATE: ICD-10-CM

## 2021-05-01 DIAGNOSIS — I10 ESSENTIAL (PRIMARY) HYPERTENSION: ICD-10-CM

## 2021-05-01 DIAGNOSIS — E11.9 TYPE 2 DIABETES MELLITUS WITHOUT COMPLICATIONS: ICD-10-CM

## 2021-05-01 DIAGNOSIS — Z98.890 OTHER SPECIFIED POSTPROCEDURAL STATES: Chronic | ICD-10-CM

## 2021-05-01 DIAGNOSIS — Z79.899 OTHER LONG TERM (CURRENT) DRUG THERAPY: ICD-10-CM

## 2021-05-01 DIAGNOSIS — R31.9 HEMATURIA, UNSPECIFIED: ICD-10-CM

## 2021-05-01 DIAGNOSIS — Z79.84 LONG TERM (CURRENT) USE OF ORAL HYPOGLYCEMIC DRUGS: ICD-10-CM

## 2021-05-01 DIAGNOSIS — F17.200 NICOTINE DEPENDENCE, UNSPECIFIED, UNCOMPLICATED: ICD-10-CM

## 2021-05-01 DIAGNOSIS — Z96.642 PRESENCE OF LEFT ARTIFICIAL HIP JOINT: Chronic | ICD-10-CM

## 2021-05-01 DIAGNOSIS — E78.5 HYPERLIPIDEMIA, UNSPECIFIED: ICD-10-CM

## 2021-05-01 DIAGNOSIS — C61 MALIGNANT NEOPLASM OF PROSTATE: Chronic | ICD-10-CM

## 2021-05-01 DIAGNOSIS — M19.90 UNSPECIFIED OSTEOARTHRITIS, UNSPECIFIED SITE: ICD-10-CM

## 2021-05-01 DIAGNOSIS — Z79.82 LONG TERM (CURRENT) USE OF ASPIRIN: ICD-10-CM

## 2021-05-01 LAB
ALBUMIN SERPL ELPH-MCNC: 4.4 G/DL — SIGNIFICANT CHANGE UP (ref 3.5–5.2)
ALP SERPL-CCNC: 96 U/L — SIGNIFICANT CHANGE UP (ref 30–115)
ALT FLD-CCNC: 15 U/L — SIGNIFICANT CHANGE UP (ref 0–41)
ANION GAP SERPL CALC-SCNC: 11 MMOL/L — SIGNIFICANT CHANGE UP (ref 7–14)
APPEARANCE UR: SIGNIFICANT CHANGE UP
AST SERPL-CCNC: 14 U/L — SIGNIFICANT CHANGE UP (ref 0–41)
BACTERIA # UR AUTO: ABNORMAL
BASOPHILS # BLD AUTO: 0.02 K/UL — SIGNIFICANT CHANGE UP (ref 0–0.2)
BASOPHILS NFR BLD AUTO: 0.3 % — SIGNIFICANT CHANGE UP (ref 0–1)
BILIRUB SERPL-MCNC: 0.6 MG/DL — SIGNIFICANT CHANGE UP (ref 0.2–1.2)
BILIRUB UR-MCNC: NEGATIVE — SIGNIFICANT CHANGE UP
BUN SERPL-MCNC: 10 MG/DL — SIGNIFICANT CHANGE UP (ref 10–20)
CALCIUM SERPL-MCNC: 8.9 MG/DL — SIGNIFICANT CHANGE UP (ref 8.5–10.1)
CHLORIDE SERPL-SCNC: 101 MMOL/L — SIGNIFICANT CHANGE UP (ref 98–110)
CO2 SERPL-SCNC: 23 MMOL/L — SIGNIFICANT CHANGE UP (ref 17–32)
COLOR SPEC: ABNORMAL
CREAT SERPL-MCNC: 0.6 MG/DL — LOW (ref 0.7–1.5)
DIFF PNL FLD: ABNORMAL
EOSINOPHIL # BLD AUTO: 0.18 K/UL — SIGNIFICANT CHANGE UP (ref 0–0.7)
EOSINOPHIL NFR BLD AUTO: 2.8 % — SIGNIFICANT CHANGE UP (ref 0–8)
GLUCOSE SERPL-MCNC: 104 MG/DL — HIGH (ref 70–99)
GLUCOSE UR QL: NEGATIVE — SIGNIFICANT CHANGE UP
HCT VFR BLD CALC: 38.3 % — LOW (ref 42–52)
HGB BLD-MCNC: 13 G/DL — LOW (ref 14–18)
IMM GRANULOCYTES NFR BLD AUTO: 0.5 % — HIGH (ref 0.1–0.3)
KETONES UR-MCNC: SIGNIFICANT CHANGE UP
LACTATE SERPL-SCNC: 1.4 MMOL/L — SIGNIFICANT CHANGE UP (ref 0.7–2)
LEUKOCYTE ESTERASE UR-ACNC: NEGATIVE — SIGNIFICANT CHANGE UP
LYMPHOCYTES # BLD AUTO: 1.12 K/UL — LOW (ref 1.2–3.4)
LYMPHOCYTES # BLD AUTO: 17.4 % — LOW (ref 20.5–51.1)
MCHC RBC-ENTMCNC: 29 PG — SIGNIFICANT CHANGE UP (ref 27–31)
MCHC RBC-ENTMCNC: 33.9 G/DL — SIGNIFICANT CHANGE UP (ref 32–37)
MCV RBC AUTO: 85.3 FL — SIGNIFICANT CHANGE UP (ref 80–94)
MONOCYTES # BLD AUTO: 0.55 K/UL — SIGNIFICANT CHANGE UP (ref 0.1–0.6)
MONOCYTES NFR BLD AUTO: 8.5 % — SIGNIFICANT CHANGE UP (ref 1.7–9.3)
NEUTROPHILS # BLD AUTO: 4.55 K/UL — SIGNIFICANT CHANGE UP (ref 1.4–6.5)
NEUTROPHILS NFR BLD AUTO: 70.5 % — SIGNIFICANT CHANGE UP (ref 42.2–75.2)
NITRITE UR-MCNC: NEGATIVE — SIGNIFICANT CHANGE UP
NRBC # BLD: 0 /100 WBCS — SIGNIFICANT CHANGE UP (ref 0–0)
PH UR: 6 — SIGNIFICANT CHANGE UP (ref 5–8)
PLATELET # BLD AUTO: 236 K/UL — SIGNIFICANT CHANGE UP (ref 130–400)
POTASSIUM SERPL-MCNC: 4.1 MMOL/L — SIGNIFICANT CHANGE UP (ref 3.5–5)
POTASSIUM SERPL-SCNC: 4.1 MMOL/L — SIGNIFICANT CHANGE UP (ref 3.5–5)
PROT SERPL-MCNC: 6.9 G/DL — SIGNIFICANT CHANGE UP (ref 6–8)
PROT UR-MCNC: ABNORMAL
RBC # BLD: 4.49 M/UL — LOW (ref 4.7–6.1)
RBC # FLD: 12.4 % — SIGNIFICANT CHANGE UP (ref 11.5–14.5)
RBC CASTS # UR COMP ASSIST: >50 /HPF — HIGH (ref 0–4)
SODIUM SERPL-SCNC: 135 MMOL/L — SIGNIFICANT CHANGE UP (ref 135–146)
SP GR SPEC: 1.02 — SIGNIFICANT CHANGE UP (ref 1.01–1.03)
UROBILINOGEN FLD QL: SIGNIFICANT CHANGE UP
WBC # BLD: 6.45 K/UL — SIGNIFICANT CHANGE UP (ref 4.8–10.8)
WBC # FLD AUTO: 6.45 K/UL — SIGNIFICANT CHANGE UP (ref 4.8–10.8)

## 2021-05-01 PROCEDURE — 74177 CT ABD & PELVIS W/CONTRAST: CPT | Mod: 26,MA

## 2021-05-01 PROCEDURE — 99285 EMERGENCY DEPT VISIT HI MDM: CPT

## 2021-05-01 NOTE — ED PROVIDER NOTE - CLINICAL SUMMARY MEDICAL DECISION MAKING FREE TEXT BOX
66 yo M presented to ED for hematuria. Patient had normal HGB. Ct scan demonstrated Diffuse circumferential bladder wall thickening up to 1 cm. Findings appear chronic in nature; however, are increased from 2018. Findings may be secondary to chronic urinary retention versus acute cystitis.  Pt has urine culture pending. DC home with urology fu

## 2021-05-01 NOTE — ED PROVIDER NOTE - PHYSICAL EXAMINATION
CONSTITUTIONAL: Well-appearing; well-nourished; in no apparent distress.   CARDIOVASCULAR: Normal S1, S2; no murmurs, rubs, or gallops.   RESPIRATORY: Normal chest excursion with respiration; breath sounds clear and equal bilaterally; no wheezes, rhonchi, or rales.  GI/: non-distended; non-tender; no palpable organomegaly.   MS: No CVA tenderness. Normal ROM in all four extremities; non-tender to palpation; distal pulses are normal.   SKIN: Normal for age and race; warm; dry; good turgor; no apparent lesions or exudate.   NEURO/PSYCH: A & O x 4; grossly unremarkable. mood and manner are appropriate.

## 2021-05-01 NOTE — ED PROVIDER NOTE - NSFOLLOWUPCLINICS_GEN_ALL_ED_FT
A Family Medicine Doctor  Family Medicine  .  [place]   Phone:   Fax:     [facility]  Urology  .  [place]   Phone: [phone]  Fax:

## 2021-05-01 NOTE — ED PROVIDER NOTE - CARE PROVIDER_API CALL
Cheo Smith  UROLOGY  [facility] [address]  Phone: [phone]  Fax: [phone]  Follow Up Time:

## 2021-05-01 NOTE — ED PROVIDER NOTE - PATIENT PORTAL LINK FT
You can access the FollowMyHealth Patient Portal offered by [facility] by registering at the following website: [email or web address]. By joining [employer]’s FollowMyHealth portal, you will also be able to view your health information using other applications (apps) compatible with our system.

## 2021-05-01 NOTE — ED PROVIDER NOTE - NS ED ROS FT
Constitutional: no fever, chills, no recent weight loss, change in appetite or malaise  Eyes: no redness/discharge/pain/vision changes  ENT: no rhinorrhea/ear pain/sore throat  Cardiac: No chest pain, SOB or edema.  Respiratory: No cough or respiratory distress  GI: No nausea, vomiting, diarrhea or abdominal pain.  : No dysuria, frequency, urgency  MS: no pain to back or extremities, no loss of ROM, no weakness  Neuro: No headache or weakness. No LOC.  Skin: No skin rash.  Except as documented in the HPI, all other systems are negative.

## 2021-05-01 NOTE — ED PROVIDER NOTE - ATTENDING CONTRIBUTION TO CARE
66 yo M with PMH of DM, HLD, prostate CA sp seed therapy in remission for 5 years presents to ED for hematuria x3 days. Patient has had clots. Pain with urination. He was seen in the ED in March for similar concerns and was treated for a UTI. He states this is different because there is more blood. No abdominal pain, flank pain, fevers, chills, SOB, CP.     Const: Well nourished, well developed, appears stated age  Eyes: PERRL, no conjunctival injection  HENT:  Neck supple without meningismus   CV: RRR, Warm, well-perfused extremities  RESP: CTA B/L, no tachypnea   GI: soft, non-tender, non-distended  MSK: No gross deformities appreciated  Skin: Warm, dry. No rashes    Will do labs, CT, UA.   Concern for UTI vs CA

## 2021-05-05 NOTE — ED POST DISCHARGE NOTE - RESULT SUMMARY
+ UCX- WILL RX AMOXICILLIN 875 MG BID 7 DAYS. + UCX- WILL RX AMOXICILLIN 875 MG BID 7 DAYS. MULTIPLE PHONE CALLS PLACED WITH MESSAGES LEFT, NO RETURN CALL.

## 2021-05-06 ENCOUNTER — APPOINTMENT (OUTPATIENT)
Age: 66
End: 2021-05-06

## 2021-05-20 ENCOUNTER — APPOINTMENT (OUTPATIENT)
Dept: UROLOGY | Facility: CLINIC | Age: 66
End: 2021-05-20

## 2021-05-27 ENCOUNTER — NON-APPOINTMENT (OUTPATIENT)
Age: 66
End: 2021-05-27

## 2021-05-28 ENCOUNTER — NON-APPOINTMENT (OUTPATIENT)
Age: 66
End: 2021-05-28

## 2021-06-03 ENCOUNTER — APPOINTMENT (OUTPATIENT)
Dept: UROLOGY | Facility: CLINIC | Age: 66
End: 2021-06-03

## 2021-06-08 ENCOUNTER — APPOINTMENT (OUTPATIENT)
Dept: INTERNAL MEDICINE | Facility: CLINIC | Age: 66
End: 2021-06-08

## 2021-07-13 ENCOUNTER — APPOINTMENT (OUTPATIENT)
Dept: OPHTHALMOLOGY | Facility: CLINIC | Age: 66
End: 2021-07-13

## 2021-07-27 ENCOUNTER — EMERGENCY (EMERGENCY)
Facility: HOSPITAL | Age: 66
LOS: 0 days | Discharge: HOME | End: 2021-07-27
Attending: EMERGENCY MEDICINE | Admitting: EMERGENCY MEDICINE
Payer: MEDICARE

## 2021-07-27 VITALS
OXYGEN SATURATION: 99 % | WEIGHT: 208.34 LBS | HEART RATE: 71 BPM | SYSTOLIC BLOOD PRESSURE: 148 MMHG | RESPIRATION RATE: 18 BRPM | TEMPERATURE: 98 F | HEIGHT: 67 IN | DIASTOLIC BLOOD PRESSURE: 87 MMHG

## 2021-07-27 DIAGNOSIS — E78.5 HYPERLIPIDEMIA, UNSPECIFIED: ICD-10-CM

## 2021-07-27 DIAGNOSIS — I10 ESSENTIAL (PRIMARY) HYPERTENSION: ICD-10-CM

## 2021-07-27 DIAGNOSIS — R21 RASH AND OTHER NONSPECIFIC SKIN ERUPTION: ICD-10-CM

## 2021-07-27 DIAGNOSIS — C61 MALIGNANT NEOPLASM OF PROSTATE: Chronic | ICD-10-CM

## 2021-07-27 DIAGNOSIS — Z79.84 LONG TERM (CURRENT) USE OF ORAL HYPOGLYCEMIC DRUGS: ICD-10-CM

## 2021-07-27 DIAGNOSIS — Z92.3 PERSONAL HISTORY OF IRRADIATION: ICD-10-CM

## 2021-07-27 DIAGNOSIS — Z87.19 PERSONAL HISTORY OF OTHER DISEASES OF THE DIGESTIVE SYSTEM: ICD-10-CM

## 2021-07-27 DIAGNOSIS — Z85.46 PERSONAL HISTORY OF MALIGNANT NEOPLASM OF PROSTATE: ICD-10-CM

## 2021-07-27 DIAGNOSIS — Z79.82 LONG TERM (CURRENT) USE OF ASPIRIN: ICD-10-CM

## 2021-07-27 DIAGNOSIS — E11.9 TYPE 2 DIABETES MELLITUS WITHOUT COMPLICATIONS: ICD-10-CM

## 2021-07-27 DIAGNOSIS — L29.9 PRURITUS, UNSPECIFIED: ICD-10-CM

## 2021-07-27 DIAGNOSIS — Z79.899 OTHER LONG TERM (CURRENT) DRUG THERAPY: ICD-10-CM

## 2021-07-27 DIAGNOSIS — Z79.02 LONG TERM (CURRENT) USE OF ANTITHROMBOTICS/ANTIPLATELETS: ICD-10-CM

## 2021-07-27 DIAGNOSIS — Z98.890 OTHER SPECIFIED POSTPROCEDURAL STATES: Chronic | ICD-10-CM

## 2021-07-27 DIAGNOSIS — Z96.642 PRESENCE OF LEFT ARTIFICIAL HIP JOINT: Chronic | ICD-10-CM

## 2021-07-27 PROCEDURE — 99283 EMERGENCY DEPT VISIT LOW MDM: CPT

## 2021-07-27 RX ORDER — METFORMIN HYDROCHLORIDE 850 MG/1
2 TABLET ORAL
Qty: 0 | Refills: 0 | DISCHARGE
Start: 2021-07-27 | End: 2021-08-05

## 2021-07-27 RX ORDER — DIPHENHYDRAMINE HCL 50 MG
50 CAPSULE ORAL ONCE
Refills: 0 | Status: COMPLETED | OUTPATIENT
Start: 2021-07-27 | End: 2021-07-27

## 2021-07-27 RX ORDER — METFORMIN HYDROCHLORIDE 850 MG/1
1 TABLET ORAL
Qty: 10 | Refills: 0
Start: 2021-07-27 | End: 2021-08-05

## 2021-07-27 RX ADMIN — Medication 50 MILLIGRAM(S): at 22:46

## 2021-07-27 NOTE — ED PROVIDER NOTE - NSFOLLOWUPINSTRUCTIONS_ED_ALL_ED_FT
Please take an additional 500mg of Metformin in the afternoon during your course of steroids.   Please take steroids as directed.   Please take Zanfel for symptomatic treatment of your rash.     Rash    A rash is a change in the color of the skin. A rash can also change the way your skin feels. There are many different conditions and factors that can cause a rash, most of which are not dangerous. Make sure to follow up with your primary care physician or a dermatologist as instructed by your health care provider.    SEEK IMMEDIATE MEDICAL CARE IF YOU HAVE THE FOLLOWING SYMPTOMS: fever, blisters, a rash inside your mouth, or altered mental status. Please take an additional 500mg of Metformin in the afternoon during your course of steroids.   Please take steroids as directed.   Please take Zanfel for symptomatic treatment of your rash (this is over the counter)    Poison Ivy    WHAT YOU NEED TO KNOW:    Poison ivy is a plant that can cause an itchy, uncomfortable rash on your skin. Poison ivy grows as a shrub or vine in woods, fields, and areas of thick underbrush. It has 3 bright green leaves on each stem that turn red in thania.     DISCHARGE INSTRUCTIONS:      Medicines:     Antiseptic or drying creams or ointments: These medicines may be used to dry out the rash and decrease the itching. These products may be available without a doctor's order. (ZANFEL)    Steroids: This medicine helps decrease itching and inflammation. It can be given as a cream to apply to your skin or as a pill.     Antihistamines: This medicine may help decrease itching and help you sleep. It is available without a doctor's order.     Take your medicine as directed. Contact your healthcare provider if you think your medicine is not helping or if you have side effects. Tell him of her if you are allergic to any medicine. Keep a list of the medicines, vitamins, and herbs you take. Include the amounts, and when and why you take them. Bring the list or the pill bottles to follow-up visits. Carry your medicine list with you in case of an emergency.    Follow up with your healthcare provider as directed: Write down your questions so you remember to ask them during your visits.     How your poison ivy rash spreads: You cannot spread poison ivy by touching your rash or the liquid from your blisters. Poison ivy is spread only if you scratch your skin while it still has oil on it. You may think your rash is spreading because new rashes appear over a number of days. This happens because areas covered by thin skin break out in a rash first. Your face or forearms may develop a rash before thicker areas, such as the palms of your hands.       Self-care:     Keep your rash clean and dry: Wash it with soap and water. Gently pat it dry with a clean towel.    Try not to scratch or rub your rash: This can cause your skin to become infected.    Use a compress on your rash: Dip a clean washcloth in cool water. Wring it out and place it on your rash. Leave the washcloth on your skin for 15 minutes. Do this at least 3 times per day.     Take a cornstarch or oatmeal bath: If your rash is too large to cover with wet washcloths, take 3 or 4 cornstarch baths daily. Mix 1 pound of cornstarch with a little water to make a paste. Add the paste to a tub full of water and mix well. You may also use colloidal oatmeal in the bath water. Use lukewarm water. Avoid hot water because it may cause your itching to increase.      Prevent a poison ivy rash in the future:     Wear skin protection: Wear long pants, a long-sleeved shirt, and gloves. Use a skin block lotion to protect your skin from poison ivy oil. You can find this at a drugstore without a prescription.    Wash clothing after possible exposure: If you think you have been near a poison ivy plant, wash the clothes you were wearing separately from other clothes. Rinse the washing machine well after you take the clothes out. Scrub boots and shoes with warm, soapy water. Dry clean items and clothing that you cannot wash in water. Poison ivy oil is sticky and can stay on surfaces for a long time. It can cause a new rash even years later.     Bathe your pet: Use warm water and shampoo on your pet's fur. This will prevent the spread of oil to your skin, car, and home. Wear long sleeves, long pants, and gloves while washing pets or any items that may have oil on them.    Reduce exposure to poison ivy: Do not touch plants that look like poison ivy. Keep your yard free of poison ivy. While protecting your skin, remove the plant and the roots. Place them in a plastic bag and seal the bag tightly.     Do not burn poison ivy plants: This can spread the oil through the air. If you breathe the oil into your lungs, you could have swelling and serious breathing problems. Oil that clings to the fire yuriy can land on your skin and cause a rash.      Contact your healthcare provider if:     You have pus, soft yellow scabs, or tenderness on the rash.  The itching gets worse or keeps you awake at night.  The rash covers more than 1/4 of your skin or spreads to your eyes, mouth, or genital area.   The rash is not better after 2 to 3 weeks.  You have tender, swollen glands on the sides of your neck.  You have swelling in your arms and legs.  You have questions or concerns about your condition or care.      Return to the emergency department if:     You have a fever.  You have redness, swelling, and tenderness around the rash.  You have trouble breathing.        Rash    A rash is a change in the color of the skin. A rash can also change the way your skin feels. There are many different conditions and factors that can cause a rash, most of which are not dangerous. Make sure to follow up with your primary care physician or a dermatologist as instructed by your health care provider.    SEEK IMMEDIATE MEDICAL CARE IF YOU HAVE THE FOLLOWING SYMPTOMS: fever, blisters, a rash inside your mouth, or altered mental status.

## 2021-07-27 NOTE — ED PROVIDER NOTE - PATIENT PORTAL LINK FT
You can access the FollowMyHealth Patient Portal offered by [facility] by registering at the following website: [email or web address]. By joining [employer]’s FollowMyHealth portal, you will also be able to view your health information using other applications (apps) compatible with our system.

## 2021-07-27 NOTE — ED PROVIDER NOTE - NS ED ROS FT
Review of Systems:  	•	CONSTITUTIONAL: no fever, no chills  	•	SKIN: +diffuse rash   	•	EYES: no eye pain, no blurry vision  	•	ENT: no sore throat   	•	RESPIRATORY: no shortness of breath, no cough  	•	CARDIAC: no chest pain, no palpitations  	•	GI: no abd pain, no nausea, no vomiting, no diarrhea  	•	MUSCULOSKELETAL: no joint paint, no swelling, no redness  	•	NEUROLOGIC: no weakness, no headache  	•	PSYCH: no anxiety, non suicidal, non homicidal

## 2021-07-27 NOTE — ED PROVIDER NOTE - CARE PROVIDER_API CALL
Andry Bustillos (DO)  Medicine  Physicians  [address] [facility], [address] [place] [address]  Phone: [phone]  Fax: [phone]  Follow Up Time: Routine

## 2021-07-27 NOTE — ED PROVIDER NOTE - CARE PLAN
Principal Discharge DX:	Rash   Principal Discharge DX:	Rash  Assessment and plan of treatment:	likely poison ivy

## 2021-07-27 NOTE — ED PROVIDER NOTE - OBJECTIVE STATEMENT
65 year old male, past medical history prostate ca s/p radiation, NIDDM, HTN, HDL, who presents with pruritic rash x1 week. patient reports noticing rash after doing gardening and yard work. denies fever, chills, drainage/bleeding, chest pain, SOB, abd pain, nausea/vomiting.

## 2021-07-27 NOTE — ED PROVIDER NOTE - ATTENDING CONTRIBUTION TO CARE
64 yo male with PMH of prostate cancer s/p completed radiation, 66 yo male with PMH of prostate cancer s/p completed radiation, diabetes, htn, hld presents to the ER for rash that is itchy s/p doing gardening and yard work a week ago. Pt has itchy, elevated rash cw poison ivy. Pt has no SOB/lip swelling/wheezing/N/V/D/abdomen pain or any other complaints. Recommend pt apply Zanfel topical to the rashes and will placed on a steroid taper. Also instructed to take additional dose of Metformin 500 mg in afternoon bringing total daily dose to 2500 mg (1000 mg in am, 500 in afternoon, 1000 mg at night) just while on the steroids.  Return precautions explained.     ALL: nkda  PMH as above  Meds amlodipine, metformin, asa, atorvastatin, viagra, tamsulosin  SH: +smokes weed, no cigs, +etoh occly  PMD Raudel Gut

## 2021-07-27 NOTE — ED PROVIDER NOTE - PHYSICAL EXAMINATION
CONSTITUTIONAL: Well-developed; well-nourished; in no acute distress, nontoxic appearing  SKIN: diffuse rash consistent with poison ivy, no active weeping/drainage/bleeding  HEAD: Normocephalic; atraumatic  EYES: PERRL,  conjunctiva and sclera clear.  ENT: MMM  NECK: ROM intact.  CARD: S1, S2 normal, no murmur  RESP: Good air movement bilaterally  EXT: Normal ROM.  NEURO: awake, alert, following commands, oriented, grossly unremarkable. No Focal deficits. GCS 15.   PSYCH: Cooperative, appropriate.

## 2021-07-27 NOTE — ED PROVIDER NOTE - CLINICAL SUMMARY MEDICAL DECISION MAKING FREE TEXT BOX
64 yo male with PMH of prostate cancer s/p completed radiation, diabetes, htn, hld presents to the ER for rash that is itchy s/p doing gardening and yard work a week ago. Pt has itchy, elevated rash cw poison ivy. Pt has no SOB/lip swelling/wheezing/N/V/D/abdomen pain or any other complaints. Recommend pt apply Zanfel topical to the rashes and will placed on a steroid taper. Also instructed to take additional dose of Metformin 500 mg in afternoon bringing total daily dose to 2500 mg (1000 mg in am, 500 in afternoon, 1000 mg at night) just while on the steroids.  Return precautions explained.

## 2021-07-27 NOTE — ED ADULT TRIAGE NOTE - CHIEF COMPLAINT QUOTE
I've been doing a lot of gardening and I noticed all this bumps on my arms, its starting to get itchy. It started last week - patient

## 2021-09-02 NOTE — ED ADULT TRIAGE NOTE - PRO INTERPRETER NEED 2
1. STOP amlodipine (para el amlodipine)  2. Check blood pressure in morning for the next week, 2 hours after taking morning medications. We want top blood pressure number greater than 110.   3. Send me [employer] message with record of blood pressures.             
English

## 2021-09-28 ENCOUNTER — NON-APPOINTMENT (OUTPATIENT)
Age: 66
End: 2021-09-28

## 2021-10-01 ENCOUNTER — OUTPATIENT (OUTPATIENT)
Dept: OUTPATIENT SERVICES | Facility: HOSPITAL | Age: 66
LOS: 1 days | End: 2021-10-01
Payer: MEDICAID

## 2021-10-01 DIAGNOSIS — Z98.890 OTHER SPECIFIED POSTPROCEDURAL STATES: Chronic | ICD-10-CM

## 2021-10-01 DIAGNOSIS — Z96.642 PRESENCE OF LEFT ARTIFICIAL HIP JOINT: Chronic | ICD-10-CM

## 2021-10-01 DIAGNOSIS — C61 MALIGNANT NEOPLASM OF PROSTATE: Chronic | ICD-10-CM

## 2021-10-13 ENCOUNTER — INPATIENT (INPATIENT)
Facility: HOSPITAL | Age: 66
LOS: 4 days | Discharge: HOME | End: 2021-10-18
Attending: INTERNAL MEDICINE | Admitting: INTERNAL MEDICINE
Payer: MEDICARE

## 2021-10-13 VITALS
WEIGHT: 210.1 LBS | HEART RATE: 79 BPM | DIASTOLIC BLOOD PRESSURE: 70 MMHG | OXYGEN SATURATION: 99 % | SYSTOLIC BLOOD PRESSURE: 128 MMHG | RESPIRATION RATE: 19 BRPM | HEIGHT: 67 IN | TEMPERATURE: 97 F

## 2021-10-13 DIAGNOSIS — Z96.642 PRESENCE OF LEFT ARTIFICIAL HIP JOINT: Chronic | ICD-10-CM

## 2021-10-13 DIAGNOSIS — Z98.890 OTHER SPECIFIED POSTPROCEDURAL STATES: Chronic | ICD-10-CM

## 2021-10-13 DIAGNOSIS — C61 MALIGNANT NEOPLASM OF PROSTATE: Chronic | ICD-10-CM

## 2021-10-13 LAB
ALBUMIN SERPL ELPH-MCNC: 4.4 G/DL — SIGNIFICANT CHANGE UP (ref 3.5–5.2)
ALP SERPL-CCNC: 85 U/L — SIGNIFICANT CHANGE UP (ref 30–115)
ALT FLD-CCNC: 18 U/L — SIGNIFICANT CHANGE UP (ref 0–41)
ANION GAP SERPL CALC-SCNC: 11 MMOL/L — SIGNIFICANT CHANGE UP (ref 7–14)
AST SERPL-CCNC: 16 U/L — SIGNIFICANT CHANGE UP (ref 0–41)
BASOPHILS # BLD AUTO: 0.04 K/UL — SIGNIFICANT CHANGE UP (ref 0–0.2)
BASOPHILS NFR BLD AUTO: 0.5 % — SIGNIFICANT CHANGE UP (ref 0–1)
BILIRUB SERPL-MCNC: 0.7 MG/DL — SIGNIFICANT CHANGE UP (ref 0.2–1.2)
BUN SERPL-MCNC: 11 MG/DL — SIGNIFICANT CHANGE UP (ref 10–20)
CALCIUM SERPL-MCNC: 9 MG/DL — SIGNIFICANT CHANGE UP (ref 8.5–10.1)
CHLORIDE SERPL-SCNC: 100 MMOL/L — SIGNIFICANT CHANGE UP (ref 98–110)
CHOLEST SERPL-MCNC: 177 MG/DL — SIGNIFICANT CHANGE UP
CO2 SERPL-SCNC: 22 MMOL/L — SIGNIFICANT CHANGE UP (ref 17–32)
CREAT SERPL-MCNC: 0.6 MG/DL — LOW (ref 0.7–1.5)
EOSINOPHIL # BLD AUTO: 0.24 K/UL — SIGNIFICANT CHANGE UP (ref 0–0.7)
EOSINOPHIL NFR BLD AUTO: 3 % — SIGNIFICANT CHANGE UP (ref 0–8)
GLUCOSE BLDC GLUCOMTR-MCNC: 117 MG/DL — HIGH (ref 70–99)
GLUCOSE BLDC GLUCOMTR-MCNC: 139 MG/DL — HIGH (ref 70–99)
GLUCOSE BLDC GLUCOMTR-MCNC: 146 MG/DL — HIGH (ref 70–99)
GLUCOSE BLDC GLUCOMTR-MCNC: 162 MG/DL — HIGH (ref 70–99)
GLUCOSE SERPL-MCNC: 173 MG/DL — HIGH (ref 70–99)
HCT VFR BLD CALC: 37.5 % — LOW (ref 42–52)
HDLC SERPL-MCNC: 48 MG/DL — SIGNIFICANT CHANGE UP
HGB BLD-MCNC: 13.7 G/DL — LOW (ref 14–18)
IMM GRANULOCYTES NFR BLD AUTO: 0.5 % — HIGH (ref 0.1–0.3)
LACTATE SERPL-SCNC: 1.1 MMOL/L — SIGNIFICANT CHANGE UP (ref 0.7–2)
LIDOCAIN IGE QN: 31 U/L — SIGNIFICANT CHANGE UP (ref 7–60)
LIPID PNL WITH DIRECT LDL SERPL: 110 MG/DL — HIGH
LYMPHOCYTES # BLD AUTO: 1.65 K/UL — SIGNIFICANT CHANGE UP (ref 1.2–3.4)
LYMPHOCYTES # BLD AUTO: 20.8 % — SIGNIFICANT CHANGE UP (ref 20.5–51.1)
MCHC RBC-ENTMCNC: 31.1 PG — HIGH (ref 27–31)
MCHC RBC-ENTMCNC: 36.5 G/DL — SIGNIFICANT CHANGE UP (ref 32–37)
MCV RBC AUTO: 85.2 FL — SIGNIFICANT CHANGE UP (ref 80–94)
MONOCYTES # BLD AUTO: 0.68 K/UL — HIGH (ref 0.1–0.6)
MONOCYTES NFR BLD AUTO: 8.6 % — SIGNIFICANT CHANGE UP (ref 1.7–9.3)
NEUTROPHILS # BLD AUTO: 5.28 K/UL — SIGNIFICANT CHANGE UP (ref 1.4–6.5)
NEUTROPHILS NFR BLD AUTO: 66.6 % — SIGNIFICANT CHANGE UP (ref 42.2–75.2)
NON HDL CHOLESTEROL: 129 MG/DL — SIGNIFICANT CHANGE UP
NRBC # BLD: 0 /100 WBCS — SIGNIFICANT CHANGE UP (ref 0–0)
PLATELET # BLD AUTO: 217 K/UL — SIGNIFICANT CHANGE UP (ref 130–400)
POTASSIUM SERPL-MCNC: 3.9 MMOL/L — SIGNIFICANT CHANGE UP (ref 3.5–5)
POTASSIUM SERPL-SCNC: 3.9 MMOL/L — SIGNIFICANT CHANGE UP (ref 3.5–5)
PROT SERPL-MCNC: 6.9 G/DL — SIGNIFICANT CHANGE UP (ref 6–8)
RBC # BLD: 4.4 M/UL — LOW (ref 4.7–6.1)
RBC # FLD: 12.6 % — SIGNIFICANT CHANGE UP (ref 11.5–14.5)
SARS-COV-2 RNA SPEC QL NAA+PROBE: SIGNIFICANT CHANGE UP
SODIUM SERPL-SCNC: 133 MMOL/L — LOW (ref 135–146)
TRIGL SERPL-MCNC: 73 MG/DL — SIGNIFICANT CHANGE UP
WBC # BLD: 7.93 K/UL — SIGNIFICANT CHANGE UP (ref 4.8–10.8)
WBC # FLD AUTO: 7.93 K/UL — SIGNIFICANT CHANGE UP (ref 4.8–10.8)

## 2021-10-13 PROCEDURE — 99223 1ST HOSP IP/OBS HIGH 75: CPT

## 2021-10-13 PROCEDURE — 71045 X-RAY EXAM CHEST 1 VIEW: CPT | Mod: 26

## 2021-10-13 PROCEDURE — 99285 EMERGENCY DEPT VISIT HI MDM: CPT

## 2021-10-13 PROCEDURE — 74177 CT ABD & PELVIS W/CONTRAST: CPT | Mod: 26,MA

## 2021-10-13 PROCEDURE — 93010 ELECTROCARDIOGRAM REPORT: CPT

## 2021-10-13 RX ORDER — ENOXAPARIN SODIUM 100 MG/ML
40 INJECTION SUBCUTANEOUS DAILY
Refills: 0 | Status: DISCONTINUED | OUTPATIENT
Start: 2021-10-13 | End: 2021-10-18

## 2021-10-13 RX ORDER — ATORVASTATIN CALCIUM 80 MG/1
40 TABLET, FILM COATED ORAL AT BEDTIME
Refills: 0 | Status: DISCONTINUED | OUTPATIENT
Start: 2021-10-13 | End: 2021-10-18

## 2021-10-13 RX ORDER — GLUCAGON INJECTION, SOLUTION 0.5 MG/.1ML
1 INJECTION, SOLUTION SUBCUTANEOUS ONCE
Refills: 0 | Status: DISCONTINUED | OUTPATIENT
Start: 2021-10-13 | End: 2021-10-18

## 2021-10-13 RX ORDER — MORPHINE SULFATE 50 MG/1
2 CAPSULE, EXTENDED RELEASE ORAL ONCE
Refills: 0 | Status: DISCONTINUED | OUTPATIENT
Start: 2021-10-13 | End: 2021-10-13

## 2021-10-13 RX ORDER — CHLORHEXIDINE GLUCONATE 213 G/1000ML
1 SOLUTION TOPICAL
Refills: 0 | Status: DISCONTINUED | OUTPATIENT
Start: 2021-10-13 | End: 2021-10-18

## 2021-10-13 RX ORDER — HYDROMORPHONE HYDROCHLORIDE 2 MG/ML
0.5 INJECTION INTRAMUSCULAR; INTRAVENOUS; SUBCUTANEOUS EVERY 8 HOURS
Refills: 0 | Status: DISCONTINUED | OUTPATIENT
Start: 2021-10-13 | End: 2021-10-14

## 2021-10-13 RX ORDER — DEXTROSE 50 % IN WATER 50 %
15 SYRINGE (ML) INTRAVENOUS ONCE
Refills: 0 | Status: DISCONTINUED | OUTPATIENT
Start: 2021-10-13 | End: 2021-10-18

## 2021-10-13 RX ORDER — TAMSULOSIN HYDROCHLORIDE 0.4 MG/1
0.4 CAPSULE ORAL AT BEDTIME
Refills: 0 | Status: DISCONTINUED | OUTPATIENT
Start: 2021-10-13 | End: 2021-10-18

## 2021-10-13 RX ORDER — METFORMIN HYDROCHLORIDE 850 MG/1
1 TABLET ORAL
Qty: 0 | Refills: 0 | DISCHARGE

## 2021-10-13 RX ORDER — DEXTROSE 50 % IN WATER 50 %
25 SYRINGE (ML) INTRAVENOUS ONCE
Refills: 0 | Status: DISCONTINUED | OUTPATIENT
Start: 2021-10-13 | End: 2021-10-18

## 2021-10-13 RX ORDER — TAMSULOSIN HYDROCHLORIDE 0.4 MG/1
0 CAPSULE ORAL
Qty: 0 | Refills: 3 | DISCHARGE

## 2021-10-13 RX ORDER — SODIUM CHLORIDE 9 MG/ML
1000 INJECTION INTRAMUSCULAR; INTRAVENOUS; SUBCUTANEOUS ONCE
Refills: 0 | Status: COMPLETED | OUTPATIENT
Start: 2021-10-13 | End: 2021-10-13

## 2021-10-13 RX ORDER — OMEPRAZOLE 10 MG/1
1 CAPSULE, DELAYED RELEASE ORAL
Qty: 0 | Refills: 0 | DISCHARGE

## 2021-10-13 RX ORDER — PANTOPRAZOLE SODIUM 20 MG/1
40 TABLET, DELAYED RELEASE ORAL
Refills: 0 | Status: DISCONTINUED | OUTPATIENT
Start: 2021-10-13 | End: 2021-10-18

## 2021-10-13 RX ORDER — MORPHINE SULFATE 50 MG/1
4 CAPSULE, EXTENDED RELEASE ORAL ONCE
Refills: 0 | Status: DISCONTINUED | OUTPATIENT
Start: 2021-10-13 | End: 2021-10-13

## 2021-10-13 RX ORDER — ASPIRIN/CALCIUM CARB/MAGNESIUM 324 MG
81 TABLET ORAL DAILY
Refills: 0 | Status: DISCONTINUED | OUTPATIENT
Start: 2021-10-13 | End: 2021-10-18

## 2021-10-13 RX ORDER — MORPHINE SULFATE 50 MG/1
2 CAPSULE, EXTENDED RELEASE ORAL EVERY 4 HOURS
Refills: 0 | Status: DISCONTINUED | OUTPATIENT
Start: 2021-10-13 | End: 2021-10-15

## 2021-10-13 RX ORDER — ACETAMINOPHEN 500 MG
650 TABLET ORAL EVERY 4 HOURS
Refills: 0 | Status: DISCONTINUED | OUTPATIENT
Start: 2021-10-13 | End: 2021-10-18

## 2021-10-13 RX ORDER — DEXTROSE 50 % IN WATER 50 %
12.5 SYRINGE (ML) INTRAVENOUS ONCE
Refills: 0 | Status: DISCONTINUED | OUTPATIENT
Start: 2021-10-13 | End: 2021-10-18

## 2021-10-13 RX ORDER — SODIUM CHLORIDE 9 MG/ML
1000 INJECTION, SOLUTION INTRAVENOUS
Refills: 0 | Status: DISCONTINUED | OUTPATIENT
Start: 2021-10-13 | End: 2021-10-18

## 2021-10-13 RX ORDER — SODIUM CHLORIDE 9 MG/ML
1000 INJECTION INTRAMUSCULAR; INTRAVENOUS; SUBCUTANEOUS
Refills: 0 | Status: DISCONTINUED | OUTPATIENT
Start: 2021-10-13 | End: 2021-10-14

## 2021-10-13 RX ORDER — AMLODIPINE BESYLATE 2.5 MG/1
5 TABLET ORAL DAILY
Refills: 0 | Status: DISCONTINUED | OUTPATIENT
Start: 2021-10-13 | End: 2021-10-18

## 2021-10-13 RX ORDER — INSULIN LISPRO 100/ML
VIAL (ML) SUBCUTANEOUS
Refills: 0 | Status: DISCONTINUED | OUTPATIENT
Start: 2021-10-13 | End: 2021-10-18

## 2021-10-13 RX ADMIN — MORPHINE SULFATE 2 MILLIGRAM(S): 50 CAPSULE, EXTENDED RELEASE ORAL at 11:49

## 2021-10-13 RX ADMIN — MORPHINE SULFATE 2 MILLIGRAM(S): 50 CAPSULE, EXTENDED RELEASE ORAL at 08:14

## 2021-10-13 RX ADMIN — MORPHINE SULFATE 4 MILLIGRAM(S): 50 CAPSULE, EXTENDED RELEASE ORAL at 02:24

## 2021-10-13 RX ADMIN — TAMSULOSIN HYDROCHLORIDE 0.4 MILLIGRAM(S): 0.4 CAPSULE ORAL at 21:26

## 2021-10-13 RX ADMIN — ENOXAPARIN SODIUM 40 MILLIGRAM(S): 100 INJECTION SUBCUTANEOUS at 13:57

## 2021-10-13 RX ADMIN — MORPHINE SULFATE 2 MILLIGRAM(S): 50 CAPSULE, EXTENDED RELEASE ORAL at 21:34

## 2021-10-13 RX ADMIN — SODIUM CHLORIDE 200 MILLILITER(S): 9 INJECTION INTRAMUSCULAR; INTRAVENOUS; SUBCUTANEOUS at 17:13

## 2021-10-13 RX ADMIN — SODIUM CHLORIDE 1000 MILLILITER(S): 9 INJECTION INTRAMUSCULAR; INTRAVENOUS; SUBCUTANEOUS at 02:23

## 2021-10-13 RX ADMIN — ATORVASTATIN CALCIUM 40 MILLIGRAM(S): 80 TABLET, FILM COATED ORAL at 21:26

## 2021-10-13 RX ADMIN — MORPHINE SULFATE 2 MILLIGRAM(S): 50 CAPSULE, EXTENDED RELEASE ORAL at 17:10

## 2021-10-13 RX ADMIN — MORPHINE SULFATE 4 MILLIGRAM(S): 50 CAPSULE, EXTENDED RELEASE ORAL at 04:36

## 2021-10-13 RX ADMIN — SODIUM CHLORIDE 125 MILLILITER(S): 9 INJECTION INTRAMUSCULAR; INTRAVENOUS; SUBCUTANEOUS at 08:14

## 2021-10-13 RX ADMIN — MORPHINE SULFATE 2 MILLIGRAM(S): 50 CAPSULE, EXTENDED RELEASE ORAL at 17:32

## 2021-10-13 RX ADMIN — Medication 81 MILLIGRAM(S): at 13:57

## 2021-10-13 RX ADMIN — SODIUM CHLORIDE 200 MILLILITER(S): 9 INJECTION INTRAMUSCULAR; INTRAVENOUS; SUBCUTANEOUS at 08:48

## 2021-10-13 NOTE — ED ADULT TRIAGE NOTE - CHIEF COMPLAINT QUOTE
pt c/o left sided abd pain for the last two days. pt states the pain is always worse after he eats but the pain is much worse today.

## 2021-10-13 NOTE — ED PROVIDER NOTE - PHYSICAL EXAMINATION
Gen: NAD, AOx3  Head: NCAT  HEENT: PERRL, oral mucosa moist, normal conjunctiva, oropharynx clear without exudate or erythema  Lung: CTAB, no respiratory distress, no wheezing, rales, rhonchi  CV: normal s1/s2, rrr, Normal perfusion, pulses 2+ throughout  Abd: soft, LUQ tenderness, ND, no CVA tenderness  Genitourinary: no pelvic tenderness  MSK: No edema, no visible deformities, full range of motion in all 4 extremities  Neuro: CN II-XII grossly intact, No focal neurologic deficits  Skin: No rash   Psych: normal affect

## 2021-10-13 NOTE — ED PROVIDER NOTE - OBJECTIVE STATEMENT
67 yo male with a pmh of HTN, HLD, DM, and pancreatitis presents c/o L abdominal pain for 2 days. pt states the pain has been intermittent and notes aggravation with eating. he describes the pain as sharp in nature with radiation to his back. pt denies any other symptoms including fevers, chill, headache, recent illness/travel, cough, chest pain, or SOB.

## 2021-10-13 NOTE — H&P ADULT - HISTORY OF PRESENT ILLNESS
65 yo M with PMH of HTN, HLD, DM, BPH, and pancreatitis presents to the hospital with 2 day history of sharp intermittent epigastric abdominal pain radiating to the back which is exacerbated by po intake. The patient denies current alcohol abuse but has a history of heavy drinking in his 30's.     T(C): 36.6,  HR: 64 , BP: 149/90, RR: 19 , SpO2: 98% on room air  Labs: lipase 31  CT Abdomen and Pelvis w/ IV Cont : Mild inflammatory stranding adjacent to pancreatic tail, which may be attributed to acute interstitial edematous pancreatitis; no acute fluid collection           65 yo M with PMH of HTN, HLD, DM, BPH, and pancreatitis presents to the hospital with 2 day history of sharp intermittent epigastric abdominal pain radiating to the back which is exacerbated by po intake. The patient notes that his last pancreatitis episode was ~ 2 years ago. He denies history of gallstones. The patient denies current alcohol abuse but has a history of heavy drinking in his 30's. The patient denies dizziness/ headache/ chest pain/ shortness of breath/ urinary frequency or urgency/ denies constipation or diarrhea. All ros negative except as noted above.     T(C): 36.6,  HR: 64 , BP: 149/90, RR: 19 , SpO2: 98% on room air  Labs: lipase 31  CT Abdomen and Pelvis w/ IV Cont : Mild inflammatory stranding adjacent to pancreatic tail, which may be attributed to acute interstitial edematous pancreatitis; no acute fluid collection; gallbladder unremarkable (no biliary dilation)           67 yo M with PMH of HTN, HLD, DM, BPH, GERD, and pancreatitis presents to the hospital with 2 day history of sharp intermittent epigastric abdominal pain radiating to the back which is exacerbated by po intake. The patient notes that his last pancreatitis episode was ~ 2 years ago. He denies history of gallstones. The patient denies current alcohol abuse but has a history of heavy drinking in his 30's. The patient denies dizziness/ headache/ chest pain/ shortness of breath/ urinary frequency or urgency/ denies constipation or diarrhea. All ros negative except as noted above.     T(C): 36.6,  HR: 64 , BP: 149/90, RR: 19 , SpO2: 98% on room air  Labs: lipase 31  CT Abdomen and Pelvis w/ IV Cont : Mild inflammatory stranding adjacent to pancreatic tail, which may be attributed to acute interstitial edematous pancreatitis; no acute fluid collection; gallbladder unremarkable (no biliary dilation)

## 2021-10-13 NOTE — H&P ADULT - NSHPPHYSICALEXAM_GEN_ALL_CORE
Vital Signs (24 Hrs):  T(C): 36.6 (10-13-21 @ 07:59), Max: 36.6 (10-13-21 @ 07:59)  HR: 64 (10-13-21 @ 07:59) (64 - 79)  BP: 149/90 (10-13-21 @ 07:59) (128/70 - 149/90)  RR: 19 (10-13-21 @ 07:59) (19 - 19)  SpO2: 98% (10-13-21 @ 07:59) (98% - 99%)  Wt(kg): --  Daily Height in cm: 170.18 (13 Oct 2021 01:36)    Daily     I&O's Summary Vital Signs (24 Hrs):  T(C): 36.6 (10-13-21 @ 07:59), Max: 36.6 (10-13-21 @ 07:59)  HR: 64 (10-13-21 @ 07:59) (64 - 79)  BP: 149/90 (10-13-21 @ 07:59) (128/70 - 149/90)  RR: 19 (10-13-21 @ 07:59) (19 - 19)  SpO2: 98% (10-13-21 @ 07:59) (98% - 99%)  Wt(kg): --  Daily Height in cm: 170.18 (13 Oct 2021 01:36)    Daily     I&O's Summary    PHYSICAL EXAM:  GENERAL: NAD, lying in bed comfortably  HEAD:  Atraumatic, Normocephalic  EYES: EOMI, PERRLA, conjunctiva and sclera clear  ENT: Moist mucous membranes  NECK: Supple, No JVD  CHEST/LUNG: Clear to auscultation bilaterally; No rales, rhonchi, wheezing, or rubs. Unlabored respirations  HEART: Regular rate and rhythm; No murmurs, rubs, or gallops  ABDOMEN: Bowel sounds present; Soft, Nontender, Nondistended. MILD EPIGASTRIC TENDERNESS/ NO REBOUND/ NO GUARDING   EXTREMITIES:  2+ Peripheral Pulses, brisk capillary refill. No clubbing, cyanosis, or edema  NERVOUS SYSTEM:  Alert & Oriented X3, speech clear. No deficits   MSK: FROM all 4 extremities, full and equal strength

## 2021-10-13 NOTE — H&P ADULT - NSHPLABSRESULTS_GEN_ALL_CORE
13.7   7.93  )-----------( 217      ( 13 Oct 2021 02:30 )             37.5   10-13-21 @ 02:30    133<L>  |  100  |  11             --------------------------< 173<H>     3.9  |  22  | 0.6<L>    eGFR AA: 121  eGFR N-AA: 105    Calcium: 9.0  Phosphorus: --  Magnesium: --    AST: 16    ALT: 18  AlkPhos: 85  Protein: 6.9  Albumin: 4.4  TBili: 0.7  D-Bili: --    Lipase, Serum (10.13.21 @ 02:30)    Lipase, Serum: 31 U/L  < from: CT Abdomen and Pelvis w/ IV Cont (10.13.21 @ 05:40) >    IMPRESSION:  Mild inflammatory stranding adjacent to pancreatic tail, which may be attributed to acute interstitial edematous pancreatitis; noacute fluid collection.    < end of copied text >

## 2021-10-13 NOTE — ED PROVIDER NOTE - CLINICAL SUMMARY MEDICAL DECISION MAKING FREE TEXT BOX
Patient presents with abdominal pain. labs, ua, ekg, ct done. Found to have pancreatitis. no hx of alcohol use. Fluids and pain medications given. Patient admitted for further management.

## 2021-10-13 NOTE — ED PROVIDER NOTE - ATTENDING CONTRIBUTION TO CARE
65 yo M pm hof HTN, HLD, DM, pancreatitis, prostate ca in remission presents with left side abdominal pain. Pain started 2 days ago getting worse, sharp, non radiating, intermittent. no n/v/d, no fevers. no chest pain, no shortness of breath, no palpitations. States that every time he eats the pain worsens.     CONSTITUTIONAL: Well-developed; well-nourished; in no acute distress.   SKIN: warm, dry  HEAD: Normocephalic; atraumatic.  EYES: PERRL, EOMI, no conjunctival erythema  ENT: No nasal discharge; airway clear.  NECK: Supple; non tender.  CARD: S1, S2 normal;  Regular rate and rhythm.   RESP: No wheezes, rales or rhonchi.  ABD: soft + epigastric, LUQ, LLQ tenderness, no cva tenderness non distended, no rebound or guarding  EXT: Normal ROM.  5/5 strength in all 4 extremities   LYMPH: No acute cervical adenopathy.  NEURO: Alert, oriented, grossly unremarkable. neurovascularly intact  PSYCH: Cooperative, appropriate.

## 2021-10-13 NOTE — H&P ADULT - ATTENDING COMMENTS
Patient seen and examined on 10/13/2021 at 23:02    HPI:  67 yo gentleman with a PMH of HTN, HLD, DM, BPH, GERD, and history of pancreatitis admitted for acute pancreatitis. He reports sudden onset of abdominal pain initially LLQ then migrating to epigastrium , and LUQ 1.5 hours after eating [employer] ~4 days ago. His pain is significantly worsened by any oral intake and radiated to his back, squeezing in nature. He reports this is almost the same presentations as his pancreatitis 2 years ago (again precipitated by fast food). He denied fevers, chills, CP, palpitations, SOB, n/v/d or constipation, LE swelling or pain.   He drinks socially , last drink was 3 weeks ago. He denied any GI issue outside of these pancreatitis episodes and typically doesn't experience heartburn or pain with food at all.     REVIEW OF SYSTEMS:  CONSTITUTIONAL:  No weakness, fevers, chills, night sweats, weight loss  EYES/ENT: No visual changes. No vertigo or dysphagia  NECK: No neck pain or stiffness  RESPIRATORY: No cough, wheezing, hemoptysis. No shortness of breath  CARDIOVASCULAR: No chest pain or palpitations. No lower extremity edema  GASTROINTESTINAL: + abdominal pain. No nausea, vomiting, diarrhea, or hematemesis  GENITOURINARY: No dysuria or hematuria   NEUROLOGICAL: No focal numbness or weakness  SKIN: No rashes or itching  HEMATOLOGIC: No easy bruising or prolonged bleeding.      PHYSICAL EXAM:  GENERAL: mild distress, obese, Non-toxic, stated age   HEAD:  Atraumatic, Normocephalic  EYES: EOMI, Sclera White   NECK: Supple, No JVD  CHEST/LUNG: Clear to auscultation bilaterally; No wheezing, rhonchi, or crackles  HEART: Regular rate and rhythm; s1, s2, No murmurs, rubs, or gallops  ABDOMEN: Soft, LUE> Epigastric, and LLQ ttp , Nondistended; Bowel sounds present, No rebound or guarding noted   EXTREMITIES:  No lower extremity edema or calf tenderness to palpation.  No clubbing or cyanosis  PSYCH: AAOx3, pleasant, cooperative, not anxious  NEUROLOGY: 5/5 strength in all extremities, no downward drift. Sensation grossly intact.   SKIN: No rashes or lesions      ASSESSMENT AND PLAN:  Acute pancreatitis (suspected, 2/3 criteria met: pain + CT findings)   -Unclear exactly why, but appears to be related to fast food  -Cont with LR at 250 cc/hr  -NPO for now, advance diet as tolerated   -f/u RUQ sono, TGs are normal   -Metformin in class III for pancreatitis (rare)  -Needs GI out patient follow up, or inpatient if no improvement.  -Hold off on checking IgG levels for now    -Cont with protonix for now     HTN / HLD : cont with atorvastatin, amlodipine, and ASA      Diabetes: Basal bolus insulin if needed, keep fingerstick glucose <180.    -Currently NPO     BPH: cont tamsulosin     DVT ppx: Lovenox/Heparin  GI ppx: trial of protonix   GOC: Full code.    My note supersedes the residents in the event of a discrepancy.

## 2021-10-13 NOTE — ED PROVIDER NOTE - NS ED ROS FT
Constitutional: (-) fever  Eyes/ENT: (-) visual changes   Cardiovascular: (-) chest pain, (-) syncope  Respiratory: (-) cough, (-) shortness of breath  Gastrointestinal: (-) vomiting, (-) diarrhea, abd pain  Genitourinary: (-) dysuria, (-) hesitancy, (-) frequency   Musculoskeletal: (-) neck pain, (-) joint pain  Integumentary: (-) rash, (-) edema  Neurological: (-) headache, (-) altered mental status  Allergic/Immunologic: (-) pruritus

## 2021-10-13 NOTE — H&P ADULT - ASSESSMENT
# Pancreatitis , recurrent   - no active alcohol abuse / history of alcohol abuse in 30's  - epigastric abd pain radiating to the back  - no sepsis on admission  - lipase wnl  - CT Abdomen and Pelvis w/ IV Cont : Mild inflammatory stranding adjacent to pancreatic tail, which may be attributed to acute interstitial edematous pancreatitis; no acute fluid collection ; gallbladder unremarkable (no biliary dilation)  - monitor H/H, BUN , and Cr  - f/u lipid profile  - f/u RUQ Ultrasound to r/o biliary sludge   - NPO Except medications   - Pain control , IVF       # h/o HTN, c/w amlodipine  # h/o HLD, c/w atorvastatin  # h/o BPH, c/w tamsulosin  # h/o DM , FS AC QHS, sliding scale, start insulin subq if FS >180    # DVT PPX: Lovenox  # GI PPX: PPI  # Diet: NPO except medications  # CHG BATH  # Activity: as tolerated     65 yo M with PMH of HTN, HLD, DM, BPH, and pancreatitis presents to the hospital with 2 day history of sharp intermittent epigastric abdominal pain radiating to the back which is exacerbated by po intake. The patient notes that his last pancreatitis episode was ~ 2 years ago. He denies history of gallstones. The patient denies current alcohol abuse but has a history of heavy drinking in his 30's. The patient denies dizziness/ headache/ chest pain/ shortness of breath/ urinary frequency or urgency/ denies constipation or diarrhea. All ros negative except as noted above.     # Pancreatitis , recurrent   - no active alcohol abuse / history of alcohol abuse in 30's  - epigastric abd pain radiating to the back  - no sepsis on admission  - lipase wnl  - CT Abdomen and Pelvis w/ IV Cont : Mild inflammatory stranding adjacent to pancreatic tail, which may be attributed to acute interstitial edematous pancreatitis; no acute fluid collection ; gallbladder unremarkable (no biliary dilation)  - monitor H/H, BUN , and Cr  - f/u lipid profile (triglycerides)  - f/u RUQ Ultrasound to r/o biliary sludge   - NPO Except medications   - Pain control , IVF       # h/o HTN, c/w amlodipine  # h/o HLD, c/w atorvastatin  # h/o BPH, c/w tamsulosin  # h/o DM , FS AC QHS, sliding scale, start insulin subq if FS >180    # DVT PPX: Lovenox  # GI PPX: PPI  # Diet: NPO except medications  # CHG BATH  # Activity: as tolerated

## 2021-10-14 LAB
A1C WITH ESTIMATED AVERAGE GLUCOSE RESULT: 7.9 % — HIGH (ref 4–5.6)
ALBUMIN SERPL ELPH-MCNC: 4.1 G/DL — SIGNIFICANT CHANGE UP (ref 3.5–5.2)
ALP SERPL-CCNC: 85 U/L — SIGNIFICANT CHANGE UP (ref 30–115)
ALT FLD-CCNC: 18 U/L — SIGNIFICANT CHANGE UP (ref 0–41)
ANION GAP SERPL CALC-SCNC: 14 MMOL/L — SIGNIFICANT CHANGE UP (ref 7–14)
AST SERPL-CCNC: 17 U/L — SIGNIFICANT CHANGE UP (ref 0–41)
BASOPHILS # BLD AUTO: 0.03 K/UL — SIGNIFICANT CHANGE UP (ref 0–0.2)
BASOPHILS NFR BLD AUTO: 0.5 % — SIGNIFICANT CHANGE UP (ref 0–1)
BILIRUB SERPL-MCNC: 1.2 MG/DL — SIGNIFICANT CHANGE UP (ref 0.2–1.2)
BUN SERPL-MCNC: 7 MG/DL — LOW (ref 10–20)
CALCIUM SERPL-MCNC: 8.7 MG/DL — SIGNIFICANT CHANGE UP (ref 8.5–10.1)
CHLORIDE SERPL-SCNC: 102 MMOL/L — SIGNIFICANT CHANGE UP (ref 98–110)
CO2 SERPL-SCNC: 20 MMOL/L — SIGNIFICANT CHANGE UP (ref 17–32)
COVID-19 SPIKE DOMAIN AB INTERP: NEGATIVE — SIGNIFICANT CHANGE UP
COVID-19 SPIKE DOMAIN ANTIBODY RESULT: 0.4 U/ML — SIGNIFICANT CHANGE UP
CREAT SERPL-MCNC: 0.5 MG/DL — LOW (ref 0.7–1.5)
EOSINOPHIL # BLD AUTO: 0.25 K/UL — SIGNIFICANT CHANGE UP (ref 0–0.7)
EOSINOPHIL NFR BLD AUTO: 4.4 % — SIGNIFICANT CHANGE UP (ref 0–8)
ESTIMATED AVERAGE GLUCOSE: 180 MG/DL — HIGH (ref 68–114)
GLUCOSE BLDC GLUCOMTR-MCNC: 121 MG/DL — HIGH (ref 70–99)
GLUCOSE BLDC GLUCOMTR-MCNC: 137 MG/DL — HIGH (ref 70–99)
GLUCOSE BLDC GLUCOMTR-MCNC: 169 MG/DL — HIGH (ref 70–99)
GLUCOSE SERPL-MCNC: 133 MG/DL — HIGH (ref 70–99)
HCT VFR BLD CALC: 38.5 % — LOW (ref 42–52)
HGB BLD-MCNC: 13 G/DL — LOW (ref 14–18)
IMM GRANULOCYTES NFR BLD AUTO: 0.5 % — HIGH (ref 0.1–0.3)
LYMPHOCYTES # BLD AUTO: 1.06 K/UL — LOW (ref 1.2–3.4)
LYMPHOCYTES # BLD AUTO: 18.5 % — LOW (ref 20.5–51.1)
MCHC RBC-ENTMCNC: 29.3 PG — SIGNIFICANT CHANGE UP (ref 27–31)
MCHC RBC-ENTMCNC: 33.8 G/DL — SIGNIFICANT CHANGE UP (ref 32–37)
MCV RBC AUTO: 86.9 FL — SIGNIFICANT CHANGE UP (ref 80–94)
MONOCYTES # BLD AUTO: 0.52 K/UL — SIGNIFICANT CHANGE UP (ref 0.1–0.6)
MONOCYTES NFR BLD AUTO: 9.1 % — SIGNIFICANT CHANGE UP (ref 1.7–9.3)
NEUTROPHILS # BLD AUTO: 3.84 K/UL — SIGNIFICANT CHANGE UP (ref 1.4–6.5)
NEUTROPHILS NFR BLD AUTO: 67 % — SIGNIFICANT CHANGE UP (ref 42.2–75.2)
NRBC # BLD: 0 /100 WBCS — SIGNIFICANT CHANGE UP (ref 0–0)
PLATELET # BLD AUTO: 193 K/UL — SIGNIFICANT CHANGE UP (ref 130–400)
POTASSIUM SERPL-MCNC: 3.9 MMOL/L — SIGNIFICANT CHANGE UP (ref 3.5–5)
POTASSIUM SERPL-SCNC: 3.9 MMOL/L — SIGNIFICANT CHANGE UP (ref 3.5–5)
PROT SERPL-MCNC: 6.5 G/DL — SIGNIFICANT CHANGE UP (ref 6–8)
RBC # BLD: 4.43 M/UL — LOW (ref 4.7–6.1)
RBC # FLD: 12.5 % — SIGNIFICANT CHANGE UP (ref 11.5–14.5)
SARS-COV-2 IGG+IGM SERPL QL IA: 0.4 U/ML — SIGNIFICANT CHANGE UP
SARS-COV-2 IGG+IGM SERPL QL IA: NEGATIVE — SIGNIFICANT CHANGE UP
SODIUM SERPL-SCNC: 136 MMOL/L — SIGNIFICANT CHANGE UP (ref 135–146)
WBC # BLD: 5.73 K/UL — SIGNIFICANT CHANGE UP (ref 4.8–10.8)
WBC # FLD AUTO: 5.73 K/UL — SIGNIFICANT CHANGE UP (ref 4.8–10.8)

## 2021-10-14 PROCEDURE — 99232 SBSQ HOSP IP/OBS MODERATE 35: CPT

## 2021-10-14 PROCEDURE — 76705 ECHO EXAM OF ABDOMEN: CPT | Mod: 26

## 2021-10-14 RX ORDER — SODIUM CHLORIDE 9 MG/ML
1000 INJECTION, SOLUTION INTRAVENOUS
Refills: 0 | Status: DISCONTINUED | OUTPATIENT
Start: 2021-10-14 | End: 2021-10-18

## 2021-10-14 RX ADMIN — TAMSULOSIN HYDROCHLORIDE 0.4 MILLIGRAM(S): 0.4 CAPSULE ORAL at 21:08

## 2021-10-14 RX ADMIN — MORPHINE SULFATE 2 MILLIGRAM(S): 50 CAPSULE, EXTENDED RELEASE ORAL at 21:08

## 2021-10-14 RX ADMIN — ENOXAPARIN SODIUM 40 MILLIGRAM(S): 100 INJECTION SUBCUTANEOUS at 12:50

## 2021-10-14 RX ADMIN — ATORVASTATIN CALCIUM 40 MILLIGRAM(S): 80 TABLET, FILM COATED ORAL at 21:08

## 2021-10-14 RX ADMIN — MORPHINE SULFATE 2 MILLIGRAM(S): 50 CAPSULE, EXTENDED RELEASE ORAL at 10:04

## 2021-10-14 RX ADMIN — SODIUM CHLORIDE 200 MILLILITER(S): 9 INJECTION, SOLUTION INTRAVENOUS at 21:08

## 2021-10-14 RX ADMIN — MORPHINE SULFATE 2 MILLIGRAM(S): 50 CAPSULE, EXTENDED RELEASE ORAL at 00:02

## 2021-10-14 RX ADMIN — SODIUM CHLORIDE 200 MILLILITER(S): 9 INJECTION, SOLUTION INTRAVENOUS at 14:39

## 2021-10-14 RX ADMIN — MORPHINE SULFATE 2 MILLIGRAM(S): 50 CAPSULE, EXTENDED RELEASE ORAL at 22:15

## 2021-10-14 RX ADMIN — HYDROMORPHONE HYDROCHLORIDE 0.5 MILLIGRAM(S): 2 INJECTION INTRAMUSCULAR; INTRAVENOUS; SUBCUTANEOUS at 05:12

## 2021-10-14 RX ADMIN — SODIUM CHLORIDE 200 MILLILITER(S): 9 INJECTION INTRAMUSCULAR; INTRAVENOUS; SUBCUTANEOUS at 02:55

## 2021-10-14 RX ADMIN — Medication 81 MILLIGRAM(S): at 12:50

## 2021-10-14 RX ADMIN — AMLODIPINE BESYLATE 5 MILLIGRAM(S): 2.5 TABLET ORAL at 05:12

## 2021-10-14 RX ADMIN — MORPHINE SULFATE 2 MILLIGRAM(S): 50 CAPSULE, EXTENDED RELEASE ORAL at 16:29

## 2021-10-14 RX ADMIN — MORPHINE SULFATE 2 MILLIGRAM(S): 50 CAPSULE, EXTENDED RELEASE ORAL at 02:55

## 2021-10-14 RX ADMIN — PANTOPRAZOLE SODIUM 40 MILLIGRAM(S): 20 TABLET, DELAYED RELEASE ORAL at 05:13

## 2021-10-14 NOTE — PROGRESS NOTE ADULT - ASSESSMENT
67 yo M with PMH of HTN, HLD, DM, BPH, and pancreatitis presents to the hospital with 2 day history of sharp intermittent epigastric abdominal pain radiating to the back which is exacerbated by po intake. The patient notes that his last pancreatitis episode was ~ 2 years ago. He denies history of gallstones. The patient denies current alcohol abuse, but has a history of heavy drinking in his 30's.     # Pancreatitis (mild), recurrent   no active alcohol abuse / history of alcohol abuse in 30's  lipase wnl  CT Abdomen and Pelvis w/ IV Cont: Mild inflammatory stranding adjacent to pancreatic tail, which may be attributed to acute interstitial edematous pancreatitis; no acute fluid collection; gallbladder unremarkable (no biliary dilation)  TG 73  RUQ Ultrasound: fatty liver  NPO  IVFs: /hr  morphine for pain  can do MRI as outpt  can see GI as outpt    # h/o HTN, c/w amlodipine    # h/o HLD, c/w atorvastatin    # h/o BPH, c/w tamsulosin    # h/o DM; A1c 7.9  FS 2x/day  not on meds here; on metformin at home    # DVT PPX: Lovenox    # GI PPX: PPI    # Activity: independent    Dispo: NPO; IVFs; pain control; can limit labs if doing well  should be able to go home in 24-48 hrs     67 yo M with PMH of HTN, HLD, DM, BPH, and pancreatitis presents to the hospital with 2 day history of sharp intermittent epigastric abdominal pain radiating to the back which is exacerbated by po intake. The patient notes that his last pancreatitis episode was ~ 2 years ago. He denies history of gallstones. The patient denies current alcohol abuse, but has a history of heavy drinking in his 30's.     # Pancreatitis (mild), recurrent   no active alcohol abuse / history of alcohol abuse in 30's  lipase wnl  CT Abdomen and Pelvis w/ IV Cont: Mild inflammatory stranding adjacent to pancreatic tail, which may be attributed to acute interstitial edematous pancreatitis; no acute fluid collection; gallbladder unremarkable (no biliary dilation)  TG 73  RUQ Ultrasound: fatty liver  NPO  IVFs: /hr  morphine for pain  can do MRI as outpt  can see GI as outpt    # h/o HTN, c/w amlodipine    # h/o HLD, c/w atorvastatin    # h/o BPH, c/w tamsulosin    # h/o DM; A1c 7.9  FS 2x/day  not on meds here; on metformin at home    # BMI 32.9 = obesity  wt loss advised    # DVT PPX: Lovenox    # GI PPX: PPI    # Activity: independent    Dispo: NPO; IVFs; pain control; can limit labs if doing well  should be able to go home in 24-48 hrs

## 2021-10-14 NOTE — PROGRESS NOTE ADULT - ASSESSMENT
67 yo M with PMH of HTN, HLD, DM, BPH, and numerous counts of pancreatitis presents for pancreatitis.   # Pancreatitis , recurrent   - CT Abdomen and Pelvis w/ IV Cont : Mild inflammatory stranding adjacent to pancreatic tail, which may be attributed to acute interstitial edematous pancreatitis; no acute fluid collection ; gallbladder unremarkable (no biliary dilation)  - lipase within normal limits, no alcohol abuse   - no gallstones on CT, f/u RUQ US  - triglycerides normal   - monitor H/H, BUN , and Cr  - NPO, afance diet as tolerate   - Pain control , IVF @ 200 now   - remove pain control once patient starts tolerating diet       # h/o HTN, c/w amlodipine  # h/o HLD, c/w atorvastatin  # h/o BPH, c/w tamsulosin  # h/o DM , FS AC QHS, sliding scale, start insulin subq if FS >180    # DVT PPX: Lovenox  # GI PPX: PPI  # Diet: NPO except medications  # CHG BATH  # Activity: as tolerated

## 2021-10-15 LAB
ALBUMIN SERPL ELPH-MCNC: 4.1 G/DL — SIGNIFICANT CHANGE UP (ref 3.5–5.2)
ALP SERPL-CCNC: 80 U/L — SIGNIFICANT CHANGE UP (ref 30–115)
ALT FLD-CCNC: 16 U/L — SIGNIFICANT CHANGE UP (ref 0–41)
AMYLASE P1 CFR SERPL: 58 U/L — SIGNIFICANT CHANGE UP (ref 25–115)
ANION GAP SERPL CALC-SCNC: 14 MMOL/L — SIGNIFICANT CHANGE UP (ref 7–14)
AST SERPL-CCNC: 15 U/L — SIGNIFICANT CHANGE UP (ref 0–41)
BASOPHILS # BLD AUTO: 0.03 K/UL — SIGNIFICANT CHANGE UP (ref 0–0.2)
BASOPHILS NFR BLD AUTO: 0.5 % — SIGNIFICANT CHANGE UP (ref 0–1)
BILIRUB SERPL-MCNC: 1.3 MG/DL — HIGH (ref 0.2–1.2)
BUN SERPL-MCNC: 6 MG/DL — LOW (ref 10–20)
CALCIUM SERPL-MCNC: 9.1 MG/DL — SIGNIFICANT CHANGE UP (ref 8.5–10.1)
CHLORIDE SERPL-SCNC: 104 MMOL/L — SIGNIFICANT CHANGE UP (ref 98–110)
CO2 SERPL-SCNC: 21 MMOL/L — SIGNIFICANT CHANGE UP (ref 17–32)
CREAT SERPL-MCNC: 0.5 MG/DL — LOW (ref 0.7–1.5)
EOSINOPHIL # BLD AUTO: 0.22 K/UL — SIGNIFICANT CHANGE UP (ref 0–0.7)
EOSINOPHIL NFR BLD AUTO: 3.9 % — SIGNIFICANT CHANGE UP (ref 0–8)
GLUCOSE BLDC GLUCOMTR-MCNC: 114 MG/DL — HIGH (ref 70–99)
GLUCOSE BLDC GLUCOMTR-MCNC: 137 MG/DL — HIGH (ref 70–99)
GLUCOSE BLDC GLUCOMTR-MCNC: 155 MG/DL — HIGH (ref 70–99)
GLUCOSE SERPL-MCNC: 126 MG/DL — HIGH (ref 70–99)
HCT VFR BLD CALC: 36.4 % — LOW (ref 42–52)
HGB BLD-MCNC: 12.3 G/DL — LOW (ref 14–18)
IMM GRANULOCYTES NFR BLD AUTO: 0.2 % — SIGNIFICANT CHANGE UP (ref 0.1–0.3)
LIDOCAIN IGE QN: 109 U/L — HIGH (ref 7–60)
LYMPHOCYTES # BLD AUTO: 1.13 K/UL — LOW (ref 1.2–3.4)
LYMPHOCYTES # BLD AUTO: 20.3 % — LOW (ref 20.5–51.1)
MAGNESIUM SERPL-MCNC: 1.9 MG/DL — SIGNIFICANT CHANGE UP (ref 1.8–2.4)
MCHC RBC-ENTMCNC: 28.9 PG — SIGNIFICANT CHANGE UP (ref 27–31)
MCHC RBC-ENTMCNC: 33.8 G/DL — SIGNIFICANT CHANGE UP (ref 32–37)
MCV RBC AUTO: 85.4 FL — SIGNIFICANT CHANGE UP (ref 80–94)
MONOCYTES # BLD AUTO: 0.57 K/UL — SIGNIFICANT CHANGE UP (ref 0.1–0.6)
MONOCYTES NFR BLD AUTO: 10.2 % — HIGH (ref 1.7–9.3)
NEUTROPHILS # BLD AUTO: 3.61 K/UL — SIGNIFICANT CHANGE UP (ref 1.4–6.5)
NEUTROPHILS NFR BLD AUTO: 64.9 % — SIGNIFICANT CHANGE UP (ref 42.2–75.2)
NRBC # BLD: 0 /100 WBCS — SIGNIFICANT CHANGE UP (ref 0–0)
PLATELET # BLD AUTO: 192 K/UL — SIGNIFICANT CHANGE UP (ref 130–400)
POTASSIUM SERPL-MCNC: 3.9 MMOL/L — SIGNIFICANT CHANGE UP (ref 3.5–5)
POTASSIUM SERPL-SCNC: 3.9 MMOL/L — SIGNIFICANT CHANGE UP (ref 3.5–5)
PROT SERPL-MCNC: 6.4 G/DL — SIGNIFICANT CHANGE UP (ref 6–8)
RBC # BLD: 4.26 M/UL — LOW (ref 4.7–6.1)
RBC # FLD: 12.3 % — SIGNIFICANT CHANGE UP (ref 11.5–14.5)
SODIUM SERPL-SCNC: 139 MMOL/L — SIGNIFICANT CHANGE UP (ref 135–146)
WBC # BLD: 5.57 K/UL — SIGNIFICANT CHANGE UP (ref 4.8–10.8)
WBC # FLD AUTO: 5.57 K/UL — SIGNIFICANT CHANGE UP (ref 4.8–10.8)

## 2021-10-15 PROCEDURE — 99232 SBSQ HOSP IP/OBS MODERATE 35: CPT

## 2021-10-15 RX ORDER — MORPHINE SULFATE 50 MG/1
3 CAPSULE, EXTENDED RELEASE ORAL EVERY 4 HOURS
Refills: 0 | Status: DISCONTINUED | OUTPATIENT
Start: 2021-10-15 | End: 2021-10-18

## 2021-10-15 RX ADMIN — Medication 1: at 17:57

## 2021-10-15 RX ADMIN — TAMSULOSIN HYDROCHLORIDE 0.4 MILLIGRAM(S): 0.4 CAPSULE ORAL at 21:18

## 2021-10-15 RX ADMIN — MORPHINE SULFATE 2 MILLIGRAM(S): 50 CAPSULE, EXTENDED RELEASE ORAL at 02:41

## 2021-10-15 RX ADMIN — MORPHINE SULFATE 3 MILLIGRAM(S): 50 CAPSULE, EXTENDED RELEASE ORAL at 21:17

## 2021-10-15 RX ADMIN — CHLORHEXIDINE GLUCONATE 1 APPLICATION(S): 213 SOLUTION TOPICAL at 05:43

## 2021-10-15 RX ADMIN — MORPHINE SULFATE 2 MILLIGRAM(S): 50 CAPSULE, EXTENDED RELEASE ORAL at 06:10

## 2021-10-15 RX ADMIN — MORPHINE SULFATE 2 MILLIGRAM(S): 50 CAPSULE, EXTENDED RELEASE ORAL at 01:23

## 2021-10-15 RX ADMIN — MORPHINE SULFATE 2 MILLIGRAM(S): 50 CAPSULE, EXTENDED RELEASE ORAL at 05:42

## 2021-10-15 RX ADMIN — MORPHINE SULFATE 3 MILLIGRAM(S): 50 CAPSULE, EXTENDED RELEASE ORAL at 22:07

## 2021-10-15 RX ADMIN — MORPHINE SULFATE 3 MILLIGRAM(S): 50 CAPSULE, EXTENDED RELEASE ORAL at 10:58

## 2021-10-15 RX ADMIN — AMLODIPINE BESYLATE 5 MILLIGRAM(S): 2.5 TABLET ORAL at 05:42

## 2021-10-15 RX ADMIN — ATORVASTATIN CALCIUM 40 MILLIGRAM(S): 80 TABLET, FILM COATED ORAL at 21:18

## 2021-10-15 RX ADMIN — ENOXAPARIN SODIUM 40 MILLIGRAM(S): 100 INJECTION SUBCUTANEOUS at 12:15

## 2021-10-15 RX ADMIN — PANTOPRAZOLE SODIUM 40 MILLIGRAM(S): 20 TABLET, DELAYED RELEASE ORAL at 05:42

## 2021-10-15 RX ADMIN — Medication 81 MILLIGRAM(S): at 12:15

## 2021-10-15 NOTE — PROGRESS NOTE ADULT - ASSESSMENT
65 yo M with PMH of HTN, HLD, DM, BPH, and pancreatitis presents to the hospital with 2 day history of sharp intermittent epigastric abdominal pain radiating to the back which is exacerbated by po intake. The patient notes that his last pancreatitis episode was ~ 2 years ago. He denies history of gallstones. The patient denies current alcohol abuse, but has a history of heavy drinking in his 30's.     # Pancreatitis (mild), recurrent   no active alcohol abuse / history of alcohol abuse in 30's  lipase 109; amylase nl  CT Abdomen and Pelvis w/ IV Cont: Mild inflammatory stranding adjacent to pancreatic tail, which may be attributed to acute interstitial edematous pancreatitis; no acute fluid collection; gallbladder unremarkable (no biliary dilation)  TG 73  RUQ Ultrasound: fatty liver  Diet: clrs  IVFs: /hr  morphine for pain  can do MRI as outpt  can see GI as outpt    # h/o HTN, c/w amlodipine    # h/o HLD, c/w atorvastatin    # h/o BPH, c/w tamsulosin    # h/o DM; A1c 7.9  FS 2x/day  not on meds here; on metformin at home    # BMI 32.9 = obesity  wt loss advised    # DVT PPX: Lovenox    # GI PPX: PPI    # Activity: independent    Dispo: clrs diet; IVFs; pain control; can limit labs if doing well  should be able to go home in 24-48 hrs

## 2021-10-15 NOTE — PROGRESS NOTE ADULT - ASSESSMENT
65 yo M with PMH of HTN, HLD, DM, BPH, and numerous counts of pancreatitis presents for pancreatitis.   # Pancreatitis , recurrent   - CT Abdomen and Pelvis w/ IV Cont : Mild inflammatory stranding adjacent to pancreatic tail, which may be attributed to acute interstitial edematous pancreatitis; no acute fluid collection ; gallbladder unremarkable (no biliary dilation)  - lipase within normal limits, no alcohol abuse   - no gallstones on CT, f/u RUQ US  - triglycerides normal   - monitor H/H, BUN , and Cr  - NPO, afance diet as tolerate   - Pain control , IVF @ 200 now   - remove pain control once patient starts tolerating diet   - patient is still having pain even with water intake, will increase pain regimen and advance once able       # h/o HTN, c/w amlodipine  # h/o HLD, c/w atorvastatin  # h/o BPH, c/w tamsulosin  # h/o DM , FS AC QHS, sliding scale, start insulin subq if FS >180    # DVT PPX: Lovenox  # GI PPX: PPI  # Diet: NPO except medications  # CHG BATH  # Activity: as tolerated

## 2021-10-16 ENCOUNTER — TRANSCRIPTION ENCOUNTER (OUTPATIENT)
Age: 66
End: 2021-10-16

## 2021-10-16 LAB
ALBUMIN SERPL ELPH-MCNC: 4.4 G/DL — SIGNIFICANT CHANGE UP (ref 3.5–5.2)
ALP SERPL-CCNC: 83 U/L — SIGNIFICANT CHANGE UP (ref 30–115)
ALT FLD-CCNC: 17 U/L — SIGNIFICANT CHANGE UP (ref 0–41)
ANION GAP SERPL CALC-SCNC: 13 MMOL/L — SIGNIFICANT CHANGE UP (ref 7–14)
AST SERPL-CCNC: 16 U/L — SIGNIFICANT CHANGE UP (ref 0–41)
BASOPHILS # BLD AUTO: 0.04 K/UL — SIGNIFICANT CHANGE UP (ref 0–0.2)
BASOPHILS NFR BLD AUTO: 0.8 % — SIGNIFICANT CHANGE UP (ref 0–1)
BILIRUB SERPL-MCNC: 1.1 MG/DL — SIGNIFICANT CHANGE UP (ref 0.2–1.2)
BUN SERPL-MCNC: 5 MG/DL — LOW (ref 10–20)
CALCIUM SERPL-MCNC: 9.1 MG/DL — SIGNIFICANT CHANGE UP (ref 8.5–10.1)
CHLORIDE SERPL-SCNC: 103 MMOL/L — SIGNIFICANT CHANGE UP (ref 98–110)
CO2 SERPL-SCNC: 21 MMOL/L — SIGNIFICANT CHANGE UP (ref 17–32)
CREAT SERPL-MCNC: 0.6 MG/DL — LOW (ref 0.7–1.5)
EOSINOPHIL # BLD AUTO: 0.2 K/UL — SIGNIFICANT CHANGE UP (ref 0–0.7)
EOSINOPHIL NFR BLD AUTO: 4.2 % — SIGNIFICANT CHANGE UP (ref 0–8)
GLUCOSE BLDC GLUCOMTR-MCNC: 130 MG/DL — HIGH (ref 70–99)
GLUCOSE BLDC GLUCOMTR-MCNC: 150 MG/DL — HIGH (ref 70–99)
GLUCOSE BLDC GLUCOMTR-MCNC: 163 MG/DL — HIGH (ref 70–99)
GLUCOSE BLDC GLUCOMTR-MCNC: 167 MG/DL — HIGH (ref 70–99)
GLUCOSE SERPL-MCNC: 134 MG/DL — HIGH (ref 70–99)
HCT VFR BLD CALC: 36 % — LOW (ref 42–52)
HGB BLD-MCNC: 12.4 G/DL — LOW (ref 14–18)
IMM GRANULOCYTES NFR BLD AUTO: 0.4 % — HIGH (ref 0.1–0.3)
LYMPHOCYTES # BLD AUTO: 1.05 K/UL — LOW (ref 1.2–3.4)
LYMPHOCYTES # BLD AUTO: 22 % — SIGNIFICANT CHANGE UP (ref 20.5–51.1)
MAGNESIUM SERPL-MCNC: 2 MG/DL — SIGNIFICANT CHANGE UP (ref 1.8–2.4)
MCHC RBC-ENTMCNC: 29.1 PG — SIGNIFICANT CHANGE UP (ref 27–31)
MCHC RBC-ENTMCNC: 34.4 G/DL — SIGNIFICANT CHANGE UP (ref 32–37)
MCV RBC AUTO: 84.5 FL — SIGNIFICANT CHANGE UP (ref 80–94)
MONOCYTES # BLD AUTO: 0.56 K/UL — SIGNIFICANT CHANGE UP (ref 0.1–0.6)
MONOCYTES NFR BLD AUTO: 11.7 % — HIGH (ref 1.7–9.3)
NEUTROPHILS # BLD AUTO: 2.91 K/UL — SIGNIFICANT CHANGE UP (ref 1.4–6.5)
NEUTROPHILS NFR BLD AUTO: 60.9 % — SIGNIFICANT CHANGE UP (ref 42.2–75.2)
NRBC # BLD: 0 /100 WBCS — SIGNIFICANT CHANGE UP (ref 0–0)
PLATELET # BLD AUTO: 205 K/UL — SIGNIFICANT CHANGE UP (ref 130–400)
POTASSIUM SERPL-MCNC: 4 MMOL/L — SIGNIFICANT CHANGE UP (ref 3.5–5)
POTASSIUM SERPL-SCNC: 4 MMOL/L — SIGNIFICANT CHANGE UP (ref 3.5–5)
PROT SERPL-MCNC: 6.8 G/DL — SIGNIFICANT CHANGE UP (ref 6–8)
RBC # BLD: 4.26 M/UL — LOW (ref 4.7–6.1)
RBC # FLD: 12.4 % — SIGNIFICANT CHANGE UP (ref 11.5–14.5)
SODIUM SERPL-SCNC: 137 MMOL/L — SIGNIFICANT CHANGE UP (ref 135–146)
WBC # BLD: 4.78 K/UL — LOW (ref 4.8–10.8)
WBC # FLD AUTO: 4.78 K/UL — LOW (ref 4.8–10.8)

## 2021-10-16 PROCEDURE — 99233 SBSQ HOSP IP/OBS HIGH 50: CPT

## 2021-10-16 RX ORDER — ACETAMINOPHEN 500 MG
2 TABLET ORAL
Qty: 0 | Refills: 0 | DISCHARGE
Start: 2021-10-16

## 2021-10-16 RX ADMIN — Medication 81 MILLIGRAM(S): at 11:56

## 2021-10-16 RX ADMIN — TAMSULOSIN HYDROCHLORIDE 0.4 MILLIGRAM(S): 0.4 CAPSULE ORAL at 22:15

## 2021-10-16 RX ADMIN — MORPHINE SULFATE 3 MILLIGRAM(S): 50 CAPSULE, EXTENDED RELEASE ORAL at 16:24

## 2021-10-16 RX ADMIN — ENOXAPARIN SODIUM 40 MILLIGRAM(S): 100 INJECTION SUBCUTANEOUS at 11:56

## 2021-10-16 RX ADMIN — SODIUM CHLORIDE 200 MILLILITER(S): 9 INJECTION, SOLUTION INTRAVENOUS at 05:36

## 2021-10-16 RX ADMIN — ATORVASTATIN CALCIUM 40 MILLIGRAM(S): 80 TABLET, FILM COATED ORAL at 22:15

## 2021-10-16 RX ADMIN — MORPHINE SULFATE 3 MILLIGRAM(S): 50 CAPSULE, EXTENDED RELEASE ORAL at 08:49

## 2021-10-16 RX ADMIN — AMLODIPINE BESYLATE 5 MILLIGRAM(S): 2.5 TABLET ORAL at 05:36

## 2021-10-16 RX ADMIN — PANTOPRAZOLE SODIUM 40 MILLIGRAM(S): 20 TABLET, DELAYED RELEASE ORAL at 05:36

## 2021-10-16 RX ADMIN — MORPHINE SULFATE 3 MILLIGRAM(S): 50 CAPSULE, EXTENDED RELEASE ORAL at 03:37

## 2021-10-16 RX ADMIN — MORPHINE SULFATE 3 MILLIGRAM(S): 50 CAPSULE, EXTENDED RELEASE ORAL at 08:44

## 2021-10-16 RX ADMIN — Medication 1: at 08:42

## 2021-10-16 RX ADMIN — CHLORHEXIDINE GLUCONATE 1 APPLICATION(S): 213 SOLUTION TOPICAL at 05:36

## 2021-10-16 RX ADMIN — MORPHINE SULFATE 3 MILLIGRAM(S): 50 CAPSULE, EXTENDED RELEASE ORAL at 22:20

## 2021-10-16 RX ADMIN — MORPHINE SULFATE 3 MILLIGRAM(S): 50 CAPSULE, EXTENDED RELEASE ORAL at 04:35

## 2021-10-16 RX ADMIN — MORPHINE SULFATE 3 MILLIGRAM(S): 50 CAPSULE, EXTENDED RELEASE ORAL at 16:49

## 2021-10-16 NOTE — PROGRESS NOTE ADULT - ATTENDING COMMENTS
# Acute Pancreatitis , recurrent   # HTN  # HLD  # BPH  # DM     plan is as above  note reviewed and edited

## 2021-10-16 NOTE — DISCHARGE NOTE PROVIDER - HOSPITAL COURSE
67 yo M with PMH of HTN, HLD, DM, BPH, GERD, and pancreatitis presents to the hospital with 2 day history of sharp intermittent epigastric abdominal pain radiating to the back which is exacerbated by po intake. The patient notes that his last pancreatitis episode was ~ 2 years ago. He denies history of gallstones. The patient denies current alcohol abuse but has a history of heavy drinking in his 30's. The patient denies dizziness/ headache/ chest pain/ shortness of breath/ urinary frequency or urgency/ denies constipation or diarrhea. All ros negative except as noted above.     T(C): 36.6,  HR: 64 , BP: 149/90, RR: 19 , SpO2: 98% on room air  Labs: lipase 31  CT Abdomen and Pelvis w/ IV Cont : Mild inflammatory stranding adjacent to pancreatic tail, which may be attributed to acute interstitial edematous pancreatitis; no acute fluid collection; gallbladder unremarkable (no biliary dilation)    Patient admitted for pancreatitis. Started on fluids, pain control and made NPO. Diet was advanced as tolerated. Patient persistently experience pain when advanced to CLD. Once patient could tolerate diet was advanced and pain control needs tapered off. IV fluids decreased.    Patient hemodynamically stable and medically clear for discharge. Patient can follow with GI OP. 67 yo M with PMH of HTN, HLD, DM, BPH, GERD, and pancreatitis presents to the hospital with 2 day history of sharp intermittent epigastric abdominal pain radiating to the back which is exacerbated by po intake. The patient notes that his last pancreatitis episode was ~ 2 years ago. He denies history of gallstones. The patient denies current alcohol abuse but has a history of heavy drinking in his 30's. The patient denies dizziness/ headache/ chest pain/ shortness of breath/ urinary frequency or urgency/ denies constipation or diarrhea. All ros negative except as noted above.     T(C): 36.6,  HR: 64 , BP: 149/90, RR: 19 , SpO2: 98% on room air  Labs: lipase 31  CT Abdomen and Pelvis w/ IV Cont : Mild inflammatory stranding adjacent to pancreatic tail, which may be attributed to acute interstitial edematous pancreatitis; no acute fluid collection; gallbladder unremarkable (no biliary dilation)    Patient admitted for pancreatitis. Started on fluids, pain control and made NPO. Diet was advanced as tolerated. Patient persistently experience pain when advanced to CLD. Once patient could tolerate diet was advanced and pain control needs tapered off. IV fluids decreased and stopped     Patient hemodynamically stable and medically clear for discharge. Patient can follow with GI OP.     I have personally seen and examined patient on the above date.  I discussed the case with Dr. Miranda and I agree with findings and plan as detailed per note above, which I have amended where appropriate.

## 2021-10-16 NOTE — DISCHARGE NOTE PROVIDER - PROVIDER TOKENS
PROVIDER:[TOKEN:[[id number]],FOLLOWUP:[2 weeks]],PROVIDER:[TOKEN:[[id number]],FOLLOWUP:[1 month]]

## 2021-10-16 NOTE — DISCHARGE NOTE PROVIDER - NSDCCPCAREPLAN_GEN_ALL_CORE_FT
PRINCIPAL DISCHARGE DIAGNOSIS  Diagnosis: Pancreatitis  Assessment and Plan of Treatment: Pancreatitis  Pancreatitis is a condition in which the pancreas becomes irritated and swollen (inflammation). The pancreas is a gland that is located behind the stomach. It produces enzymes that help to digest food. Most acute attacks last a couple of days and can cause serious problems and even be life threatening. The most common causes are alcohol abuse and gallstones. You were negative for alcohol consumption, gallstones, and triglycerides.  Symptoms include pain in the upper abdomen that may radiate to the back, nausea, and vomiting. Diagnosis is made with a medical history and physical exam as well as additional diagnostic testing. At home, eat smaller, more frequent meals and avoid alcohol and fatty foods.  Drink enough fluid to keep your urine clear or pale yellow.   Follow up with your PCP. Becuase you have had multiple episodes of pancreatitis you should see a gastroenterologist for further workup.   SEEK IMMEDIATE MEDICAL CARE IF YOU HAVE ANY OF THE FOLLOWING SYMPTOMS: inability to keep fluids down, increasing pain, yellowing of your skin or eyes, or lightheadedness/dizziness.

## 2021-10-16 NOTE — PROGRESS NOTE ADULT - ASSESSMENT
67 yo M with PMH of HTN, HLD, DM, BPH, and numerous counts of pancreatitis presents for pancreatitis.   # Pancreatitis , recurrent   - CT Abdomen and Pelvis w/ IV Cont : Mild inflammatory stranding adjacent to pancreatic tail, which may be attributed to acute interstitial edematous pancreatitis; no acute fluid collection ; gallbladder unremarkable (no biliary dilation)  - lipase within normal limits, no alcohol abuse   - no gallstones on CT, f/u RUQ US  - triglycerides normal   - monitor H/H, BUN , and Cr  - Pain control , IVF @ 200 now   - remove pain control once patient starts tolerating diet   - patient is still having pain even with water intake, will increase pain regimen and advance once able   - CLD , advance diet as tolerate    # h/o HTN, c/w amlodipine  # h/o HLD, c/w atorvastatin  # h/o BPH, c/w tamsulosin  # h/o DM , FS AC QHS, sliding scale, start insulin subq if FS >180    # DVT PPX: Lovenox  # GI PPX: PPI  # Diet: advance diet as tolerated  # CHG BATH  # Activity: as tolerated 65 yo M with PMH of HTN, HLD, DM, BPH, and numerous counts of pancreatitis presents for pancreatitis.   # Pancreatitis , recurrent   - CT Abdomen and Pelvis w/ IV Cont : Mild inflammatory stranding adjacent to pancreatic tail, which may be attributed to acute interstitial edematous pancreatitis; no acute fluid collection ; gallbladder unremarkable (no biliary dilation)  - lipase within normal limits, no alcohol abuse   - no gallstones on CT, f/u RUQ US  - triglycerides normal   - monitor H/H, BUN , and Cr  - Pain control , IVF @ 200 now   - patient is tolerating clear liquid diet, endorses pain upon advancing the diet to soft today  - advance diet as tolerated     # h/o HTN, c/w amlodipine  # h/o HLD, c/w atorvastatin  # h/o BPH, c/w tamsulosin  # h/o DM , FS AC QHS, sliding scale, start insulin subq if FS >180    # DVT PPX: Lovenox  # GI PPX: PPI  # Diet: advance diet as tolerated  # CHG BATH  # Activity: as tolerated  Disposition: Acute

## 2021-10-16 NOTE — DISCHARGE NOTE PROVIDER - CARE PROVIDERS DIRECT ADDRESSES
[email or web address] [email or web address] [email or web address]

## 2021-10-16 NOTE — DISCHARGE NOTE PROVIDER - NSDCMRMEDTOKEN_GEN_ALL_CORE_FT
acetaminophen 325 mg oral tablet: 2 tab(s) orally every 4 hours, As needed, Mild Pain (1 - 3)  amLODIPine 5 mg oral tablet: 1 tab(s) orally once a day  aspirin 81 mg oral tablet: 1 tab(s) orally once a day  atorvastatin 40 mg oral tablet: 1 tab(s) orally once a day (at bedtime)  Flomax 0.4 mg oral capsule: 1 cap(s) orally once a day (at bedtime)  metFORMIN 500 mg oral tablet: 2 tab(s) orally 2 times a day

## 2021-10-16 NOTE — DISCHARGE NOTE PROVIDER - CARE PROVIDER_API CALL
Junior Magana)  Gastroenterology; Internal Medicine  [address] [place] [address]  Phone: [phone]  Fax: [phone]  Follow Up Time: 2 weeks    Andry Bustillos (DO)  Medicine  Physicians  [address], [address] [place] [address]  Phone: [phone]  Fax: [phone]  Follow Up Time: 1 month

## 2021-10-17 LAB
ALBUMIN SERPL ELPH-MCNC: 4.2 G/DL — SIGNIFICANT CHANGE UP (ref 3.5–5.2)
ALP SERPL-CCNC: 79 U/L — SIGNIFICANT CHANGE UP (ref 30–115)
ALT FLD-CCNC: 19 U/L — SIGNIFICANT CHANGE UP (ref 0–41)
ANION GAP SERPL CALC-SCNC: 11 MMOL/L — SIGNIFICANT CHANGE UP (ref 7–14)
AST SERPL-CCNC: 19 U/L — SIGNIFICANT CHANGE UP (ref 0–41)
BASOPHILS # BLD AUTO: 0.04 K/UL — SIGNIFICANT CHANGE UP (ref 0–0.2)
BASOPHILS NFR BLD AUTO: 0.9 % — SIGNIFICANT CHANGE UP (ref 0–1)
BILIRUB SERPL-MCNC: 1.1 MG/DL — SIGNIFICANT CHANGE UP (ref 0.2–1.2)
BUN SERPL-MCNC: 4 MG/DL — LOW (ref 10–20)
CALCIUM SERPL-MCNC: 9 MG/DL — SIGNIFICANT CHANGE UP (ref 8.5–10.1)
CHLORIDE SERPL-SCNC: 104 MMOL/L — SIGNIFICANT CHANGE UP (ref 98–110)
CO2 SERPL-SCNC: 23 MMOL/L — SIGNIFICANT CHANGE UP (ref 17–32)
CREAT SERPL-MCNC: 0.6 MG/DL — LOW (ref 0.7–1.5)
EOSINOPHIL # BLD AUTO: 0.21 K/UL — SIGNIFICANT CHANGE UP (ref 0–0.7)
EOSINOPHIL NFR BLD AUTO: 4.7 % — SIGNIFICANT CHANGE UP (ref 0–8)
GLUCOSE BLDC GLUCOMTR-MCNC: 117 MG/DL — HIGH (ref 70–99)
GLUCOSE BLDC GLUCOMTR-MCNC: 143 MG/DL — HIGH (ref 70–99)
GLUCOSE BLDC GLUCOMTR-MCNC: 161 MG/DL — HIGH (ref 70–99)
GLUCOSE BLDC GLUCOMTR-MCNC: 169 MG/DL — HIGH (ref 70–99)
GLUCOSE SERPL-MCNC: 135 MG/DL — HIGH (ref 70–99)
HCT VFR BLD CALC: 35.1 % — LOW (ref 42–52)
HGB BLD-MCNC: 12 G/DL — LOW (ref 14–18)
IMM GRANULOCYTES NFR BLD AUTO: 0.2 % — SIGNIFICANT CHANGE UP (ref 0.1–0.3)
LYMPHOCYTES # BLD AUTO: 0.86 K/UL — LOW (ref 1.2–3.4)
LYMPHOCYTES # BLD AUTO: 19.1 % — LOW (ref 20.5–51.1)
MAGNESIUM SERPL-MCNC: 1.9 MG/DL — SIGNIFICANT CHANGE UP (ref 1.8–2.4)
MCHC RBC-ENTMCNC: 29 PG — SIGNIFICANT CHANGE UP (ref 27–31)
MCHC RBC-ENTMCNC: 34.2 G/DL — SIGNIFICANT CHANGE UP (ref 32–37)
MCV RBC AUTO: 84.8 FL — SIGNIFICANT CHANGE UP (ref 80–94)
MONOCYTES # BLD AUTO: 0.55 K/UL — SIGNIFICANT CHANGE UP (ref 0.1–0.6)
MONOCYTES NFR BLD AUTO: 12.2 % — HIGH (ref 1.7–9.3)
NEUTROPHILS # BLD AUTO: 2.83 K/UL — SIGNIFICANT CHANGE UP (ref 1.4–6.5)
NEUTROPHILS NFR BLD AUTO: 62.9 % — SIGNIFICANT CHANGE UP (ref 42.2–75.2)
NRBC # BLD: 0 /100 WBCS — SIGNIFICANT CHANGE UP (ref 0–0)
PLATELET # BLD AUTO: 184 K/UL — SIGNIFICANT CHANGE UP (ref 130–400)
POTASSIUM SERPL-MCNC: 4 MMOL/L — SIGNIFICANT CHANGE UP (ref 3.5–5)
POTASSIUM SERPL-SCNC: 4 MMOL/L — SIGNIFICANT CHANGE UP (ref 3.5–5)
PROT SERPL-MCNC: 6.5 G/DL — SIGNIFICANT CHANGE UP (ref 6–8)
RBC # BLD: 4.14 M/UL — LOW (ref 4.7–6.1)
RBC # FLD: 12.4 % — SIGNIFICANT CHANGE UP (ref 11.5–14.5)
SODIUM SERPL-SCNC: 138 MMOL/L — SIGNIFICANT CHANGE UP (ref 135–146)
WBC # BLD: 4.5 K/UL — LOW (ref 4.8–10.8)
WBC # FLD AUTO: 4.5 K/UL — LOW (ref 4.8–10.8)

## 2021-10-17 PROCEDURE — 99232 SBSQ HOSP IP/OBS MODERATE 35: CPT

## 2021-10-17 RX ADMIN — ATORVASTATIN CALCIUM 40 MILLIGRAM(S): 80 TABLET, FILM COATED ORAL at 21:49

## 2021-10-17 RX ADMIN — MORPHINE SULFATE 3 MILLIGRAM(S): 50 CAPSULE, EXTENDED RELEASE ORAL at 21:49

## 2021-10-17 RX ADMIN — CHLORHEXIDINE GLUCONATE 1 APPLICATION(S): 213 SOLUTION TOPICAL at 05:59

## 2021-10-17 RX ADMIN — Medication 1: at 17:26

## 2021-10-17 RX ADMIN — MORPHINE SULFATE 3 MILLIGRAM(S): 50 CAPSULE, EXTENDED RELEASE ORAL at 12:30

## 2021-10-17 RX ADMIN — TAMSULOSIN HYDROCHLORIDE 0.4 MILLIGRAM(S): 0.4 CAPSULE ORAL at 21:49

## 2021-10-17 RX ADMIN — Medication 81 MILLIGRAM(S): at 12:29

## 2021-10-17 RX ADMIN — MORPHINE SULFATE 3 MILLIGRAM(S): 50 CAPSULE, EXTENDED RELEASE ORAL at 17:40

## 2021-10-17 RX ADMIN — PANTOPRAZOLE SODIUM 40 MILLIGRAM(S): 20 TABLET, DELAYED RELEASE ORAL at 05:59

## 2021-10-17 RX ADMIN — ENOXAPARIN SODIUM 40 MILLIGRAM(S): 100 INJECTION SUBCUTANEOUS at 12:29

## 2021-10-17 RX ADMIN — MORPHINE SULFATE 3 MILLIGRAM(S): 50 CAPSULE, EXTENDED RELEASE ORAL at 12:29

## 2021-10-17 RX ADMIN — MORPHINE SULFATE 3 MILLIGRAM(S): 50 CAPSULE, EXTENDED RELEASE ORAL at 17:29

## 2021-10-17 RX ADMIN — AMLODIPINE BESYLATE 5 MILLIGRAM(S): 2.5 TABLET ORAL at 05:59

## 2021-10-17 NOTE — PROGRESS NOTE ADULT - ASSESSMENT
67 yo M with PMH of HTN, HLD, DM, BPH, and numerous counts of pancreatitis presents for pancreatitis.     # Pancreatitis , recurrent   - CT Abdomen and Pelvis w/ IV Cont : Mild inflammatory stranding adjacent to pancreatic tail, which may be attributed to acute interstitial edematous pancreatitis; no acute fluid collection ; gallbladder unremarkable (no biliary dilation)  - lipase within normal limits, no alcohol abuse   - no gallstones on CT, f/u RUQ US  - triglycerides normal   - monitor H/H, BUN , and Cr  - Pain control , IVF @ 200 now   - advance to soft diet today    # h/o HTN, c/w amlodipine  # h/o HLD, c/w atorvastatin  # h/o BPH, c/w tamsulosin  # h/o DM , FS AC QHS, sliding scale, start insulin subq if FS >180    # DVT PPX: Lovenox  # GI PPX: PPI  # Diet: advance diet as tolerated  # CHG BATH  # Activity: as tolerated  Disposition: will anticipate home tomorrow

## 2021-10-18 ENCOUNTER — TRANSCRIPTION ENCOUNTER (OUTPATIENT)
Age: 66
End: 2021-10-18

## 2021-10-18 VITALS — TEMPERATURE: 97 F | DIASTOLIC BLOOD PRESSURE: 69 MMHG | SYSTOLIC BLOOD PRESSURE: 114 MMHG | HEART RATE: 70 BPM

## 2021-10-18 LAB
ALBUMIN SERPL ELPH-MCNC: 4.5 G/DL — SIGNIFICANT CHANGE UP (ref 3.5–5.2)
ALP SERPL-CCNC: 96 U/L — SIGNIFICANT CHANGE UP (ref 30–115)
ALT FLD-CCNC: 22 U/L — SIGNIFICANT CHANGE UP (ref 0–41)
ANION GAP SERPL CALC-SCNC: 13 MMOL/L — SIGNIFICANT CHANGE UP (ref 7–14)
AST SERPL-CCNC: 20 U/L — SIGNIFICANT CHANGE UP (ref 0–41)
BASOPHILS # BLD AUTO: 0.03 K/UL — SIGNIFICANT CHANGE UP (ref 0–0.2)
BASOPHILS NFR BLD AUTO: 0.5 % — SIGNIFICANT CHANGE UP (ref 0–1)
BILIRUB SERPL-MCNC: 0.9 MG/DL — SIGNIFICANT CHANGE UP (ref 0.2–1.2)
BUN SERPL-MCNC: 4 MG/DL — LOW (ref 10–20)
CALCIUM SERPL-MCNC: 9.3 MG/DL — SIGNIFICANT CHANGE UP (ref 8.5–10.1)
CHLORIDE SERPL-SCNC: 104 MMOL/L — SIGNIFICANT CHANGE UP (ref 98–110)
CO2 SERPL-SCNC: 22 MMOL/L — SIGNIFICANT CHANGE UP (ref 17–32)
CREAT SERPL-MCNC: 0.6 MG/DL — LOW (ref 0.7–1.5)
EOSINOPHIL # BLD AUTO: 0.2 K/UL — SIGNIFICANT CHANGE UP (ref 0–0.7)
EOSINOPHIL NFR BLD AUTO: 3.4 % — SIGNIFICANT CHANGE UP (ref 0–8)
GLUCOSE BLDC GLUCOMTR-MCNC: 105 MG/DL — HIGH (ref 70–99)
GLUCOSE BLDC GLUCOMTR-MCNC: 134 MG/DL — HIGH (ref 70–99)
GLUCOSE SERPL-MCNC: 117 MG/DL — HIGH (ref 70–99)
HCT VFR BLD CALC: 37.3 % — LOW (ref 42–52)
HGB BLD-MCNC: 12.7 G/DL — LOW (ref 14–18)
IGG4 SER-MCNC: 18 MG/DL — SIGNIFICANT CHANGE UP (ref 2–96)
IMM GRANULOCYTES NFR BLD AUTO: 0.2 % — SIGNIFICANT CHANGE UP (ref 0.1–0.3)
LYMPHOCYTES # BLD AUTO: 0.8 K/UL — LOW (ref 1.2–3.4)
LYMPHOCYTES # BLD AUTO: 13.7 % — LOW (ref 20.5–51.1)
MAGNESIUM SERPL-MCNC: 1.9 MG/DL — SIGNIFICANT CHANGE UP (ref 1.8–2.4)
MCHC RBC-ENTMCNC: 29.4 PG — SIGNIFICANT CHANGE UP (ref 27–31)
MCHC RBC-ENTMCNC: 34 G/DL — SIGNIFICANT CHANGE UP (ref 32–37)
MCV RBC AUTO: 86.3 FL — SIGNIFICANT CHANGE UP (ref 80–94)
MONOCYTES # BLD AUTO: 0.53 K/UL — SIGNIFICANT CHANGE UP (ref 0.1–0.6)
MONOCYTES NFR BLD AUTO: 9.1 % — SIGNIFICANT CHANGE UP (ref 1.7–9.3)
NEUTROPHILS # BLD AUTO: 4.27 K/UL — SIGNIFICANT CHANGE UP (ref 1.4–6.5)
NEUTROPHILS NFR BLD AUTO: 73.1 % — SIGNIFICANT CHANGE UP (ref 42.2–75.2)
NRBC # BLD: 0 /100 WBCS — SIGNIFICANT CHANGE UP (ref 0–0)
PLATELET # BLD AUTO: 188 K/UL — SIGNIFICANT CHANGE UP (ref 130–400)
POTASSIUM SERPL-MCNC: 4.4 MMOL/L — SIGNIFICANT CHANGE UP (ref 3.5–5)
POTASSIUM SERPL-SCNC: 4.4 MMOL/L — SIGNIFICANT CHANGE UP (ref 3.5–5)
PROT SERPL-MCNC: 7.1 G/DL — SIGNIFICANT CHANGE UP (ref 6–8)
RBC # BLD: 4.32 M/UL — LOW (ref 4.7–6.1)
RBC # FLD: 12.2 % — SIGNIFICANT CHANGE UP (ref 11.5–14.5)
SODIUM SERPL-SCNC: 139 MMOL/L — SIGNIFICANT CHANGE UP (ref 135–146)
WBC # BLD: 5.84 K/UL — SIGNIFICANT CHANGE UP (ref 4.8–10.8)
WBC # FLD AUTO: 5.84 K/UL — SIGNIFICANT CHANGE UP (ref 4.8–10.8)

## 2021-10-18 PROCEDURE — 99239 HOSP IP/OBS DSCHRG MGMT >30: CPT

## 2021-10-18 RX ADMIN — ENOXAPARIN SODIUM 40 MILLIGRAM(S): 100 INJECTION SUBCUTANEOUS at 12:24

## 2021-10-18 RX ADMIN — MORPHINE SULFATE 3 MILLIGRAM(S): 50 CAPSULE, EXTENDED RELEASE ORAL at 09:49

## 2021-10-18 RX ADMIN — AMLODIPINE BESYLATE 5 MILLIGRAM(S): 2.5 TABLET ORAL at 05:58

## 2021-10-18 RX ADMIN — PANTOPRAZOLE SODIUM 40 MILLIGRAM(S): 20 TABLET, DELAYED RELEASE ORAL at 06:00

## 2021-10-18 RX ADMIN — SODIUM CHLORIDE 200 MILLILITER(S): 9 INJECTION, SOLUTION INTRAVENOUS at 05:59

## 2021-10-18 RX ADMIN — MORPHINE SULFATE 3 MILLIGRAM(S): 50 CAPSULE, EXTENDED RELEASE ORAL at 03:51

## 2021-10-18 RX ADMIN — Medication 81 MILLIGRAM(S): at 12:24

## 2021-10-18 RX ADMIN — CHLORHEXIDINE GLUCONATE 1 APPLICATION(S): 213 SOLUTION TOPICAL at 05:59

## 2021-10-18 RX ADMIN — MORPHINE SULFATE 3 MILLIGRAM(S): 50 CAPSULE, EXTENDED RELEASE ORAL at 09:19

## 2021-10-18 NOTE — PROGRESS NOTE ADULT - ASSESSMENT
65 yo M with PMH of HTN, HLD, DM, BPH, and numerous counts of pancreatitis presents for pancreatitis.     # Pancreatitis , recurrent   - CT Abdomen and Pelvis w/ IV Cont : Mild inflammatory stranding adjacent to pancreatic tail, which may be attributed to acute interstitial edematous pancreatitis; no acute fluid collection ; gallbladder unremarkable (no biliary dilation)  - lipase within normal limits, no alcohol abuse   - no gallstones on CT, f/u RUQ US shows no stones  - triglycerides normal   - monitor H/H, BUN , and Cr  - Switch to PO pain control, decrease IVF, continue to advance diet    # h/o HTN, c/w amlodipine  # h/o HLD, c/w atorvastatin  # h/o BPH, c/w tamsulosin  # h/o DM , FS AC QHS, sliding scale, start insulin subq if FS >180    # DVT PPX: Lovenox  # GI PPX: PPI  # Diet: soft, advance diet as tolerated  # CHG BATH  # Activity: as tolerated  Disposition:patient tolerating diet without needing pain meds, bela home today  >30 min spent on discharge planning and counseling regarding pancreatitis

## 2021-10-18 NOTE — PROGRESS NOTE ADULT - PROVIDER SPECIALTY LIST ADULT
Internal Medicine
Hospitalist
Internal Medicine

## 2021-10-18 NOTE — DISCHARGE NOTE NURSING/CASE MANAGEMENT/SOCIAL WORK - PATIENT PORTAL LINK FT
You can access the FollowMyHealth Patient Portal offered by [facility] by registering at the following website: [email or web address]. By joining [facility]’s FollowMyHealth portal, you will also be able to view your health information using other applications (apps) compatible with our system.

## 2021-10-18 NOTE — PROGRESS NOTE ADULT - REASON FOR ADMISSION
pancreatitis

## 2021-10-18 NOTE — DISCHARGE NOTE NURSING/CASE MANAGEMENT/SOCIAL WORK - NSDCVIVACCINE_GEN_ALL_CORE_FT
influenza, injectable, quadrivalent, preservative free; 03-Feb-2019 13:02; Stacy Joe (RN); [employer]; [id number] (Exp. Date: 30-Jun-2019); IntraMuscular; Deltoid Left.; 0.5 milliLiter(s); VIS (VIS Published: 07-Aug-2015, VIS Presented: 03-Feb-2019);   Tdap; 01-Feb-2019 22:39; Angie Serna (RN); [employer]; [id number] (Exp. Date: 02-Nov-2020); IntraMuscular; Deltoid Left.; 0.5 milliLiter(s); VIS (VIS Published: 09-May-2013, VIS Presented: 01-Feb-2019);   Tdap; 23-Oct-2020 20:52; Whitney Hsu (RN); [employer]; [id number] (Exp. Date: 21-Jul-2022); IntraMuscular; Deltoid Right.; 0.5 milliLiter(s); VIS (VIS Published: 09-May-2013, VIS Presented: 23-Oct-2020);

## 2021-10-18 NOTE — PROGRESS NOTE ADULT - SUBJECTIVE AND OBJECTIVE BOX
WILI ALFARO 66y Male  MRN#: [id number]   Hospital Day: 2d    SUBJECTIVE  Patient is a 66y old Male who presents with a chief complaint of pancreatitis (14 Oct 2021 18:17)  Currently admitted to medicine with the primary diagnosis of Pancreatitis      INTERVAL HPI AND OVERNIGHT EVENTS:  Patient was examined and seen at bedside. This morning he is resting comfortably in bed. No issues or overnight events. Still attests abdominal pain at baseline and worsens with water intake.     OBJECTIVE  PAST MEDICAL & SURGICAL HISTORY  Diabetes mellitus, type II    Hypertension    Prostate cancer  s/p history of seeding    Hyperlipidemia    Osteoarthritis    Pancreatitis  recurrent    Prostate cancer  s/p history of seeding    History of appendectomy    S/P hip replacement, left  9/2015      ALLERGIES:  No Known Allergies    MEDICATIONS:  STANDING MEDICATIONS  amLODIPine   Tablet 5 milliGRAM(s) Oral daily  aspirin  chewable 81 milliGRAM(s) Oral daily  atorvastatin 40 milliGRAM(s) Oral at bedtime  chlorhexidine 4% Liquid 1 Application(s) Topical <User Schedule>  dextrose 40% Gel 15 Gram(s) Oral once  dextrose 5%. 1000 milliLiter(s) IV Continuous <Continuous>  dextrose 5%. 1000 milliLiter(s) IV Continuous <Continuous>  dextrose 50% Injectable 25 Gram(s) IV Push once  dextrose 50% Injectable 12.5 Gram(s) IV Push once  dextrose 50% Injectable 25 Gram(s) IV Push once  enoxaparin Injectable 40 milliGRAM(s) SubCutaneous daily  glucagon  Injectable 1 milliGRAM(s) IntraMuscular once  insulin lispro (ADMELOG) corrective regimen sliding scale   SubCutaneous three times a day before meals  lactated ringers. 1000 milliLiter(s) IV Continuous <Continuous>  pantoprazole    Tablet 40 milliGRAM(s) Oral before breakfast  tamsulosin 0.4 milliGRAM(s) Oral at bedtime    PRN MEDICATIONS  acetaminophen   Tablet .. 650 milliGRAM(s) Oral every 4 hours PRN  morphine  - Injectable 3 milliGRAM(s) IV Push every 4 hours PRN      VITAL SIGNS: Last 24 Hours  T(C): 36.2 (15 Oct 2021 05:30), Max: 36.3 (14 Oct 2021 21:23)  T(F): 97.2 (15 Oct 2021 05:30), Max: 97.4 (14 Oct 2021 21:23)  HR: 68 (15 Oct 2021 05:30) (68 - 77)  BP: 116/63 (15 Oct 2021 05:30) (116/63 - 137/96)  BP(mean): --  RR: 18 (15 Oct 2021 05:30) (18 - 19)  SpO2: 97% (14 Oct 2021 19:45) (97% - 97%)    LABS:                        12.3   5.57  )-----------( 192      ( 15 Oct 2021 06:59 )             36.4     10-15    139  |  104  |  6<L>  ----------------------------<  126<H>  3.9   |  21  |  0.5<L>    Ca    9.1      15 Oct 2021 06:59  Mg     1.9     10-15    TPro  6.4  /  Alb  4.1  /  TBili  1.3<H>  /  DBili  x   /  AST  15  /  ALT  16  /  AlkPhos  80  10-15                  RADIOLOGY:      PHYSICAL EXAM:  CONSTITUTIONAL: No acute distress, well-developed, well-groomed, AAOx3  HEAD: Atraumatic, normocephalic  EYES: EOM intact, PERRLA, conjunctiva and sclera clear  ENT: Supple, no masses, no thyromegaly, no bruits, no JVD; moist mucous membranes  PULMONARY: Clear to auscultation bilaterally; no wheezes, rales, or rhonchi  CARDIOVASCULAR: Regular rate and rhythm; no murmurs, rubs, or gallops  GASTROINTESTINAL: Soft, tenderness to palpation,  non-distended; bowel sounds present  MUSCULOSKELETAL: 2+ peripheral pulses; no clubbing, no cyanosis, no edema  NEUROLOGY: non-focal  SKIN: No rashes or lesions; warm and dry  
WILI ALFARO 66y Male  MRN#: [id number]   Hospital Day: 5d    SUBJECTIVE  Patient is a 66y old Male who presents with a chief complaint of pancreatitis (17 Oct 2021 15:27)  Currently admitted to medicine with the primary diagnosis of Pancreatitis      INTERVAL HPI AND OVERNIGHT EVENTS:  Patient was examined and seen at bedside. This morning he is resting comfortably in bed. No issues or overnight events. patient states he is tolerating diet and pain improved.     OBJECTIVE  PAST MEDICAL & SURGICAL HISTORY  Diabetes mellitus, type II    Hypertension    Prostate cancer  s/p history of seeding    Hyperlipidemia    Osteoarthritis    Pancreatitis  recurrent    Prostate cancer  s/p history of seeding    History of appendectomy    S/P hip replacement, left  9/2015      ALLERGIES:  No Known Allergies    MEDICATIONS:  STANDING MEDICATIONS  amLODIPine   Tablet 5 milliGRAM(s) Oral daily  aspirin  chewable 81 milliGRAM(s) Oral daily  atorvastatin 40 milliGRAM(s) Oral at bedtime  chlorhexidine 4% Liquid 1 Application(s) Topical <User Schedule>  dextrose 40% Gel 15 Gram(s) Oral once  dextrose 5%. 1000 milliLiter(s) IV Continuous <Continuous>  dextrose 5%. 1000 milliLiter(s) IV Continuous <Continuous>  dextrose 50% Injectable 25 Gram(s) IV Push once  dextrose 50% Injectable 12.5 Gram(s) IV Push once  dextrose 50% Injectable 25 Gram(s) IV Push once  enoxaparin Injectable 40 milliGRAM(s) SubCutaneous daily  glucagon  Injectable 1 milliGRAM(s) IntraMuscular once  insulin lispro (ADMELOG) corrective regimen sliding scale   SubCutaneous three times a day before meals  lactated ringers. 1000 milliLiter(s) IV Continuous <Continuous>  pantoprazole    Tablet 40 milliGRAM(s) Oral before breakfast  tamsulosin 0.4 milliGRAM(s) Oral at bedtime    PRN MEDICATIONS  acetaminophen   Tablet .. 650 milliGRAM(s) Oral every 4 hours PRN  morphine  - Injectable 3 milliGRAM(s) IV Push every 4 hours PRN      VITAL SIGNS: Last 24 Hours  T(C): 36.6 (18 Oct 2021 05:38), Max: 36.6 (18 Oct 2021 05:38)  T(F): 97.9 (18 Oct 2021 05:38), Max: 97.9 (18 Oct 2021 05:38)  HR: 65 (18 Oct 2021 05:38) (65 - 82)  BP: 118/65 (18 Oct 2021 05:38) (118/65 - 139/88)  BP(mean): --  RR: 18 (18 Oct 2021 05:38) (18 - 20)  SpO2: --    LABS:                        12.7   5.84  )-----------( 188      ( 18 Oct 2021 08:38 )             37.3     10-18    139  |  104  |  4<L>  ----------------------------<  117<H>  4.4   |  22  |  0.6<L>    Ca    9.3      18 Oct 2021 08:38  Mg     1.9     10-18    TPro  7.1  /  Alb  4.5  /  TBili  0.9  /  DBili  x   /  AST  20  /  ALT  22  /  AlkPhos  96  10-18                  RADIOLOGY:      PHYSICAL EXAM:  CONSTITUTIONAL: No acute distress, AAOx3  HEAD: Atraumatic, normocephalic  EYES: EOM intact, PERRLA, conjunctiva and sclera clear  ENT: Supple, no masses, no thyromegaly, no bruits, no JVD; moist mucous membranes  PULMONARY: Clear to auscultation bilaterally; no wheezes, rales, or rhonchi  CARDIOVASCULAR: Regular rate and rhythm; no murmurs, rubs, or gallops  GASTROINTESTINAL: Soft, tenderness to palpation of RUQ, non-distended; bowel sounds present  MUSCULOSKELETAL: 2+ peripheral pulses; no clubbing, no cyanosis, no edema  NEUROLOGY: non-focal  SKIN: No rashes or lesions; warm and dry  
  WILI ALFARO  66y  Male  ***My note supersedes ALL resident notes that I sign.  My corrections for their notes are in my note.***    I can be reached directly on [employer] [id number]. My office number is [phone]. My personal cell number is [phone].    INTERVAL EVENTS: Here for f/u of pancreatitis. Pt has mild abd pain. He is walking fine. Pt does not feel ready to eat yet.    T(F): 97.1 (10-14-21 @ 14:34), Max: 97.1 (10-14-21 @ 14:34)  HR: 77 (10-14-21 @ 14:34) (68 - 77)  BP: 136/74 (10-14-21 @ 14:34) (136/74 - 142/90)  RR: 19 (10-14-21 @ 14:34) (18 - 19)  SpO2: --    Gen: NAD  HEENT: PERRL, EOMI, mouth clr, nose clr  Neck: no nodes, no JVD, thyroid nl  lungs: clr  hrt: s1 s2 rrr no murmur  abd: soft, ND, mild-mod epi pain; no HS megaly  ext: no edema, no c/c  neuro: aa ox3, cn intact, can move all 4 ext    LABS:                      13.0    (    86.9   5.73  )-----------( ---------      193      ( 14 Oct 2021 07:11 )             38.5    (    12.5     136   (   102   (   133      10-14-21 @ 07:11  ----------------------               3.9   (   20   (   7                             -----                        0.5  Ca  8.7   Mg  --    P   --     LFT  6.5  (  1.2  (  17       10-14-21 @ 07:11  -------------------------  4.1  (  85  (  18    CAPILLARY BLOOD GLUCOSE  POCT Blood Glucose.: 169 (10-14-21 @ 16:43)  POCT Blood Glucose.: 137 (10-14-21 @ 07:56)  POCT Blood Glucose.: 146 (10-13-21 @ 21:55)  POCT Blood Glucose.: 117 (10-13-21 @ 16:31)  POCT Blood Glucose.: 139 (10-13-21 @ 13:06)  POCT Blood Glucose.: 162 (10-13-21 @ 09:42)    RADIOLOGY & ADDITIONAL TESTS:  < from: US Abdomen Upper Quadrant Right (10.14.21 @ 09:03) >  IMPRESSION:    Hepatic steatosis.    Otherwise unremarkable study.    < end of copied text >    < from: CT Abdomen and Pelvis w/ IV Cont (10.13.21 @ 05:40) >  IMPRESSION:  Mild inflammatory stranding adjacent to pancreatic tail, which may be attributed to acute interstitial edematous pancreatitis; no acute fluid collection.    < end of copied text >    < from: Xray Chest 1 View- PORTABLE-Urgent (10.13.21 @ 04:02) >  Impression:    No radiographic evidence of acute cardiopulmonary disease.    < end of copied text >    MEDICATIONS:    acetaminophen   Tablet .. 650 milliGRAM(s) Oral every 4 hours PRN  amLODIPine   Tablet 5 milliGRAM(s) Oral daily  aspirin  chewable 81 milliGRAM(s) Oral daily  atorvastatin 40 milliGRAM(s) Oral at bedtime  chlorhexidine 4% Liquid 1 Application(s) Topical <User Schedule>  enoxaparin Injectable 40 milliGRAM(s) SubCutaneous daily  insulin lispro (ADMELOG) corrective regimen sliding scale   SubCutaneous three times a day before meals  lactated ringers. 1000 milliLiter(s) IV Continuous <Continuous>  morphine  - Injectable 2 milliGRAM(s) IV Push every 4 hours PRN  pantoprazole    Tablet 40 milliGRAM(s) Oral before breakfast  tamsulosin 0.4 milliGRAM(s) Oral at bedtime  
  WILI ALFARO  66y  Male  ***My note supersedes ALL resident notes that I sign.  My corrections for their notes are in my note.***    I can be reached directly on [email or web address]. My office number is [phone]. My personal cell number is [phone].    INTERVAL EVENTS: Here for f/u of abd pain. Pt doing a little better and wants to try clr liquids. Was asking for incr in pain meds.    T(F): 96.7 (10-15-21 @ 12:14), Max: 97.4 (10-14-21 @ 21:23)  HR: 68 (10-15-21 @ 12:14) (68 - 73)  BP: 134/84 (10-15-21 @ 12:14) (116/63 - 137/96)  RR: 18 (10-15-21 @ 12:14) (18 - 18)  SpO2: 97% (10-14-21 @ 19:45) (97% - 97%)    Gen: NAD  HEENT: PERRL, EOMI, mouth clr, nose clr  Neck: no nodes, no JVD, thyroid nl  lungs: clr  hrt: s1 s2 rrr no murmur  abd: soft, ND, mild epi pain; no HS megaly  ext: no edema, no c/c  neuro: aa ox3, cn intact, can move all 4 ext    LABS:                      12.3    (    85.4   5.57  )-----------( ---------      192      ( 15 Oct 2021 06:59 )             36.4    (    12.3     139   (   104   (   126      10-15-21 @ 06:59  ----------------------               3.9   (   21   (   6                             -----                        0.5  Ca  9.1   Mg  1.9    P   --     LFT  6.4  (  1.3  (  15       10-15-21 @ 06:59  -------------------------  4.1  (  80  (  16    CAPILLARY BLOOD GLUCOSE  POCT Blood Glucose.: 155 (10-15-21 @ 17:43)  POCT Blood Glucose.: 114 (10-15-21 @ 12:03)  POCT Blood Glucose.: 137 (10-15-21 @ 07:55)  POCT Blood Glucose.: 121 (10-14-21 @ 21:07)  POCT Blood Glucose.: 169 (10-14-21 @ 16:43)  POCT Blood Glucose.: 137 (10-14-21 @ 07:56)  POCT Blood Glucose.: 146 (10-13-21 @ 21:55)  POCT Blood Glucose.: 117 (10-13-21 @ 16:31)    RADIOLOGY & ADDITIONAL TESTS:      MEDICATIONS:    acetaminophen   Tablet .. 650 milliGRAM(s) Oral every 4 hours PRN  amLODIPine   Tablet 5 milliGRAM(s) Oral daily  aspirin  chewable 81 milliGRAM(s) Oral daily  atorvastatin 40 milliGRAM(s) Oral at bedtime  chlorhexidine 4% Liquid 1 Application(s) Topical <User Schedule>  enoxaparin Injectable 40 milliGRAM(s) SubCutaneous daily  insulin lispro (ADMELOG) corrective regimen sliding scale   SubCutaneous three times a day before meals  lactated ringers. 1000 milliLiter(s) IV Continuous <Continuous>  morphine  - Injectable 3 milliGRAM(s) IV Push every 4 hours PRN  pantoprazole    Tablet 40 milliGRAM(s) Oral before breakfast  tamsulosin 0.4 milliGRAM(s) Oral at bedtime  
Patient is a 66y old  Male who presents with a chief complaint of pancreatitis (18 Oct 2021 11:42)      Patient seen and examined at bedside.  Patient denies any chest pain, shortness of breath.  States abd pain is much improved   ALLERGIES:  No Known Allergies    MEDICATIONS:  acetaminophen   Tablet .. 650 milliGRAM(s) Oral every 4 hours PRN  amLODIPine   Tablet 5 milliGRAM(s) Oral daily  aspirin  chewable 81 milliGRAM(s) Oral daily  atorvastatin 40 milliGRAM(s) Oral at bedtime  chlorhexidine 4% Liquid 1 Application(s) Topical <User Schedule>  dextrose 40% Gel 15 Gram(s) Oral once  dextrose 5%. 1000 milliLiter(s) IV Continuous <Continuous>  dextrose 5%. 1000 milliLiter(s) IV Continuous <Continuous>  dextrose 50% Injectable 25 Gram(s) IV Push once  dextrose 50% Injectable 12.5 Gram(s) IV Push once  dextrose 50% Injectable 25 Gram(s) IV Push once  enoxaparin Injectable 40 milliGRAM(s) SubCutaneous daily  glucagon  Injectable 1 milliGRAM(s) IntraMuscular once  insulin lispro (ADMELOG) corrective regimen sliding scale   SubCutaneous three times a day before meals  pantoprazole    Tablet 40 milliGRAM(s) Oral before breakfast  tamsulosin 0.4 milliGRAM(s) Oral at bedtime    Vital Signs Last 24 Hrs  T(F): 96.6 (18 Oct 2021 13:59), Max: 97.9 (18 Oct 2021 05:38)  HR: 70 (18 Oct 2021 13:59) (65 - 82)  BP: 114/69 (18 Oct 2021 13:59) (114/69 - 139/88)  RR: 18 (18 Oct 2021 05:38) (18 - 18)  SpO2: --  I&O's Summary      PHYSICAL EXAM:  General: NAD, A/O x 3  ENT: MMM  Neck: Supple, No JVD  Lungs: Clear to auscultation bilaterally  Cardio: RRR, S1/S2, No murmurs  Abdomen: Soft, Nontender, Nondistended; Bowel sounds present  Extremities: No cyanosis, No edema    LABS:                        12.7   5.84  )-----------( 188      ( 18 Oct 2021 08:38 )             37.3     10-18    139  |  104  |  4   ----------------------------<  117  4.4   |  22  |  0.6    Ca    9.3      18 Oct 2021 08:38  Mg     1.9     10-18    TPro  7.1  /  Alb  4.5  /  TBili  0.9  /  DBili  x   /  AST  20  /  ALT  22  /  AlkPhos  96  10-18    eGFR if Non African American: 105 mL/min/1.73M2 (10-18-21 @ 08:38)  eGFR if African American: 121 mL/min/1.73M2 (10-18-21 @ 08:38)            10-13 Chol 177 mg/dL LDL -- HDL 48 mg/dL Trig 73 mg/dL              POCT Blood Glucose.: 134 mg/dL (18 Oct 2021 12:07)  POCT Blood Glucose.: 105 mg/dL (18 Oct 2021 08:14)  POCT Blood Glucose.: 169 mg/dL (17 Oct 2021 21:10)          COVID-19 PCR: NotDetec (10-13-21 @ 06:09)      RADIOLOGY & ADDITIONAL TESTS:    Care Discussed with Consultants/Other Providers: 
SUBJECTIVE:    Patient is a 66y old Male who presents with a chief complaint of pancreatitis (16 Oct 2021 19:06)  Currently admitted to medicine with the primary diagnosis of Pancreatitis  Today is hospital day 4d. This morning he is resting comfortably in bed and reports no new issues or overnight events.   pt is tolerating clear liquid diet, still complaining of abdominal pain that is controlled with current pain regimen.     PAST MEDICAL & SURGICAL HISTORY  Diabetes mellitus, type II    Hypertension    Prostate cancer  s/p history of seeding    Hyperlipidemia    Osteoarthritis    Pancreatitis  recurrent    Prostate cancer  s/p history of seeding    History of appendectomy    S/P hip replacement, left  9/2015      SOCIAL HISTORY:  Negative for smoking/alcohol/drug use.     ALLERGIES:  No Known Allergies    MEDICATIONS:  STANDING MEDICATIONS  amLODIPine   Tablet 5 milliGRAM(s) Oral daily  aspirin  chewable 81 milliGRAM(s) Oral daily  atorvastatin 40 milliGRAM(s) Oral at bedtime  chlorhexidine 4% Liquid 1 Application(s) Topical <User Schedule>  dextrose 40% Gel 15 Gram(s) Oral once  dextrose 5%. 1000 milliLiter(s) IV Continuous <Continuous>  dextrose 5%. 1000 milliLiter(s) IV Continuous <Continuous>  dextrose 50% Injectable 25 Gram(s) IV Push once  dextrose 50% Injectable 12.5 Gram(s) IV Push once  dextrose 50% Injectable 25 Gram(s) IV Push once  enoxaparin Injectable 40 milliGRAM(s) SubCutaneous daily  glucagon  Injectable 1 milliGRAM(s) IntraMuscular once  insulin lispro (ADMELOG) corrective regimen sliding scale   SubCutaneous three times a day before meals  lactated ringers. 1000 milliLiter(s) IV Continuous <Continuous>  pantoprazole    Tablet 40 milliGRAM(s) Oral before breakfast  tamsulosin 0.4 milliGRAM(s) Oral at bedtime    PRN MEDICATIONS  acetaminophen   Tablet .. 650 milliGRAM(s) Oral every 4 hours PRN  morphine  - Injectable 3 milliGRAM(s) IV Push every 4 hours PRN    VITALS:   T(F): 97.5  HR: 69  BP: 122/60  RR: 20  SpO2: --    LABS:                        12.0   4.50  )-----------( 184      ( 17 Oct 2021 08:48 )             35.1     10-17    138  |  104  |  4<L>  ----------------------------<  135<H>  4.0   |  23  |  0.6<L>    Ca    9.0      17 Oct 2021 08:48  Mg     1.9     10-17    TPro  6.5  /  Alb  4.2  /  TBili  1.1  /  DBili  x   /  AST  19  /  ALT  19  /  AlkPhos  79  10-17      RADIOLOGY:    < from: CT Abdomen and Pelvis w/ IV Cont (10.13.21 @ 05:40) >  IMPRESSION:  Mild inflammatory stranding adjacent to pancreatic tail, which may be attributed to acute interstitial edematous pancreatitis; noacute fluid collection.    < end of copied text >      PHYSICAL EXAM:  CONSTITUTIONAL: No acute distress, well-developed, well-groomed, AAOx3  HEAD: Atraumatic, normocephalic  EYES: EOM intact, PERRLA, conjunctiva and sclera clear  ENT: Supple, no masses, no thyromegaly, no bruits, no JVD; moist mucous membranes  PULMONARY: Clear to auscultation bilaterally; no wheezes, rales, or rhonchi  CARDIOVASCULAR: Regular rate and rhythm; no murmurs, rubs, or gallops  GASTROINTESTINAL: Soft, abdominal tenderness RUQ, non-distended; bowel sounds present  MUSCULOSKELETAL: 2+ peripheral pulses; no clubbing, no cyanosis, no edema  NEUROLOGY: non-focal  SKIN: No rashes or lesions; warm and dry    
WILI ALFARO 66y Male  MRN#: [id number]   Hospital Day: 3d    SUBJECTIVE  Patient is a 66y old Male who presents with a chief complaint of pancreatitis (15 Oct 2021 18:02)  Currently admitted to medicine with the primary diagnosis of Pancreatitis      INTERVAL HPI AND OVERNIGHT EVENTS:  Patient was examined and seen at bedside. This morning he is resting comfortably in bed. No issues or overnight events. Patient states that his pain is well under control but he can still not tolerate PO intake. Was advanced to [facility]D yesterday. Will encourage increase intake and advance as tolerated.       OBJECTIVE  PAST MEDICAL & SURGICAL HISTORY  Diabetes mellitus, type II    Hypertension    Prostate cancer  s/p history of seeding    Hyperlipidemia    Osteoarthritis    Pancreatitis  recurrent    Prostate cancer  s/p history of seeding    History of appendectomy    S/P hip replacement, left  9/2015      ALLERGIES:  No Known Allergies    MEDICATIONS:  STANDING MEDICATIONS  amLODIPine   Tablet 5 milliGRAM(s) Oral daily  aspirin  chewable 81 milliGRAM(s) Oral daily  atorvastatin 40 milliGRAM(s) Oral at bedtime  chlorhexidine 4% Liquid 1 Application(s) Topical <User Schedule>  dextrose 40% Gel 15 Gram(s) Oral once  dextrose 5%. 1000 milliLiter(s) IV Continuous <Continuous>  dextrose 5%. 1000 milliLiter(s) IV Continuous <Continuous>  dextrose 50% Injectable 25 Gram(s) IV Push once  dextrose 50% Injectable 12.5 Gram(s) IV Push once  dextrose 50% Injectable 25 Gram(s) IV Push once  enoxaparin Injectable 40 milliGRAM(s) SubCutaneous daily  glucagon  Injectable 1 milliGRAM(s) IntraMuscular once  insulin lispro (ADMELOG) corrective regimen sliding scale   SubCutaneous three times a day before meals  lactated ringers. 1000 milliLiter(s) IV Continuous <Continuous>  pantoprazole    Tablet 40 milliGRAM(s) Oral before breakfast  tamsulosin 0.4 milliGRAM(s) Oral at bedtime    PRN MEDICATIONS  acetaminophen   Tablet .. 650 milliGRAM(s) Oral every 4 hours PRN  morphine  - Injectable 3 milliGRAM(s) IV Push every 4 hours PRN      VITAL SIGNS: Last 24 Hours  T(C): 36.1 (16 Oct 2021 05:00), Max: 36.1 (16 Oct 2021 05:00)  T(F): 97 (16 Oct 2021 05:00), Max: 97 (16 Oct 2021 05:00)  HR: 70 (16 Oct 2021 05:00) (68 - 70)  BP: 124/73 (16 Oct 2021 05:00) (124/73 - 134/84)  BP(mean): --  RR: 18 (16 Oct 2021 05:00) (18 - 18)  SpO2: --    LABS:                        12.4   4.78  )-----------( 205      ( 16 Oct 2021 08:47 )             36.0     10-15    139  |  104  |  6<L>  ----------------------------<  126<H>  3.9   |  21  |  0.5<L>    Ca    9.1      15 Oct 2021 06:59  Mg     1.9     10-15    TPro  6.4  /  Alb  4.1  /  TBili  1.3<H>  /  DBili  x   /  AST  15  /  ALT  16  /  AlkPhos  80  10-15                  RADIOLOGY:      PHYSICAL EXAM:  CONSTITUTIONAL: No acute distress, well-developed, well-groomed, AAOx3  HEAD: Atraumatic, normocephalic  EYES: EOM intact, PERRLA, conjunctiva and sclera clear  ENT: Supple, no masses, no thyromegaly, no bruits, no JVD; moist mucous membranes  PULMONARY: Clear to auscultation bilaterally; no wheezes, rales, or rhonchi  CARDIOVASCULAR: Regular rate and rhythm; no murmurs, rubs, or gallops  GASTROINTESTINAL: Soft, abdominal tenderness RUQ, non-distended; bowel sounds present  MUSCULOSKELETAL: 2+ peripheral pulses; no clubbing, no cyanosis, no edema  NEUROLOGY: non-focal  SKIN: No rashes or lesions; warm and dry  
WILI ALFARO 66y Male  MRN#: [id number]   Hospital Day: 1d    SUBJECTIVE  Patient is a 66y old Male who presents with a chief complaint of pancreatitis (13 Oct 2021 08:04)  Currently admitted to medicine with the primary diagnosis of Pancreatitis      INTERVAL HPI AND OVERNIGHT EVENTS:  Patient was examined and seen at bedside. This morning he is resting comfortably in bed. No issues or overnight events.    REVIEW OF SYMPTOMS:  CONSTITUTIONAL: No weakness, fevers or chills; No headaches  EYES: No visual changes, eye pain, or discharge  ENT: No vertigo; No ear pain or change in hearing; No sore throat or difficulty swallowing  NECK: No pain or stiffness  RESPIRATORY: No cough, wheezing, or hemoptysis; No shortness of breath  CARDIOVASCULAR: No chest pain or palpitations  GASTROINTESTINAL: No abdominal or epigastric pain; No nausea, vomiting, or hematemesis; No diarrhea or constipation; No melena or hematochezia  GENITOURINARY: No dysuria, frequency or hematuria  MUSCULOSKELETAL: No joint pain, no muscle pain, no weakness  NEUROLOGICAL: No numbness or weakness  SKIN: No itching or rashes    OBJECTIVE  PAST MEDICAL & SURGICAL HISTORY  Diabetes mellitus, type II    Hypertension    Prostate cancer  s/p history of seeding    Hyperlipidemia    Osteoarthritis    Pancreatitis  recurrent    Prostate cancer  s/p history of seeding    History of appendectomy    S/P hip replacement, left  9/2015      ALLERGIES:  No Known Allergies    MEDICATIONS:  STANDING MEDICATIONS  amLODIPine   Tablet 5 milliGRAM(s) Oral daily  aspirin  chewable 81 milliGRAM(s) Oral daily  atorvastatin 40 milliGRAM(s) Oral at bedtime  chlorhexidine 4% Liquid 1 Application(s) Topical <User Schedule>  dextrose 40% Gel 15 Gram(s) Oral once  dextrose 5%. 1000 milliLiter(s) IV Continuous <Continuous>  dextrose 5%. 1000 milliLiter(s) IV Continuous <Continuous>  dextrose 50% Injectable 25 Gram(s) IV Push once  dextrose 50% Injectable 12.5 Gram(s) IV Push once  dextrose 50% Injectable 25 Gram(s) IV Push once  enoxaparin Injectable 40 milliGRAM(s) SubCutaneous daily  glucagon  Injectable 1 milliGRAM(s) IntraMuscular once  insulin lispro (ADMELOG) corrective regimen sliding scale   SubCutaneous three times a day before meals  pantoprazole    Tablet 40 milliGRAM(s) Oral before breakfast  sodium chloride 0.9%. 1000 milliLiter(s) IV Continuous <Continuous>  tamsulosin 0.4 milliGRAM(s) Oral at bedtime    PRN MEDICATIONS  acetaminophen   Tablet .. 650 milliGRAM(s) Oral every 4 hours PRN  morphine  - Injectable 2 milliGRAM(s) IV Push every 4 hours PRN      VITAL SIGNS: Last 24 Hours  T(C): 35.9 (14 Oct 2021 04:55), Max: 35.9 (13 Oct 2021 21:00)  T(F): 96.7 (14 Oct 2021 04:55), Max: 96.7 (13 Oct 2021 21:00)  HR: 70 (14 Oct 2021 04:55) (65 - 70)  BP: 142/90 (14 Oct 2021 04:55) (130/76 - 142/90)  BP(mean): --  RR: 18 (14 Oct 2021 04:55) (18 - 18)  SpO2: 100% (13 Oct 2021 15:15) (100% - 100%)    LABS:                        13.0   5.73  )-----------( 193      ( 14 Oct 2021 07:11 )             38.5     10-14    136  |  102  |  7<L>  ----------------------------<  133<H>  3.9   |  20  |  0.5<L>    Ca    8.7      14 Oct 2021 07:11    TPro  6.5  /  Alb  4.1  /  TBili  1.2  /  DBili  x   /  AST  17  /  ALT  18  /  AlkPhos  85  10-14                  RADIOLOGY:      PHYSICAL EXAM:  CONSTITUTIONAL: No acute distress, well-developed, well-groomed, AAOx3  HEAD: Atraumatic, normocephalic  EYES: EOM intact, PERRLA, conjunctiva and sclera clear  ENT: Supple, no masses, no thyromegaly, no bruits, no JVD; moist mucous membranes  PULMONARY: Clear to auscultation bilaterally; no wheezes, rales, or rhonchi  CARDIOVASCULAR: Regular rate and rhythm; no murmurs, rubs, or gallops  GASTROINTESTINAL: Soft, tenderness to palpation,  non-distended; bowel sounds present  MUSCULOSKELETAL: 2+ peripheral pulses; no clubbing, no cyanosis, no edema  NEUROLOGY: non-focal  SKIN: No rashes or lesions; warm and dry

## 2021-10-18 NOTE — PROGRESS NOTE ADULT - ASSESSMENT
67 yo M with PMH of HTN, HLD, DM, BPH, and numerous counts of pancreatitis presents for pancreatitis.     # Pancreatitis , recurrent   - CT Abdomen and Pelvis w/ IV Cont : Mild inflammatory stranding adjacent to pancreatic tail, which may be attributed to acute interstitial edematous pancreatitis; no acute fluid collection ; gallbladder unremarkable (no biliary dilation)  - lipase within normal limits, no alcohol abuse   - no gallstones on CT, f/u RUQ US shows no stones  - triglycerides normal   - monitor H/H, BUN , and Cr  - Switch to PO pain control, decrease IVF, continue to advance diet    # h/o HTN, c/w amlodipine  # h/o HLD, c/w atorvastatin  # h/o BPH, c/w tamsulosin  # h/o DM , FS AC QHS, sliding scale, start insulin subq if FS >180    # DVT PPX: Lovenox  # GI PPX: PPI  # Diet: soft, advance diet as tolerated  # CHG BATH  # Activity: as tolerated  Disposition: will anticipate home tomorrow

## 2021-10-19 DIAGNOSIS — Z71.89 OTHER SPECIFIED COUNSELING: ICD-10-CM

## 2021-10-29 DIAGNOSIS — K21.9 GASTRO-ESOPHAGEAL REFLUX DISEASE WITHOUT ESOPHAGITIS: ICD-10-CM

## 2021-10-29 DIAGNOSIS — Z87.891 PERSONAL HISTORY OF NICOTINE DEPENDENCE: ICD-10-CM

## 2021-10-29 DIAGNOSIS — E66.9 OBESITY, UNSPECIFIED: ICD-10-CM

## 2021-10-29 DIAGNOSIS — Z85.46 PERSONAL HISTORY OF MALIGNANT NEOPLASM OF PROSTATE: ICD-10-CM

## 2021-10-29 DIAGNOSIS — E11.9 TYPE 2 DIABETES MELLITUS WITHOUT COMPLICATIONS: ICD-10-CM

## 2021-10-29 DIAGNOSIS — N40.0 BENIGN PROSTATIC HYPERPLASIA WITHOUT LOWER URINARY TRACT SYMPTOMS: ICD-10-CM

## 2021-10-29 DIAGNOSIS — Z96.642 PRESENCE OF LEFT ARTIFICIAL HIP JOINT: ICD-10-CM

## 2021-10-29 DIAGNOSIS — I10 ESSENTIAL (PRIMARY) HYPERTENSION: ICD-10-CM

## 2021-10-29 DIAGNOSIS — F10.21 ALCOHOL DEPENDENCE, IN REMISSION: ICD-10-CM

## 2021-10-29 DIAGNOSIS — K85.80 OTHER ACUTE PANCREATITIS WITHOUT NECROSIS OR INFECTION: ICD-10-CM

## 2021-10-29 DIAGNOSIS — E78.5 HYPERLIPIDEMIA, UNSPECIFIED: ICD-10-CM

## 2021-10-29 DIAGNOSIS — E87.1 HYPO-OSMOLALITY AND HYPONATREMIA: ICD-10-CM

## 2021-10-29 DIAGNOSIS — Z79.82 LONG TERM (CURRENT) USE OF ASPIRIN: ICD-10-CM

## 2021-10-29 DIAGNOSIS — Z79.84 LONG TERM (CURRENT) USE OF ORAL HYPOGLYCEMIC DRUGS: ICD-10-CM

## 2021-11-30 ENCOUNTER — OUTPATIENT (OUTPATIENT)
Dept: OUTPATIENT SERVICES | Facility: HOSPITAL | Age: 66
LOS: 1 days | Discharge: HOME | End: 2021-11-30

## 2021-11-30 ENCOUNTER — APPOINTMENT (OUTPATIENT)
Dept: INTERNAL MEDICINE | Facility: CLINIC | Age: 66
End: 2021-11-30
Payer: MEDICARE

## 2021-11-30 ENCOUNTER — EMERGENCY (EMERGENCY)
Facility: HOSPITAL | Age: 66
LOS: 0 days | Discharge: HOME | End: 2021-11-30
Payer: MEDICARE

## 2021-11-30 ENCOUNTER — NON-APPOINTMENT (OUTPATIENT)
Age: 66
End: 2021-11-30

## 2021-11-30 ENCOUNTER — OUTPATIENT (OUTPATIENT)
Dept: OUTPATIENT SERVICES | Facility: HOSPITAL | Age: 66
LOS: 1 days | Discharge: HOME | End: 2021-11-30
Payer: MEDICARE

## 2021-11-30 VITALS
HEART RATE: 75 BPM | HEIGHT: 67 IN | DIASTOLIC BLOOD PRESSURE: 80 MMHG | BODY MASS INDEX: 32.96 KG/M2 | SYSTOLIC BLOOD PRESSURE: 125 MMHG | OXYGEN SATURATION: 98 % | WEIGHT: 210 LBS

## 2021-11-30 DIAGNOSIS — K85.90 ACUTE PANCREATITIS WITHOUT NECROSIS OR INFECTION, UNSPECIFIED: ICD-10-CM

## 2021-11-30 DIAGNOSIS — Z96.642 PRESENCE OF LEFT ARTIFICIAL HIP JOINT: Chronic | ICD-10-CM

## 2021-11-30 DIAGNOSIS — C61 MALIGNANT NEOPLASM OF PROSTATE: Chronic | ICD-10-CM

## 2021-11-30 DIAGNOSIS — Z98.890 OTHER SPECIFIED POSTPROCEDURAL STATES: Chronic | ICD-10-CM

## 2021-11-30 DIAGNOSIS — R06.2 WHEEZING: ICD-10-CM

## 2021-11-30 PROCEDURE — 71046 X-RAY EXAM CHEST 2 VIEWS: CPT | Mod: 26

## 2021-11-30 PROCEDURE — L9981: CPT

## 2021-11-30 PROCEDURE — 99212 OFFICE O/P EST SF 10 MIN: CPT | Mod: GC

## 2021-11-30 NOTE — HISTORY OF PRESENT ILLNESS
[FreeTextEntry1] : f/u dm2 [de-identified] : here for dm2 f/u. claims good compliance with all meds except for flomax

## 2021-11-30 NOTE — PHYSICAL EXAM
[No Acute Distress] : no acute distress [No Respiratory Distress] : no respiratory distress  [Normal Rate] : normal rate  [Regular Rhythm] : with a regular rhythm [Soft] : abdomen soft [No HSM] : no HSM [No Joint Swelling] : no joint swelling

## 2021-11-30 NOTE — ASSESSMENT
[FreeTextEntry1] : 65 year old male with PMHx of non-obstructive CAD, DL, HTN, DM2, Prostate Ca s/p seeding (in remission), Hx of pancreatitis (last episode 2015) presenting for follow-up\par \par recurrent panc\par - send to GI, unclear etiology\par \par R wheeze\par - PFT\par - CXR\par \par #DM2, uncontrolled\par poor compliance with f/u\par check a1c\par pharmacist visit in 30 days\par rtc 3 mo\par \par #Back pain\par resolved\par \par #Tension Headache\par resolved\par \par #HTN\par Continue Amlodipine 5mg qd\par \par #Non-obstructive CAD\par Continue Aspirin 81mg, Lipitor 40mg qd\par \par #HCM\par Colonoscopy in 2020, with 3 adenomas, the largest 8mm, repeat in 3 years\par tdap- in 2012. \par never had covid vaccine, thinks he may have had covid. now amenable to taking moderna\par f/u in 3 months, with repeat HBa1c. Josefina in 4 weeks\par

## 2021-12-01 PROCEDURE — G9005: CPT

## 2021-12-17 ENCOUNTER — APPOINTMENT (OUTPATIENT)
Dept: INTERNAL MEDICINE | Facility: CLINIC | Age: 66
End: 2021-12-17

## 2022-01-01 NOTE — ED ADULT TRIAGE NOTE - HEART RATE (BEATS/MIN)
Subjective:     Chief Complaint/Reason for Admission: Infant is a 1 days Boy Estrada Fletcher born at 38w0d  Infant male was born on 2022 at 8:21 PM via , Low Transverse.    Maternal History: The mother is a 20 y.o.   . Mother  has a past medical history of Asthma.     Prenatal Labs Review:  ABO/Rh:   Lab Results   Component Value Date/Time    GROUPTRH O POS 2022 05:22 PM      Group B Beta Strep:   Lab Results   Component Value Date/Time    STREPBCULT No Group B Streptococcus isolated 2022 11:15 AM      HIV:   HIV 1/2 Ag/Ab   Date Value Ref Range Status   2022 Negative Negative Final        RPR:   Lab Results   Component Value Date/Time    RPR Non-reactive 2022 01:08 PM      Hepatitis B Surface Antigen:   Lab Results   Component Value Date/Time    HEPBSAG Negative 2021 04:12 PM      Rubella Immune Status:   Lab Results   Component Value Date/Time    RUBELLAIMMUN Reactive 2021 04:12 PM        Pregnancy/Delivery Course: The pregnancy was complicated by history of CS x 1 for placental abruption, anemia, and mild intermittent asthma, and circumvallate placenta . Prenatal ultrasound revealed normal anatomy. Prenatal care was good. Mother received routine medications related to delivery via  section (cefazolin x1 and anesthetic medications).     Membrane rupture: at delivery    The delivery was complicated by Category 2 FHT (Deceleration in REKHA), otherwise there were no reported complications. Apgar scores:    Assessment:       1 Minute:  Skin color:    Muscle tone:      Heart rate:    Breathing:      Grimace:      Total: 9            5 Minute:  Skin color:    Muscle tone:      Heart rate:    Breathing:      Grimace:      Total: 9            10 Minute:  Skin color:    Muscle tone:      Heart rate:    Breathing:      Grimace:      Total:            Objective:     Vital Signs (Most Recent)  Temp: 98 °F (36.7 °C) (22 0900)  Pulse: 144 (22  79 "0900)  Resp: 40 (06/03/22 0900)    Most Recent Weight: 3040 g (6 lb 11.2 oz) (Filed from Delivery Summary) (06/02/22 2021)  Admission Weight: 3040 g (6 lb 11.2 oz) (Filed from Delivery Summary) (06/02/22 2021)  Admission  Head Circumference: 34.3 cm (Filed from Delivery Summary)   Admission Length: Height: 47.6 cm (18.75") (Filed from Delivery Summary)    Physical Exam  General Appearance: healthy-appearing, vigorous infant, no dysmorphic features  Head: +minimal caput succedaneum otherwise normocephalic, atraumatic, anterior fontanelle open soft and flat  Eyes: red reflex present bilaterally, anicteric sclera, no discharge  Ears: well-positioned, well-formed pinnae                         Nose: nares patent, no rhinorrhea  Throat: oropharynx clear, non-erythematous, mucous membranes moist, palate intact  Neck: supple, symmetrical, no torticollis  Chest: lungs clear to auscultation, respirations unlabored, clavicles intact  Heart: regular rate & rhythm, normal S1/S2, no murmurs  Abdomen: positive bowel sounds, soft, non-tender, non-distended, no masses, umbilical stump clean  Pulses: strong equal femoral and brachial pulses, brisk capillary refill  Hips: negative Osei & Ortolani  : normal Francesco I male genitalia, testes descended, anus patent  Musculosketal: normal tone and muscle bulk  Back: no abnormal sacral nani or dimples, no scoliosis or masses  Extremities: well-perfused, warm and dry  Skin: no rashes, no jaundice  Neuro: strong cry, good symmetric tone and strength, normal baby reflexes    Recent Results (from the past 168 hour(s))   Cord Blood Evaluation    Collection Time: 06/02/22  9:02 PM   Result Value Ref Range    Cord ABO O POS     Cord Direct Rea NEG        "

## 2022-01-13 LAB
C TRACH RRNA SPEC QL NAA+PROBE: NOT DETECTED
CREAT SPEC-SCNC: 253 MG/DL
MICROALBUMIN 24H UR DL<=1MG/L-MCNC: 5.2 MG/DL
MICROALBUMIN/CREAT 24H UR-RTO: 21 MG/G
N GONORRHOEA RRNA SPEC QL NAA+PROBE: NOT DETECTED
SOURCE AMPLIFICATION: NORMAL

## 2022-02-11 ENCOUNTER — APPOINTMENT (OUTPATIENT)
Dept: GASTROENTEROLOGY | Facility: CLINIC | Age: 67
End: 2022-02-11

## 2022-03-01 ENCOUNTER — APPOINTMENT (OUTPATIENT)
Dept: INTERNAL MEDICINE | Facility: CLINIC | Age: 67
End: 2022-03-01

## 2022-03-10 NOTE — ED PROVIDER NOTE - OBJECTIVE STATEMENT
used
pt with pmhx DM, Hyperlipidemia, Hypertension, Osteoarthritis, Pancreatitis, and Prostate cancer s/p seed therapy presents to ED for 2nd time for hematuria. prior ED visit had labs and urine testing, dx with UTI and DC home on abx. pt sts hematuria improved slightly but has returned and reports passing some clots. Denies fever/chill/HA/dizziness/chest pain/palpitation/sob/abd pain/n/v/d/ black stool/bloody stool

## 2022-04-02 ENCOUNTER — INPATIENT (INPATIENT)
Facility: HOSPITAL | Age: 67
LOS: 3 days | Discharge: HOME | End: 2022-04-06
Attending: INTERNAL MEDICINE | Admitting: INTERNAL MEDICINE
Payer: MEDICARE

## 2022-04-02 VITALS
RESPIRATION RATE: 18 BRPM | OXYGEN SATURATION: 97 % | HEART RATE: 72 BPM | SYSTOLIC BLOOD PRESSURE: 123 MMHG | HEIGHT: 67 IN | TEMPERATURE: 98 F | DIASTOLIC BLOOD PRESSURE: 77 MMHG | WEIGHT: 205.03 LBS

## 2022-04-02 DIAGNOSIS — Z98.890 OTHER SPECIFIED POSTPROCEDURAL STATES: Chronic | ICD-10-CM

## 2022-04-02 DIAGNOSIS — C61 MALIGNANT NEOPLASM OF PROSTATE: Chronic | ICD-10-CM

## 2022-04-02 DIAGNOSIS — Z96.642 PRESENCE OF LEFT ARTIFICIAL HIP JOINT: Chronic | ICD-10-CM

## 2022-04-02 LAB
ALBUMIN SERPL ELPH-MCNC: 4.4 G/DL — SIGNIFICANT CHANGE UP (ref 3.5–5.2)
ALP SERPL-CCNC: 90 U/L — SIGNIFICANT CHANGE UP (ref 30–115)
ALT FLD-CCNC: 17 U/L — SIGNIFICANT CHANGE UP (ref 0–41)
ANION GAP SERPL CALC-SCNC: 12 MMOL/L — SIGNIFICANT CHANGE UP (ref 7–14)
AST SERPL-CCNC: 16 U/L — SIGNIFICANT CHANGE UP (ref 0–41)
BASOPHILS # BLD AUTO: 0.03 K/UL — SIGNIFICANT CHANGE UP (ref 0–0.2)
BASOPHILS NFR BLD AUTO: 0.5 % — SIGNIFICANT CHANGE UP (ref 0–1)
BILIRUB SERPL-MCNC: 1 MG/DL — SIGNIFICANT CHANGE UP (ref 0.2–1.2)
BLD GP AB SCN SERPL QL: SIGNIFICANT CHANGE UP
BUN SERPL-MCNC: 10 MG/DL — SIGNIFICANT CHANGE UP (ref 10–20)
CALCIUM SERPL-MCNC: 9.2 MG/DL — SIGNIFICANT CHANGE UP (ref 8.5–10.1)
CHLORIDE SERPL-SCNC: 102 MMOL/L — SIGNIFICANT CHANGE UP (ref 98–110)
CO2 SERPL-SCNC: 24 MMOL/L — SIGNIFICANT CHANGE UP (ref 17–32)
CREAT SERPL-MCNC: 0.7 MG/DL — SIGNIFICANT CHANGE UP (ref 0.7–1.5)
EGFR: 102 ML/MIN/1.73M2 — SIGNIFICANT CHANGE UP
EOSINOPHIL # BLD AUTO: 0.15 K/UL — SIGNIFICANT CHANGE UP (ref 0–0.7)
EOSINOPHIL NFR BLD AUTO: 2.3 % — SIGNIFICANT CHANGE UP (ref 0–8)
GLUCOSE SERPL-MCNC: 136 MG/DL — HIGH (ref 70–99)
HCT VFR BLD CALC: 38.6 % — LOW (ref 42–52)
HGB BLD-MCNC: 12.9 G/DL — LOW (ref 14–18)
IMM GRANULOCYTES NFR BLD AUTO: 0.3 % — SIGNIFICANT CHANGE UP (ref 0.1–0.3)
LACTATE SERPL-SCNC: 1 MMOL/L — SIGNIFICANT CHANGE UP (ref 0.7–2)
LIDOCAIN IGE QN: 92 U/L — HIGH (ref 7–60)
LYMPHOCYTES # BLD AUTO: 1.61 K/UL — SIGNIFICANT CHANGE UP (ref 1.2–3.4)
LYMPHOCYTES # BLD AUTO: 24.8 % — SIGNIFICANT CHANGE UP (ref 20.5–51.1)
MCHC RBC-ENTMCNC: 28.7 PG — SIGNIFICANT CHANGE UP (ref 27–31)
MCHC RBC-ENTMCNC: 33.4 G/DL — SIGNIFICANT CHANGE UP (ref 32–37)
MCV RBC AUTO: 86 FL — SIGNIFICANT CHANGE UP (ref 80–94)
MONOCYTES # BLD AUTO: 0.49 K/UL — SIGNIFICANT CHANGE UP (ref 0.1–0.6)
MONOCYTES NFR BLD AUTO: 7.5 % — SIGNIFICANT CHANGE UP (ref 1.7–9.3)
NEUTROPHILS # BLD AUTO: 4.2 K/UL — SIGNIFICANT CHANGE UP (ref 1.4–6.5)
NEUTROPHILS NFR BLD AUTO: 64.6 % — SIGNIFICANT CHANGE UP (ref 42.2–75.2)
NRBC # BLD: 0 /100 WBCS — SIGNIFICANT CHANGE UP (ref 0–0)
PLATELET # BLD AUTO: 151 K/UL — SIGNIFICANT CHANGE UP (ref 130–400)
POTASSIUM SERPL-MCNC: 4.4 MMOL/L — SIGNIFICANT CHANGE UP (ref 3.5–5)
POTASSIUM SERPL-SCNC: 4.4 MMOL/L — SIGNIFICANT CHANGE UP (ref 3.5–5)
PROT SERPL-MCNC: 7.1 G/DL — SIGNIFICANT CHANGE UP (ref 6–8)
RBC # BLD: 4.49 M/UL — LOW (ref 4.7–6.1)
RBC # FLD: 12.5 % — SIGNIFICANT CHANGE UP (ref 11.5–14.5)
SARS-COV-2 RNA SPEC QL NAA+PROBE: SIGNIFICANT CHANGE UP
SODIUM SERPL-SCNC: 138 MMOL/L — SIGNIFICANT CHANGE UP (ref 135–146)
WBC # BLD: 6.5 K/UL — SIGNIFICANT CHANGE UP (ref 4.8–10.8)
WBC # FLD AUTO: 6.5 K/UL — SIGNIFICANT CHANGE UP (ref 4.8–10.8)

## 2022-04-02 PROCEDURE — 76705 ECHO EXAM OF ABDOMEN: CPT | Mod: 26

## 2022-04-02 PROCEDURE — 74177 CT ABD & PELVIS W/CONTRAST: CPT | Mod: 26,MA

## 2022-04-02 PROCEDURE — 71045 X-RAY EXAM CHEST 1 VIEW: CPT | Mod: 26

## 2022-04-02 PROCEDURE — 99285 EMERGENCY DEPT VISIT HI MDM: CPT

## 2022-04-02 RX ORDER — SODIUM CHLORIDE 9 MG/ML
1000 INJECTION, SOLUTION INTRAVENOUS
Refills: 0 | Status: DISCONTINUED | OUTPATIENT
Start: 2022-04-02 | End: 2022-04-06

## 2022-04-02 RX ORDER — ASPIRIN/CALCIUM CARB/MAGNESIUM 324 MG
81 TABLET ORAL DAILY
Refills: 0 | Status: DISCONTINUED | OUTPATIENT
Start: 2022-04-02 | End: 2022-04-06

## 2022-04-02 RX ORDER — TAMSULOSIN HYDROCHLORIDE 0.4 MG/1
1 CAPSULE ORAL
Qty: 0 | Refills: 0 | DISCHARGE

## 2022-04-02 RX ORDER — ENOXAPARIN SODIUM 100 MG/ML
40 INJECTION SUBCUTANEOUS EVERY 24 HOURS
Refills: 0 | Status: DISCONTINUED | OUTPATIENT
Start: 2022-04-02 | End: 2022-04-06

## 2022-04-02 RX ORDER — FOLIC ACID 0.8 MG
1 TABLET ORAL DAILY
Refills: 0 | Status: DISCONTINUED | OUTPATIENT
Start: 2022-04-02 | End: 2022-04-06

## 2022-04-02 RX ORDER — FAMOTIDINE 10 MG/ML
20 INJECTION INTRAVENOUS ONCE
Refills: 0 | Status: COMPLETED | OUTPATIENT
Start: 2022-04-02 | End: 2022-04-02

## 2022-04-02 RX ORDER — DEXTROSE 50 % IN WATER 50 %
25 SYRINGE (ML) INTRAVENOUS ONCE
Refills: 0 | Status: DISCONTINUED | OUTPATIENT
Start: 2022-04-02 | End: 2022-04-06

## 2022-04-02 RX ORDER — TIOTROPIUM BROMIDE 18 UG/1
1 CAPSULE ORAL; RESPIRATORY (INHALATION) DAILY
Refills: 0 | Status: DISCONTINUED | OUTPATIENT
Start: 2022-04-02 | End: 2022-04-06

## 2022-04-02 RX ORDER — ATORVASTATIN CALCIUM 80 MG/1
40 TABLET, FILM COATED ORAL AT BEDTIME
Refills: 0 | Status: DISCONTINUED | OUTPATIENT
Start: 2022-04-02 | End: 2022-04-06

## 2022-04-02 RX ORDER — AMLODIPINE BESYLATE 2.5 MG/1
1 TABLET ORAL
Qty: 0 | Refills: 0 | DISCHARGE

## 2022-04-02 RX ORDER — AMLODIPINE BESYLATE 2.5 MG/1
5 TABLET ORAL DAILY
Refills: 0 | Status: DISCONTINUED | OUTPATIENT
Start: 2022-04-02 | End: 2022-04-06

## 2022-04-02 RX ORDER — BUDESONIDE AND FORMOTEROL FUMARATE DIHYDRATE 160; 4.5 UG/1; UG/1
2 AEROSOL RESPIRATORY (INHALATION)
Refills: 0 | Status: DISCONTINUED | OUTPATIENT
Start: 2022-04-02 | End: 2022-04-06

## 2022-04-02 RX ORDER — PANTOPRAZOLE SODIUM 20 MG/1
40 TABLET, DELAYED RELEASE ORAL DAILY
Refills: 0 | Status: DISCONTINUED | OUTPATIENT
Start: 2022-04-02 | End: 2022-04-06

## 2022-04-02 RX ORDER — TAMSULOSIN HYDROCHLORIDE 0.4 MG/1
0.4 CAPSULE ORAL AT BEDTIME
Refills: 0 | Status: DISCONTINUED | OUTPATIENT
Start: 2022-04-02 | End: 2022-04-06

## 2022-04-02 RX ORDER — DEXTROSE 50 % IN WATER 50 %
15 SYRINGE (ML) INTRAVENOUS ONCE
Refills: 0 | Status: DISCONTINUED | OUTPATIENT
Start: 2022-04-02 | End: 2022-04-06

## 2022-04-02 RX ORDER — MORPHINE SULFATE 50 MG/1
6 CAPSULE, EXTENDED RELEASE ORAL ONCE
Refills: 0 | Status: DISCONTINUED | OUTPATIENT
Start: 2022-04-02 | End: 2022-04-02

## 2022-04-02 RX ORDER — IPRATROPIUM/ALBUTEROL SULFATE 18-103MCG
3 AEROSOL WITH ADAPTER (GRAM) INHALATION EVERY 6 HOURS
Refills: 0 | Status: DISCONTINUED | OUTPATIENT
Start: 2022-04-02 | End: 2022-04-03

## 2022-04-02 RX ORDER — MORPHINE SULFATE 50 MG/1
2 CAPSULE, EXTENDED RELEASE ORAL ONCE
Refills: 0 | Status: DISCONTINUED | OUTPATIENT
Start: 2022-04-02 | End: 2022-04-02

## 2022-04-02 RX ORDER — DEXTROSE 50 % IN WATER 50 %
12.5 SYRINGE (ML) INTRAVENOUS ONCE
Refills: 0 | Status: DISCONTINUED | OUTPATIENT
Start: 2022-04-02 | End: 2022-04-06

## 2022-04-02 RX ORDER — IPRATROPIUM BROMIDE 0.2 MG/ML
500 SOLUTION, NON-ORAL INHALATION EVERY 6 HOURS
Refills: 0 | Status: DISCONTINUED | OUTPATIENT
Start: 2022-04-02 | End: 2022-04-03

## 2022-04-02 RX ORDER — IOHEXOL 300 MG/ML
30 INJECTION, SOLUTION INTRAVENOUS ONCE
Refills: 0 | Status: COMPLETED | OUTPATIENT
Start: 2022-04-02 | End: 2022-04-02

## 2022-04-02 RX ORDER — HYDROMORPHONE HYDROCHLORIDE 2 MG/ML
0.5 INJECTION INTRAMUSCULAR; INTRAVENOUS; SUBCUTANEOUS EVERY 6 HOURS
Refills: 0 | Status: DISCONTINUED | OUTPATIENT
Start: 2022-04-02 | End: 2022-04-02

## 2022-04-02 RX ORDER — GLUCAGON INJECTION, SOLUTION 0.5 MG/.1ML
1 INJECTION, SOLUTION SUBCUTANEOUS ONCE
Refills: 0 | Status: DISCONTINUED | OUTPATIENT
Start: 2022-04-02 | End: 2022-04-06

## 2022-04-02 RX ORDER — SODIUM CHLORIDE 9 MG/ML
1500 INJECTION, SOLUTION INTRAVENOUS ONCE
Refills: 0 | Status: COMPLETED | OUTPATIENT
Start: 2022-04-02 | End: 2022-04-02

## 2022-04-02 RX ORDER — SODIUM CHLORIDE 9 MG/ML
1000 INJECTION, SOLUTION INTRAVENOUS
Refills: 0 | Status: DISCONTINUED | OUTPATIENT
Start: 2022-04-02 | End: 2022-04-03

## 2022-04-02 RX ORDER — MORPHINE SULFATE 50 MG/1
1 CAPSULE, EXTENDED RELEASE ORAL
Refills: 0 | Status: DISCONTINUED | OUTPATIENT
Start: 2022-04-02 | End: 2022-04-04

## 2022-04-02 RX ORDER — INSULIN LISPRO 100/ML
VIAL (ML) SUBCUTANEOUS
Refills: 0 | Status: DISCONTINUED | OUTPATIENT
Start: 2022-04-02 | End: 2022-04-06

## 2022-04-02 RX ORDER — CHLORHEXIDINE GLUCONATE 213 G/1000ML
1 SOLUTION TOPICAL
Refills: 0 | Status: DISCONTINUED | OUTPATIENT
Start: 2022-04-02 | End: 2022-04-06

## 2022-04-02 RX ORDER — ATORVASTATIN CALCIUM 80 MG/1
1 TABLET, FILM COATED ORAL
Qty: 0 | Refills: 0 | DISCHARGE

## 2022-04-02 RX ADMIN — IOHEXOL 30 MILLILITER(S): 300 INJECTION, SOLUTION INTRAVENOUS at 05:50

## 2022-04-02 RX ADMIN — SODIUM CHLORIDE 1500 MILLILITER(S): 9 INJECTION, SOLUTION INTRAVENOUS at 05:48

## 2022-04-02 RX ADMIN — PANTOPRAZOLE SODIUM 40 MILLIGRAM(S): 20 TABLET, DELAYED RELEASE ORAL at 12:51

## 2022-04-02 RX ADMIN — ATORVASTATIN CALCIUM 40 MILLIGRAM(S): 80 TABLET, FILM COATED ORAL at 22:10

## 2022-04-02 RX ADMIN — MORPHINE SULFATE 1 MILLIGRAM(S): 50 CAPSULE, EXTENDED RELEASE ORAL at 08:13

## 2022-04-02 RX ADMIN — HYDROMORPHONE HYDROCHLORIDE 0.5 MILLIGRAM(S): 2 INJECTION INTRAMUSCULAR; INTRAVENOUS; SUBCUTANEOUS at 12:57

## 2022-04-02 RX ADMIN — Medication 81 MILLIGRAM(S): at 14:13

## 2022-04-02 RX ADMIN — SODIUM CHLORIDE 250 MILLILITER(S): 9 INJECTION, SOLUTION INTRAVENOUS at 12:29

## 2022-04-02 RX ADMIN — MORPHINE SULFATE 6 MILLIGRAM(S): 50 CAPSULE, EXTENDED RELEASE ORAL at 05:49

## 2022-04-02 RX ADMIN — MORPHINE SULFATE 2 MILLIGRAM(S): 50 CAPSULE, EXTENDED RELEASE ORAL at 09:06

## 2022-04-02 RX ADMIN — SODIUM CHLORIDE 250 MILLILITER(S): 9 INJECTION, SOLUTION INTRAVENOUS at 22:05

## 2022-04-02 RX ADMIN — FAMOTIDINE 20 MILLIGRAM(S): 10 INJECTION INTRAVENOUS at 05:56

## 2022-04-02 RX ADMIN — SODIUM CHLORIDE 250 MILLILITER(S): 9 INJECTION, SOLUTION INTRAVENOUS at 17:58

## 2022-04-02 RX ADMIN — TAMSULOSIN HYDROCHLORIDE 0.4 MILLIGRAM(S): 0.4 CAPSULE ORAL at 22:10

## 2022-04-02 RX ADMIN — MORPHINE SULFATE 1 MILLIGRAM(S): 50 CAPSULE, EXTENDED RELEASE ORAL at 17:58

## 2022-04-02 RX ADMIN — HYDROMORPHONE HYDROCHLORIDE 0.5 MILLIGRAM(S): 2 INJECTION INTRAMUSCULAR; INTRAVENOUS; SUBCUTANEOUS at 13:15

## 2022-04-02 RX ADMIN — MORPHINE SULFATE 1 MILLIGRAM(S): 50 CAPSULE, EXTENDED RELEASE ORAL at 23:31

## 2022-04-02 RX ADMIN — MORPHINE SULFATE 6 MILLIGRAM(S): 50 CAPSULE, EXTENDED RELEASE ORAL at 06:04

## 2022-04-02 NOTE — ED PROVIDER NOTE - PROGRESS NOTE DETAILS
Pt s/o to Dr. Peña to follow up imaging, reassess and dispo. Resident AO: Patient received as sign out from YANIRA Vázquez, pending CT read. When reassessed, resting in stretcher, complaining of abdominal pain. Abdomen soft, tender diffusely. Discussed plan with patient, and he agrees. Patient has received Morphine 6mg @ 5:49, will order another 2mg for now.

## 2022-04-02 NOTE — PATIENT PROFILE ADULT - FALL HARM RISK - UNIVERSAL INTERVENTIONS
Bed in lowest position, wheels locked, appropriate side rails in place/Call bell, personal items and telephone in reach/Instruct patient to call for assistance before getting out of bed or chair/Non-slip footwear when patient is out of bed/Heath to call system/Physically safe environment - no spills, clutter or unnecessary equipment/Purposeful Proactive Rounding/Room/bathroom lighting operational, light cord in reach

## 2022-04-02 NOTE — ED PROVIDER NOTE - CLINICAL SUMMARY MEDICAL DECISION MAKING FREE TEXT BOX
Patient presented with acute in onset of abdominal pain consistent with his prior episodes of pancreatitis.  Also with no bowel movement over the past 2 to 3 days.  Otherwise on arrival patient afebrile, hemodynamically stable but tender on exam.  Obtaining labs which revealed mildly elevated lipase but otherwise grossly unremarkable.  CT abdomen pelvis showed uncomplicated acute pancreatitis but no other emergent pathologies.  Despite symptomatic control in ED patient unable to tolerate p.o. with persistent pain.  Given the above will require admission for further management.  Patient agreeable with plan.  Hemodynamically stable at time of admission.

## 2022-04-02 NOTE — PATIENT PROFILE ADULT - FUNCTIONAL ASSESSMENT - DAILY ACTIVITY SCORE.
24
I have reviewed and confirmed nurses' notes for patient's medications, allergies, medical history, and surgical history.

## 2022-04-02 NOTE — ED ADULT TRIAGE NOTE - CHIEF COMPLAINT QUOTE
I think my pancreatitis is back, there is something terribly wrong with my stomach, every time I eat I have pain here (LLQ) - patient

## 2022-04-02 NOTE — ED ADULT NURSE NOTE - OBJECTIVE STATEMENT
Patient presents with LLQ pain that radiates upward and to the back for a "few days".  Reports pain 9/10 and states that he believes his pancreatitis is back as he has prior history.  Denies nausea and vomiting.

## 2022-04-02 NOTE — ED PROVIDER NOTE - NS ED ATTENDING STATEMENT MOD
This was a shared visit with the [facility]. I reviewed and verified the documentation and independently performed the documented:

## 2022-04-02 NOTE — ED PROVIDER NOTE - ATTENDING CONTRIBUTION TO CARE
66-year-old male with PMH prostate cancer, HTN, DM, HLD presents complaining of abdominal pain and distention for 2 to 3 days with decreased bowel movement.  Patient states history of pancreatitis with similar presentation.  Denies any melena, hematochezia, N/V/D/fevers/chills, cough, SOB, chest pain or urinary complaints.      VITAL SIGNS: noted  CONSTITUTIONAL: Well-developed; well-nourished; in no acute distress  HEAD: Normocephalic; atraumatic  EYES: PERRL, EOM intact; conjunctiva and sclera clear  ENT: No nasal discharge; airway clear. MMM  NECK: Supple; non tender.    CARD: S1, S2 normal; no murmurs, gallops, or rubs. Regular rate and rhythm  RESP: CTAB/L, no wheezes, rales or rhonchi  ABD: Normal bowel sounds; soft; non-distended; + diffuse tenderness, most notably LLQ, no rebound or guarding, no CVA tenderness  EXT: Normal ROM. No calf tenderness or edema. Distal pulses intact  NEURO: Alert, oriented. Grossly unremarkable. No focal deficits  SKIN: Skin exam is warm and dry

## 2022-04-02 NOTE — H&P ADULT - NSHPPHYSICALEXAM_GEN_ALL_CORE
T(C): 36.7 (04-02-22 @ 04:07), Max: 36.7 (04-02-22 @ 04:07)  HR: 72 (04-02-22 @ 04:07) (72 - 72)  BP: 123/77 (04-02-22 @ 04:07) (123/77 - 123/77)  RR: 18 (04-02-22 @ 04:07) (18 - 18)  SpO2: 97% (04-02-22 @ 04:07) (97% - 97%)    PHYSICAL EXAM:  GENERAL: NAD, well-developed  HEAD:  Atraumatic, Normocephalic  EYES: EOMI, PERRLA, conjunctiva and sclera clear  ENT:No nasal obstruction or discharge. No tonsillar exudate, swelling or erythema.  NECK: Supple, No JVD  CHEST/LUNG: Clear to auscultation bilaterally; ++ wheeze  HEART: Regular rate and rhythm; No murmurs, rubs, or gallops  ABDOMEN: Soft, Nontender, Nondistended; Bowel sounds present  EXTREMITIES:  2+ Peripheral Pulses, No clubbing, cyanosis, or edema  PSYCH: AAOx3  NEUROLOGY: non-focal  SKIN: No rashes or lesions

## 2022-04-02 NOTE — H&P ADULT - ASSESSMENT
66 year old with a PMHx of HTN, DLD, Diabetes mellitus on Metformin,  BPH, GERD, prostate CA s/p seeding on remission, recurrent pancreatitis last admission in 10/2021 presented today for abdominal pain with distension that started 2 days ago diffuse, not radiating, worse with food, not alleviated by anything. pain was getting worse so he presented to the hospital.  pain is similar to pancreatitis pain- denies excessive drinking ( had a beer and a shot 2 weeks ago), no  n/v, fever, chills, back pain sob.    pt smokes marijuana everyday, occasional alcohol ex smoker : 30 py QUIT 15years ago.     #  Acute interstitial pancreatitis mild unknown cause  -labs done showed Lipase, Serum: 92.  - CT Abdomen and Pelvis w/ Oral Cont and w/ IV Cont (04.02.22 @ 07:51) >  1. Findings compatible with acute interstitial edematous pancreatitis,   without acute fluid collection.  2. Hepatic steatosis.  pt received morphine, IVF.   -Hemodynamically stable/ No signs of sepsis  - BISAP score 1  - FU US abdomen, lipid profile , calcium, IgG4  - pt smokes marijuana daily, ex smoker, occasional alcohol (last time 2 weeks ago : beer, and a shot).   - Medications reviewed: pt is on Metformin, Amlodipine,   - Keep NPO  - RL at 250 cc/hr  - Monitor daily HCT/BUN/CR/urine output.  - Monitor pain scale everyday and give pain medications accordingly.  - Avoid alcohol and pancreatitis causing medications.      # h/o HTN, c/w amlodipine  # h/o HLD, c/w atorvastatin  # h/o BPH, c/w tamsulosin  # h/o DM , FS AC QHS, sliding scale, start insulin subq if FS >180  # h/o HTN, c/w amlodipine  # h/o HLD, c/w atorvastatin  # h/o BPH, c/w tamsulosin  # h/o DM , FS AC QHS, sliding scale, start insulin subq if FS >180    # DVT PPX: Lovenox  # GI PPX: PPI  # Diet: soft, advance diet as tolerated  # CHG BATH  # Activity: as tolerated   66 year old with a PMHx of HTN, DLD, Diabetes mellitus on Metformin,  BPH, GERD, prostate CA s/p seeding on remission, recurrent pancreatitis last admission in 10/2021 presented today for abdominal pain with distension that started 2 days ago diffuse, not radiating, worse with food, not alleviated by anything. pain was getting worse so he presented to the hospital.  pain is similar to pancreatitis pain- denies excessive drinking ( had a beer and a shot 2 weeks ago), no  n/v, fever, chills, back pain sob.    pt smokes marijuana everyday, occasional alcohol ex smoker : 30 py QUIT 15years ago.     #  Acute interstitial pancreatitis mild.  - likely secondary to cannabis use, ro Gallstones ( less likley, no cholelithiasis on previous US), less likley medication induced. TG in 10/21: 73, reports occasional alcohol use. vs idiopathic  -labs done showed Lipase, Serum: 92.  - CT Abdomen and Pelvis w/ Oral Cont and w/ IV Cont (04.02.22 @ 07:51) >  1. Findings compatible with acute interstitial edematous pancreatitis,   without acute fluid collection.  2. Hepatic steatosis.  - pt received morphine, IVF.   -Hemodynamically stable/ No signs of epsis  - BISAP score 1  -  IgG4 : 18 in 10/21 : normal, calcium : 9.2  - FU US abdomen, lipid profile ,  - pt smokes marijuana twice daily, ex smoker, occasional alcohol (last time 2 weeks ago : beer, and a shot).   - Medications reviewed: pt is on Metformin, Amlodipine, Atorvastatin  - Keep NPO  - RL at 250 cc/hr  - Monitor daily HCT/BUN/CR/urine output.  - Monitor pain scale everyday and give pain medications accordingly.  - Avoid alcohol and pancreatitis causing medications.    #Mild Bronchitis:  - pt was wheezing on PE.  - no documented COPD, pt quit smoking 15 years ago,   - Will give Duonebs, symbicort, tiotropium.   - Will need fu OP with pulm for PFTs.     # h/o HTN, c/w amlodipine  # h/o HLD, c/w atorvastatin  # h/o BPH, c/w tamsulosin  # h/o DM , FS AC QHS, sliding scale, start insulin subq if FS >180      # DVT PPX: Lovenox  # GI PPX: PPI  # Diet: NPO  # CHG BATH  # Activity: as tolerated   66 year old man with a PMHx of HTN, DLD, Diabetes mellitus on Metformin, BPH, GERD, prostate CA s/p seeding on remission, recurrent pancreatitis last admission in 10/2021 presented today for abdominal pain with distension that started 2 days ago diffuse, not radiating, worse with food, not alleviated by anything. pain was getting worse so he presented to the hospital.  pain is similar to prior bouts of pancreatitis pain- denies excessive drinking (had a beer and a shot 2 weeks ago), no n/v, fever, chills, back pain sob.    pt smokes marijuana everyday, occasional alcohol, ex smoker: 30 py - QUIT 15years ago.     #  Acute interstitial pancreatitis mild likely 2/2 fried/greasy food intake w/ mod alcohol use (microlithiasis?)   doubt THC is cause  not alcoholic, per pt  no GB stones  no viral illness  no new meds  pt has never had MRCP -> can do as outpt (inpt if he worsens or LFTs become abnl)  Lipase, Serum: 92.  CT Abdomen and Pelvis w/ Oral Cont and w/ IV Cont (04.02.22 @ 07:51) >  1. Findings compatible with acute interstitial edematous pancreatitis,   without acute fluid collection.  2. Hepatic steatosis.  RUQ US: fatty liver; no GB stones; no duct dil; panc not well seen  Diet: full liquids  IVFs: decr /hr  IV morphine 1mg iv q6 prn for now  IgG4: 18 in 10/21; normal, calcium : 9.2  TG 83;     # Mild Bronchitis:  no further wheeze today  no documented COPD, pt quit smoking 15 years ago,   c/w albuterol, symbicort, tiotropium.   f/u OP with pulm for PFTs.     # h/o HTN, c/w amlodipine    # h/o HLD, c/w atorvastatin  not fully controlled -> when feeling better, can incr lipitor to 80mg HS (as outpt)    # h/o BPH, c/w tamsulosin    # h/o DM  FS can be 2x/day - will fix if FS > 180  on metformin at home - will resume upon d/c    # DVT PPX: Lovenox    # GI PPX: PPI    # Activity: independent    # Code: full    Dispo: anticipate d/c yani; decr LR; start feeding; tx pain  outpt MRCP

## 2022-04-02 NOTE — ED PROVIDER NOTE - PHYSICAL EXAMINATION
Constitutional: Well developed, well nourished. NAD  Head: Normocephalic, atraumatic.  Eyes: PERRL, EOMI.  ENT: No nasal discharge. Mucous membranes dry.  Neck: Supple. Painless ROM.  Cardiovascular:   Regular rate and rhythm.    Pulmonary:  Lungs clear to auscultation bilaterally.    Abdominal: Soft mildly distended abdomen; + moderate tenderness noted to LLQ area;   Extremities. Pelvis stable. No lower extremity edema, symmetric calves.  Skin: No rashes, cyanosis.  Neuro: AAOx3. No focal neurological deficits.  Psych: Normal mood. Normal affect.

## 2022-04-02 NOTE — H&P ADULT - NSHPLABSRESULTS_GEN_ALL_CORE
12.9   6.50  )-----------( 151      ( 02 Apr 2022 05:46 )             38.6       04-02    138  |  102  |  10  ----------------------------<  136<H>  4.4   |  24  |  0.7    Ca    9.2      02 Apr 2022 05:46    TPro  7.1  /  Alb  4.4  /  TBili  1.0  /  DBili  x   /  AST  16  /  ALT  17  /  AlkPhos  90  04-02                      Lactate Trend  04-02 @ 05:46 Lactate:1.0       < from: CT Abdomen and Pelvis w/ Oral Cont and w/ IV Cont (04.02.22 @ 07:51) >    IMPRESSION:  1. Findings compatible with acute interstitial edematous pancreatitis,   without acute fluid collection.  2. Hepatic steatosis.    < end of copied text >          CAPILLARY BLOOD GLUCOSE

## 2022-04-02 NOTE — H&P ADULT - HISTORY OF PRESENT ILLNESS
66 year old with a PMHx of HTN, DLD, Diabetes mellitus on Metformin,  66 year old with a PMHx of HTN, DLD, Diabetes mellitus on Metformin,  BPH, GERD, prostate CA s/p seeding on remission, recurrent pancreatitis last admission in 10/2021 presented today for abdominal pain with distension that started 2 days ago diffuse, not radiating, worse with food, not alleviated by anything. pain was getting worse so he presented to the hospital.  pain is similar to pancreatitis pain- denies excessive drinking ( had a beer and a shot 2 weeks ago), no  n/v, fever, chills, back pain sob.    pt smokes marijuana everyday, occasional alcohol ex smoker : 30 py QUIT 15years ago.     in the ED,pt's VS T(C): 36.7 (04-02-22 @ 04:07), Max: 36.7 (04-02-22 @ 04:07)  HR: 72 (04-02-22 @ 04:07) (72 - 72)  BP: 123/77 (04-02-22 @ 04:07) (123/77 - 123/77)  RR: 18 (04-02-22 @ 04:07) (18 - 18)  SpO2: 97% (04-02-22 @ 04:07) (97% - 97%), labs done showed Lipase, Serum: 92.  CT Abdomen and Pelvis w/ Oral Cont and w/ IV Cont (04.02.22 @ 07:51) >  1. Findings compatible with acute interstitial edematous pancreatitis,   without acute fluid collection.  2. Hepatic steatosis.  pt received morphine, IVF.       pt is admitted for workup/management 66 year old with a PMHx of HTN, DLD, Diabetes mellitus on Metformin,  BPH, GERD, prostate CA s/p seeding on remission, recurrent pancreatitis last admission in 10/2021 presented today for abdominal pain with distension that started 2 days ago diffuse, not radiating, worse with food, not alleviated by anything. pain was getting worse so he presented to the hospital.  pain is similar to pancreatitis pain- denies excessive drinking ( had a beer and a shot 2 weeks ago), no  n/v, fever, chills, back pain sob.    pt smokes marijuana everyday, occasional alcohol ex smoker : 30 py QUIT 15years ago.     in the ED,pt's VS T(C): 36.7 (04-02-22 @ 04:07), Max: 36.7 (04-02-22 @ 04:07)  HR: 72 (04-02-22 @ 04:07) (72 - 72)  BP: 123/77 (04-02-22 @ 04:07) (123/77 - 123/77)  RR: 18 (04-02-22 @ 04:07) (18 - 18)  SpO2: 97% (04-02-22 @ 04:07) (97% - 97%), labs done showed Lipase, Serum: 92.  CT Abdomen and Pelvis w/ Oral Cont and w/ IV Cont (04.02.22 @ 07:51) >  1. Findings compatible with acute interstitial edematous pancreatitis,   without acute fluid collection.  2. Hepatic steatosis.  pt received morphine, IVF.     pt is admitted for workup/management    Attd: pt known to have pancreatitis on/off; seems related to what he eats and drinks; went to a party a couple of days ago and ate a lot, including fried (fried chicken) and greasy (ox tails) foods. He also had some alcohol, but is NOT known to be an alcoholic. He is feeling better and would like to start w/ liquid diet today;

## 2022-04-02 NOTE — ED ADULT NURSE REASSESSMENT NOTE - NS ED NURSE REASSESS COMMENT FT1
assumed care of pt; pt A&O3 sitting on stretcher, no s/s of distress. pt denies c/o LUQ pain awaiting CT. 20g PIV L hand patent. Safety and comfort measures in place.

## 2022-04-02 NOTE — ED PROVIDER NOTE - OBJECTIVE STATEMENT
66 yold male to Ed Pmhx Prostate cancer s/p rt tx, Pancreatitis, Htn, Hld, dm c/o abdominal pain, distension with no bm x 2-3 days; pt with prior sx pancreatitis - states sx similar - denies excessive drinking, n/v, fever, chills, back pain sob; only abdominal sx AP - denies prior hx of bowel obstruction;

## 2022-04-03 LAB
ALBUMIN SERPL ELPH-MCNC: 4.1 G/DL — SIGNIFICANT CHANGE UP (ref 3.5–5.2)
ALP SERPL-CCNC: 87 U/L — SIGNIFICANT CHANGE UP (ref 30–115)
ALT FLD-CCNC: 15 U/L — SIGNIFICANT CHANGE UP (ref 0–41)
ANION GAP SERPL CALC-SCNC: 11 MMOL/L — SIGNIFICANT CHANGE UP (ref 7–14)
APTT BLD: 33.6 SEC — SIGNIFICANT CHANGE UP (ref 27–39.2)
AST SERPL-CCNC: 14 U/L — SIGNIFICANT CHANGE UP (ref 0–41)
BASOPHILS # BLD AUTO: 0.03 K/UL — SIGNIFICANT CHANGE UP (ref 0–0.2)
BASOPHILS NFR BLD AUTO: 0.6 % — SIGNIFICANT CHANGE UP (ref 0–1)
BILIRUB DIRECT SERPL-MCNC: 0.2 MG/DL — SIGNIFICANT CHANGE UP (ref 0–0.3)
BILIRUB INDIRECT FLD-MCNC: 1.1 MG/DL — SIGNIFICANT CHANGE UP (ref 0.2–1.2)
BILIRUB SERPL-MCNC: 1.3 MG/DL — HIGH (ref 0.2–1.2)
BUN SERPL-MCNC: 7 MG/DL — LOW (ref 10–20)
CALCIUM SERPL-MCNC: 9.5 MG/DL — SIGNIFICANT CHANGE UP (ref 8.5–10.1)
CHLORIDE SERPL-SCNC: 103 MMOL/L — SIGNIFICANT CHANGE UP (ref 98–110)
CHOLEST SERPL-MCNC: 176 MG/DL — SIGNIFICANT CHANGE UP
CO2 SERPL-SCNC: 23 MMOL/L — SIGNIFICANT CHANGE UP (ref 17–32)
CREAT SERPL-MCNC: 0.6 MG/DL — LOW (ref 0.7–1.5)
EGFR: 106 ML/MIN/1.73M2 — SIGNIFICANT CHANGE UP
EOSINOPHIL # BLD AUTO: 0.21 K/UL — SIGNIFICANT CHANGE UP (ref 0–0.7)
EOSINOPHIL NFR BLD AUTO: 4.3 % — SIGNIFICANT CHANGE UP (ref 0–8)
GLUCOSE SERPL-MCNC: 112 MG/DL — HIGH (ref 70–99)
HCT VFR BLD CALC: 38.7 % — LOW (ref 42–52)
HDLC SERPL-MCNC: 49 MG/DL — SIGNIFICANT CHANGE UP
HGB BLD-MCNC: 13.2 G/DL — LOW (ref 14–18)
IMM GRANULOCYTES NFR BLD AUTO: 0.4 % — HIGH (ref 0.1–0.3)
INR BLD: 1.21 RATIO — SIGNIFICANT CHANGE UP (ref 0.65–1.3)
LIPID PNL WITH DIRECT LDL SERPL: 114 MG/DL — HIGH
LYMPHOCYTES # BLD AUTO: 0.97 K/UL — LOW (ref 1.2–3.4)
LYMPHOCYTES # BLD AUTO: 20 % — LOW (ref 20.5–51.1)
MAGNESIUM SERPL-MCNC: 2 MG/DL — SIGNIFICANT CHANGE UP (ref 1.8–2.4)
MCHC RBC-ENTMCNC: 29.3 PG — SIGNIFICANT CHANGE UP (ref 27–31)
MCHC RBC-ENTMCNC: 34.1 G/DL — SIGNIFICANT CHANGE UP (ref 32–37)
MCV RBC AUTO: 85.8 FL — SIGNIFICANT CHANGE UP (ref 80–94)
MONOCYTES # BLD AUTO: 0.43 K/UL — SIGNIFICANT CHANGE UP (ref 0.1–0.6)
MONOCYTES NFR BLD AUTO: 8.8 % — SIGNIFICANT CHANGE UP (ref 1.7–9.3)
NEUTROPHILS # BLD AUTO: 3.2 K/UL — SIGNIFICANT CHANGE UP (ref 1.4–6.5)
NEUTROPHILS NFR BLD AUTO: 65.9 % — SIGNIFICANT CHANGE UP (ref 42.2–75.2)
NON HDL CHOLESTEROL: 127 MG/DL — SIGNIFICANT CHANGE UP
NRBC # BLD: 0 /100 WBCS — SIGNIFICANT CHANGE UP (ref 0–0)
PLATELET # BLD AUTO: 221 K/UL — SIGNIFICANT CHANGE UP (ref 130–400)
POTASSIUM SERPL-MCNC: 4 MMOL/L — SIGNIFICANT CHANGE UP (ref 3.5–5)
POTASSIUM SERPL-SCNC: 4 MMOL/L — SIGNIFICANT CHANGE UP (ref 3.5–5)
PROT SERPL-MCNC: 6.7 G/DL — SIGNIFICANT CHANGE UP (ref 6–8)
PROTHROM AB SERPL-ACNC: 13.9 SEC — HIGH (ref 9.95–12.87)
RBC # BLD: 4.51 M/UL — LOW (ref 4.7–6.1)
RBC # FLD: 12.2 % — SIGNIFICANT CHANGE UP (ref 11.5–14.5)
SODIUM SERPL-SCNC: 137 MMOL/L — SIGNIFICANT CHANGE UP (ref 135–146)
TRIGL SERPL-MCNC: 83 MG/DL — SIGNIFICANT CHANGE UP
WBC # BLD: 4.86 K/UL — SIGNIFICANT CHANGE UP (ref 4.8–10.8)
WBC # FLD AUTO: 4.86 K/UL — SIGNIFICANT CHANGE UP (ref 4.8–10.8)

## 2022-04-03 PROCEDURE — 99223 1ST HOSP IP/OBS HIGH 75: CPT

## 2022-04-03 RX ORDER — ALBUTEROL 90 UG/1
2.5 AEROSOL, METERED ORAL EVERY 6 HOURS
Refills: 0 | Status: DISCONTINUED | OUTPATIENT
Start: 2022-04-03 | End: 2022-04-06

## 2022-04-03 RX ORDER — ALBUTEROL 90 UG/1
1 AEROSOL, METERED ORAL EVERY 4 HOURS
Refills: 0 | Status: DISCONTINUED | OUTPATIENT
Start: 2022-04-03 | End: 2022-04-06

## 2022-04-03 RX ORDER — SODIUM CHLORIDE 9 MG/ML
1000 INJECTION, SOLUTION INTRAVENOUS
Refills: 0 | Status: DISCONTINUED | OUTPATIENT
Start: 2022-04-03 | End: 2022-04-06

## 2022-04-03 RX ADMIN — ENOXAPARIN SODIUM 40 MILLIGRAM(S): 100 INJECTION SUBCUTANEOUS at 12:14

## 2022-04-03 RX ADMIN — PANTOPRAZOLE SODIUM 40 MILLIGRAM(S): 20 TABLET, DELAYED RELEASE ORAL at 11:35

## 2022-04-03 RX ADMIN — SODIUM CHLORIDE 200 MILLILITER(S): 9 INJECTION, SOLUTION INTRAVENOUS at 21:40

## 2022-04-03 RX ADMIN — AMLODIPINE BESYLATE 5 MILLIGRAM(S): 2.5 TABLET ORAL at 06:34

## 2022-04-03 RX ADMIN — Medication 81 MILLIGRAM(S): at 11:35

## 2022-04-03 RX ADMIN — Medication 1 MILLIGRAM(S): at 11:35

## 2022-04-03 RX ADMIN — Medication 2: at 12:13

## 2022-04-03 RX ADMIN — MORPHINE SULFATE 1 MILLIGRAM(S): 50 CAPSULE, EXTENDED RELEASE ORAL at 17:09

## 2022-04-03 RX ADMIN — MORPHINE SULFATE 1 MILLIGRAM(S): 50 CAPSULE, EXTENDED RELEASE ORAL at 12:32

## 2022-04-03 RX ADMIN — ATORVASTATIN CALCIUM 40 MILLIGRAM(S): 80 TABLET, FILM COATED ORAL at 21:39

## 2022-04-03 RX ADMIN — SODIUM CHLORIDE 250 MILLILITER(S): 9 INJECTION, SOLUTION INTRAVENOUS at 06:28

## 2022-04-03 RX ADMIN — MORPHINE SULFATE 1 MILLIGRAM(S): 50 CAPSULE, EXTENDED RELEASE ORAL at 00:00

## 2022-04-03 RX ADMIN — MORPHINE SULFATE 1 MILLIGRAM(S): 50 CAPSULE, EXTENDED RELEASE ORAL at 17:14

## 2022-04-03 RX ADMIN — SODIUM CHLORIDE 250 MILLILITER(S): 9 INJECTION, SOLUTION INTRAVENOUS at 04:09

## 2022-04-03 RX ADMIN — TAMSULOSIN HYDROCHLORIDE 0.4 MILLIGRAM(S): 0.4 CAPSULE ORAL at 21:38

## 2022-04-03 RX ADMIN — MORPHINE SULFATE 1 MILLIGRAM(S): 50 CAPSULE, EXTENDED RELEASE ORAL at 07:21

## 2022-04-03 RX ADMIN — MORPHINE SULFATE 1 MILLIGRAM(S): 50 CAPSULE, EXTENDED RELEASE ORAL at 11:35

## 2022-04-03 RX ADMIN — MORPHINE SULFATE 1 MILLIGRAM(S): 50 CAPSULE, EXTENDED RELEASE ORAL at 06:33

## 2022-04-04 ENCOUNTER — NON-APPOINTMENT (OUTPATIENT)
Age: 67
End: 2022-04-04

## 2022-04-04 LAB
A1C WITH ESTIMATED AVERAGE GLUCOSE RESULT: 7.7 % — HIGH (ref 4–5.6)
ESTIMATED AVERAGE GLUCOSE: 174 MG/DL — HIGH (ref 68–114)

## 2022-04-04 PROCEDURE — 99232 SBSQ HOSP IP/OBS MODERATE 35: CPT

## 2022-04-04 RX ORDER — MORPHINE SULFATE 50 MG/1
2 CAPSULE, EXTENDED RELEASE ORAL ONCE
Refills: 0 | Status: DISCONTINUED | OUTPATIENT
Start: 2022-04-04 | End: 2022-04-04

## 2022-04-04 RX ORDER — MORPHINE SULFATE 50 MG/1
3 CAPSULE, EXTENDED RELEASE ORAL EVERY 6 HOURS
Refills: 0 | Status: DISCONTINUED | OUTPATIENT
Start: 2022-04-04 | End: 2022-04-04

## 2022-04-04 RX ORDER — MORPHINE SULFATE 50 MG/1
1 CAPSULE, EXTENDED RELEASE ORAL EVERY 6 HOURS
Refills: 0 | Status: DISCONTINUED | OUTPATIENT
Start: 2022-04-04 | End: 2022-04-05

## 2022-04-04 RX ADMIN — Medication 81 MILLIGRAM(S): at 11:16

## 2022-04-04 RX ADMIN — MORPHINE SULFATE 1 MILLIGRAM(S): 50 CAPSULE, EXTENDED RELEASE ORAL at 06:16

## 2022-04-04 RX ADMIN — Medication 1 MILLIGRAM(S): at 11:17

## 2022-04-04 RX ADMIN — ATORVASTATIN CALCIUM 40 MILLIGRAM(S): 80 TABLET, FILM COATED ORAL at 21:34

## 2022-04-04 RX ADMIN — MORPHINE SULFATE 3 MILLIGRAM(S): 50 CAPSULE, EXTENDED RELEASE ORAL at 11:23

## 2022-04-04 RX ADMIN — MORPHINE SULFATE 1 MILLIGRAM(S): 50 CAPSULE, EXTENDED RELEASE ORAL at 19:08

## 2022-04-04 RX ADMIN — MORPHINE SULFATE 3 MILLIGRAM(S): 50 CAPSULE, EXTENDED RELEASE ORAL at 12:25

## 2022-04-04 RX ADMIN — MORPHINE SULFATE 2 MILLIGRAM(S): 50 CAPSULE, EXTENDED RELEASE ORAL at 18:36

## 2022-04-04 RX ADMIN — MORPHINE SULFATE 1 MILLIGRAM(S): 50 CAPSULE, EXTENDED RELEASE ORAL at 00:30

## 2022-04-04 RX ADMIN — MORPHINE SULFATE 1 MILLIGRAM(S): 50 CAPSULE, EXTENDED RELEASE ORAL at 00:02

## 2022-04-04 RX ADMIN — SODIUM CHLORIDE 150 MILLILITER(S): 9 INJECTION, SOLUTION INTRAVENOUS at 23:48

## 2022-04-04 RX ADMIN — SODIUM CHLORIDE 200 MILLILITER(S): 9 INJECTION, SOLUTION INTRAVENOUS at 03:07

## 2022-04-04 RX ADMIN — TAMSULOSIN HYDROCHLORIDE 0.4 MILLIGRAM(S): 0.4 CAPSULE ORAL at 21:34

## 2022-04-04 RX ADMIN — AMLODIPINE BESYLATE 5 MILLIGRAM(S): 2.5 TABLET ORAL at 06:15

## 2022-04-04 RX ADMIN — PANTOPRAZOLE SODIUM 40 MILLIGRAM(S): 20 TABLET, DELAYED RELEASE ORAL at 11:16

## 2022-04-04 RX ADMIN — ENOXAPARIN SODIUM 40 MILLIGRAM(S): 100 INJECTION SUBCUTANEOUS at 11:16

## 2022-04-04 RX ADMIN — MORPHINE SULFATE 1 MILLIGRAM(S): 50 CAPSULE, EXTENDED RELEASE ORAL at 17:56

## 2022-04-05 LAB
ALBUMIN SERPL ELPH-MCNC: 4.3 G/DL — SIGNIFICANT CHANGE UP (ref 3.5–5.2)
ALP SERPL-CCNC: 80 U/L — SIGNIFICANT CHANGE UP (ref 30–115)
ALT FLD-CCNC: 14 U/L — SIGNIFICANT CHANGE UP (ref 0–41)
ANION GAP SERPL CALC-SCNC: 13 MMOL/L — SIGNIFICANT CHANGE UP (ref 7–14)
AST SERPL-CCNC: 14 U/L — SIGNIFICANT CHANGE UP (ref 0–41)
BASOPHILS # BLD AUTO: 0.03 K/UL — SIGNIFICANT CHANGE UP (ref 0–0.2)
BASOPHILS NFR BLD AUTO: 0.6 % — SIGNIFICANT CHANGE UP (ref 0–1)
BILIRUB SERPL-MCNC: 1.1 MG/DL — SIGNIFICANT CHANGE UP (ref 0.2–1.2)
BUN SERPL-MCNC: 4 MG/DL — LOW (ref 10–20)
CALCIUM SERPL-MCNC: 9.3 MG/DL — SIGNIFICANT CHANGE UP (ref 8.5–10.1)
CHLORIDE SERPL-SCNC: 102 MMOL/L — SIGNIFICANT CHANGE UP (ref 98–110)
CO2 SERPL-SCNC: 23 MMOL/L — SIGNIFICANT CHANGE UP (ref 17–32)
CREAT SERPL-MCNC: 0.6 MG/DL — LOW (ref 0.7–1.5)
EGFR: 106 ML/MIN/1.73M2 — SIGNIFICANT CHANGE UP
EOSINOPHIL # BLD AUTO: 0.18 K/UL — SIGNIFICANT CHANGE UP (ref 0–0.7)
EOSINOPHIL NFR BLD AUTO: 3.8 % — SIGNIFICANT CHANGE UP (ref 0–8)
GLUCOSE SERPL-MCNC: 128 MG/DL — HIGH (ref 70–99)
HCT VFR BLD CALC: 37.5 % — LOW (ref 42–52)
HGB BLD-MCNC: 12.8 G/DL — LOW (ref 14–18)
IGG SERPL-MCNC: 1245 MG/DL — SIGNIFICANT CHANGE UP (ref 603–1613)
IGG1 SER-MCNC: 786 MG/DL — SIGNIFICANT CHANGE UP (ref 248–810)
IGG2 SER-MCNC: 214 MG/DL — SIGNIFICANT CHANGE UP (ref 130–555)
IGG3 SER-MCNC: 121 MG/DL — HIGH (ref 15–102)
IGG4 SER-MCNC: 21 MG/DL — SIGNIFICANT CHANGE UP (ref 2–96)
IMM GRANULOCYTES NFR BLD AUTO: 0.4 % — HIGH (ref 0.1–0.3)
LYMPHOCYTES # BLD AUTO: 1.32 K/UL — SIGNIFICANT CHANGE UP (ref 1.2–3.4)
LYMPHOCYTES # BLD AUTO: 27.8 % — SIGNIFICANT CHANGE UP (ref 20.5–51.1)
MAGNESIUM SERPL-MCNC: 1.8 MG/DL — SIGNIFICANT CHANGE UP (ref 1.8–2.4)
MCHC RBC-ENTMCNC: 29.3 PG — SIGNIFICANT CHANGE UP (ref 27–31)
MCHC RBC-ENTMCNC: 34.1 G/DL — SIGNIFICANT CHANGE UP (ref 32–37)
MCV RBC AUTO: 85.8 FL — SIGNIFICANT CHANGE UP (ref 80–94)
MONOCYTES # BLD AUTO: 0.53 K/UL — SIGNIFICANT CHANGE UP (ref 0.1–0.6)
MONOCYTES NFR BLD AUTO: 11.2 % — HIGH (ref 1.7–9.3)
NEUTROPHILS # BLD AUTO: 2.67 K/UL — SIGNIFICANT CHANGE UP (ref 1.4–6.5)
NEUTROPHILS NFR BLD AUTO: 56.2 % — SIGNIFICANT CHANGE UP (ref 42.2–75.2)
NRBC # BLD: 0 /100 WBCS — SIGNIFICANT CHANGE UP (ref 0–0)
PLATELET # BLD AUTO: 214 K/UL — SIGNIFICANT CHANGE UP (ref 130–400)
POTASSIUM SERPL-MCNC: 3.7 MMOL/L — SIGNIFICANT CHANGE UP (ref 3.5–5)
POTASSIUM SERPL-SCNC: 3.7 MMOL/L — SIGNIFICANT CHANGE UP (ref 3.5–5)
PROT SERPL-MCNC: 6.5 G/DL — SIGNIFICANT CHANGE UP (ref 6–8)
RBC # BLD: 4.37 M/UL — LOW (ref 4.7–6.1)
RBC # FLD: 12.1 % — SIGNIFICANT CHANGE UP (ref 11.5–14.5)
SODIUM SERPL-SCNC: 138 MMOL/L — SIGNIFICANT CHANGE UP (ref 135–146)
WBC # BLD: 4.75 K/UL — LOW (ref 4.8–10.8)
WBC # FLD AUTO: 4.75 K/UL — LOW (ref 4.8–10.8)

## 2022-04-05 PROCEDURE — 99233 SBSQ HOSP IP/OBS HIGH 50: CPT

## 2022-04-05 RX ORDER — MORPHINE SULFATE 50 MG/1
1 CAPSULE, EXTENDED RELEASE ORAL ONCE
Refills: 0 | Status: DISCONTINUED | OUTPATIENT
Start: 2022-04-05 | End: 2022-04-05

## 2022-04-05 RX ORDER — MORPHINE SULFATE 50 MG/1
2 CAPSULE, EXTENDED RELEASE ORAL ONCE
Refills: 0 | Status: DISCONTINUED | OUTPATIENT
Start: 2022-04-05 | End: 2022-04-05

## 2022-04-05 RX ORDER — HYDROMORPHONE HYDROCHLORIDE 2 MG/ML
2 INJECTION INTRAMUSCULAR; INTRAVENOUS; SUBCUTANEOUS ONCE
Refills: 0 | Status: DISCONTINUED | OUTPATIENT
Start: 2022-04-05 | End: 2022-04-06

## 2022-04-05 RX ORDER — OXYCODONE HYDROCHLORIDE 5 MG/1
5 TABLET ORAL EVERY 6 HOURS
Refills: 0 | Status: DISCONTINUED | OUTPATIENT
Start: 2022-04-05 | End: 2022-04-06

## 2022-04-05 RX ADMIN — MORPHINE SULFATE 1 MILLIGRAM(S): 50 CAPSULE, EXTENDED RELEASE ORAL at 01:48

## 2022-04-05 RX ADMIN — MORPHINE SULFATE 2 MILLIGRAM(S): 50 CAPSULE, EXTENDED RELEASE ORAL at 15:17

## 2022-04-05 RX ADMIN — SODIUM CHLORIDE 100 MILLILITER(S): 9 INJECTION, SOLUTION INTRAVENOUS at 23:03

## 2022-04-05 RX ADMIN — SODIUM CHLORIDE 150 MILLILITER(S): 9 INJECTION, SOLUTION INTRAVENOUS at 06:43

## 2022-04-05 RX ADMIN — PANTOPRAZOLE SODIUM 40 MILLIGRAM(S): 20 TABLET, DELAYED RELEASE ORAL at 10:47

## 2022-04-05 RX ADMIN — SODIUM CHLORIDE 100 MILLILITER(S): 9 INJECTION, SOLUTION INTRAVENOUS at 12:47

## 2022-04-05 RX ADMIN — ENOXAPARIN SODIUM 40 MILLIGRAM(S): 100 INJECTION SUBCUTANEOUS at 10:46

## 2022-04-05 RX ADMIN — TAMSULOSIN HYDROCHLORIDE 0.4 MILLIGRAM(S): 0.4 CAPSULE ORAL at 22:58

## 2022-04-05 RX ADMIN — MORPHINE SULFATE 2 MILLIGRAM(S): 50 CAPSULE, EXTENDED RELEASE ORAL at 10:46

## 2022-04-05 RX ADMIN — MORPHINE SULFATE 1 MILLIGRAM(S): 50 CAPSULE, EXTENDED RELEASE ORAL at 01:33

## 2022-04-05 RX ADMIN — Medication 81 MILLIGRAM(S): at 10:46

## 2022-04-05 RX ADMIN — MORPHINE SULFATE 2 MILLIGRAM(S): 50 CAPSULE, EXTENDED RELEASE ORAL at 22:56

## 2022-04-05 RX ADMIN — MORPHINE SULFATE 1 MILLIGRAM(S): 50 CAPSULE, EXTENDED RELEASE ORAL at 03:00

## 2022-04-05 RX ADMIN — ATORVASTATIN CALCIUM 40 MILLIGRAM(S): 80 TABLET, FILM COATED ORAL at 22:58

## 2022-04-05 RX ADMIN — MORPHINE SULFATE 1 MILLIGRAM(S): 50 CAPSULE, EXTENDED RELEASE ORAL at 02:45

## 2022-04-05 RX ADMIN — AMLODIPINE BESYLATE 5 MILLIGRAM(S): 2.5 TABLET ORAL at 05:06

## 2022-04-05 RX ADMIN — Medication 1 MILLIGRAM(S): at 10:46

## 2022-04-05 RX ADMIN — MORPHINE SULFATE 2 MILLIGRAM(S): 50 CAPSULE, EXTENDED RELEASE ORAL at 16:22

## 2022-04-05 RX ADMIN — MORPHINE SULFATE 2 MILLIGRAM(S): 50 CAPSULE, EXTENDED RELEASE ORAL at 23:56

## 2022-04-06 ENCOUNTER — TRANSCRIPTION ENCOUNTER (OUTPATIENT)
Age: 67
End: 2022-04-06

## 2022-04-06 VITALS — TEMPERATURE: 97 F

## 2022-04-06 LAB
ALBUMIN SERPL ELPH-MCNC: 4.3 G/DL — SIGNIFICANT CHANGE UP (ref 3.5–5.2)
ALP SERPL-CCNC: 88 U/L — SIGNIFICANT CHANGE UP (ref 30–115)
ALT FLD-CCNC: 15 U/L — SIGNIFICANT CHANGE UP (ref 0–41)
ANION GAP SERPL CALC-SCNC: 13 MMOL/L — SIGNIFICANT CHANGE UP (ref 7–14)
AST SERPL-CCNC: 14 U/L — SIGNIFICANT CHANGE UP (ref 0–41)
BASOPHILS # BLD AUTO: 0.04 K/UL — SIGNIFICANT CHANGE UP (ref 0–0.2)
BASOPHILS NFR BLD AUTO: 0.7 % — SIGNIFICANT CHANGE UP (ref 0–1)
BILIRUB SERPL-MCNC: 1.3 MG/DL — HIGH (ref 0.2–1.2)
BUN SERPL-MCNC: 5 MG/DL — LOW (ref 10–20)
CALCIUM SERPL-MCNC: 9.2 MG/DL — SIGNIFICANT CHANGE UP (ref 8.5–10.1)
CHLORIDE SERPL-SCNC: 100 MMOL/L — SIGNIFICANT CHANGE UP (ref 98–110)
CO2 SERPL-SCNC: 24 MMOL/L — SIGNIFICANT CHANGE UP (ref 17–32)
CREAT SERPL-MCNC: 0.6 MG/DL — LOW (ref 0.7–1.5)
EGFR: 106 ML/MIN/1.73M2 — SIGNIFICANT CHANGE UP
EOSINOPHIL # BLD AUTO: 0.11 K/UL — SIGNIFICANT CHANGE UP (ref 0–0.7)
EOSINOPHIL NFR BLD AUTO: 2.1 % — SIGNIFICANT CHANGE UP (ref 0–8)
GLUCOSE SERPL-MCNC: 126 MG/DL — HIGH (ref 70–99)
HCT VFR BLD CALC: 36.9 % — LOW (ref 42–52)
HGB BLD-MCNC: 12.9 G/DL — LOW (ref 14–18)
IMM GRANULOCYTES NFR BLD AUTO: 0.2 % — SIGNIFICANT CHANGE UP (ref 0.1–0.3)
LYMPHOCYTES # BLD AUTO: 1.09 K/UL — LOW (ref 1.2–3.4)
LYMPHOCYTES # BLD AUTO: 20.4 % — LOW (ref 20.5–51.1)
MAGNESIUM SERPL-MCNC: 2.1 MG/DL — SIGNIFICANT CHANGE UP (ref 1.8–2.4)
MCHC RBC-ENTMCNC: 29.4 PG — SIGNIFICANT CHANGE UP (ref 27–31)
MCHC RBC-ENTMCNC: 35 G/DL — SIGNIFICANT CHANGE UP (ref 32–37)
MCV RBC AUTO: 84.1 FL — SIGNIFICANT CHANGE UP (ref 80–94)
MONOCYTES # BLD AUTO: 0.7 K/UL — HIGH (ref 0.1–0.6)
MONOCYTES NFR BLD AUTO: 13.1 % — HIGH (ref 1.7–9.3)
NEUTROPHILS # BLD AUTO: 3.39 K/UL — SIGNIFICANT CHANGE UP (ref 1.4–6.5)
NEUTROPHILS NFR BLD AUTO: 63.5 % — SIGNIFICANT CHANGE UP (ref 42.2–75.2)
NRBC # BLD: 0 /100 WBCS — SIGNIFICANT CHANGE UP (ref 0–0)
PLATELET # BLD AUTO: 218 K/UL — SIGNIFICANT CHANGE UP (ref 130–400)
POTASSIUM SERPL-MCNC: 4 MMOL/L — SIGNIFICANT CHANGE UP (ref 3.5–5)
POTASSIUM SERPL-SCNC: 4 MMOL/L — SIGNIFICANT CHANGE UP (ref 3.5–5)
PROT SERPL-MCNC: 6.6 G/DL — SIGNIFICANT CHANGE UP (ref 6–8)
RBC # BLD: 4.39 M/UL — LOW (ref 4.7–6.1)
RBC # FLD: 12.2 % — SIGNIFICANT CHANGE UP (ref 11.5–14.5)
SODIUM SERPL-SCNC: 137 MMOL/L — SIGNIFICANT CHANGE UP (ref 135–146)
WBC # BLD: 5.34 K/UL — SIGNIFICANT CHANGE UP (ref 4.8–10.8)
WBC # FLD AUTO: 5.34 K/UL — SIGNIFICANT CHANGE UP (ref 4.8–10.8)

## 2022-04-06 PROCEDURE — 99239 HOSP IP/OBS DSCHRG MGMT >30: CPT

## 2022-04-06 RX ORDER — POLYETHYLENE GLYCOL 3350 17 G/17G
17 POWDER, FOR SOLUTION ORAL
Qty: 85 | Refills: 0
Start: 2022-04-06 | End: 2022-04-10

## 2022-04-06 RX ORDER — LACTULOSE 10 G/15ML
20 SOLUTION ORAL
Refills: 0 | Status: DISCONTINUED | OUTPATIENT
Start: 2022-04-06 | End: 2022-04-06

## 2022-04-06 RX ORDER — ALBUTEROL 90 UG/1
1 AEROSOL, METERED ORAL
Qty: 0 | Refills: 0 | DISCHARGE
Start: 2022-04-06

## 2022-04-06 RX ADMIN — AMLODIPINE BESYLATE 5 MILLIGRAM(S): 2.5 TABLET ORAL at 05:05

## 2022-04-06 RX ADMIN — CHLORHEXIDINE GLUCONATE 1 APPLICATION(S): 213 SOLUTION TOPICAL at 05:06

## 2022-04-06 NOTE — CHART NOTE - NSCHARTNOTEFT_GEN_A_CORE
Called by RN and patient at 9-10 pm about patient's persistent abdominal pain due to acute pancreatitis. Pt previously on IV morphine transitioned to PO oxycodone for pain management on PO meds prior to DC. Had a long discussion with the patient (>30 minutes) about pain management and the different modalities we use, and the importance of transitioning to PO pain meds in order to facilitate a safe discharge. Patient was requesting IV morphine adamantly and endorsed that the oxycodone didn't relieve his pain and that he was still having trouble tolerating PO intake. I discussed with him the difficulty of discharging patients who are still requiring IV pain management and he expressed understanding that this might possibly delay his discharge if we cannot monitor his response on PO meds. He requested one final dose of IV morphine for his intractable pain, ensuring that he will transition to PO meds starting 4/6, and acknowledged that this may possibly delay his discharge.   Given 2mg IV morphine at night with resolution of pain and no further issues.   Keep on PO pain meds today and advance diet as tolerated.

## 2022-04-06 NOTE — DISCHARGE NOTE PROVIDER - NSDCMRMEDTOKEN_GEN_ALL_CORE_FT
acetaminophen 325 mg oral tablet: 2 tab(s) orally every 4 hours, As needed, Mild Pain (1 - 3)  amLODIPine 5 mg oral tablet: 1 tab(s) orally once a day  aspirin 81 mg oral tablet: 1 tab(s) orally once a day  atorvastatin 40 mg oral tablet: 1 tab(s) orally once a day (at bedtime)  Flomax 0.4 mg oral capsule: 1 cap(s) orally once a day (at bedtime)  metFORMIN 500 mg oral tablet: 2 tab(s) orally 2 times a day   acetaminophen 325 mg oral tablet: 2 tab(s) orally every 4 hours, As needed, Mild Pain (1 - 3)  albuterol 2.5 mg/3 mL (0.083%) inhalation solution: 1 spray(s) inhaled 2 times a day  albuterol 90 mcg/inh inhalation aerosol: 1 puff(s) inhaled every 4 hours, As needed, Wheezing  amLODIPine 5 mg oral tablet: 1 tab(s) orally once a day  aspirin 81 mg oral tablet: 1 tab(s) orally once a day  atorvastatin 40 mg oral tablet: 1 tab(s) orally once a day (at bedtime)  Flomax 0.4 mg oral capsule: 1 cap(s) orally once a day (at bedtime)  metFORMIN 500 mg oral tablet: 2 tab(s) orally 2 times a day   acetaminophen 325 mg oral tablet: 2 tab(s) orally every 4 hours, As needed, Mild Pain (1 - 3)  albuterol 2.5 mg/3 mL (0.083%) inhalation solution: 1 spray(s) inhaled 2 times a day  albuterol 90 mcg/inh inhalation aerosol: 1 puff(s) inhaled every 4 hours, As needed, Wheezing  amLODIPine 5 mg oral tablet: 1 tab(s) orally once a day  aspirin 81 mg oral tablet: 1 tab(s) orally once a day  atorvastatin 40 mg oral tablet: 1 tab(s) orally once a day (at bedtime)  Flomax 0.4 mg oral capsule: 1 cap(s) orally once a day (at bedtime)  metFORMIN 500 mg oral tablet: 2 tab(s) orally 2 times a day  MiraLax oral powder for reconstitution: 17 gram(s) orally once a day, As Needed -for constipation

## 2022-04-06 NOTE — DISCHARGE NOTE NURSING/CASE MANAGEMENT/SOCIAL WORK - NSDCPEFALRISK_GEN_ALL_CORE
For information on Fall & Injury Prevention, visit: [email or web address] OR  [email or web address] OR  https://www.cdc.gov/steadi/patient.html

## 2022-04-06 NOTE — PROGRESS NOTE ADULT - SUBJECTIVE AND OBJECTIVE BOX
Patient is a 66y old  Male who presents with a chief complaint of abdominal pain (05 Apr 2022 11:16)    HPI:  66 year old with a PMHx of HTN, DLD, Diabetes mellitus on Metformin,  BPH, GERD, prostate CA s/p seeding on remission, recurrent pancreatitis last admission in 10/2021 presented today for abdominal pain with distension that started 2 days ago diffuse, not radiating, worse with food, not alleviated by anything. pain was getting worse so he presented to the hospital.  pain is similar to pancreatitis pain- denies excessive drinking ( had a beer and a shot 2 weeks ago), no  n/v, fever, chills, back pain sob.    pt smokes marijuana everyday, occasional alcohol ex smoker : 30 py QUIT 15years ago.     in the ED,pt's VS T(C): 36.7 (04-02-22 @ 04:07), Max: 36.7 (04-02-22 @ 04:07)  HR: 72 (04-02-22 @ 04:07) (72 - 72)  BP: 123/77 (04-02-22 @ 04:07) (123/77 - 123/77)  RR: 18 (04-02-22 @ 04:07) (18 - 18)  SpO2: 97% (04-02-22 @ 04:07) (97% - 97%), labs done showed Lipase, Serum: 92.  CT Abdomen and Pelvis w/ Oral Cont and w/ IV Cont (04.02.22 @ 07:51) >  1. Findings compatible with acute interstitial edematous pancreatitis,   without acute fluid collection.  2. Hepatic steatosis.  pt received morphine, IVF.     Attd: pt known to have pancreatitis on/off; seems related to what he eats and drinks; went to a party a couple of days ago and ate a lot, including fried (fried chicken) and greasy (ox tails) foods. He also had some alcohol, but is NOT known to be an alcoholic. He is feeling better and would like to start w/ liquid diet today; (02 Apr 2022 11:38)    PAST MEDICAL & SURGICAL HISTORY:  Diabetes mellitus, type II  Hypertension  Prostate cancer  s/p history of seeding  Hyperlipidemia  Osteoarthritis  Pancreatitis-recurrent  S/P hip replacement, left  9/2015    patient seen and examined independently on morning rounds for the first time today, chart reviewed and discussed with the medicine resident and on interdisciplinary rounds    tolerating clear liquid diet with improving pain-will try to advance to soft diet for lunch- patient is upset/agitated and questioning why he did not have pruritis or metallic taste after receiving morphine 2 mg iv injection this morning- he is being aggressive-  advised him we are hoping to taper him off iv morphine for possible dc home tomorrow    Vital Signs Last 24 Hrs  T(C): 36 (05 Apr 2022 04:58), Max: 36 (05 Apr 2022 04:58)  T(F): 96.8 (05 Apr 2022 04:58), Max: 96.8 (05 Apr 2022 04:58)  HR: 63 (05 Apr 2022 04:58) (63 - 72)  BP: 132/85 (05 Apr 2022 04:58) (124/66 - 138/94)  BP(mean): --  RR: 18 (05 Apr 2022 04:58) (18 - 18)  SpO2: 98% (05 Apr 2022 04:58) (98% - 98%)             PE:  GEN-NAD, AAOx3, ambulating around room- appears very comfortable and not in pain  PULM- Clear to auscultation bilaterally, fair air entry  CVS- +s1/s2 RRR no murmurs  GI- soft mild ttp LUQ- no rebound- no guarding +bs  EXT- no edema               12.8   4.75  )-----------( 214      ( 05 Apr 2022 06:50 )             37.5     04-05    138  |  102  |  4<L>  ----------------------------<  128<H>  3.7   |  23  |  0.6<L>    Ca    9.3      05 Apr 2022 06:50  Mg     1.8     04-05    TPro  6.5  /  Alb  4.3  /  TBili  1.1  /  DBili  x   /  AST  14  /  ALT  14  /  AlkPhos  80  04-05              MEDICATIONS  (STANDING):  amLODIPine   Tablet 5 milliGRAM(s) Oral daily  aspirin enteric coated 81 milliGRAM(s) Oral daily  atorvastatin 40 milliGRAM(s) Oral at bedtime  budesonide 160 MICROgram(s)/formoterol 4.5 MICROgram(s) Inhaler 2 Puff(s) Inhalation two times a day  chlorhexidine 4% Liquid 1 Application(s) Topical <User Schedule>  dextrose 5%. 1000 milliLiter(s) (100 mL/Hr) IV Continuous <Continuous>  dextrose 5%. 1000 milliLiter(s) (50 mL/Hr) IV Continuous <Continuous>  dextrose 50% Injectable 25 Gram(s) IV Push once  dextrose 50% Injectable 12.5 Gram(s) IV Push once  dextrose 50% Injectable 25 Gram(s) IV Push once  enoxaparin Injectable 40 milliGRAM(s) SubCutaneous every 24 hours  folic acid 1 milliGRAM(s) Oral daily  glucagon  Injectable 1 milliGRAM(s) IntraMuscular once  insulin lispro (ADMELOG) corrective regimen sliding scale   SubCutaneous three times a day before meals  lactated ringers. 1000 milliLiter(s) (100 mL/Hr) IV Continuous <Continuous>  morphine  - Injectable 2 milliGRAM(s) IV Push once  pantoprazole  Injectable 40 milliGRAM(s) IV Push daily  tamsulosin 0.4 milliGRAM(s) Oral at bedtime  tiotropium 18 MICROgram(s) Capsule 1 Capsule(s) Inhalation daily  
  WILI ALFARO  66y  Male  ***My note supersedes ALL resident notes that I sign.  My corrections for their notes are in my note.***    I can be reached directly on [phone]. My office number is [phone]. My personal cell number is [phone].    INTERVAL EVENTS: Here for f/u of abd pain. Pt said he tried chicken last night, but he developed abd pain. Pt says the chicken was brought to him -> not sure how that happened because diet was either NPO or full liquids, never solids. So, not sure if he is telling the whole story or not. Anyway, he feels he needs to stay w/ full liquid diet. Pt says pain is still present, but improving.    T(F): 96.4 (04-04-22 @ 13:50), Max: 98.4 (04-03-22 @ 19:45)  HR: 72 (04-04-22 @ 13:50) (62 - 72)  BP: 138/94 (04-04-22 @ 13:50) (126/65 - 138/94)  RR: 18 (04-04-22 @ 13:50) (18 - 20)  SpO2: 98% (04-04-22 @ 13:50) (98% - 98%)    Gen: NAD; looks very comfortable  HEENT: PERRL, EOMI, mouth clr, nose clr  Neck: no nodes, no JVD, thyroid nl  lungs: clr  hrt: s1 s2 rrr no murmur  abd: soft, ND, mild epi tenderness; no HS megaly  ext: no edema, no c/c  neuro: aa ox3, cn intact, can move all 4 ext    LABS:                      13.2    (    85.8   4.86  )-----------( ---------      221      ( 03 Apr 2022 07:30 )             38.7    (    12.2     PT/INR - ( 03 Apr 2022 07:30 )   PT: 13.90 sec;   INR: 1.21 ratio    PTT - ( 03 Apr 2022 07:30 )  PTT: 33.6 sec    CAPILLARY BLOOD GLUCOSE  POCT Blood Glucose.: 133 (04-04-22 @ 11:27)  POCT Blood Glucose.: 153 (04-04-22 @ 07:45)  POCT Blood Glucose.: 240 (04-03-22 @ 22:14)  POCT Blood Glucose.: 121 (04-03-22 @ 17:09)  POCT Blood Glucose.: 152 (04-03-22 @ 11:54)  POCT Blood Glucose.: 121 (04-03-22 @ 08:13)  POCT Blood Glucose.: 122 (04-02-22 @ 16:10)    RADIOLOGY & ADDITIONAL TESTS:      MEDICATIONS:    ALBUTerol    0.083% 2.5 milliGRAM(s) Nebulizer every 6 hours PRN  ALBUTerol    90 MICROgram(s) HFA Inhaler 1 Puff(s) Inhalation every 4 hours PRN  amLODIPine   Tablet 5 milliGRAM(s) Oral daily  aspirin enteric coated 81 milliGRAM(s) Oral daily  atorvastatin 40 milliGRAM(s) Oral at bedtime  budesonide 160 MICROgram(s)/formoterol 4.5 MICROgram(s) Inhaler 2 Puff(s) Inhalation two times a day  chlorhexidine 4% Liquid 1 Application(s) Topical <User Schedule>  enoxaparin Injectable 40 milliGRAM(s) SubCutaneous every 24 hours  folic acid 1 milliGRAM(s) Oral daily  insulin lispro (ADMELOG) corrective regimen sliding scale   SubCutaneous three times a day before meals  lactated ringers. 1000 milliLiter(s) IV Continuous <Continuous>  morphine  - Injectable 1 milliGRAM(s) IV Push every 6 hours PRN  pantoprazole  Injectable 40 milliGRAM(s) IV Push daily  tamsulosin 0.4 milliGRAM(s) Oral at bedtime  tiotropium 18 MICROgram(s) Capsule 1 Capsule(s) Inhalation daily  
SUBJECTIVE:    Patient is a 66y old Male who presents with a chief complaint of abdominal pain (04 Apr 2022 16:22)    Currently admitted to medicine with the primary diagnosis of Pancreatitis, acute       Today is hospital day 3d. This morning he is resting comfortably in bed and reports no new issues or overnight events.   Got morphine iv extra dose overnight    PAST MEDICAL & SURGICAL HISTORY  Diabetes mellitus, type II    Hypertension    Prostate cancer  s/p history of seeding    Hyperlipidemia    Osteoarthritis    Pancreatitis  recurrent    Prostate cancer  s/p history of seeding    History of appendectomy    S/P hip replacement, left  9/2015      SOCIAL HISTORY:  Negative for smoking/alcohol/drug use.     ALLERGIES:  No Known Allergies    MEDICATIONS:  STANDING MEDICATIONS  amLODIPine   Tablet 5 milliGRAM(s) Oral daily  aspirin enteric coated 81 milliGRAM(s) Oral daily  atorvastatin 40 milliGRAM(s) Oral at bedtime  budesonide 160 MICROgram(s)/formoterol 4.5 MICROgram(s) Inhaler 2 Puff(s) Inhalation two times a day  chlorhexidine 4% Liquid 1 Application(s) Topical <User Schedule>  dextrose 5%. 1000 milliLiter(s) IV Continuous <Continuous>  dextrose 5%. 1000 milliLiter(s) IV Continuous <Continuous>  dextrose 50% Injectable 25 Gram(s) IV Push once  dextrose 50% Injectable 12.5 Gram(s) IV Push once  dextrose 50% Injectable 25 Gram(s) IV Push once  enoxaparin Injectable 40 milliGRAM(s) SubCutaneous every 24 hours  folic acid 1 milliGRAM(s) Oral daily  glucagon  Injectable 1 milliGRAM(s) IntraMuscular once  insulin lispro (ADMELOG) corrective regimen sliding scale   SubCutaneous three times a day before meals  lactated ringers. 1000 milliLiter(s) IV Continuous <Continuous>  pantoprazole  Injectable 40 milliGRAM(s) IV Push daily  tamsulosin 0.4 milliGRAM(s) Oral at bedtime  tiotropium 18 MICROgram(s) Capsule 1 Capsule(s) Inhalation daily    PRN MEDICATIONS  ALBUTerol    0.083% 2.5 milliGRAM(s) Nebulizer every 6 hours PRN  ALBUTerol    90 MICROgram(s) HFA Inhaler 1 Puff(s) Inhalation every 4 hours PRN  dextrose Oral Gel 15 Gram(s) Oral once PRN  oxyCODONE    IR 5 milliGRAM(s) Oral every 6 hours PRN    VITALS:   T(F): 96.8  HR: 63  BP: 132/85  RR: 18  SpO2: 98%    LABS:                        12.8   4.75  )-----------( 214      ( 05 Apr 2022 06:50 )             37.5     04-05    138  |  102  |  4<L>  ----------------------------<  128<H>  3.7   |  23  |  0.6<L>    Ca    9.3      05 Apr 2022 06:50  Mg     1.8     04-05    TPro  6.5  /  Alb  4.3  /  TBili  1.1  /  DBili  x   /  AST  14  /  ALT  14  /  AlkPhos  80  04-05                  RADIOLOGY:    PHYSICAL EXAM:  GEN: No acute distress  LUNGS: Clear to auscultation bilaterally   HEART: Regular  ABD: Soft, non-tender, non-distended.  EXT: NC/NC/NE/2+PP/LORENZANA/Skin Intact.   NEURO: AAOX3    Intravenous access:   NG tube:   Davey Catheter:       
SUBJECTIVE:    Patient is a 66y old Male who presents with a chief complaint of abdominal pain (02 Apr 2022 11:38)    Currently admitted to medicine with the primary diagnosis of Pancreatitis, acute       Today is hospital day 2d. This morning he is resting comfortably in bed and reports no new issues or overnight events.     PAST MEDICAL & SURGICAL HISTORY  Diabetes mellitus, type II    Hypertension    Prostate cancer  s/p history of seeding    Hyperlipidemia    Osteoarthritis    Pancreatitis  recurrent    Prostate cancer  s/p history of seeding    History of appendectomy    S/P hip replacement, left  9/2015      SOCIAL HISTORY:  Negative for smoking/alcohol/drug use.     ALLERGIES:  No Known Allergies    MEDICATIONS:  STANDING MEDICATIONS  amLODIPine   Tablet 5 milliGRAM(s) Oral daily  aspirin enteric coated 81 milliGRAM(s) Oral daily  atorvastatin 40 milliGRAM(s) Oral at bedtime  budesonide 160 MICROgram(s)/formoterol 4.5 MICROgram(s) Inhaler 2 Puff(s) Inhalation two times a day  chlorhexidine 4% Liquid 1 Application(s) Topical <User Schedule>  dextrose 5%. 1000 milliLiter(s) IV Continuous <Continuous>  dextrose 5%. 1000 milliLiter(s) IV Continuous <Continuous>  dextrose 50% Injectable 25 Gram(s) IV Push once  dextrose 50% Injectable 12.5 Gram(s) IV Push once  dextrose 50% Injectable 25 Gram(s) IV Push once  enoxaparin Injectable 40 milliGRAM(s) SubCutaneous every 24 hours  folic acid 1 milliGRAM(s) Oral daily  glucagon  Injectable 1 milliGRAM(s) IntraMuscular once  insulin lispro (ADMELOG) corrective regimen sliding scale   SubCutaneous three times a day before meals  lactated ringers. 1000 milliLiter(s) IV Continuous <Continuous>  pantoprazole  Injectable 40 milliGRAM(s) IV Push daily  tamsulosin 0.4 milliGRAM(s) Oral at bedtime  tiotropium 18 MICROgram(s) Capsule 1 Capsule(s) Inhalation daily    PRN MEDICATIONS  ALBUTerol    0.083% 2.5 milliGRAM(s) Nebulizer every 6 hours PRN  ALBUTerol    90 MICROgram(s) HFA Inhaler 1 Puff(s) Inhalation every 4 hours PRN  dextrose Oral Gel 15 Gram(s) Oral once PRN  morphine  - Injectable 1 milliGRAM(s) IV Push every 6 hours PRN    VITALS:   T(F): 96.4  HR: 72  BP: 138/94  RR: 18  SpO2: 98%    LABS:                        13.2   4.86  )-----------( 221      ( 03 Apr 2022 07:30 )             38.7     04-03    137  |  103  |  7<L>  ----------------------------<  112<H>  4.0   |  23  |  0.6<L>    Ca    9.5      03 Apr 2022 07:30  Mg     2.0     04-03    TPro  6.7  /  Alb  4.1  /  TBili  1.3<H>  /  DBili  0.2  /  AST  14  /  ALT  15  /  AlkPhos  87  04-03    PT/INR - ( 03 Apr 2022 07:30 )   PT: 13.90 sec;   INR: 1.21 ratio         PTT - ( 03 Apr 2022 07:30 )  PTT:33.6 sec              RADIOLOGY:  CT Abdomen and Pelvis w/ Oral Cont and w/ IV Cont (04.02.22 @ 07:51) >  1. Findings compatible with acute interstitial edematous pancreatitis,   without acute fluid collection.  2. Hepatic steatosis.    PHYSICAL EXAM:  GEN: No acute distress  LUNGS: Clear to auscultation bilaterally   HEART: Regular  ABD: Soft, non-tender, non-distended.  EXT: NC/NC/NE/2+PP/LORENZANA/Skin Intact.   NEURO: AAOX3    Intravenous access:   NG tube:   Davey Catheter:       
  WILI ALFARO  66y  Male  ***My note supersedes ALL resident notes that I sign.  My corrections for their notes are in my note.***    I can be reached directly on [employer] [id number]. My office number is [phone]. My personal cell number is [phone].    INTERVAL EVENTS: Here for f/u of pancretitis. Pt feels well and wants to go home. Pt eating fine.    T(F): 97.2 (04-06-22 @ 06:29), Max: 97.3 (04-05-22 @ 19:50)  HR: 60 (04-06-22 @ 05:21) (60 - 67)  BP: 120/74 (04-06-22 @ 05:21) (120/74 - 124/67)  RR: 16 (04-06-22 @ 05:21) (16 - 18)  SpO2: --    Gen: NAD  HEENT: PERRL, EOMI, mouth clr, nose clr  Neck: no nodes, no JVD, thyroid nl  lungs: clr  hrt: s1 s2 rrr no murmur  abd: soft, NT/ND, no HS megaly  ext: no edema, no c/c  neuro: aa ox3, cn intact, can move all 4 ext    LABS:                      12.9    (    84.1   5.34  )-----------( ---------      218      ( 06 Apr 2022 04:30 )             36.9    (    12.2     137   (   100   (   126      04-06-22 @ 04:30  ----------------------               4.0   (   24   (   5                             -----                        0.6  Ca  9.2   Mg  2.1    P   --     LFT  6.6  (  1.3  (  14       04-06-22 @ 04:30  -------------------------  4.3  (  88  (  15    CAPILLARY BLOOD GLUCOSE  POCT Blood Glucose.: 125 (04-06-22 @ 07:53)  POCT Blood Glucose.: 137 (04-05-22 @ 16:56)  POCT Blood Glucose.: 123 (04-05-22 @ 11:23)  POCT Blood Glucose.: 138 (04-05-22 @ 07:53)  POCT Blood Glucose.: 172 (04-04-22 @ 21:22)  POCT Blood Glucose.: 132 (04-04-22 @ 16:34)  POCT Blood Glucose.: 133 (04-04-22 @ 11:27)  POCT Blood Glucose.: 153 (04-04-22 @ 07:45)    RADIOLOGY & ADDITIONAL TESTS:    MEDICATIONS:    ALBUTerol    0.083% 2.5 milliGRAM(s) Nebulizer every 6 hours PRN  ALBUTerol    90 MICROgram(s) HFA Inhaler 1 Puff(s) Inhalation every 4 hours PRN  amLODIPine   Tablet 5 milliGRAM(s) Oral daily  aspirin enteric coated 81 milliGRAM(s) Oral daily  atorvastatin 40 milliGRAM(s) Oral at bedtime  budesonide 160 MICROgram(s)/formoterol 4.5 MICROgram(s) Inhaler 2 Puff(s) Inhalation two times a day  chlorhexidine 4% Liquid 1 Application(s) Topical <User Schedule>  enoxaparin Injectable 40 milliGRAM(s) SubCutaneous every 24 hours  folic acid 1 milliGRAM(s) Oral daily  HYDROmorphone   Tablet 2 milliGRAM(s) Oral once PRN  insulin lispro (ADMELOG) corrective regimen sliding scale   SubCutaneous three times a day before meals  lactulose Syrup 20 Gram(s) Oral every 3 hours  oxyCODONE    IR 5 milliGRAM(s) Oral every 6 hours PRN  pantoprazole  Injectable 40 milliGRAM(s) IV Push daily  tamsulosin 0.4 milliGRAM(s) Oral at bedtime  tiotropium 18 MICROgram(s) Capsule 1 Capsule(s) Inhalation daily

## 2022-04-06 NOTE — DISCHARGE NOTE PROVIDER - NSDCCPCAREPLAN_GEN_ALL_CORE_FT
PRINCIPAL DISCHARGE DIAGNOSIS  Diagnosis: Pancreatitis, acute  Assessment and Plan of Treatment: Pancreatitis  Pancreatitis is a condition in which the pancreas becomes irritated and swollen (inflammation). The pancreas is a gland that is located behind the stomach. It produces enzymes that help to digest food. Most acute attacks last a couple of days and can cause serious problems and even be life threatening. The most common causes are alcohol abuse and gallstones. Symptoms include pain in the upper abdomen that may radiate to the back, nausea, and vomiting. Diagnosis is made with a medical history and physical exam as well as additional diagnostic testing. At home, eat smaller, more frequent meals and avoid alcohol and fatty foods. Drink enough fluid to keep your urine clear or pale yellow.   SEEK IMMEDIATE MEDICAL CARE IF YOU HAVE ANY OF THE FOLLOWING SYMPTOMS: inability to keep fluids down, increasing pain, yellowing of your skin or eyes, or lightheadedness/dizziness.        SECONDARY DISCHARGE DIAGNOSES  Diagnosis: History of claustrophobia  Assessment and Plan of Treatment:

## 2022-04-06 NOTE — PROGRESS NOTE ADULT - ASSESSMENT
66 year old man with a PMHx of HTN, DLD, Diabetes mellitus on Metformin, BPH, GERD, prostate CA s/p seeding on remission, recurrent pancreatitis last admission in 10/2021 presented today for abdominal pain with distension that started 2 days ago diffuse, not radiating, worse with food, not alleviated by anything. pain was getting worse so he presented to the hospital.  pain is similar to prior bouts of pancreatitis pain- denies excessive drinking (had a beer and a shot 2 weeks ago), no n/v, fever, chills, back pain sob.      #Acute interstitial pancreatitis  - most likely 2/2 greasy food intake; has not been consuming alcohol over past 2 weeks  - Lipase 92  - CT A/P - consistent with acute interstitial edematous pancreatitis  - RUQ USG - fatty liver; no evidence of GS or ductal dilation  - soft and bite sized diet from afternoon  - decreased LR to 100cc/hr  - dcd morphine after giving 1 dose this AM; started him on oxycodone IR 5mg q6hr prn (actually more potent that morphine 1mg iv) - eventhough pt is asking for morphine(looks like seeking behavoir)    #DM  - a1c: 7.7  - Takes metformin at home  - SS here    #HTN  - c/w amlodipine    #HLD  - c/w lipitor    #BPH  - c/w tamsulosin    DVT PPX - Lovenox  GI PPX - PPI  Diet - soft and bite sized diet  Activity - AAT  FULL CODE  Dispo: d/c anticipated for tomorrow
66 year old man with a PMHx of HTN, DLD, Diabetes mellitus on Metformin, BPH, GERD, prostate CA s/p seeding on remission, recurrent pancreatitis last admission in 10/2021 presented today for abdominal pain with distension that started 2 days ago diffuse, not radiating, worse with food, not alleviated by anything. pain was getting worse so he presented to the hospital.  pain is similar to prior bouts of pancreatitis pain- denies excessive drinking (had a beer and a shot 2 weeks ago), no n/v, fever, chills, back pain sob.    # occasional alcohol - social, not alcoholic; ex-smoker: 30 py - QUIT 15years ago.     #  Acute interstitial pancreatitis mild likely 2/2 fried/greasy food intake w/ mod alcohol use (microlithiasis?)   doubt THC is cause - pt says he smokes THC about 2-3x/wk and has been doing so for a long time w/out effect on his pancrease  not alcoholic, per pt (and I have never seen w/drawals on any of his admissions)  no GB stones  no viral illness  no new meds  pt has never had MRCP -> can do as outpt (inpt if he worsens or LFTs become abnl)  Lipase, Serum: 92.  CT Abdomen and Pelvis w/ Oral Cont and w/ IV Cont (04.02.22 @ 07:51) >  1. Findings compatible with acute interstitial edematous pancreatitis,   without acute fluid collection.  2. Hepatic steatosis.  RUQ US: fatty liver; no GB stones; no duct dil; panc not well seen  Diet: shane DASH  IVFs: off  can d/c pain meds  IgG4: 18 in 10/21; normal, calcium : 9.2  TG 83;     # Mild Bronchitis:  no further wheeze  no documented COPD, pt quit smoking 15 years ago   c/w albuterol, symbicort, tiotropium  f/u OP with pulm for PFTs    # h/o HTN, c/w amlodipine    # h/o HLD, c/w atorvastatin  not fully controlled -> when feeling better, can incr lipitor to 80mg HS (as outpt)    # h/o BPH, c/w tamsulosin    # h/o DM  FS can be 2x/day - will fix if FS > 180  on metformin at home - will resume upon d/c    # DVT PPX: Lovenox    # GI PPX: PPI    # Activity: independent    # Code: full    Dispo: d/c home today;   outpt [facility]  
66 year old man with a PMHx of HTN, DLD, Diabetes mellitus on Metformin, BPH, GERD, prostate CA s/p seeding on remission, recurrent pancreatitis last admission in 10/2021 presented today for abdominal pain with distension that started 2 days ago diffuse, not radiating, worse with food, not alleviated by anything. pain was getting worse so he presented to the hospital.  pain is similar to prior bouts of pancreatitis pain- denies excessive drinking (had a beer and a shot 2 weeks ago), no n/v, fever, chills, back pain sob.    occasional alcohol, ex smoker: 30 py - QUIT 15years ago.     #  Acute interstitial pancreatitis mild likely 2/2 fried/greasy food intake w/ mod alcohol use (microlithiasis?)   doubt THC is cause - pt says he smokes THC about 2-3x/wk and has been doing so for a long time w/out effect on his pancrease  not alcoholic, per pt (and I have never seen w/drawals on any of his admissions)  no GB stones  no viral illness  no new meds  pt has never had MRCP -> can do as outpt (inpt if he worsens or LFTs become abnl)  Lipase, Serum: 92.  CT Abdomen and Pelvis w/ Oral Cont and w/ IV Cont (04.02.22 @ 07:51) >  1. Findings compatible with acute interstitial edematous pancreatitis,   without acute fluid collection.  2. Hepatic steatosis.  RUQ US: fatty liver; no GB stones; no duct dil; panc not well seen  Diet: full liquids - try to adv yani  IVFs: decr /hr  IV morphine 1mg iv q6 prn for now (to oral pain meds once tolerating solid food)  IgG4: 18 in 10/21; normal, calcium : 9.2  TG 83;     # Mild Bronchitis:  no further wheeze today  no documented COPD, pt quit smoking 15 years ago   c/w albuterol, symbicort, tiotropium  f/u OP with pulm for PFTs    # h/o HTN, c/w amlodipine    # h/o HLD, c/w atorvastatin  not fully controlled -> when feeling better, can incr lipitor to 80mg HS (as outpt)    # h/o BPH, c/w tamsulosin    # h/o DM  FS can be 2x/day - will fix if FS > 180  on metformin at home - will resume upon d/c    # DVT PPX: Lovenox    # GI PPX: PPI    # Activity: independent    # Code: full    Dispo: anticipate d/c 24-48hrs?; decr LR; c/w feeding; tx pain  outpt MRCP  
66 year old man with a PMHx of HTN, DLD, Diabetes mellitus on Metformin, BPH, GERD, prostate CA s/p seeding on remission, recurrent pancreatitis last admission in 10/2021 presented today for abdominal pain with distension that started 2 days ago diffuse, not radiating, worse with food, not alleviated by anything. pain was getting worse so he presented to the hospital.  pain is similar to prior bouts of pancreatitis pain- denies excessive drinking (had a beer and a shot 2 weeks ago), no n/v, fever, chills, back pain sob.      #Acute interstitial pancreatitis  - most likely 2/2 greasy food intake; has not been consuming alcohol over past 2 weeks  - Lipase 92  - CT A/P - consistent with acute interstitial edematous pancreatitis  - RUQ USG - fatty liver; no evidence of GS or ductal dilation  - Full liquid diet for now; advance as tolerated  - LR @ 150cc/hr; decrease as tolerated  - pain meds: IV morphine 1mg iv q6 prn for now    #DM  - a1c: 7.7  - Takes metformin at home  - SS here    #HTN  - c/w amlodipine    #HLD  - c/w lipitor    #BPH  - c/w tamsulosin    DVT PPX - Lovenox  GI PPX - PPI  Diet - Full liq diet  Activity - AAT  FULL CODE  Dispo: pending improvement of pancreatitis pain    
a/p:  #  Acute interstitial pancreatitis on recurrent chronic pancreatitis  - mild and slowly improving  -suspect 2/2 fried/greasy food intake w/ mod alcohol use (microlithiasis?)- no h/o prior etoh withdrawl or known DT's   -lipase 92  -GI f/u as outpatient ---consider MRCP as outpatient as symptoms improving  -CT abd/pelvis- acute interstitial edematous pancreatitis without acute fluid collection, hepatic steatosis  -tolerating full liquid diet---advance to soft and then regular diet tonight- cont ivf until shane full diet  -start oral oxycodone 5 mg q 6hr prn for severe pain (transitioning to oral for dispo plan)  -morphine 2 mg iv at 3 pm---->if needs additional iv morphine overnight transition to 1 mg iv q6hr prn  - patient feels that lack of pruritis or metallic taste after iv morphine given this morning means he was not given medication (I confirmed with RN that morphine 2 mg ivp x 1 dose was given at 10:45 am)--? opiate seeking component to symptoms and aggressive behavior today    # Mild Bronchitis- resolved---prior smoker (quit 15 yrs ago)  -c/w albuterol, symbicort, tiotropium  -pulm f/u as outpatient    # h/o HTN- stable- cont norvasc    # h/o HLD-cont atorvastatin    # h/o BPH- cont flomax     # h/o DM- monitor fs  -restart metformin upon dc     DVT/GI ppx    anticipate likely dc tomorrow after tolerates advancing diet and once pain controlled----patient being aggressive and accusing staff of lying to him about morphine injection- explained that lack of pruritis or metallic taste could be from tolerance built up- plan to taper off iv morphine today and start oral oxycodone--       Total time spent to complete patient's bedside assessment, review medical chart, discuss medical plan of care with covering medical team was more than 35 minutes  with >50% of time spendt face to face with patient, discussion with patient/family and/or coordination of care

## 2022-04-06 NOTE — DISCHARGE NOTE PROVIDER - HOSPITAL COURSE
66 year old with a PMHx of HTN, DLD, Diabetes mellitus on Metformin,  BPH, GERD, prostate CA s/p seeding on remission, recurrent pancreatitis last admission in 10/2021 presented today for abdominal pain with distension that started 2 days ago diffuse, not radiating, worse with food, not alleviated by anything. pain was getting worse so he presented to the hospital.  pain is similar to pancreatitis pain- denies excessive drinking ( had a beer and a shot 2 weeks ago), no  n/v, fever, chills, back pain sob.    pt smokes marijuana everyday, occasional alcohol ex smoker : 30 py QUIT 15years ago.  pt was admitted with Acute interstitial pancreatitis on recurrent chronic pancreatitis : mild and slowly improving  likely suspect 2/2 fried/greasy food intake w/ mod alcohol use (microlithiasis?)- no h/o prior etoh withdrawl or known DT's   -lipase 92  -GI f/u as outpatient ---consider MRCP as outpatient as symptoms improving  -CT abd/pelvis- acute interstitial edematous pancreatitis without acute fluid collection, hepatic steatosis  -tolerating full liquid diet---advance to soft and then regular diet tonight- cont ivf until shane full diet  -start oral oxycodone 5 mg q 6hr prn for severe pain (transitioning to oral for dispo plan)  -morphine 2 mg iv at 3 pm---->if needs additional iv morphine overnight transition to 1 mg iv q6hr prn  - patient feels that lack of pruritis or metallic taste after iv morphine given this morning means he was not given medication (I confirmed with RN that morphine 2 mg ivp x 1 dose was given at 10:45 am)--? opiate seeking component to symptoms and aggressive behavior today no palpitations/no dyspnea on exertion/no chest pain/no claudication HPI: 66 year old with a PMHx of HTN, DLD, Diabetes mellitus on Metformin,  BPH, GERD, prostate CA s/p seeding on remission, recurrent pancreatitis last admission in 10/2021 presented today for abdominal pain with distension that started 2 days ago diffuse, not radiating, worse with food, not alleviated by anything. pain was getting worse so he presented to the hospital.  pain is similar to pancreatitis pain- denies excessive drinking ( had a beer and a shot 2 weeks ago), no  n/v, fever, chills, back pain sob.    HOSPITAL COURSE SIGNIFICANT FOR THE FOLLOWING:  pt smokes marijuana everyday, occasional alcohol, ex smoker : 30 py QUIT 15years ago.  pt was admitted with Acute interstitial pancreatitis on recurrent chronic pancreatitis  likely suspect 2/2 fried/greasy food intake w/ mod alcohol use (microlithiasis?)- no h/o prior etoh withdrawl or known DT's   -lipase 92  -CT abd/pelvis- acute interstitial edematous pancreatitis without acute fluid collection, hepatic steatosis  -Started on liq diet and switched to regular diet - was tolerating diet well  -managed with IVF  -Got IV opioids; transitioned to PO opioids prior to d/c  -GI f/u as outpatient --- consider MRCP as outpatient as symptoms improving     no claudication/no peripheral edema/no palpitations/no dyspnea on exertion/no chest pain

## 2022-04-06 NOTE — DISCHARGE NOTE NURSING/CASE MANAGEMENT/SOCIAL WORK - PATIENT PORTAL LINK FT
You can access the FollowMyHealth Patient Portal offered by [facility] by registering at the following website: [email or web address]. By joining [employer]’s FollowMyHealth portal, you will also be able to view your health information using other applications (apps) compatible with our system.

## 2022-04-06 NOTE — DISCHARGE NOTE NURSING/CASE MANAGEMENT/SOCIAL WORK - NSDCVIVACCINE_GEN_ALL_CORE_FT
influenza, injectable, quadrivalent, preservative free; 03-Feb-2019 13:02; Stacy Joe (RN); [employer]; [id number] (Exp. Date: 30-Jun-2019); IntraMuscular; Deltoid Left.; 0.5 milliLiter(s); VIS (VIS Published: 07-Aug-2015, VIS Presented: 03-Feb-2019);   Tdap; 01-Feb-2019 22:39; Angie Serna (RN); [employer]; [id number] (Exp. Date: 02-Nov-2020); IntraMuscular; Deltoid Left.; 0.5 milliLiter(s); VIS (VIS Published: 09-May-2013, VIS Presented: 01-Feb-2019);   Tdap; 23-Oct-2020 20:52; Whitney Hsu (RN); [employer]; [id number] (Exp. Date: 21-Jul-2022); IntraMuscular; Deltoid Right.; 0.5 milliLiter(s); VIS (VIS Published: 09-May-2013, VIS Presented: 23-Oct-2020);

## 2022-04-06 NOTE — DISCHARGE NOTE PROVIDER - CARE PROVIDER_API CALL
Fabby Henriquez)  Gastroenterology  [address] [place] [address]  Phone: [phone]  Fax: [phone]  Follow Up Time: 1 week

## 2022-04-20 DIAGNOSIS — F40.240 CLAUSTROPHOBIA: ICD-10-CM

## 2022-04-20 DIAGNOSIS — K21.9 GASTRO-ESOPHAGEAL REFLUX DISEASE WITHOUT ESOPHAGITIS: ICD-10-CM

## 2022-04-20 DIAGNOSIS — I10 ESSENTIAL (PRIMARY) HYPERTENSION: ICD-10-CM

## 2022-04-20 DIAGNOSIS — N40.0 BENIGN PROSTATIC HYPERPLASIA WITHOUT LOWER URINARY TRACT SYMPTOMS: ICD-10-CM

## 2022-04-20 DIAGNOSIS — E78.5 HYPERLIPIDEMIA, UNSPECIFIED: ICD-10-CM

## 2022-04-20 DIAGNOSIS — K76.0 FATTY (CHANGE OF) LIVER, NOT ELSEWHERE CLASSIFIED: ICD-10-CM

## 2022-04-20 DIAGNOSIS — K85.80 OTHER ACUTE PANCREATITIS WITHOUT NECROSIS OR INFECTION: ICD-10-CM

## 2022-04-20 DIAGNOSIS — Z85.46 PERSONAL HISTORY OF MALIGNANT NEOPLASM OF PROSTATE: ICD-10-CM

## 2022-04-20 DIAGNOSIS — F12.10 CANNABIS ABUSE, UNCOMPLICATED: ICD-10-CM

## 2022-04-20 DIAGNOSIS — J40 BRONCHITIS, NOT SPECIFIED AS ACUTE OR CHRONIC: ICD-10-CM

## 2022-04-20 DIAGNOSIS — M19.90 UNSPECIFIED OSTEOARTHRITIS, UNSPECIFIED SITE: ICD-10-CM

## 2022-04-20 DIAGNOSIS — Z79.82 LONG TERM (CURRENT) USE OF ASPIRIN: ICD-10-CM

## 2022-04-20 DIAGNOSIS — Z87.891 PERSONAL HISTORY OF NICOTINE DEPENDENCE: ICD-10-CM

## 2022-04-20 DIAGNOSIS — Z96.642 PRESENCE OF LEFT ARTIFICIAL HIP JOINT: ICD-10-CM

## 2022-04-20 DIAGNOSIS — Z79.84 LONG TERM (CURRENT) USE OF ORAL HYPOGLYCEMIC DRUGS: ICD-10-CM

## 2022-04-20 DIAGNOSIS — E11.9 TYPE 2 DIABETES MELLITUS WITHOUT COMPLICATIONS: ICD-10-CM

## 2022-04-20 NOTE — ED ADULT NURSE NOTE - PMH
Diabetes mellitus, type II    Hyperlipidemia    Hypertension    Osteoarthritis    Pancreatitis  recurrent  Prostate cancer  s/p history of seeding  
numerical 0-10

## 2022-04-22 ENCOUNTER — APPOINTMENT (OUTPATIENT)
Dept: GASTROENTEROLOGY | Facility: CLINIC | Age: 67
End: 2022-04-22

## 2022-06-20 ENCOUNTER — EMERGENCY (EMERGENCY)
Facility: HOSPITAL | Age: 67
LOS: 0 days | Discharge: HOME | End: 2022-06-20
Attending: EMERGENCY MEDICINE | Admitting: EMERGENCY MEDICINE
Payer: MEDICARE

## 2022-06-20 VITALS
DIASTOLIC BLOOD PRESSURE: 76 MMHG | WEIGHT: 197.75 LBS | RESPIRATION RATE: 16 BRPM | TEMPERATURE: 97 F | OXYGEN SATURATION: 99 % | SYSTOLIC BLOOD PRESSURE: 125 MMHG | HEART RATE: 74 BPM | HEIGHT: 67 IN

## 2022-06-20 DIAGNOSIS — Z79.82 LONG TERM (CURRENT) USE OF ASPIRIN: ICD-10-CM

## 2022-06-20 DIAGNOSIS — M19.90 UNSPECIFIED OSTEOARTHRITIS, UNSPECIFIED SITE: ICD-10-CM

## 2022-06-20 DIAGNOSIS — Z90.49 ACQUIRED ABSENCE OF OTHER SPECIFIED PARTS OF DIGESTIVE TRACT: ICD-10-CM

## 2022-06-20 DIAGNOSIS — Z96.642 PRESENCE OF LEFT ARTIFICIAL HIP JOINT: ICD-10-CM

## 2022-06-20 DIAGNOSIS — Z85.46 PERSONAL HISTORY OF MALIGNANT NEOPLASM OF PROSTATE: ICD-10-CM

## 2022-06-20 DIAGNOSIS — Z79.84 LONG TERM (CURRENT) USE OF ORAL HYPOGLYCEMIC DRUGS: ICD-10-CM

## 2022-06-20 DIAGNOSIS — M25.552 PAIN IN LEFT HIP: ICD-10-CM

## 2022-06-20 DIAGNOSIS — Z98.890 OTHER SPECIFIED POSTPROCEDURAL STATES: Chronic | ICD-10-CM

## 2022-06-20 DIAGNOSIS — E78.5 HYPERLIPIDEMIA, UNSPECIFIED: ICD-10-CM

## 2022-06-20 DIAGNOSIS — C61 MALIGNANT NEOPLASM OF PROSTATE: Chronic | ICD-10-CM

## 2022-06-20 DIAGNOSIS — Z96.642 PRESENCE OF LEFT ARTIFICIAL HIP JOINT: Chronic | ICD-10-CM

## 2022-06-20 DIAGNOSIS — E11.9 TYPE 2 DIABETES MELLITUS WITHOUT COMPLICATIONS: ICD-10-CM

## 2022-06-20 DIAGNOSIS — Z87.19 PERSONAL HISTORY OF OTHER DISEASES OF THE DIGESTIVE SYSTEM: ICD-10-CM

## 2022-06-20 PROCEDURE — 99284 EMERGENCY DEPT VISIT MOD MDM: CPT

## 2022-06-20 PROCEDURE — 73502 X-RAY EXAM HIP UNI 2-3 VIEWS: CPT | Mod: 26,LT

## 2022-06-20 RX ORDER — KETOROLAC TROMETHAMINE 30 MG/ML
30 SYRINGE (ML) INJECTION ONCE
Refills: 0 | Status: DISCONTINUED | OUTPATIENT
Start: 2022-06-20 | End: 2022-06-20

## 2022-06-20 RX ADMIN — Medication 30 MILLIGRAM(S): at 13:06

## 2022-06-20 RX ADMIN — Medication 30 MILLIGRAM(S): at 12:36

## 2022-06-20 NOTE — ED CLERICAL - NS ED CLERK NOTE PRE-ARRIVAL INFORMATION; ADDITIONAL PRE-ARRIVAL INFORMATION
This patient is enrolled in the [facility] readmission reduction program and has active care navigation. This patient can be followed up by the care navigation team within 24 hours. To arrange close follow-up or to obtain additional clinical information about this patient, please call the contact number above.

## 2022-06-20 NOTE — ED PROVIDER NOTE - OBJECTIVE STATEMENT
66 y.o. male, psh hip replacement here for eval of L hip pain x 1.5 weeks. No falls or paresthesias, saddle anesthesia urinary bowel incontinence. Pain with bending. No pain meds. Smokes marijuana for relief.

## 2022-06-20 NOTE — ED PROVIDER NOTE - PATIENT PORTAL LINK FT
You can access the FollowMyHealth Patient Portal offered by [facility] by registering at the following website: [email or web address]. By joining [employer]’s FollowMyHealth portal, you will also be able to view your health information using other applications (apps) compatible with our system.

## 2022-06-20 NOTE — ED PROVIDER NOTE - PROGRESS NOTE DETAILS
Patient's pain improved with Toradol.  X-ray results discussed.  Understands to return to physical therapy.  Ambulatory in ER.  Lonny RANGEL.

## 2022-06-20 NOTE — ED PROVIDER NOTE - PHYSICAL EXAMINATION
Physical Exam    Vital Signs: I have reviewed the initial vital signs.  Constitutional: well-nourished, appears stated age, no acute distress  Eyes: Conjunctiva pink, Sclera clear, PERRLA, EOMI, no ptosis, no entrapment, no racoon eyes.  Cardiovascular: S1 and S2, regular rate, regular rhythm, well-perfused extremities, radial pulses equal and 2+, calves nonttp, equal in size  Gastrointestinal: soft, non-tender abdomen, no pulsatile mass, normal bowl sounds  Musculoskeletal: supple neck, no lower extremity edema, no midline tenderness, paraspinal tenderness, clavicular crepitus, painful rom, moving all extremities appropriately, no gross bony deformities or swelling. painful rom of left hip, surgical scar noted, no TTP or erythema , can peform active rom but limited by pain   Integumentary: warm, dry, no rashes, lacerations,  Neurologic: awake, alert

## 2022-06-20 NOTE — ED PROVIDER NOTE - NSFOLLOWUPINSTRUCTIONS_ED_ALL_ED_FT
He is continue to go to physical therapy.  Take your medication as directed.  \  Hip Pain    Your hip is the joint between your upper legs and your lower pelvis. The bones, cartilage, tendons, and muscles of your hip joint perform a lot of work each day supporting your body weight and allowing you to move around.    Hip pain can range from a minor ache to severe pain in one or both of your hips. Pain may be felt on the inside of the hip joint near the groin, or the outside near the buttocks and upper thigh. You may have swelling or stiffness as well.     HOME CARE INSTRUCTIONS  Take medicines only as directed by your health care provider.  Apply ice to the injured area:  Put ice in a plastic bag.  Place a towel between your skin and the bag.  Leave the ice on for 15–20 minutes at a time, 3–4 times a day.  Keep your leg raised (elevated) when possible to lessen swelling.  Avoid activities that cause pain.  Follow specific exercises as directed by your health care provider.  Sleep with a pillow between your legs on your most comfortable side.  Record how often you have hip pain, the location of the pain, and what it feels like.     SEEK MEDICAL CARE IF:  You are unable to put weight on your leg.  Your hip is red or swollen or very tender to touch.  Your pain or swelling continues or worsens after 1 week.  You have increasing difficulty walking.  You have a fever.    SEEK IMMEDIATE MEDICAL CARE IF:  You have fallen.  You have a sudden increase in pain and swelling in your hip.    MAKE SURE YOU:  Understand these instructions.  Will watch your condition.  Will get help right away if you are not doing well or get worse.    ADDITIONAL NOTES AND INSTRUCTIONS    Please follow up with your Primary MD in 24-48 hr.  Seek immediate medical care for any new/worsening signs or symptoms.

## 2022-06-22 ENCOUNTER — APPOINTMENT (OUTPATIENT)
Dept: INTERNAL MEDICINE | Facility: CLINIC | Age: 67
End: 2022-06-22

## 2022-07-06 NOTE — ED ADULT NURSE NOTE - NSSEPSISSUSPECTED_ED_A_ED
hx of bladder cancer  last chemo 5/2022   follows at [facility]  Pain management consulted pt on SCD for DVt prphylaxis pt on SCD for DVt prphylaxis No hx of bladder cancer  last chemo 5/2022   follows at [facility]  Pain management consulted

## 2022-08-30 NOTE — ED PROVIDER NOTE - NS HIV RISK FACTOR YES
Decrease current dose of warfarin as instructed on dosing calendar provided. Continue to monitor urine and stool for signs and symptoms of bleeding. Please notify the clinic of any medication changes. Please remember to bring all medications (both prescription and OTC) to your next visit. Kindly notify the clinic if you are unable to make to your next appointment. Declined

## 2022-09-23 LAB
ALBUMIN SERPL ELPH-MCNC: 4.5 G/DL
ALP BLD-CCNC: 91 U/L
ALT SERPL-CCNC: 21 U/L
ANION GAP SERPL CALC-SCNC: 17 MMOL/L
AST SERPL-CCNC: 22 U/L
BASOPHILS # BLD AUTO: 0.04 K/UL
BASOPHILS NFR BLD AUTO: 0.7 %
BILIRUB SERPL-MCNC: 1.2 MG/DL
BUN SERPL-MCNC: 10 MG/DL
CALCIUM SERPL-MCNC: 9.3 MG/DL
CHLORIDE SERPL-SCNC: 100 MMOL/L
CHOLEST SERPL-MCNC: 172 MG/DL
CO2 SERPL-SCNC: 20 MMOL/L
COVID-19 NUCLEOCAPSID  GAM ANTIBODY INTERPRETATION: NEGATIVE
CREAT SERPL-MCNC: 0.7 MG/DL
EOSINOPHIL # BLD AUTO: 0.33 K/UL
EOSINOPHIL NFR BLD AUTO: 5.9 %
ESTIMATED AVERAGE GLUCOSE: 163 MG/DL
GLUCOSE SERPL-MCNC: 144 MG/DL
HBA1C MFR BLD HPLC: 7.3 %
HCT VFR BLD CALC: 40.7 %
HCV AB SER QL: NONREACTIVE
HCV S/CO RATIO: 0.1 S/CO
HDLC SERPL-MCNC: 53 MG/DL
HGB BLD-MCNC: 13.5 G/DL
HIV1+2 AB SPEC QL IA.RAPID: NONREACTIVE
IMM GRANULOCYTES NFR BLD AUTO: 0.4 %
LDLC SERPL CALC-MCNC: 104 MG/DL
LYMPHOCYTES # BLD AUTO: 1.11 K/UL
LYMPHOCYTES NFR BLD AUTO: 19.7 %
MAN DIFF?: NORMAL
MCHC RBC-ENTMCNC: 28.9 PG
MCHC RBC-ENTMCNC: 33.2 G/DL
MCV RBC AUTO: 87.2 FL
MONOCYTES # BLD AUTO: 0.6 K/UL
MONOCYTES NFR BLD AUTO: 10.6 %
NEUTROPHILS # BLD AUTO: 3.54 K/UL
NEUTROPHILS NFR BLD AUTO: 62.7 %
NONHDLC SERPL-MCNC: 120 MG/DL
PLATELET # BLD AUTO: 247 K/UL
POTASSIUM SERPL-SCNC: 3.7 MMOL/L
PROT SERPL-MCNC: 7.5 G/DL
PSA FREE FLD-MCNC: NORMAL %
PSA FREE SERPL-MCNC: <0.01 NG/ML
PSA SERPL-MCNC: 0.46 NG/ML
RBC # BLD: 4.67 M/UL
RBC # FLD: 12.7 %
SARS-COV-2 AB SERPL QL IA: 0.07 INDEX
SODIUM SERPL-SCNC: 137 MMOL/L
T PALLIDUM AB SER QL IA: NEGATIVE
TRIGL SERPL-MCNC: 79 MG/DL
TSH SERPL-ACNC: 0.47 UIU/ML
WBC # FLD AUTO: 5.64 K/UL

## 2022-10-07 NOTE — ED ADULT NURSE NOTE - PAIN: BODY LOCATION
Left message for patient to contact the scheduling office at [phone] to schedule their procedure  chest, midsternal

## 2022-10-27 ENCOUNTER — INPATIENT (INPATIENT)
Facility: HOSPITAL | Age: 67
LOS: 6 days | Discharge: HOME | End: 2022-11-03
Attending: STUDENT IN AN ORGANIZED HEALTH CARE EDUCATION/TRAINING PROGRAM | Admitting: STUDENT IN AN ORGANIZED HEALTH CARE EDUCATION/TRAINING PROGRAM
Payer: MEDICARE

## 2022-10-27 VITALS
HEART RATE: 74 BPM | WEIGHT: 220.02 LBS | RESPIRATION RATE: 17 BRPM | DIASTOLIC BLOOD PRESSURE: 87 MMHG | SYSTOLIC BLOOD PRESSURE: 152 MMHG | HEIGHT: 67 IN | OXYGEN SATURATION: 98 % | TEMPERATURE: 98 F

## 2022-10-27 DIAGNOSIS — I10 ESSENTIAL (PRIMARY) HYPERTENSION: ICD-10-CM

## 2022-10-27 DIAGNOSIS — E11.9 TYPE 2 DIABETES MELLITUS WITHOUT COMPLICATIONS: ICD-10-CM

## 2022-10-27 DIAGNOSIS — K85.90 ACUTE PANCREATITIS WITHOUT NECROSIS OR INFECTION, UNSPECIFIED: ICD-10-CM

## 2022-10-27 DIAGNOSIS — Z96.642 PRESENCE OF LEFT ARTIFICIAL HIP JOINT: Chronic | ICD-10-CM

## 2022-10-27 DIAGNOSIS — Z98.890 OTHER SPECIFIED POSTPROCEDURAL STATES: Chronic | ICD-10-CM

## 2022-10-27 DIAGNOSIS — M19.91 PRIMARY OSTEOARTHRITIS, UNSPECIFIED SITE: ICD-10-CM

## 2022-10-27 DIAGNOSIS — N40.0 BENIGN PROSTATIC HYPERPLASIA WITHOUT LOWER URINARY TRACT SYMPTOMS: ICD-10-CM

## 2022-10-27 DIAGNOSIS — K21.9 GASTRO-ESOPHAGEAL REFLUX DISEASE WITHOUT ESOPHAGITIS: ICD-10-CM

## 2022-10-27 DIAGNOSIS — C61 MALIGNANT NEOPLASM OF PROSTATE: Chronic | ICD-10-CM

## 2022-10-27 DIAGNOSIS — E78.5 HYPERLIPIDEMIA, UNSPECIFIED: ICD-10-CM

## 2022-10-27 DIAGNOSIS — K29.70 GASTRITIS, UNSPECIFIED, WITHOUT BLEEDING: ICD-10-CM

## 2022-10-27 DIAGNOSIS — Z79.84 LONG TERM (CURRENT) USE OF ORAL HYPOGLYCEMIC DRUGS: ICD-10-CM

## 2022-10-27 DIAGNOSIS — Z96.642 PRESENCE OF LEFT ARTIFICIAL HIP JOINT: ICD-10-CM

## 2022-10-27 DIAGNOSIS — Z85.46 PERSONAL HISTORY OF MALIGNANT NEOPLASM OF PROSTATE: ICD-10-CM

## 2022-10-27 DIAGNOSIS — K82.9 DISEASE OF GALLBLADDER, UNSPECIFIED: ICD-10-CM

## 2022-10-27 LAB
ALBUMIN SERPL ELPH-MCNC: 4.4 G/DL — SIGNIFICANT CHANGE UP (ref 3.5–5.2)
ALP SERPL-CCNC: 96 U/L — SIGNIFICANT CHANGE UP (ref 30–115)
ALT FLD-CCNC: 15 U/L — SIGNIFICANT CHANGE UP (ref 0–41)
ANION GAP SERPL CALC-SCNC: 13 MMOL/L — SIGNIFICANT CHANGE UP (ref 7–14)
ANION GAP SERPL CALC-SCNC: 9 MMOL/L — SIGNIFICANT CHANGE UP (ref 7–14)
AST SERPL-CCNC: 35 U/L — SIGNIFICANT CHANGE UP (ref 0–41)
BASOPHILS # BLD AUTO: 0.04 K/UL — SIGNIFICANT CHANGE UP (ref 0–0.2)
BASOPHILS NFR BLD AUTO: 0.6 % — SIGNIFICANT CHANGE UP (ref 0–1)
BILIRUB SERPL-MCNC: 0.5 MG/DL — SIGNIFICANT CHANGE UP (ref 0.2–1.2)
BUN SERPL-MCNC: 10 MG/DL — SIGNIFICANT CHANGE UP (ref 10–20)
BUN SERPL-MCNC: 9 MG/DL — LOW (ref 10–20)
CALCIUM SERPL-MCNC: 8.5 MG/DL — SIGNIFICANT CHANGE UP (ref 8.4–10.5)
CALCIUM SERPL-MCNC: 8.5 MG/DL — SIGNIFICANT CHANGE UP (ref 8.4–10.5)
CHLORIDE SERPL-SCNC: 103 MMOL/L — SIGNIFICANT CHANGE UP (ref 98–110)
CHLORIDE SERPL-SCNC: 104 MMOL/L — SIGNIFICANT CHANGE UP (ref 98–110)
CHOLEST SERPL-MCNC: 177 MG/DL — SIGNIFICANT CHANGE UP
CO2 SERPL-SCNC: 21 MMOL/L — SIGNIFICANT CHANGE UP (ref 17–32)
CO2 SERPL-SCNC: 22 MMOL/L — SIGNIFICANT CHANGE UP (ref 17–32)
CREAT SERPL-MCNC: 0.6 MG/DL — LOW (ref 0.7–1.5)
CREAT SERPL-MCNC: 0.6 MG/DL — LOW (ref 0.7–1.5)
EGFR: 106 ML/MIN/1.73M2 — SIGNIFICANT CHANGE UP
EGFR: 106 ML/MIN/1.73M2 — SIGNIFICANT CHANGE UP
EOSINOPHIL # BLD AUTO: 0.27 K/UL — SIGNIFICANT CHANGE UP (ref 0–0.7)
EOSINOPHIL NFR BLD AUTO: 3.9 % — SIGNIFICANT CHANGE UP (ref 0–8)
GLUCOSE SERPL-MCNC: 145 MG/DL — HIGH (ref 70–99)
GLUCOSE SERPL-MCNC: 167 MG/DL — HIGH (ref 70–99)
HCT VFR BLD CALC: 38.6 % — LOW (ref 42–52)
HDLC SERPL-MCNC: 44 MG/DL — SIGNIFICANT CHANGE UP
HGB BLD-MCNC: 13.2 G/DL — LOW (ref 14–18)
IMM GRANULOCYTES NFR BLD AUTO: 0.4 % — HIGH (ref 0.1–0.3)
LIDOCAIN IGE QN: 136 U/L — HIGH (ref 7–60)
LIPID PNL WITH DIRECT LDL SERPL: 85 MG/DL — SIGNIFICANT CHANGE UP
LYMPHOCYTES # BLD AUTO: 1.23 K/UL — SIGNIFICANT CHANGE UP (ref 1.2–3.4)
LYMPHOCYTES # BLD AUTO: 17.9 % — LOW (ref 20.5–51.1)
MCHC RBC-ENTMCNC: 29.9 PG — SIGNIFICANT CHANGE UP (ref 27–31)
MCHC RBC-ENTMCNC: 34.2 G/DL — SIGNIFICANT CHANGE UP (ref 32–37)
MCV RBC AUTO: 87.3 FL — SIGNIFICANT CHANGE UP (ref 80–94)
MONOCYTES # BLD AUTO: 0.68 K/UL — HIGH (ref 0.1–0.6)
MONOCYTES NFR BLD AUTO: 9.9 % — HIGH (ref 1.7–9.3)
NEUTROPHILS # BLD AUTO: 4.63 K/UL — SIGNIFICANT CHANGE UP (ref 1.4–6.5)
NEUTROPHILS NFR BLD AUTO: 67.3 % — SIGNIFICANT CHANGE UP (ref 42.2–75.2)
NON HDL CHOLESTEROL: 133 MG/DL — HIGH
NRBC # BLD: 0 /100 WBCS — SIGNIFICANT CHANGE UP (ref 0–0)
PLATELET # BLD AUTO: 213 K/UL — SIGNIFICANT CHANGE UP (ref 130–400)
POTASSIUM SERPL-MCNC: 4.5 MMOL/L — SIGNIFICANT CHANGE UP (ref 3.5–5)
POTASSIUM SERPL-MCNC: 5.9 MMOL/L — HIGH (ref 3.5–5)
POTASSIUM SERPL-SCNC: 4.5 MMOL/L — SIGNIFICANT CHANGE UP (ref 3.5–5)
POTASSIUM SERPL-SCNC: 5.9 MMOL/L — HIGH (ref 3.5–5)
PROT SERPL-MCNC: 7.2 G/DL — SIGNIFICANT CHANGE UP (ref 6–8)
RBC # BLD: 4.42 M/UL — LOW (ref 4.7–6.1)
RBC # FLD: 12.7 % — SIGNIFICANT CHANGE UP (ref 11.5–14.5)
SARS-COV-2 RNA SPEC QL NAA+PROBE: SIGNIFICANT CHANGE UP
SODIUM SERPL-SCNC: 135 MMOL/L — SIGNIFICANT CHANGE UP (ref 135–146)
SODIUM SERPL-SCNC: 137 MMOL/L — SIGNIFICANT CHANGE UP (ref 135–146)
TRIGL SERPL-MCNC: 239 MG/DL — HIGH
TROPONIN T SERPL-MCNC: <0.01 NG/ML — SIGNIFICANT CHANGE UP
WBC # BLD: 6.88 K/UL — SIGNIFICANT CHANGE UP (ref 4.8–10.8)
WBC # FLD AUTO: 6.88 K/UL — SIGNIFICANT CHANGE UP (ref 4.8–10.8)

## 2022-10-27 PROCEDURE — 99223 1ST HOSP IP/OBS HIGH 75: CPT

## 2022-10-27 PROCEDURE — 93010 ELECTROCARDIOGRAM REPORT: CPT

## 2022-10-27 PROCEDURE — 71045 X-RAY EXAM CHEST 1 VIEW: CPT | Mod: 26

## 2022-10-27 PROCEDURE — 99285 EMERGENCY DEPT VISIT HI MDM: CPT

## 2022-10-27 PROCEDURE — 74177 CT ABD & PELVIS W/CONTRAST: CPT | Mod: 26,MA

## 2022-10-27 RX ORDER — MORPHINE SULFATE 50 MG/1
2 CAPSULE, EXTENDED RELEASE ORAL EVERY 4 HOURS
Refills: 0 | Status: DISCONTINUED | OUTPATIENT
Start: 2022-10-27 | End: 2022-10-28

## 2022-10-27 RX ORDER — ONDANSETRON 8 MG/1
4 TABLET, FILM COATED ORAL ONCE
Refills: 0 | Status: COMPLETED | OUTPATIENT
Start: 2022-10-27 | End: 2022-10-27

## 2022-10-27 RX ORDER — ALBUTEROL 90 UG/1
1 AEROSOL, METERED ORAL EVERY 4 HOURS
Refills: 0 | Status: DISCONTINUED | OUTPATIENT
Start: 2022-10-27 | End: 2022-11-03

## 2022-10-27 RX ORDER — ASPIRIN/CALCIUM CARB/MAGNESIUM 324 MG
81 TABLET ORAL DAILY
Refills: 0 | Status: DISCONTINUED | OUTPATIENT
Start: 2022-10-27 | End: 2022-10-31

## 2022-10-27 RX ORDER — ENOXAPARIN SODIUM 100 MG/ML
40 INJECTION SUBCUTANEOUS EVERY 24 HOURS
Refills: 0 | Status: DISCONTINUED | OUTPATIENT
Start: 2022-10-27 | End: 2022-11-01

## 2022-10-27 RX ORDER — SODIUM CHLORIDE 9 MG/ML
1000 INJECTION, SOLUTION INTRAVENOUS
Refills: 0 | Status: DISCONTINUED | OUTPATIENT
Start: 2022-10-27 | End: 2022-10-30

## 2022-10-27 RX ORDER — MORPHINE SULFATE 50 MG/1
4 CAPSULE, EXTENDED RELEASE ORAL ONCE
Refills: 0 | Status: DISCONTINUED | OUTPATIENT
Start: 2022-10-27 | End: 2022-10-27

## 2022-10-27 RX ORDER — POLYETHYLENE GLYCOL 3350 17 G/17G
17 POWDER, FOR SOLUTION ORAL DAILY
Refills: 0 | Status: DISCONTINUED | OUTPATIENT
Start: 2022-10-27 | End: 2022-11-03

## 2022-10-27 RX ORDER — ONDANSETRON 8 MG/1
4 TABLET, FILM COATED ORAL EVERY 6 HOURS
Refills: 0 | Status: DISCONTINUED | OUTPATIENT
Start: 2022-10-27 | End: 2022-11-03

## 2022-10-27 RX ORDER — SENNA PLUS 8.6 MG/1
2 TABLET ORAL AT BEDTIME
Refills: 0 | Status: DISCONTINUED | OUTPATIENT
Start: 2022-10-27 | End: 2022-11-03

## 2022-10-27 RX ORDER — MORPHINE SULFATE 50 MG/1
4 CAPSULE, EXTENDED RELEASE ORAL EVERY 6 HOURS
Refills: 0 | Status: DISCONTINUED | OUTPATIENT
Start: 2022-10-27 | End: 2022-10-28

## 2022-10-27 RX ORDER — TAMSULOSIN HYDROCHLORIDE 0.4 MG/1
0.4 CAPSULE ORAL AT BEDTIME
Refills: 0 | Status: DISCONTINUED | OUTPATIENT
Start: 2022-10-27 | End: 2022-11-03

## 2022-10-27 RX ORDER — SODIUM CHLORIDE 9 MG/ML
2000 INJECTION, SOLUTION INTRAVENOUS ONCE
Refills: 0 | Status: COMPLETED | OUTPATIENT
Start: 2022-10-27 | End: 2022-10-27

## 2022-10-27 RX ORDER — AMLODIPINE BESYLATE 2.5 MG/1
5 TABLET ORAL DAILY
Refills: 0 | Status: DISCONTINUED | OUTPATIENT
Start: 2022-10-27 | End: 2022-11-03

## 2022-10-27 RX ORDER — ATORVASTATIN CALCIUM 80 MG/1
40 TABLET, FILM COATED ORAL AT BEDTIME
Refills: 0 | Status: DISCONTINUED | OUTPATIENT
Start: 2022-10-27 | End: 2022-11-03

## 2022-10-27 RX ORDER — ACETAMINOPHEN 500 MG
650 TABLET ORAL EVERY 6 HOURS
Refills: 0 | Status: DISCONTINUED | OUTPATIENT
Start: 2022-10-27 | End: 2022-11-03

## 2022-10-27 RX ADMIN — MORPHINE SULFATE 4 MILLIGRAM(S): 50 CAPSULE, EXTENDED RELEASE ORAL at 12:46

## 2022-10-27 RX ADMIN — SENNA PLUS 2 TABLET(S): 8.6 TABLET ORAL at 22:22

## 2022-10-27 RX ADMIN — SODIUM CHLORIDE 200 MILLILITER(S): 9 INJECTION, SOLUTION INTRAVENOUS at 17:10

## 2022-10-27 RX ADMIN — AMLODIPINE BESYLATE 5 MILLIGRAM(S): 2.5 TABLET ORAL at 17:10

## 2022-10-27 RX ADMIN — TAMSULOSIN HYDROCHLORIDE 0.4 MILLIGRAM(S): 0.4 CAPSULE ORAL at 22:21

## 2022-10-27 RX ADMIN — MORPHINE SULFATE 2 MILLIGRAM(S): 50 CAPSULE, EXTENDED RELEASE ORAL at 22:21

## 2022-10-27 RX ADMIN — SODIUM CHLORIDE 2000 MILLILITER(S): 9 INJECTION, SOLUTION INTRAVENOUS at 12:52

## 2022-10-27 RX ADMIN — SODIUM CHLORIDE 2000 MILLILITER(S): 9 INJECTION, SOLUTION INTRAVENOUS at 05:21

## 2022-10-27 RX ADMIN — MORPHINE SULFATE 4 MILLIGRAM(S): 50 CAPSULE, EXTENDED RELEASE ORAL at 07:29

## 2022-10-27 RX ADMIN — Medication 81 MILLIGRAM(S): at 16:14

## 2022-10-27 RX ADMIN — MORPHINE SULFATE 4 MILLIGRAM(S): 50 CAPSULE, EXTENDED RELEASE ORAL at 11:24

## 2022-10-27 RX ADMIN — ATORVASTATIN CALCIUM 40 MILLIGRAM(S): 80 TABLET, FILM COATED ORAL at 22:21

## 2022-10-27 RX ADMIN — MORPHINE SULFATE 4 MILLIGRAM(S): 50 CAPSULE, EXTENDED RELEASE ORAL at 17:10

## 2022-10-27 RX ADMIN — MORPHINE SULFATE 4 MILLIGRAM(S): 50 CAPSULE, EXTENDED RELEASE ORAL at 05:21

## 2022-10-27 RX ADMIN — MORPHINE SULFATE 2 MILLIGRAM(S): 50 CAPSULE, EXTENDED RELEASE ORAL at 18:11

## 2022-10-27 RX ADMIN — MORPHINE SULFATE 4 MILLIGRAM(S): 50 CAPSULE, EXTENDED RELEASE ORAL at 12:52

## 2022-10-27 RX ADMIN — ONDANSETRON 4 MILLIGRAM(S): 8 TABLET, FILM COATED ORAL at 05:20

## 2022-10-27 NOTE — H&P ADULT - HISTORY OF PRESENT ILLNESS
67 year old with a PMHx of HTN, DLD, Diabetes mellitus on Metformin,  BPH, GERD, prostate CA s/p seeding on remission, recurrent pancreatitis last admission in 4/22 presented today for epigastric pain associated with n/v for 2 days. He reported pain is sharp and similar to the pain when he had pancreatitis and eating exacerbated the pain. denies drinking alcohol frequently. Also denies fever/chill/diarrhea/constipation and urinary sxs.    In the ED, CT a/p shows acute pancreatitis. labs are overall unremarkable except lipase 136.    Vital Signs Last 24 Hrs  T(C): 36.2 (27 Oct 2022 08:29), Max: 36.7 (27 Oct 2022 03:52)  T(F): 97.2 (27 Oct 2022 08:29), Max: 98.1 (27 Oct 2022 03:52)  HR: 60 (27 Oct 2022 08:29) (60 - 74)  BP: 130/67 (27 Oct 2022 08:29) (130/67 - 152/87)  BP(mean): --  RR: 17 (27 Oct 2022 08:29) (17 - 17)  SpO2: 98% (27 Oct 2022 08:29) (98% - 98%)    Parameters below as of 27 Oct 2022 08:29  Patient On (Oxygen Delivery Method): room air

## 2022-10-27 NOTE — ED ADULT NURSE NOTE - NSIMPLEMENTINTERV_GEN_ALL_ED
Implemented All Universal Safety Interventions:  Seltzer to call system. Call bell, personal items and telephone within reach. Instruct patient to call for assistance. Room bathroom lighting operational. Non-slip footwear when patient is off stretcher. Physically safe environment: no spills, clutter or unnecessary equipment. Stretcher in lowest position, wheels locked, appropriate side rails in place.

## 2022-10-27 NOTE — H&P ADULT - NSHPLABSRESULTS_GEN_ALL_CORE
13.2   6.88  )-----------( 213      ( 27 Oct 2022 06:00 )             38.6       10-27    137  |  103  |  9<L>  ----------------------------<  145<H>  4.5   |  21  |  0.6<L>    Ca    8.5      27 Oct 2022 06:45    TPro  7.2  /  Alb  4.4  /  TBili  0.5  /  DBili  x   /  AST  35  /  ALT  15  /  AlkPhos  96  10-27                      Lactate Trend      CARDIAC MARKERS ( 27 Oct 2022 06:00 )  x     / <0.01 ng/mL / x     / x     / x            CAPILLARY BLOOD GLUCOSE

## 2022-10-27 NOTE — H&P ADULT - ATTENDING COMMENTS
Patient seen and examined at bedside independently of the residents. I read the resident's note and agree with the plan with the additions and corrections as noted below.    REVIEW OF SYSTEMS:  CONSTITUTIONAL: No weakness, fevers or chills  EYES/ENT: No visual changes;  No vertigo or throat pain   NECK: No pain or stiffness  RESPIRATORY: No cough, wheezing, hemoptysis; No shortness of breath  CARDIOVASCULAR: No chest pain or palpitations  GASTROINTESTINAL: complains of Epigastric pain with nausea/vomiting. No diarrhea or constipation. No melena or hematochezia.  GENITOURINARY: No dysuria, frequency or hematuria  NEUROLOGICAL: No numbness or weakness  MSK: No pain. No weakness.   SKIN: No itching, rashes.     PMH: HTN, HLD, DM II (on metformin), BPH, Prostate Ca s/p seeding (in remission), GERD and recurrent pancreatitis    FHx: Reviewed. Not relevant.     Physical Exam:  GEN: No acute distress. A & O x 3.   Head: Atraumatic, Normocephalic.   Eye: PEERLA. No sclera icterus. EOMI.   ENT: Normal oropharynx, no thyromegaly.   LUNGS: Clear to auscultation bilaterally. No wheeze/rales/crackles.   HEART: Normal. S1/S2 present. RRR. No murmur/gallops.   ABD: Soft, non-tender, non-distended. Bowel sounds present.   EXT: No pitting edema.   Integumentary: No rash, No petechia.   NEURO: CN III-XII intact. Strength: 5/5 b/l ULE. Sensory intact b/l ULE.     Vital Signs Last 24 Hrs  T(C): 35.6 (27 Oct 2022 16:52), Max: 36.7 (27 Oct 2022 03:52)  T(F): 96.1 (27 Oct 2022 16:52), Max: 98.1 (27 Oct 2022 03:52)  HR: 58 (27 Oct 2022 16:52) (58 - 74)  BP: 145/89 (27 Oct 2022 16:52) (130/67 - 152/87)  BP(mean): --  RR: 18 (27 Oct 2022 16:52) (17 - 18)  SpO2: 98% (27 Oct 2022 16:52) (98% - 98%)    Parameters below as of 27 Oct 2022 16:52  Patient On (Oxygen Delivery Method): room air      Please see the above notes for Labs and radiology.     Assessment and Plan:     68 yo M with hx of HTN, HLD, DM II (on metformin), BPH, Prostate Ca s/p seeding (in remission), GERD and recurrent pancreatitis presents for epigastric pain a/w nausea and vomiting x 2 days.     Acute pancreatitis   - CT abdomen shows Mild fat stranding surrounding the head of the pancreas, consistent with acute interstitial edematous pancreatitis; no acute fluid collection.  - serum lipase 136  - check serum triglyceride and RUQ US   - NPO with IVF  - advance diet as tolerated in AM.   - monitor BUN, Hct and urine output     HTN/HLD - c/w home med  BPH - on flomax  DM II - monitor on FS AC HS.     DVT ppx: Lovenox SC  GI ppx: PPI  Diet: NPO with IVF   Activity: as tolerated.     Date seen by the attending: 10/27/2022. Patient seen and examined at bedside independently of the residents. I read the resident's note and agree with the plan with the additions and corrections as noted below.    REVIEW OF SYSTEMS:  CONSTITUTIONAL: No weakness, fevers or chills  EYES/ENT: No visual changes;  No vertigo or throat pain   NECK: No pain or stiffness  RESPIRATORY: No cough, wheezing, hemoptysis; No shortness of breath  CARDIOVASCULAR: No chest pain or palpitations  GASTROINTESTINAL: complains of Epigastric pain with nausea/vomiting. No diarrhea or constipation. No melena or hematochezia.  GENITOURINARY: No dysuria, frequency or hematuria  NEUROLOGICAL: No numbness or weakness  MSK: No pain. No weakness.   SKIN: No itching, rashes.     PMH: HTN, HLD, DM II (on metformin), BPH, Prostate Ca s/p seeding (in remission), GERD and recurrent pancreatitis    FHx: Reviewed. Not relevant.     Physical Exam:  GEN: No acute distress. A & O x 3.   Head: Atraumatic, Normocephalic.   Eye: PEERLA. No sclera icterus. EOMI.   ENT: Normal oropharynx, no thyromegaly.   LUNGS: Clear to auscultation bilaterally. No wheeze/rales/crackles.   HEART: Normal. S1/S2 present. RRR. No murmur/gallops.   ABD: Soft, Bowel sounds present. Abdomen distended. Mild tenderness to palpation of epigastric area.   EXT: No pitting edema.   Integumentary: No rash, No petechia.   NEURO: CN III-XII intact. Strength: 5/5 b/l ULE. Sensory intact b/l ULE.     Vital Signs Last 24 Hrs  T(C): 35.6 (27 Oct 2022 16:52), Max: 36.7 (27 Oct 2022 03:52)  T(F): 96.1 (27 Oct 2022 16:52), Max: 98.1 (27 Oct 2022 03:52)  HR: 58 (27 Oct 2022 16:52) (58 - 74)  BP: 145/89 (27 Oct 2022 16:52) (130/67 - 152/87)  BP(mean): --  RR: 18 (27 Oct 2022 16:52) (17 - 18)  SpO2: 98% (27 Oct 2022 16:52) (98% - 98%)    Parameters below as of 27 Oct 2022 16:52  Patient On (Oxygen Delivery Method): room air      Please see the above notes for Labs and radiology.     Assessment and Plan:     66 yo M with hx of HTN, HLD, DM II (on metformin), BPH, Prostate Ca s/p seeding (in remission), GERD and recurrent pancreatitis presents for epigastric pain a/w nausea and vomiting x 2 days.     Acute pancreatitis   - CT abdomen shows Mild fat stranding surrounding the head of the pancreas, consistent with acute interstitial edematous pancreatitis; no acute fluid collection.  - serum lipase 136  - check serum triglyceride and RUQ US   - NPO with IVF  - advance diet as tolerated in AM.   - monitor BUN, Hct and urine output   - supportive care    HTN/HLD - c/w home med  BPH - on flomax  DM II - monitor on FS AC HS.     DVT ppx: Lovenox SC  GI ppx: PPI  Diet: NPO with IVF   Activity: as tolerated.     Date seen by the attending: 10/27/2022.

## 2022-10-27 NOTE — H&P ADULT - ASSESSMENT
67 year old with a PMHx of HTN, DLD, Diabetes mellitus on Metformin,  BPH, GERD, prostate CA s/p seeding on remission, recurrent pancreatitis last admission in 4/22 presented today for epigastric pain associated with n/v for 2 days.     #Acute pancreatitis  - CT a/p shows acute pancreatitis, lipase elevated  - s/p IVF in ED  - NPO for now  - adv diet as shane  - start LR at 200 cc/hr  - Will need to f/u GI OP     # h/o HTN,   c/w amlodipine    #h/o HLD,   c/w atorvastatin      # h/o BPH  c/w tamsulosin    # h/o DM  - monitor FS    # DVT PPX: Lovenox  # GI PPX: PPI  # Diet: NPO  #Dispo: acute

## 2022-10-27 NOTE — H&P ADULT - NSHPPHYSICALEXAM_GEN_ALL_CORE
GENERAL: NAD, speaks in full sentences, no signs of respiratory distress  HEAD:  Atraumatic, Normocephalic  EYES: EOMI, PERRLA  NECK: Supple  CHEST/LUNG: Clear to auscultation bilaterally; No wheeze  HEART: Regular rate and rhythm; No murmurs;   ABDOMEN: Soft, diffuse tenderness, Nondistended; Bowel sounds present; No guarding  EXTREMITIES:  No cyanosis or edema  PSYCH: AAOx3  NEUROLOGY: non-focal  SKIN: No rashes or lesions

## 2022-10-27 NOTE — ED PROVIDER NOTE - ATTENDING APP SHARED VISIT CONTRIBUTION OF CARE
pt with epig pain, to back. no fever.  hx of pancreatitis, feels the same.    anict, ctab, rrr, ab soft tender epig. no rebound.    labs, imaging, supportive care

## 2022-10-27 NOTE — ED PROVIDER NOTE - OBJECTIVE STATEMENT
68 yo male hx HTN/HLD/DM/ pancreatitis 2 years ago present c/o epigastric pain associates with nausea and vomiting for 2 days. reported pain is sharp and similar to the pain when he had pancreatitis. eating exacerbated the pain. denies drinking alcohol frequently. further denies fever/chill/diarrhea/constipation and urinary sxs. Denies HA/dizziness/chest pain/sob/palpitations and pain to extremities and ambulatory difficulty.

## 2022-10-27 NOTE — ED PROVIDER NOTE - PHYSICAL EXAMINATION
CONSTITUTIONAL: Well-appearing; well-nourished; in no apparent distress.   EYES: PERRL; EOM intact.   CARDIOVASCULAR: Normal S1, S2; no murmurs, rubs, or gallops.   RESPIRATORY: Normal chest excursion with respiration; breath sounds clear and equal bilaterally; no wheezes, rhonchi, or rales.  GI/: + epigastric tenderness with mild diffuse tenderness. Normal bowel sounds; non-distended; no palpable organomegaly.   MS: No calf swelling and tenderness.  SKIN: Normal for age and race; warm; dry; good turgor; no apparent lesions or exudate.   NEURO/PSYCH: A & O x 4; grossly unremarkable.

## 2022-10-28 LAB
A1C WITH ESTIMATED AVERAGE GLUCOSE RESULT: 7.5 % — HIGH (ref 4–5.6)
ALBUMIN SERPL ELPH-MCNC: 4.6 G/DL — SIGNIFICANT CHANGE UP (ref 3.5–5.2)
ALP SERPL-CCNC: 111 U/L — SIGNIFICANT CHANGE UP (ref 30–115)
ALT FLD-CCNC: 16 U/L — SIGNIFICANT CHANGE UP (ref 0–41)
AMPHET UR-MCNC: SIGNIFICANT CHANGE UP
ANION GAP SERPL CALC-SCNC: 13 MMOL/L — SIGNIFICANT CHANGE UP (ref 7–14)
AST SERPL-CCNC: 15 U/L — SIGNIFICANT CHANGE UP (ref 0–41)
BARBITURATES UR SCN-MCNC: SIGNIFICANT CHANGE UP
BASOPHILS # BLD AUTO: 0.03 K/UL — SIGNIFICANT CHANGE UP (ref 0–0.2)
BASOPHILS NFR BLD AUTO: 0.4 % — SIGNIFICANT CHANGE UP (ref 0–1)
BENZODIAZ UR-MCNC: SIGNIFICANT CHANGE UP
BILIRUB SERPL-MCNC: 1.3 MG/DL — HIGH (ref 0.2–1.2)
BUN SERPL-MCNC: 6 MG/DL — LOW (ref 10–20)
CALCIUM SERPL-MCNC: 9.6 MG/DL — SIGNIFICANT CHANGE UP (ref 8.4–10.4)
CHLORIDE SERPL-SCNC: 99 MMOL/L — SIGNIFICANT CHANGE UP (ref 98–110)
CHOLEST SERPL-MCNC: 195 MG/DL — SIGNIFICANT CHANGE UP
CO2 SERPL-SCNC: 25 MMOL/L — SIGNIFICANT CHANGE UP (ref 17–32)
COCAINE METAB.OTHER UR-MCNC: SIGNIFICANT CHANGE UP
CREAT SERPL-MCNC: 0.6 MG/DL — LOW (ref 0.7–1.5)
EGFR: 106 ML/MIN/1.73M2 — SIGNIFICANT CHANGE UP
EOSINOPHIL # BLD AUTO: 0.16 K/UL — SIGNIFICANT CHANGE UP (ref 0–0.7)
EOSINOPHIL NFR BLD AUTO: 2.1 % — SIGNIFICANT CHANGE UP (ref 0–8)
ESTIMATED AVERAGE GLUCOSE: 169 MG/DL — HIGH (ref 68–114)
GLUCOSE BLDC GLUCOMTR-MCNC: 106 MG/DL — HIGH (ref 70–99)
GLUCOSE BLDC GLUCOMTR-MCNC: 116 MG/DL — HIGH (ref 70–99)
GLUCOSE SERPL-MCNC: 135 MG/DL — HIGH (ref 70–99)
HCT VFR BLD CALC: 42.6 % — SIGNIFICANT CHANGE UP (ref 42–52)
HDLC SERPL-MCNC: 55 MG/DL — SIGNIFICANT CHANGE UP
HGB BLD-MCNC: 14.2 G/DL — SIGNIFICANT CHANGE UP (ref 14–18)
IMM GRANULOCYTES NFR BLD AUTO: 0.4 % — HIGH (ref 0.1–0.3)
LIPID PNL WITH DIRECT LDL SERPL: 127 MG/DL — HIGH
LYMPHOCYTES # BLD AUTO: 1.09 K/UL — LOW (ref 1.2–3.4)
LYMPHOCYTES # BLD AUTO: 14.3 % — LOW (ref 20.5–51.1)
MAGNESIUM SERPL-MCNC: 2 MG/DL — SIGNIFICANT CHANGE UP (ref 1.8–2.4)
MCHC RBC-ENTMCNC: 29.3 PG — SIGNIFICANT CHANGE UP (ref 27–31)
MCHC RBC-ENTMCNC: 33.3 G/DL — SIGNIFICANT CHANGE UP (ref 32–37)
MCV RBC AUTO: 88 FL — SIGNIFICANT CHANGE UP (ref 80–94)
METHADONE UR-MCNC: SIGNIFICANT CHANGE UP
MONOCYTES # BLD AUTO: 0.63 K/UL — HIGH (ref 0.1–0.6)
MONOCYTES NFR BLD AUTO: 8.3 % — SIGNIFICANT CHANGE UP (ref 1.7–9.3)
NEUTROPHILS # BLD AUTO: 5.69 K/UL — SIGNIFICANT CHANGE UP (ref 1.4–6.5)
NEUTROPHILS NFR BLD AUTO: 74.5 % — SIGNIFICANT CHANGE UP (ref 42.2–75.2)
NON HDL CHOLESTEROL: 140 MG/DL — HIGH
NRBC # BLD: 0 /100 WBCS — SIGNIFICANT CHANGE UP (ref 0–0)
OPIATES UR-MCNC: SIGNIFICANT CHANGE UP
PCP SPEC-MCNC: SIGNIFICANT CHANGE UP
PLATELET # BLD AUTO: 229 K/UL — SIGNIFICANT CHANGE UP (ref 130–400)
POTASSIUM SERPL-MCNC: 4.2 MMOL/L — SIGNIFICANT CHANGE UP (ref 3.5–5)
POTASSIUM SERPL-SCNC: 4.2 MMOL/L — SIGNIFICANT CHANGE UP (ref 3.5–5)
PROPOXYPHENE QUALITATIVE URINE RESULT: SIGNIFICANT CHANGE UP
PROT SERPL-MCNC: 7 G/DL — SIGNIFICANT CHANGE UP (ref 6–8)
RBC # BLD: 4.84 M/UL — SIGNIFICANT CHANGE UP (ref 4.7–6.1)
RBC # FLD: 12.5 % — SIGNIFICANT CHANGE UP (ref 11.5–14.5)
SODIUM SERPL-SCNC: 137 MMOL/L — SIGNIFICANT CHANGE UP (ref 135–146)
TRIGL SERPL-MCNC: 68 MG/DL — SIGNIFICANT CHANGE UP
WBC # BLD: 7.63 K/UL — SIGNIFICANT CHANGE UP (ref 4.8–10.8)
WBC # FLD AUTO: 7.63 K/UL — SIGNIFICANT CHANGE UP (ref 4.8–10.8)

## 2022-10-28 PROCEDURE — 76705 ECHO EXAM OF ABDOMEN: CPT | Mod: 26

## 2022-10-28 PROCEDURE — 99233 SBSQ HOSP IP/OBS HIGH 50: CPT

## 2022-10-28 RX ORDER — MORPHINE SULFATE 50 MG/1
4 CAPSULE, EXTENDED RELEASE ORAL EVERY 4 HOURS
Refills: 0 | Status: DISCONTINUED | OUTPATIENT
Start: 2022-10-28 | End: 2022-10-31

## 2022-10-28 RX ORDER — INFLUENZA VIRUS VACCINE 15; 15; 15; 15 UG/.5ML; UG/.5ML; UG/.5ML; UG/.5ML
0.7 SUSPENSION INTRAMUSCULAR ONCE
Refills: 0 | Status: DISCONTINUED | OUTPATIENT
Start: 2022-10-28 | End: 2022-11-03

## 2022-10-28 RX ADMIN — SODIUM CHLORIDE 200 MILLILITER(S): 9 INJECTION, SOLUTION INTRAVENOUS at 11:57

## 2022-10-28 RX ADMIN — Medication 81 MILLIGRAM(S): at 11:57

## 2022-10-28 RX ADMIN — ATORVASTATIN CALCIUM 40 MILLIGRAM(S): 80 TABLET, FILM COATED ORAL at 21:38

## 2022-10-28 RX ADMIN — SENNA PLUS 2 TABLET(S): 8.6 TABLET ORAL at 21:38

## 2022-10-28 RX ADMIN — POLYETHYLENE GLYCOL 3350 17 GRAM(S): 17 POWDER, FOR SOLUTION ORAL at 11:57

## 2022-10-28 RX ADMIN — MORPHINE SULFATE 4 MILLIGRAM(S): 50 CAPSULE, EXTENDED RELEASE ORAL at 15:39

## 2022-10-28 RX ADMIN — AMLODIPINE BESYLATE 5 MILLIGRAM(S): 2.5 TABLET ORAL at 05:14

## 2022-10-28 RX ADMIN — SODIUM CHLORIDE 200 MILLILITER(S): 9 INJECTION, SOLUTION INTRAVENOUS at 00:39

## 2022-10-28 RX ADMIN — SODIUM CHLORIDE 200 MILLILITER(S): 9 INJECTION, SOLUTION INTRAVENOUS at 05:14

## 2022-10-28 RX ADMIN — MORPHINE SULFATE 4 MILLIGRAM(S): 50 CAPSULE, EXTENDED RELEASE ORAL at 01:19

## 2022-10-28 RX ADMIN — MORPHINE SULFATE 4 MILLIGRAM(S): 50 CAPSULE, EXTENDED RELEASE ORAL at 08:52

## 2022-10-28 RX ADMIN — ONDANSETRON 4 MILLIGRAM(S): 8 TABLET, FILM COATED ORAL at 08:52

## 2022-10-28 RX ADMIN — TAMSULOSIN HYDROCHLORIDE 0.4 MILLIGRAM(S): 0.4 CAPSULE ORAL at 21:38

## 2022-10-28 RX ADMIN — MORPHINE SULFATE 4 MILLIGRAM(S): 50 CAPSULE, EXTENDED RELEASE ORAL at 22:43

## 2022-10-28 RX ADMIN — MORPHINE SULFATE 4 MILLIGRAM(S): 50 CAPSULE, EXTENDED RELEASE ORAL at 23:10

## 2022-10-28 NOTE — ED ADULT NURSE REASSESSMENT NOTE - NS ED NURSE REASSESS COMMENT FT1
Went to patient's bedside to address complaints. Patient appears paranoid and making unrealistic commands and threats. MD made aware of patient's behavior.

## 2022-10-28 NOTE — PROGRESS NOTE ADULT - ASSESSMENT
68 yo M with hx of HTN, HLD, DM II (on metformin), BPH, Prostate Ca s/p seeding (in remission), GERD and recurrent pancreatitis presents for epigastric pain a/w nausea and vomiting x 2 days.     # Acute pancreatitis   - CT abdomen shows Mild fat stranding surrounding the head of the pancreas, consistent with acute interstitial edematous pancreatitis; no acute fluid collection.  - serum lipase 136  - US RUG- no gall stones  - NPO with IVF- continue LR at 200 ml/hr  - monitor BUN, Hct and urine output   - advance diet as tolerated  - pain control- morphine PRN  - continue laxatives    # HTN- c/w amlodipine    # BPH - on flomax    # DM II - monitor on FS AC HS.     #h/o HLD, -c/w atorvastatin    DVT ppx: Lovenox SC     68 yo M with hx of HTN, HLD, DM II (on metformin), BPH, Prostate Ca s/p seeding (in remission), GERD and recurrent pancreatitis presents for epigastric pain a/w nausea and vomiting x 2 days.     # Acute pancreatitis   - CT abdomen shows Mild fat stranding surrounding the head of the pancreas, consistent with acute interstitial edematous pancreatitis; no acute fluid collection.  - serum lipase 136  - US RUG- no gall stones  - NPO with IVF- continue LR at 200 ml/hr  - monitor BUN, Hct and urine output   - advance diet as tolerated  - pain control- morphine PRN  - continue laxatives    # HTN- c/w amlodipine    # BPH - on flomax    # DM II - monitor on FS AC HS.     #h/o HLD, -c/w atorvastatin    DVT ppx: Lovenox SC    Pending: clinical improvement, diet tolerance, urine substance abuse screen

## 2022-10-29 LAB
ALBUMIN SERPL ELPH-MCNC: 4.1 G/DL — SIGNIFICANT CHANGE UP (ref 3.5–5.2)
ALP SERPL-CCNC: 99 U/L — SIGNIFICANT CHANGE UP (ref 30–115)
ALT FLD-CCNC: 15 U/L — SIGNIFICANT CHANGE UP (ref 0–41)
ANION GAP SERPL CALC-SCNC: 12 MMOL/L — SIGNIFICANT CHANGE UP (ref 7–14)
AST SERPL-CCNC: 17 U/L — SIGNIFICANT CHANGE UP (ref 0–41)
BASOPHILS # BLD AUTO: 0.03 K/UL — SIGNIFICANT CHANGE UP (ref 0–0.2)
BASOPHILS NFR BLD AUTO: 0.5 % — SIGNIFICANT CHANGE UP (ref 0–1)
BILIRUB SERPL-MCNC: 1.7 MG/DL — HIGH (ref 0.2–1.2)
BUN SERPL-MCNC: 6 MG/DL — LOW (ref 10–20)
CALCIUM SERPL-MCNC: 9.1 MG/DL — SIGNIFICANT CHANGE UP (ref 8.4–10.4)
CHLORIDE SERPL-SCNC: 100 MMOL/L — SIGNIFICANT CHANGE UP (ref 98–110)
CO2 SERPL-SCNC: 26 MMOL/L — SIGNIFICANT CHANGE UP (ref 17–32)
CREAT SERPL-MCNC: 0.6 MG/DL — LOW (ref 0.7–1.5)
EGFR: 106 ML/MIN/1.73M2 — SIGNIFICANT CHANGE UP
EOSINOPHIL # BLD AUTO: 0.21 K/UL — SIGNIFICANT CHANGE UP (ref 0–0.7)
EOSINOPHIL NFR BLD AUTO: 3.8 % — SIGNIFICANT CHANGE UP (ref 0–8)
GLUCOSE SERPL-MCNC: 112 MG/DL — HIGH (ref 70–99)
HCT VFR BLD CALC: 39.1 % — LOW (ref 42–52)
HGB BLD-MCNC: 13.3 G/DL — LOW (ref 14–18)
IMM GRANULOCYTES NFR BLD AUTO: 0.2 % — SIGNIFICANT CHANGE UP (ref 0.1–0.3)
LYMPHOCYTES # BLD AUTO: 1.24 K/UL — SIGNIFICANT CHANGE UP (ref 1.2–3.4)
LYMPHOCYTES # BLD AUTO: 22.3 % — SIGNIFICANT CHANGE UP (ref 20.5–51.1)
MCHC RBC-ENTMCNC: 29.6 PG — SIGNIFICANT CHANGE UP (ref 27–31)
MCHC RBC-ENTMCNC: 34 G/DL — SIGNIFICANT CHANGE UP (ref 32–37)
MCV RBC AUTO: 86.9 FL — SIGNIFICANT CHANGE UP (ref 80–94)
MONOCYTES # BLD AUTO: 0.52 K/UL — SIGNIFICANT CHANGE UP (ref 0.1–0.6)
MONOCYTES NFR BLD AUTO: 9.4 % — HIGH (ref 1.7–9.3)
NEUTROPHILS # BLD AUTO: 3.55 K/UL — SIGNIFICANT CHANGE UP (ref 1.4–6.5)
NEUTROPHILS NFR BLD AUTO: 63.8 % — SIGNIFICANT CHANGE UP (ref 42.2–75.2)
NRBC # BLD: 0 /100 WBCS — SIGNIFICANT CHANGE UP (ref 0–0)
PLATELET # BLD AUTO: 225 K/UL — SIGNIFICANT CHANGE UP (ref 130–400)
POTASSIUM SERPL-MCNC: 4 MMOL/L — SIGNIFICANT CHANGE UP (ref 3.5–5)
POTASSIUM SERPL-SCNC: 4 MMOL/L — SIGNIFICANT CHANGE UP (ref 3.5–5)
PROT SERPL-MCNC: 6.8 G/DL — SIGNIFICANT CHANGE UP (ref 6–8)
RBC # BLD: 4.5 M/UL — LOW (ref 4.7–6.1)
RBC # FLD: 12.2 % — SIGNIFICANT CHANGE UP (ref 11.5–14.5)
SODIUM SERPL-SCNC: 138 MMOL/L — SIGNIFICANT CHANGE UP (ref 135–146)
WBC # BLD: 5.56 K/UL — SIGNIFICANT CHANGE UP (ref 4.8–10.8)
WBC # FLD AUTO: 5.56 K/UL — SIGNIFICANT CHANGE UP (ref 4.8–10.8)

## 2022-10-29 PROCEDURE — 99233 SBSQ HOSP IP/OBS HIGH 50: CPT

## 2022-10-29 PROCEDURE — 99221 1ST HOSP IP/OBS SF/LOW 40: CPT

## 2022-10-29 RX ADMIN — Medication 81 MILLIGRAM(S): at 11:33

## 2022-10-29 RX ADMIN — ATORVASTATIN CALCIUM 40 MILLIGRAM(S): 80 TABLET, FILM COATED ORAL at 21:31

## 2022-10-29 RX ADMIN — AMLODIPINE BESYLATE 5 MILLIGRAM(S): 2.5 TABLET ORAL at 05:09

## 2022-10-29 RX ADMIN — MORPHINE SULFATE 4 MILLIGRAM(S): 50 CAPSULE, EXTENDED RELEASE ORAL at 12:11

## 2022-10-29 RX ADMIN — SODIUM CHLORIDE 200 MILLILITER(S): 9 INJECTION, SOLUTION INTRAVENOUS at 21:31

## 2022-10-29 RX ADMIN — ENOXAPARIN SODIUM 40 MILLIGRAM(S): 100 INJECTION SUBCUTANEOUS at 05:09

## 2022-10-29 RX ADMIN — POLYETHYLENE GLYCOL 3350 17 GRAM(S): 17 POWDER, FOR SOLUTION ORAL at 11:32

## 2022-10-29 RX ADMIN — SENNA PLUS 2 TABLET(S): 8.6 TABLET ORAL at 21:31

## 2022-10-29 RX ADMIN — TAMSULOSIN HYDROCHLORIDE 0.4 MILLIGRAM(S): 0.4 CAPSULE ORAL at 21:31

## 2022-10-29 NOTE — CONSULT NOTE ADULT - SUBJECTIVE AND OBJECTIVE BOX
Gastroenterology Consultation:    Patient is a 67y old  Male who presents with a chief complaint of abd pain (29 Oct 2022 13:05)    Admitted on: 10-27-22      HPI:  67 year old with a PMHx of HTN, DLD, Diabetes mellitus on Metformin,  BPH, GERD, prostate CA s/p seeding on remission, recurrent pancreatitis last admission in 4/22 presented today for epigastric pain associated with n/v for 2 days. He reported pain is sharp and similar to the pain when he had pancreatitis and eating exacerbated the pain. denies drinking alcohol frequently.   CT a/p shows acute pancreatitis. labs are overall unremarkable except lipase 136.  admitted for pancreatitis       Prior EGD/ Colonoscopy:  < from: Colonoscopy (08.31.20 @ 13:00) >   Polyp (8 mm) in the descending colon. (Polypectomy).    Polyp (8 mm) in the descending colon. (Polypectomy).    Polyp (1 cm) in the descending colon. (Polypectomy).    Internal and external hemorrhoids.     < end of copied text >      PAST MEDICAL & SURGICAL HISTORY:  Diabetes mellitus, type II      Hypertension      Prostate cancer  s/p history of seeding      Hyperlipidemia      Osteoarthritis      Pancreatitis  recurrent      Prostate cancer  s/p history of seeding      History of appendectomy      S/P hip replacement, left  9/2015            FAMILY HISTORY:  Family history of diabetes mellitus in father (Father)    no fh of gi cancers    Social History:  Tobacco: denies  Alcohol: denies  Drugs: denies    Home Medications:  acetaminophen 325 mg oral tablet: 2 tab(s) orally every 4 hours, As needed, Mild Pain (1 - 3) (02 Apr 2022 11:53)  albuterol 2.5 mg/3 mL (0.083%) inhalation solution: 1 spray(s) inhaled 2 times a day (06 Apr 2022 09:34)  albuterol 90 mcg/inh inhalation aerosol: 1 puff(s) inhaled every 4 hours, As needed, Wheezing (06 Apr 2022 09:34)  amLODIPine 5 mg oral tablet: 1 tab(s) orally once a day (02 Apr 2022 11:53)  aspirin 81 mg oral tablet: 1 tab(s) orally once a day (02 Apr 2022 11:53)  atorvastatin 40 mg oral tablet: 1 tab(s) orally once a day (at bedtime) (02 Apr 2022 11:53)  Flomax 0.4 mg oral capsule: 1 cap(s) orally once a day (at bedtime) (02 Apr 2022 11:53)  metFORMIN 500 mg oral tablet: 2 tab(s) orally 2 times a day (02 Apr 2022 11:53)        MEDICATIONS  (STANDING):  amLODIPine   Tablet 5 milliGRAM(s) Oral daily  aspirin  chewable 81 milliGRAM(s) Oral daily  atorvastatin 40 milliGRAM(s) Oral at bedtime  enoxaparin Injectable 40 milliGRAM(s) SubCutaneous every 24 hours  influenza  Vaccine (HIGH DOSE) 0.7 milliLiter(s) IntraMuscular once  lactated ringers. 1000 milliLiter(s) (200 mL/Hr) IV Continuous <Continuous>  polyethylene glycol 3350 17 Gram(s) Oral daily  senna 2 Tablet(s) Oral at bedtime  tamsulosin 0.4 milliGRAM(s) Oral at bedtime    MEDICATIONS  (PRN):  acetaminophen     Tablet .. 650 milliGRAM(s) Oral every 6 hours PRN Temp greater or equal to 38C (100.4F), Mild Pain (1 - 3)  ALBUTerol    90 MICROgram(s) HFA Inhaler 1 Puff(s) Inhalation every 4 hours PRN Wheezing  bisacodyl 5 milliGRAM(s) Oral daily PRN Constipation  morphine  - Injectable 4 milliGRAM(s) IV Push every 4 hours PRN Severe Pain (7 - 10)  ondansetron Injectable 4 milliGRAM(s) IV Push every 6 hours PRN Nausea and/or Vomiting      Allergies  No Known Allergies      Review of Systems:   Constitutional:  No Fever, No Chills  ENT/Mouth:  No Hearing Changes,  No Difficulty Swallowing  Eyes:  No Eye Pain, No Vision Changes  Cardiovascular:  No Chest Pain, No Palpitations  Respiratory:  No Cough, No Dyspnea  Gastrointestinal:  As described in HPI  Musculoskeletal:  No Joint Swelling, No Back Pain  Skin:  No Skin Lesions, No Jaundice  Neuro:  No Syncope, No Dizziness  Heme/Lymph:  No Bruising, No Bleeding.    Physical Examination:  T(C): 35.7 (10-29-22 @ 09:25), Max: 35.9 (10-29-22 @ 05:11)  HR: 68 (10-29-22 @ 09:25) (63 - 68)  BP: 129/74 (10-29-22 @ 09:25) (129/74 - 149/92)  RR: 18 (10-29-22 @ 09:25) (18 - 18)  SpO2: 98% (10-29-22 @ 09:25) (98% - 100%)          GENERAL: AAOx3, no acute distress.  HEAD:  Atraumatic, Normocephalic  EYES: conjunctiva and sclera clear  NECK: Supple, no JVD or thyromegaly  CHEST/LUNG: Clear to auscultation bilaterally  HEART: Regular rate and rhythm; normal S1, S2, No murmurs.  ABDOMEN: Soft, nontender, nondistended  NEUROLOGY: No asterixis or tremor.   SKIN: Intact, no jaundice        Data:                        13.3   5.56  )-----------( 225      ( 29 Oct 2022 07:09 )             39.1     Hgb Trend:  13.3  10-29-22 @ 07:09  14.2  10-28-22 @ 07:57  13.2  10-27-22 @ 06:00      10-29    138  |  100  |  6<L>  ----------------------------<  112<H>  4.0   |  26  |  0.6<L>    Ca    9.1      29 Oct 2022 07:09  Mg     2.0     10-28    TPro  6.8  /  Alb  4.1  /  TBili  1.7<H>  /  DBili  x   /  AST  17  /  ALT  15  /  AlkPhos  99  10-29    Liver panel trend:  TBili 1.7   /   AST 17   /   ALT 15   /   AlkP 99   /   Tptn 6.8   /   Alb 4.1    /   DBili --      10-29  TBili 1.3   /   AST 15   /   ALT 16   /   AlkP 111   /   Tptn 7.0   /   Alb 4.6    /   DBili --      10-28  TBili 0.5   /   AST 35   /   ALT 15   /   AlkP 96   /   Tptn 7.2   /   Alb 4.4    /   DBili --      10-27              Radiology:    US Abdomen Upper Quadrant Right:   ACC: [id number] EXAM:  US ABDOMEN RT UPR QUADRANT                          PROCEDURE DATE:  10/28/2022          INTERPRETATION:  CLINICAL INFORMATION: Abdominal pain.    COMPARISON: CT scan performed the prior day.    TECHNIQUE: Sonography of the right upper quadrant.    FINDINGS:  Liver: Within normal limits.  Bile ducts: Normal caliber. Common bile duct measures 5 mm.  Gallbladder: Sludge is seen within the gallbladder.  Pancreas: Visualized portions are within normal limits.  Right kidney: 10.7 cm. No hydronephrosis.  Ascites: None.  IVC: Visualized portions are within normal limits.    IMPRESSION:  Normal right upper quadrant abdominal ultrasound.        --- End of Report ---            KAYLENE DIETZ MD; Attending Interventional Radiologist  This document has been electronically signed. Oct 28 2022  7:18AM (10-28-22 @ 06:24)

## 2022-10-29 NOTE — CONSULT NOTE ADULT - ASSESSMENT
67 year old with a PMHx of HTN, DLD, Diabetes mellitus on Metformin,  BPH, GERD, prostate CA on remission, recurrent pancreatitis admitted for pancreatitis.    # Acute Pancreatitis, Third episode of pancreatitis in three years. Presented with typical pancreatitis pain, elevated lipase and CT finding suggestive of pancreatitis. Normal TG,  No H/O ETOH abuse. RUQ unremarkable except for Sludge is seen within the gallbladder. Tbili is 1.7, other LFTS are normal. Reviewed medications, non of pts medications is known to cause pancreatitis.     RECOMMENDATIONS: NPO today, LR @ 250 cc/hour, Pain control, follow up UOP, BUN and Hct and trend LFTs. In absence of clear etiology and given third episode of pancreatitis, would recommend surgery eval for Cholecystectomy as biliary sludge has been linked to pancreatitis in pts with no other clear etiology. Please obtain IgG levels. Will consider MRCP as outpatient.     # HCM: last colonoscopy 2020. due for repeat in 8/2023    - Follow up with our [facility] located at [address] [place] [address]. Phone Number: [phone]   67 year old with a PMHx of HTN, DLD, Diabetes mellitus on Metformin,  BPH, GERD, prostate CA on remission, recurrent pancreatitis admitted for pancreatitis.    # Acute Pancreatitis, Third episode of pancreatitis in three years. Presented with typical pancreatitis pain, elevated lipase and CT finding suggestive of pancreatitis. Normal TG,  No H/O ETOH abuse. RUQ unremarkable except for Sludge seen within the gallbladder. Tbili is 1.7, other LFTS are normal. Reviewed medications, non of pts medications is known to cause pancreatitis.     RECOMMENDATIONS: NPO today, LR @ 250 cc/hour, Pain control, follow up UOP, BUN and Hct and trend LFTs. In absence of clear etiology and given third episode of pancreatitis, would recommend surgery eval for Cholecystectomy as biliary sludge has been linked to pancreatitis in pts with no other clear etiology. Please obtain IgG levels. Will consider MRCP as outpatient.     # HCM: last colonoscopy 2020. due for repeat in 8/2023    - Follow up with our [facility] located at [address] [place] [address]. Phone Number: [phone]

## 2022-10-29 NOTE — PROGRESS NOTE ADULT - ASSESSMENT
68 yo M with hx of HTN, HLD, DM II (on metformin), BPH, Prostate Ca s/p seeding (in remission), GERD and recurrent pancreatitis presents for epigastric pain a/w nausea and vomiting x 2 days.     # Acute pancreatitis   CT abdomen shows Mild fat stranding surrounding the head of the pancreas, consistent with acute interstitial edematous pancreatitis; no acute fluid collection.  serum lipase 136  US RUG- no gall stones  Pt denies Alcohol use  c/w IVF- continue LR at 200 ml/hr  start CLD   monitor BUN, Hct and urine output   advance diet as tolerated  pain control- morphine PRN  continue laxatives    # HTN  129/74  c/w amlodipine    # BPH   on flomax    # DM II   monitor on FS AC HS.     #h/o HLD,   c/w atorvastatin    DVT ppx: Lovenox SC    Progress Note Handoff:  Pending: clinical improvement, diet tolerance, urine substance abuse screen  plan of care d/w patient at the bedside.   Dispo: acute

## 2022-10-30 LAB
ALBUMIN SERPL ELPH-MCNC: 4.5 G/DL — SIGNIFICANT CHANGE UP (ref 3.5–5.2)
ALP SERPL-CCNC: 110 U/L — SIGNIFICANT CHANGE UP (ref 30–115)
ALT FLD-CCNC: 17 U/L — SIGNIFICANT CHANGE UP (ref 0–41)
ANION GAP SERPL CALC-SCNC: 14 MMOL/L — SIGNIFICANT CHANGE UP (ref 7–14)
AST SERPL-CCNC: 22 U/L — SIGNIFICANT CHANGE UP (ref 0–41)
BASOPHILS # BLD AUTO: 0.04 K/UL — SIGNIFICANT CHANGE UP (ref 0–0.2)
BASOPHILS NFR BLD AUTO: 0.7 % — SIGNIFICANT CHANGE UP (ref 0–1)
BILIRUB SERPL-MCNC: 2.1 MG/DL — HIGH (ref 0.2–1.2)
BUN SERPL-MCNC: 7 MG/DL — LOW (ref 10–20)
CALCIUM SERPL-MCNC: 9.9 MG/DL — SIGNIFICANT CHANGE UP (ref 8.4–10.4)
CHLORIDE SERPL-SCNC: 100 MMOL/L — SIGNIFICANT CHANGE UP (ref 98–110)
CO2 SERPL-SCNC: 25 MMOL/L — SIGNIFICANT CHANGE UP (ref 17–32)
CREAT SERPL-MCNC: 0.7 MG/DL — SIGNIFICANT CHANGE UP (ref 0.7–1.5)
EGFR: 101 ML/MIN/1.73M2 — SIGNIFICANT CHANGE UP
EOSINOPHIL # BLD AUTO: 0.19 K/UL — SIGNIFICANT CHANGE UP (ref 0–0.7)
EOSINOPHIL NFR BLD AUTO: 3.4 % — SIGNIFICANT CHANGE UP (ref 0–8)
GLUCOSE SERPL-MCNC: 91 MG/DL — SIGNIFICANT CHANGE UP (ref 70–99)
HCT VFR BLD CALC: 43 % — SIGNIFICANT CHANGE UP (ref 42–52)
HGB BLD-MCNC: 14.7 G/DL — SIGNIFICANT CHANGE UP (ref 14–18)
IMM GRANULOCYTES NFR BLD AUTO: 0.4 % — HIGH (ref 0.1–0.3)
LYMPHOCYTES # BLD AUTO: 1.15 K/UL — LOW (ref 1.2–3.4)
LYMPHOCYTES # BLD AUTO: 20.6 % — SIGNIFICANT CHANGE UP (ref 20.5–51.1)
MCHC RBC-ENTMCNC: 29.6 PG — SIGNIFICANT CHANGE UP (ref 27–31)
MCHC RBC-ENTMCNC: 34.2 G/DL — SIGNIFICANT CHANGE UP (ref 32–37)
MCV RBC AUTO: 86.7 FL — SIGNIFICANT CHANGE UP (ref 80–94)
MONOCYTES # BLD AUTO: 0.54 K/UL — SIGNIFICANT CHANGE UP (ref 0.1–0.6)
MONOCYTES NFR BLD AUTO: 9.7 % — HIGH (ref 1.7–9.3)
NEUTROPHILS # BLD AUTO: 3.65 K/UL — SIGNIFICANT CHANGE UP (ref 1.4–6.5)
NEUTROPHILS NFR BLD AUTO: 65.2 % — SIGNIFICANT CHANGE UP (ref 42.2–75.2)
NRBC # BLD: 0 /100 WBCS — SIGNIFICANT CHANGE UP (ref 0–0)
PLATELET # BLD AUTO: 232 K/UL — SIGNIFICANT CHANGE UP (ref 130–400)
POTASSIUM SERPL-MCNC: 4.6 MMOL/L — SIGNIFICANT CHANGE UP (ref 3.5–5)
POTASSIUM SERPL-SCNC: 4.6 MMOL/L — SIGNIFICANT CHANGE UP (ref 3.5–5)
PROT SERPL-MCNC: 7.6 G/DL — SIGNIFICANT CHANGE UP (ref 6–8)
RBC # BLD: 4.96 M/UL — SIGNIFICANT CHANGE UP (ref 4.7–6.1)
RBC # FLD: 12.2 % — SIGNIFICANT CHANGE UP (ref 11.5–14.5)
SODIUM SERPL-SCNC: 139 MMOL/L — SIGNIFICANT CHANGE UP (ref 135–146)
WBC # BLD: 5.59 K/UL — SIGNIFICANT CHANGE UP (ref 4.8–10.8)
WBC # FLD AUTO: 5.59 K/UL — SIGNIFICANT CHANGE UP (ref 4.8–10.8)

## 2022-10-30 PROCEDURE — 99223 1ST HOSP IP/OBS HIGH 75: CPT

## 2022-10-30 PROCEDURE — 99233 SBSQ HOSP IP/OBS HIGH 50: CPT

## 2022-10-30 RX ORDER — SODIUM CHLORIDE 9 MG/ML
1000 INJECTION, SOLUTION INTRAVENOUS
Refills: 0 | Status: DISCONTINUED | OUTPATIENT
Start: 2022-10-30 | End: 2022-10-31

## 2022-10-30 RX ADMIN — ATORVASTATIN CALCIUM 40 MILLIGRAM(S): 80 TABLET, FILM COATED ORAL at 21:47

## 2022-10-30 RX ADMIN — POLYETHYLENE GLYCOL 3350 17 GRAM(S): 17 POWDER, FOR SOLUTION ORAL at 11:14

## 2022-10-30 RX ADMIN — AMLODIPINE BESYLATE 5 MILLIGRAM(S): 2.5 TABLET ORAL at 05:10

## 2022-10-30 RX ADMIN — TAMSULOSIN HYDROCHLORIDE 0.4 MILLIGRAM(S): 0.4 CAPSULE ORAL at 21:47

## 2022-10-30 RX ADMIN — ENOXAPARIN SODIUM 40 MILLIGRAM(S): 100 INJECTION SUBCUTANEOUS at 05:10

## 2022-10-30 RX ADMIN — Medication 81 MILLIGRAM(S): at 11:14

## 2022-10-30 RX ADMIN — SENNA PLUS 2 TABLET(S): 8.6 TABLET ORAL at 21:47

## 2022-10-30 NOTE — CONSULT NOTE ADULT - ATTENDING COMMENTS
Patient is seen and examined, pain is better controlled, US showed GB sludge, consult surgery. IVF, bowel rest and pain control, monitor HCT, BUN, Cr.
This is 68 y/o male PSH of laparoscopic appendectomy (~10y ago), PMH of HTN, DLD, DM, BPH, GERD, prostate Ca s/p seeding on remission presenting with acute onset 2 day history of epigastric abdominal pain with radiation to the back and progression to diffuse generalized abdominal pain prompting presentation to hospital for evaluation. Patient has history of pancreatitis x2 episodes in the past, last admission 4/2022. Upon admission lipase 136, complaining of severe abdominal pain and CTAP showing mild peripancreatic inflammation around head of the pancreas. No history of EtOH abuse (last drink 1.5-2 mos prior to presentation), TG mildly elevated to 239 on admission trended down to 68, and RUQ U/S showing presence of biliary sludge without evidence of cholecystitis. Patients abdominal pain is improving, attempting CLD at the time of evaluation, having BM and passing gas.    PE:  AAO x3  Chest: clear.  CV : rrr  Abdomen: soft, nontender  Extr: no edema.    CT and Abd US were reviewed.    ASSESSMENT:  68 y/o male with Acute Pancreatitis, resolving.  Elevated Bilirubin.    PLAN:  No evidence of gallstones on imaging studies.  I am not convinced this is a gallstone pancreatitis.  Patient has elevated T.Chi with normal LFTs  I recommend GI to reconsult and do EUS, possibly ERCP as needed.  Will follow up.

## 2022-10-30 NOTE — PROGRESS NOTE ADULT - ASSESSMENT
68 yo M with hx of HTN, HLD, DM II (on metformin), BPH, Prostate Ca s/p seeding (in remission), GERD and recurrent pancreatitis presents for epigastric pain a/w nausea and vomiting x 2 days.     # Acute pancreatitis   -CT abdomen shows Mild fat stranding surrounding the head of the pancreas, consistent with acute interstitial edematous pancreatitis; no acute fluid collection.  -serum lipase 136  -US RUG- no gall stones, some GB sludge  -Pt says he rarely has a glass of wine  -c/w IVF- increase LR to 250 ml/hr  -monitor BUN, Hct and urine output   -advance diet as tolerated  -pain control- morphine PRN  -continue laxatives  -surgery consult for GB sludge  -sent IgG subsets     # HTN  -bp stable  -c/w amlodipine    # BPH   -on flomax    # DM II   -monitor on FS AC HS.     #h/o HLD,   -c/w atorvastatin    #DVT ppx: Lovenox SC    Progress Note Handoff:  Pending: clinical improvement, diet tolerance, pain control  plan of care d/w patient at the bedside.   Dispo: acute

## 2022-10-30 NOTE — CONSULT NOTE ADULT - SUBJECTIVE AND OBJECTIVE BOX
ALFAROSARAVANANIG [id number]  67y Male  3d    Surgical consult  67M PSH of laparoscopic appendectomy (~10y ago), PMH of HTN, DLD, DM, BPH, GERD, prostate Ca s/p seeding on remission presenting with acute onset 2 day history of epigastric abdominal pain with radiation to the back and progression to diffuse generalized abdominal pain prompting presentation to hospital for evaluation. Patient has history of pancreatitis x2 episodes in the past, last admission 4/2022. Upon admission lipase 136, complaining of severe abdominal pain and CTAP showing mild peripancreatic inflammation around head of the pancreas. No history of EtOH abuse (last drink 1.5-2 mos prior to presentation), TG normal on admission, and RUQ U/S showing presence of biliary sludge without evidence of cholecystitis. Patients abdominal pain is improving, attempting CLD at the time of evaluation, having BM and passing gas. No family history of autoimmune disorders. Has never seen a surgeon in the past.     PAST MEDICAL & SURGICAL HISTORY:  Diabetes mellitus, type II  Hypertension  Prostate cancer  s/p history of seeding  Hyperlipidemia  Osteoarthritis  Pancreatitis  recurrent  Prostate cancer  s/p history of seeding  History of appendectomy  S/P hip replacement, left  9/2015    MEDICATIONS  (STANDING):  amLODIPine   Tablet 5 milliGRAM(s) Oral daily  aspirin  chewable 81 milliGRAM(s) Oral daily  atorvastatin 40 milliGRAM(s) Oral at bedtime  enoxaparin Injectable 40 milliGRAM(s) SubCutaneous every 24 hours  influenza  Vaccine (HIGH DOSE) 0.7 milliLiter(s) IntraMuscular once  lactated ringers. 1000 milliLiter(s) (200 mL/Hr) IV Continuous <Continuous>  polyethylene glycol 3350 17 Gram(s) Oral daily  senna 2 Tablet(s) Oral at bedtime  tamsulosin 0.4 milliGRAM(s) Oral at bedtime    MEDICATIONS  (PRN):  acetaminophen     Tablet .. 650 milliGRAM(s) Oral every 6 hours PRN Temp greater or equal to 38C (100.4F), Mild Pain (1 - 3)  ALBUTerol    90 MICROgram(s) HFA Inhaler 1 Puff(s) Inhalation every 4 hours PRN Wheezing  bisacodyl 5 milliGRAM(s) Oral daily PRN Constipation  morphine  - Injectable 4 milliGRAM(s) IV Push every 4 hours PRN Severe Pain (7 - 10)  ondansetron Injectable 4 milliGRAM(s) IV Push every 6 hours PRN Nausea and/or Vomiting    Allergies  No Known Allergies  Intolerances    REVIEW OF SYSTEMS  [x ] A ten-point review of systems was otherwise negative except as noted.  [ ] Due to altered mental status/intubation, subjective information were not able to be obtained from the patient. History was obtained, to the extent possible, from review of the chart and collateral sources of information.    Vital Signs Last 24 Hrs  T(C): 36.3 (30 Oct 2022 05:02), Max: 36.3 (30 Oct 2022 05:02)  T(F): 97.4 (30 Oct 2022 05:02), Max: 97.4 (30 Oct 2022 05:02)  HR: 70 (30 Oct 2022 05:02) (68 - 70)  BP: 146/85 (30 Oct 2022 05:02) (135/82 - 146/85)  RR: 18 (30 Oct 2022 05:02) (18 - 18)  SpO2: 99% (30 Oct 2022 05:02) (98% - 99%)    Parameters below as of 30 Oct 2022 05:02  Patient On (Oxygen Delivery Method): room air    PHYSICAL EXAM:  GENERAL: NAD, well-appearing  CHEST/LUNG: Clear to auscultation bilaterally  HEART: Regular rate and rhythm  ABDOMEN: Soft, Nontender, Nondistended;   EXTREMITIES:  No clubbing, cyanosis, or edema    Labs:                14.7   5.59  )-----------( 232      ( 30 Oct 2022 11:24 )             43.0       Auto Neutrophil %: 65.2 % (10-30-22 @ 11:24)  Auto Immature Granulocyte %: 0.4 % (10-30-22 @ 11:24)    10-30  139  |  100  |  7<L>  ----------------------------<  91  4.6   |  25  |  0.7      Calcium, Total Serum: 9.9 mg/dL (10-30-22 @ 11:24)    LFTs:         7.6  | 2.1  | 22       ------------------[110     ( 30 Oct 2022 11:24 )  4.5  | x    | 17          Lipase:x      Amylase:x      Coags:    RADIOLOGY & ADDITIONAL STUDIES:  < from: US Abdomen Upper Quadrant Right (10.28.22 @ 06:24) >  FINDINGS:  Liver: Within normal limits.  Bile ducts: Normal caliber. Common bile duct measures 5 mm.  Gallbladder: Sludge is seen within the gallbladder.  Pancreas: Visualized portions are within normal limits.  Right kidney: 10.7 cm. No hydronephrosis.  Ascites: None.  IVC: Visualized portions are within normal limits.    < end of copied text >  < from: CT Abdomen and Pelvis w/ IV Cont (10.27.22 @ 05:50) >    IMPRESSION:    Mild fat stranding surrounding the head of the pancreas, consistent with   acute interstitial edematous pancreatitis; no acute fluid collection.    < end of copied text >     ALFAROSARAVANANIG [id number]  67y Male  3d    Surgical consult  67M PSH of laparoscopic appendectomy (~10y ago), PMH of HTN, DLD, DM, BPH, GERD, prostate Ca s/p seeding on remission presenting with acute onset 2 day history of epigastric abdominal pain with radiation to the back and progression to diffuse generalized abdominal pain prompting presentation to hospital for evaluation. Patient has history of pancreatitis x2 episodes in the past, last admission 4/2022. Upon admission lipase 136, complaining of severe abdominal pain and CTAP showing mild peripancreatic inflammation around head of the pancreas. No history of EtOH abuse (last drink 1.5-2 mos prior to presentation), TG mildly elevated to 239 on admission trended down to 68, and RUQ U/S showing presence of biliary sludge without evidence of cholecystitis. Patients abdominal pain is improving, attempting CLD at the time of evaluation, having BM and passing gas. No family history of autoimmune disorders. Has never seen a surgeon in the past.     PAST MEDICAL & SURGICAL HISTORY:  Diabetes mellitus, type II  Hypertension  Prostate cancer  s/p history of seeding  Hyperlipidemia  Osteoarthritis  Pancreatitis  recurrent  Prostate cancer  s/p history of seeding  History of appendectomy  S/P hip replacement, left  9/2015    MEDICATIONS  (STANDING):  amLODIPine   Tablet 5 milliGRAM(s) Oral daily  aspirin  chewable 81 milliGRAM(s) Oral daily  atorvastatin 40 milliGRAM(s) Oral at bedtime  enoxaparin Injectable 40 milliGRAM(s) SubCutaneous every 24 hours  influenza  Vaccine (HIGH DOSE) 0.7 milliLiter(s) IntraMuscular once  lactated ringers. 1000 milliLiter(s) (200 mL/Hr) IV Continuous <Continuous>  polyethylene glycol 3350 17 Gram(s) Oral daily  senna 2 Tablet(s) Oral at bedtime  tamsulosin 0.4 milliGRAM(s) Oral at bedtime    MEDICATIONS  (PRN):  acetaminophen     Tablet .. 650 milliGRAM(s) Oral every 6 hours PRN Temp greater or equal to 38C (100.4F), Mild Pain (1 - 3)  ALBUTerol    90 MICROgram(s) HFA Inhaler 1 Puff(s) Inhalation every 4 hours PRN Wheezing  bisacodyl 5 milliGRAM(s) Oral daily PRN Constipation  morphine  - Injectable 4 milliGRAM(s) IV Push every 4 hours PRN Severe Pain (7 - 10)  ondansetron Injectable 4 milliGRAM(s) IV Push every 6 hours PRN Nausea and/or Vomiting    Allergies  No Known Allergies  Intolerances    REVIEW OF SYSTEMS  [x ] A ten-point review of systems was otherwise negative except as noted.  [ ] Due to altered mental status/intubation, subjective information were not able to be obtained from the patient. History was obtained, to the extent possible, from review of the chart and collateral sources of information.    Vital Signs Last 24 Hrs  T(C): 36.3 (30 Oct 2022 05:02), Max: 36.3 (30 Oct 2022 05:02)  T(F): 97.4 (30 Oct 2022 05:02), Max: 97.4 (30 Oct 2022 05:02)  HR: 70 (30 Oct 2022 05:02) (68 - 70)  BP: 146/85 (30 Oct 2022 05:02) (135/82 - 146/85)  RR: 18 (30 Oct 2022 05:02) (18 - 18)  SpO2: 99% (30 Oct 2022 05:02) (98% - 99%)    Parameters below as of 30 Oct 2022 05:02  Patient On (Oxygen Delivery Method): room air    PHYSICAL EXAM:  GENERAL: NAD, well-appearing  CHEST/LUNG: Clear to auscultation bilaterally  HEART: Regular rate and rhythm  ABDOMEN: Soft, Nontender, Nondistended;   EXTREMITIES:  No clubbing, cyanosis, or edema    Labs:                14.7   5.59  )-----------( 232      ( 30 Oct 2022 11:24 )             43.0       Auto Neutrophil %: 65.2 % (10-30-22 @ 11:24)  Auto Immature Granulocyte %: 0.4 % (10-30-22 @ 11:24)    10-30  139  |  100  |  7<L>  ----------------------------<  91  4.6   |  25  |  0.7      Calcium, Total Serum: 9.9 mg/dL (10-30-22 @ 11:24)    LFTs:         7.6  | 2.1  | 22       ------------------[110     ( 30 Oct 2022 11:24 )  4.5  | x    | 17          Lipase:x      Amylase:x      Coags:    RADIOLOGY & ADDITIONAL STUDIES:  < from: US Abdomen Upper Quadrant Right (10.28.22 @ 06:24) >  FINDINGS:  Liver: Within normal limits.  Bile ducts: Normal caliber. Common bile duct measures 5 mm.  Gallbladder: Sludge is seen within the gallbladder.  Pancreas: Visualized portions are within normal limits.  Right kidney: 10.7 cm. No hydronephrosis.  Ascites: None.  IVC: Visualized portions are within normal limits.    < end of copied text >  < from: CT Abdomen and Pelvis w/ IV Cont (10.27.22 @ 05:50) >    IMPRESSION:    Mild fat stranding surrounding the head of the pancreas, consistent with   acute interstitial edematous pancreatitis; no acute fluid collection.    < end of copied text >

## 2022-10-30 NOTE — CONSULT NOTE ADULT - ASSESSMENT
Assessment  67M PSH of laparoscopic appendectomy (~10y ago), PMH of HTN, DLD, DM, BPH, GERD, prostate Ca s/p seeding on remission presenting with acute onset 2 day history of epigastric abdominal pain with radiation to the back and progression to diffuse generalized abdominal, found to have elevated lipase and imaging c/w acute pancreatitis. Admitted on 10/27 now with improvement on abdominal exam, RUQ U/S showing presence of biliary sludge for which surgical consult was placed    Plan  -  Assessment  67M PSH of laparoscopic appendectomy (~10y ago), PMH of HTN, DLD, DM, BPH, GERD, prostate Ca s/p seeding on remission presenting with acute onset 2 day history of epigastric abdominal pain with radiation to the back and progression to diffuse generalized abdominal, found to have elevated lipase and imaging c/w acute pancreatitis. Admitted on 10/27 now with improvement on abdominal exam, RUQ U/S showing presence of biliary sludge for which surgical consult was placed    Plan  - [facility] consult for EUS/ERCP given rising bilirubin  - daily hepatic function panel with GGT  - advance diet as tolerated  - surgical team to follow  - d/w Dr. Dupree

## 2022-10-31 LAB
ALBUMIN SERPL ELPH-MCNC: 4.2 G/DL — SIGNIFICANT CHANGE UP (ref 3.5–5.2)
ALP SERPL-CCNC: 116 U/L — HIGH (ref 30–115)
ALT FLD-CCNC: 16 U/L — SIGNIFICANT CHANGE UP (ref 0–41)
ANION GAP SERPL CALC-SCNC: 9 MMOL/L — SIGNIFICANT CHANGE UP (ref 7–14)
AST SERPL-CCNC: 18 U/L — SIGNIFICANT CHANGE UP (ref 0–41)
BASOPHILS # BLD AUTO: 0.03 K/UL — SIGNIFICANT CHANGE UP (ref 0–0.2)
BASOPHILS NFR BLD AUTO: 0.5 % — SIGNIFICANT CHANGE UP (ref 0–1)
BILIRUB SERPL-MCNC: 0.9 MG/DL — SIGNIFICANT CHANGE UP (ref 0.2–1.2)
BUN SERPL-MCNC: 8 MG/DL — LOW (ref 10–20)
CALCIUM SERPL-MCNC: 8.9 MG/DL — SIGNIFICANT CHANGE UP (ref 8.4–10.5)
CHLORIDE SERPL-SCNC: 100 MMOL/L — SIGNIFICANT CHANGE UP (ref 98–110)
CO2 SERPL-SCNC: 25 MMOL/L — SIGNIFICANT CHANGE UP (ref 17–32)
CREAT SERPL-MCNC: 0.6 MG/DL — LOW (ref 0.7–1.5)
EGFR: 106 ML/MIN/1.73M2 — SIGNIFICANT CHANGE UP
EOSINOPHIL # BLD AUTO: 0.09 K/UL — SIGNIFICANT CHANGE UP (ref 0–0.7)
EOSINOPHIL NFR BLD AUTO: 1.6 % — SIGNIFICANT CHANGE UP (ref 0–8)
GLUCOSE SERPL-MCNC: 132 MG/DL — HIGH (ref 70–99)
HCT VFR BLD CALC: 35.8 % — LOW (ref 42–52)
HGB BLD-MCNC: 12.6 G/DL — LOW (ref 14–18)
IMM GRANULOCYTES NFR BLD AUTO: 0.4 % — HIGH (ref 0.1–0.3)
LYMPHOCYTES # BLD AUTO: 1.01 K/UL — LOW (ref 1.2–3.4)
LYMPHOCYTES # BLD AUTO: 18.2 % — LOW (ref 20.5–51.1)
MAGNESIUM SERPL-MCNC: 1.9 MG/DL — SIGNIFICANT CHANGE UP (ref 1.8–2.4)
MCHC RBC-ENTMCNC: 30.1 PG — SIGNIFICANT CHANGE UP (ref 27–31)
MCHC RBC-ENTMCNC: 35.2 G/DL — SIGNIFICANT CHANGE UP (ref 32–37)
MCV RBC AUTO: 85.4 FL — SIGNIFICANT CHANGE UP (ref 80–94)
MONOCYTES # BLD AUTO: 0.63 K/UL — HIGH (ref 0.1–0.6)
MONOCYTES NFR BLD AUTO: 11.3 % — HIGH (ref 1.7–9.3)
NEUTROPHILS # BLD AUTO: 3.78 K/UL — SIGNIFICANT CHANGE UP (ref 1.4–6.5)
NEUTROPHILS NFR BLD AUTO: 68 % — SIGNIFICANT CHANGE UP (ref 42.2–75.2)
NRBC # BLD: 0 /100 WBCS — SIGNIFICANT CHANGE UP (ref 0–0)
PLATELET # BLD AUTO: 208 K/UL — SIGNIFICANT CHANGE UP (ref 130–400)
POTASSIUM SERPL-MCNC: 3.8 MMOL/L — SIGNIFICANT CHANGE UP (ref 3.5–5)
POTASSIUM SERPL-SCNC: 3.8 MMOL/L — SIGNIFICANT CHANGE UP (ref 3.5–5)
PROT SERPL-MCNC: 6.7 G/DL — SIGNIFICANT CHANGE UP (ref 6–8)
RBC # BLD: 4.19 M/UL — LOW (ref 4.7–6.1)
RBC # FLD: 12.2 % — SIGNIFICANT CHANGE UP (ref 11.5–14.5)
SODIUM SERPL-SCNC: 134 MMOL/L — LOW (ref 135–146)
WBC # BLD: 5.56 K/UL — SIGNIFICANT CHANGE UP (ref 4.8–10.8)
WBC # FLD AUTO: 5.56 K/UL — SIGNIFICANT CHANGE UP (ref 4.8–10.8)

## 2022-10-31 PROCEDURE — 99233 SBSQ HOSP IP/OBS HIGH 50: CPT

## 2022-10-31 PROCEDURE — 99232 SBSQ HOSP IP/OBS MODERATE 35: CPT

## 2022-10-31 RX ORDER — LIPASE/PROTEASE/AMYLASE 16-48-48K
3 CAPSULE,DELAYED RELEASE (ENTERIC COATED) ORAL
Refills: 0 | Status: DISCONTINUED | OUTPATIENT
Start: 2022-10-31 | End: 2022-11-03

## 2022-10-31 RX ORDER — SODIUM CHLORIDE 9 MG/ML
1000 INJECTION, SOLUTION INTRAVENOUS
Refills: 0 | Status: DISCONTINUED | OUTPATIENT
Start: 2022-10-31 | End: 2022-11-01

## 2022-10-31 RX ORDER — LIPASE/PROTEASE/AMYLASE 16-48-48K
36000 CAPSULE,DELAYED RELEASE (ENTERIC COATED) ORAL
Refills: 0 | Status: DISCONTINUED | OUTPATIENT
Start: 2022-10-31 | End: 2022-10-31

## 2022-10-31 RX ADMIN — ATORVASTATIN CALCIUM 40 MILLIGRAM(S): 80 TABLET, FILM COATED ORAL at 21:55

## 2022-10-31 RX ADMIN — Medication 3 CAPSULE(S): at 17:56

## 2022-10-31 RX ADMIN — POLYETHYLENE GLYCOL 3350 17 GRAM(S): 17 POWDER, FOR SOLUTION ORAL at 11:40

## 2022-10-31 RX ADMIN — ENOXAPARIN SODIUM 40 MILLIGRAM(S): 100 INJECTION SUBCUTANEOUS at 06:15

## 2022-10-31 RX ADMIN — AMLODIPINE BESYLATE 5 MILLIGRAM(S): 2.5 TABLET ORAL at 06:15

## 2022-10-31 RX ADMIN — TAMSULOSIN HYDROCHLORIDE 0.4 MILLIGRAM(S): 0.4 CAPSULE ORAL at 21:56

## 2022-10-31 RX ADMIN — SENNA PLUS 2 TABLET(S): 8.6 TABLET ORAL at 21:55

## 2022-10-31 NOTE — PROGRESS NOTE ADULT - ASSESSMENT
68 yo M with hx of HTN, HLD, DM II (on metformin), BPH, Prostate Ca s/p seeding (in remission), GERD and recurrent pancreatitis presents for epigastric pain a/w nausea and vomiting x 2 days.     # Acute pancreatitis   -CT abdomen shows Mild fat stranding surrounding the head of the pancreas, consistent with acute interstitial edematous pancreatitis; no acute fluid collection.  -serum lipase 136  -US RUQ- no gall stones, some GB sludge  - Only drinks etoh socially   Plan:  -de-escalate IVF to 75cc/ hr, dc tomorrow if maintaining appropriate PO intake   -pain control- dc morphine has not required in 2 days, tylenol prn   -continue laxatives- senna and miralax   - GI following with plan for EUS and ERCP on wednesday, if negative, they strongly recommend proceeding with cholecystectomy.   - Surgery following as well   -sent IgG subsets     # HTN  -c/w amlodipine    # BPH   -on flomax    # DM II   -monitor on FS AC HS.     #h/o HLD  -c/w atorvastatin    #DVT ppx: Lovenox SC    Progress Note Handoff:  Pending: ERCP on wednesday, possible CCY this admission   plan of care d/w patient at the bedside.   Dispo: acute      Total time spent to complete patient's bedside assessment, review medical chart, discuss medical plan of care with covering medical team was more than 35 minutes  with >50% of time spent face to face with patient, discussion with patient/family and/or coordination of care      Angie Pike DO

## 2022-10-31 NOTE — PROGRESS NOTE ADULT - ATTENDING COMMENTS
Patient is stable clinically   No abdominal pain    Assessment/Plan:  No evidence on performed images (CT and Abd. US) of any gallstones.  Needs GI for possible EUS/ERCP.  Ok to advance diet as tolerated.  He can follow with me as outpatient.

## 2022-10-31 NOTE — CHART NOTE - NSCHARTNOTEFT_GEN_A_CORE
[facility] will perform EUS with possible ERCP on Wednesday [facility] will perform EUS with possible ERCP on Wednesday.  Dr. Leia Medrano suggested outpatient follow up to schedule OR. Clinic number: [id number]

## 2022-10-31 NOTE — PROGRESS NOTE ADULT - ASSESSMENT
Patient 67 year old with a PMHx of HTN, DLD, Diabetes mellitus on Metformin,  BPH, GERD, prostate CA s/p seeding on remission, recurrent pancreatitis last admission in 4/22 presented for epigastric pain associated with n/v for 2 days. Followed by Trauma as will need CCY this admission. They care concerned as T adenike increasing. Patient doing better overall. Plan EUS +/-ERCP Wednesday unless worsening clinical picture.     Pancreatitis / Gallbladder Sludge  - Daily CMP CBC  - Hold ASA starting today  - Hold Lovenox Wednesday 11/2  - Obtain INR on routine labs, want INR less than 1.5 for Wednesday   - Low fat diet- Creon 36,000 4 x daily with meals   - EUS +/- ERCP Wednesday

## 2022-10-31 NOTE — PROGRESS NOTE ADULT - ASSESSMENT
A/P:  67M PSH of laparoscopic appendectomy (~10y ago), PMH of HTN, DLD, DM, BPH, GERD, prostate Ca s/p seeding on remission presenting with acute onset 2 day history of epigastric abdominal pain with radiation to the back and progression to diffuse generalized abdominal, found to have elevated lipase and imaging c/w acute pancreatitis. Admitted on 10/27 now with improvement on abdominal exam, RUQ U/S showing presence of biliary sludge for which surgical consult was placed    PLAN:   Pancreatitis  -CT 10/27: Mild fat stranding surrounding the head of the pancreas, consistent with acute interstitial edematous pancreatitis  -RUQ US 10/28: Sludge within gallbladder, CBD 5mm  -Currently low fiber diet  -Tbili 2.1, uptrend recommend recall GI for EUS/ERCP  -Recommend goal based IVF resuscitation, decrease ivf    #Antibiotics:  Not indicated  #DVT ppx: enoxaparin Injectable 40 milliGRAM(s) SubCutaneous every 24 hours  #GI ppx: Not indicated  #Bowel regimen:  Senna/Mirilax/Dulcolax       TAP (Trauma, Acute care, Pediatrics) Spectra 82   A/P:  67M PSH of laparoscopic appendectomy (~10y ago), PMH of HTN, DLD, DM, BPH, GERD, prostate Ca s/p seeding on remission presenting with acute onset 2 day history of epigastric abdominal pain with radiation to the back and progression to diffuse generalized abdominal, found to have elevated lipase and imaging c/w acute pancreatitis. Admitted on 10/27 now with improvement on abdominal exam, RUQ U/S showing presence of biliary sludge for which surgical consult was placed    PLAN:   Pancreatitis  -CT 10/27: Mild fat stranding surrounding the head of the pancreas, consistent with acute interstitial edematous pancreatitis  -RUQ US 10/28: Sludge within gallbladder, CBD 5mm  -Currently low fiber diet  -Tbili 2.1, uptrend   -Recommend goal based IVF resuscitation, decrease ivf  -GI planning for EUS +/- ERCP on Wednesday 11/2  -Recommending outpatient follow up to schedule patient for OR. Please call [phone] to schedule an appointment.     #Antibiotics:  Not indicated  #DVT ppx: enoxaparin Injectable 40 milliGRAM(s) SubCutaneous every 24 hours  #GI ppx: Not indicated  #Bowel regimen:  Senna/Mirilax/Dulcolax       TAP (Trauma, Acute care, Pediatrics) Spectra [id number]

## 2022-11-01 LAB
ALBUMIN SERPL ELPH-MCNC: 4 G/DL — SIGNIFICANT CHANGE UP (ref 3.5–5.2)
ALP SERPL-CCNC: 116 U/L — HIGH (ref 30–115)
ALT FLD-CCNC: 16 U/L — SIGNIFICANT CHANGE UP (ref 0–41)
ANION GAP SERPL CALC-SCNC: 9 MMOL/L — SIGNIFICANT CHANGE UP (ref 7–14)
APTT BLD: 32.9 SEC — SIGNIFICANT CHANGE UP (ref 27–39.2)
AST SERPL-CCNC: 16 U/L — SIGNIFICANT CHANGE UP (ref 0–41)
BASOPHILS # BLD AUTO: 0.05 K/UL — SIGNIFICANT CHANGE UP (ref 0–0.2)
BASOPHILS NFR BLD AUTO: 0.9 % — SIGNIFICANT CHANGE UP (ref 0–1)
BILIRUB SERPL-MCNC: 0.5 MG/DL — SIGNIFICANT CHANGE UP (ref 0.2–1.2)
BLD GP AB SCN SERPL QL: SIGNIFICANT CHANGE UP
BUN SERPL-MCNC: 6 MG/DL — LOW (ref 10–20)
CALCIUM SERPL-MCNC: 8.7 MG/DL — SIGNIFICANT CHANGE UP (ref 8.4–10.5)
CHLORIDE SERPL-SCNC: 106 MMOL/L — SIGNIFICANT CHANGE UP (ref 98–110)
CO2 SERPL-SCNC: 23 MMOL/L — SIGNIFICANT CHANGE UP (ref 17–32)
CREAT SERPL-MCNC: 0.6 MG/DL — LOW (ref 0.7–1.5)
EGFR: 106 ML/MIN/1.73M2 — SIGNIFICANT CHANGE UP
EOSINOPHIL # BLD AUTO: 0.26 K/UL — SIGNIFICANT CHANGE UP (ref 0–0.7)
EOSINOPHIL NFR BLD AUTO: 4.5 % — SIGNIFICANT CHANGE UP (ref 0–8)
GLUCOSE SERPL-MCNC: 168 MG/DL — HIGH (ref 70–99)
HCT VFR BLD CALC: 36.5 % — LOW (ref 42–52)
HGB BLD-MCNC: 12.4 G/DL — LOW (ref 14–18)
IMM GRANULOCYTES NFR BLD AUTO: 0.5 % — HIGH (ref 0.1–0.3)
INR BLD: 1.11 RATIO — SIGNIFICANT CHANGE UP (ref 0.65–1.3)
LYMPHOCYTES # BLD AUTO: 1.36 K/UL — SIGNIFICANT CHANGE UP (ref 1.2–3.4)
LYMPHOCYTES # BLD AUTO: 23.5 % — SIGNIFICANT CHANGE UP (ref 20.5–51.1)
MAGNESIUM SERPL-MCNC: 1.8 MG/DL — SIGNIFICANT CHANGE UP (ref 1.8–2.4)
MCHC RBC-ENTMCNC: 29.5 PG — SIGNIFICANT CHANGE UP (ref 27–31)
MCHC RBC-ENTMCNC: 34 G/DL — SIGNIFICANT CHANGE UP (ref 32–37)
MCV RBC AUTO: 86.7 FL — SIGNIFICANT CHANGE UP (ref 80–94)
MONOCYTES # BLD AUTO: 0.67 K/UL — HIGH (ref 0.1–0.6)
MONOCYTES NFR BLD AUTO: 11.6 % — HIGH (ref 1.7–9.3)
NEUTROPHILS # BLD AUTO: 3.41 K/UL — SIGNIFICANT CHANGE UP (ref 1.4–6.5)
NEUTROPHILS NFR BLD AUTO: 59 % — SIGNIFICANT CHANGE UP (ref 42.2–75.2)
NRBC # BLD: 0 /100 WBCS — SIGNIFICANT CHANGE UP (ref 0–0)
PLATELET # BLD AUTO: 212 K/UL — SIGNIFICANT CHANGE UP (ref 130–400)
POTASSIUM SERPL-MCNC: 3.8 MMOL/L — SIGNIFICANT CHANGE UP (ref 3.5–5)
POTASSIUM SERPL-SCNC: 3.8 MMOL/L — SIGNIFICANT CHANGE UP (ref 3.5–5)
PROT SERPL-MCNC: 6.4 G/DL — SIGNIFICANT CHANGE UP (ref 6–8)
PROTHROM AB SERPL-ACNC: 12.7 SEC — SIGNIFICANT CHANGE UP (ref 9.95–12.87)
RBC # BLD: 4.21 M/UL — LOW (ref 4.7–6.1)
RBC # FLD: 12.3 % — SIGNIFICANT CHANGE UP (ref 11.5–14.5)
SODIUM SERPL-SCNC: 138 MMOL/L — SIGNIFICANT CHANGE UP (ref 135–146)
WBC # BLD: 5.78 K/UL — SIGNIFICANT CHANGE UP (ref 4.8–10.8)
WBC # FLD AUTO: 5.78 K/UL — SIGNIFICANT CHANGE UP (ref 4.8–10.8)

## 2022-11-01 PROCEDURE — 99233 SBSQ HOSP IP/OBS HIGH 50: CPT

## 2022-11-01 RX ADMIN — AMLODIPINE BESYLATE 5 MILLIGRAM(S): 2.5 TABLET ORAL at 05:34

## 2022-11-01 RX ADMIN — ENOXAPARIN SODIUM 40 MILLIGRAM(S): 100 INJECTION SUBCUTANEOUS at 05:34

## 2022-11-01 RX ADMIN — Medication 3 CAPSULE(S): at 12:11

## 2022-11-01 RX ADMIN — POLYETHYLENE GLYCOL 3350 17 GRAM(S): 17 POWDER, FOR SOLUTION ORAL at 12:11

## 2022-11-01 RX ADMIN — TAMSULOSIN HYDROCHLORIDE 0.4 MILLIGRAM(S): 0.4 CAPSULE ORAL at 21:19

## 2022-11-01 RX ADMIN — Medication 3 CAPSULE(S): at 17:39

## 2022-11-01 RX ADMIN — SENNA PLUS 2 TABLET(S): 8.6 TABLET ORAL at 21:19

## 2022-11-01 RX ADMIN — ATORVASTATIN CALCIUM 40 MILLIGRAM(S): 80 TABLET, FILM COATED ORAL at 21:20

## 2022-11-01 RX ADMIN — Medication 3 CAPSULE(S): at 08:34

## 2022-11-01 NOTE — PROGRESS NOTE ADULT - ASSESSMENT
68 yo M with hx of HTN, HLD, DM II (on metformin), BPH, Prostate Ca s/p seeding (in remission), GERD and recurrent pancreatitis presents for epigastric pain a/w nausea and vomiting x 2 days.     # Acute pancreatitis   -CT abdomen shows Mild fat stranding surrounding the head of the pancreas, consistent with acute interstitial edematous pancreatitis; no acute fluid collection.  -serum lipase 136  -US RUQ- no gall stones, some GB sludge  - Only drinks etoh socially   Plan:  -IVF dc'd  -pain control- dc morphine has not required in 2 days, tylenol prn   -continue laxatives- senna and miralax   - GI following with plan for EUS and ERCP on wednesday, if negative, they strongly recommend proceeding with cholecystectomy.   - Surgery following as well   -sent IgG subsets     # HTN  -c/w amlodipine    # BPH   -on flomax    # DM II   -monitor on FS AC HS.     #h/o HLD  -c/w atorvastatin    #DVT ppx: Lovenox SC    Progress Note Handoff:  Pending: ERCP on wednesday, possible CCY this admission   plan of care d/w patient at the bedside.   Dispo: acute

## 2022-11-02 ENCOUNTER — RESULT REVIEW (OUTPATIENT)
Age: 67
End: 2022-11-02

## 2022-11-02 ENCOUNTER — TRANSCRIPTION ENCOUNTER (OUTPATIENT)
Age: 67
End: 2022-11-02

## 2022-11-02 LAB
ALBUMIN SERPL ELPH-MCNC: 4.9 G/DL — SIGNIFICANT CHANGE UP (ref 3.5–5.2)
ALP SERPL-CCNC: 110 U/L — SIGNIFICANT CHANGE UP (ref 30–115)
ALT FLD-CCNC: 19 U/L — SIGNIFICANT CHANGE UP (ref 0–41)
ANION GAP SERPL CALC-SCNC: 12 MMOL/L — SIGNIFICANT CHANGE UP (ref 7–14)
AST SERPL-CCNC: 18 U/L — SIGNIFICANT CHANGE UP (ref 0–41)
BASOPHILS # BLD AUTO: 0.06 K/UL — SIGNIFICANT CHANGE UP (ref 0–0.2)
BASOPHILS NFR BLD AUTO: 1 % — SIGNIFICANT CHANGE UP (ref 0–1)
BILIRUB SERPL-MCNC: 0.7 MG/DL — SIGNIFICANT CHANGE UP (ref 0.2–1.2)
BUN SERPL-MCNC: 6 MG/DL — LOW (ref 10–20)
CALCIUM SERPL-MCNC: 9.3 MG/DL — SIGNIFICANT CHANGE UP (ref 8.4–10.5)
CHLORIDE SERPL-SCNC: 103 MMOL/L — SIGNIFICANT CHANGE UP (ref 98–110)
CO2 SERPL-SCNC: 23 MMOL/L — SIGNIFICANT CHANGE UP (ref 17–32)
CREAT SERPL-MCNC: 0.6 MG/DL — LOW (ref 0.7–1.5)
EGFR: 106 ML/MIN/1.73M2 — SIGNIFICANT CHANGE UP
EOSINOPHIL # BLD AUTO: 0.27 K/UL — SIGNIFICANT CHANGE UP (ref 0–0.7)
EOSINOPHIL NFR BLD AUTO: 4.5 % — SIGNIFICANT CHANGE UP (ref 0–8)
GLUCOSE SERPL-MCNC: 140 MG/DL — HIGH (ref 70–99)
HCT VFR BLD CALC: 41.2 % — LOW (ref 42–52)
HGB BLD-MCNC: 14 G/DL — SIGNIFICANT CHANGE UP (ref 14–18)
IMM GRANULOCYTES NFR BLD AUTO: 0.3 % — SIGNIFICANT CHANGE UP (ref 0.1–0.3)
LYMPHOCYTES # BLD AUTO: 1.49 K/UL — SIGNIFICANT CHANGE UP (ref 1.2–3.4)
LYMPHOCYTES # BLD AUTO: 25 % — SIGNIFICANT CHANGE UP (ref 20.5–51.1)
MAGNESIUM SERPL-MCNC: 2 MG/DL — SIGNIFICANT CHANGE UP (ref 1.8–2.4)
MCHC RBC-ENTMCNC: 29.7 PG — SIGNIFICANT CHANGE UP (ref 27–31)
MCHC RBC-ENTMCNC: 34 G/DL — SIGNIFICANT CHANGE UP (ref 32–37)
MCV RBC AUTO: 87.3 FL — SIGNIFICANT CHANGE UP (ref 80–94)
MONOCYTES # BLD AUTO: 0.55 K/UL — SIGNIFICANT CHANGE UP (ref 0.1–0.6)
MONOCYTES NFR BLD AUTO: 9.2 % — SIGNIFICANT CHANGE UP (ref 1.7–9.3)
NEUTROPHILS # BLD AUTO: 3.58 K/UL — SIGNIFICANT CHANGE UP (ref 1.4–6.5)
NEUTROPHILS NFR BLD AUTO: 60 % — SIGNIFICANT CHANGE UP (ref 42.2–75.2)
NRBC # BLD: 0 /100 WBCS — SIGNIFICANT CHANGE UP (ref 0–0)
PLATELET # BLD AUTO: 211 K/UL — SIGNIFICANT CHANGE UP (ref 130–400)
POTASSIUM SERPL-MCNC: 4.3 MMOL/L — SIGNIFICANT CHANGE UP (ref 3.5–5)
POTASSIUM SERPL-SCNC: 4.3 MMOL/L — SIGNIFICANT CHANGE UP (ref 3.5–5)
PROT SERPL-MCNC: 7.3 G/DL — SIGNIFICANT CHANGE UP (ref 6–8)
RBC # BLD: 4.72 M/UL — SIGNIFICANT CHANGE UP (ref 4.7–6.1)
RBC # FLD: 12.2 % — SIGNIFICANT CHANGE UP (ref 11.5–14.5)
SODIUM SERPL-SCNC: 138 MMOL/L — SIGNIFICANT CHANGE UP (ref 135–146)
WBC # BLD: 5.97 K/UL — SIGNIFICANT CHANGE UP (ref 4.8–10.8)
WBC # FLD AUTO: 5.97 K/UL — SIGNIFICANT CHANGE UP (ref 4.8–10.8)

## 2022-11-02 PROCEDURE — 88312 SPECIAL STAINS GROUP 1: CPT | Mod: 26

## 2022-11-02 PROCEDURE — 43237 ENDOSCOPIC US EXAM ESOPH: CPT

## 2022-11-02 PROCEDURE — 88305 TISSUE EXAM BY PATHOLOGIST: CPT | Mod: 26

## 2022-11-02 PROCEDURE — 43239 EGD BIOPSY SINGLE/MULTIPLE: CPT | Mod: XU

## 2022-11-02 PROCEDURE — 99233 SBSQ HOSP IP/OBS HIGH 50: CPT

## 2022-11-02 RX ORDER — ONDANSETRON 8 MG/1
4 TABLET, FILM COATED ORAL ONCE
Refills: 0 | Status: DISCONTINUED | OUTPATIENT
Start: 2022-11-02 | End: 2022-11-03

## 2022-11-02 RX ORDER — SODIUM CHLORIDE 9 MG/ML
1000 INJECTION, SOLUTION INTRAVENOUS
Refills: 0 | Status: DISCONTINUED | OUTPATIENT
Start: 2022-11-02 | End: 2022-11-02

## 2022-11-02 RX ORDER — HYDROMORPHONE HYDROCHLORIDE 2 MG/ML
0.5 INJECTION INTRAMUSCULAR; INTRAVENOUS; SUBCUTANEOUS
Refills: 0 | Status: DISCONTINUED | OUTPATIENT
Start: 2022-11-02 | End: 2022-11-03

## 2022-11-02 RX ORDER — ENOXAPARIN SODIUM 100 MG/ML
40 INJECTION SUBCUTANEOUS EVERY 24 HOURS
Refills: 0 | Status: DISCONTINUED | OUTPATIENT
Start: 2022-11-02 | End: 2022-11-03

## 2022-11-02 RX ADMIN — POLYETHYLENE GLYCOL 3350 17 GRAM(S): 17 POWDER, FOR SOLUTION ORAL at 11:34

## 2022-11-02 RX ADMIN — ATORVASTATIN CALCIUM 40 MILLIGRAM(S): 80 TABLET, FILM COATED ORAL at 21:12

## 2022-11-02 RX ADMIN — TAMSULOSIN HYDROCHLORIDE 0.4 MILLIGRAM(S): 0.4 CAPSULE ORAL at 21:12

## 2022-11-02 RX ADMIN — AMLODIPINE BESYLATE 5 MILLIGRAM(S): 2.5 TABLET ORAL at 05:04

## 2022-11-02 RX ADMIN — Medication 3 CAPSULE(S): at 16:45

## 2022-11-02 RX ADMIN — ENOXAPARIN SODIUM 40 MILLIGRAM(S): 100 INJECTION SUBCUTANEOUS at 16:59

## 2022-11-02 RX ADMIN — SENNA PLUS 2 TABLET(S): 8.6 TABLET ORAL at 21:12

## 2022-11-02 NOTE — PROGRESS NOTE ADULT - TIME BILLING
Ms. Vidal called for a refill of her:      estradiol (ESTRACE) 0.5 MG tablet  venlafaxine XR (EFFEXOR XR) 150 MG 24 hr capsule  lisinopril-hydroCHLOROthiazide (PRINZIDE,ZESTORETIC) 10-12.5 MG per tablet    Pharmacy is [facility] here at the clinic.    Thank you.    Ligia  
Total time spent to complete patient's bedside assessment, physical examination, review medical chart including labs & imaging, discuss medical plan of care with housestaff was more than 35 minutes
Total time spent to complete patient's bedside assessment, physical examination, review medical chart including labs & imaging, discuss medical plan of care with housestaff was more than 35 minutes
patient's care

## 2022-11-02 NOTE — CHART NOTE - NSCHARTNOTEFT_GEN_A_CORE
EGD: non erosive gastritis, biopsies obtained    EUS focused examination: normal CBD of 3.7 mm, no CBD stones found, there is gallbladder sludge    Recommendation:   Await pathology  MRI abdomen as outpatient   FU in the clinic in 2 - 4 weeks   FU with surgery

## 2022-11-02 NOTE — PROGRESS NOTE ADULT - PROVIDER SPECIALTY LIST ADULT
Gastroenterology
Hospitalist
Internal Medicine
Surgery
Hospitalist

## 2022-11-02 NOTE — PROGRESS NOTE ADULT - ASSESSMENT
68 yo M with hx of HTN, HLD, DM II (on metformin), BPH, Prostate Ca s/p seeding (in remission), GERD and recurrent pancreatitis presents for epigastric pain a/w nausea and vomiting x 2 days.     # Acute pancreatitis   -CT abdomen shows Mild fat stranding surrounding the head of the pancreas, consistent with acute interstitial edematous pancreatitis; no acute fluid collection.  -serum lipase 136  -US RUQ- no gall stones, some GB sludge  - Only drinks etoh socially   -pain controlled   -continue laxatives- senna and miralax   - GI following with plan for EUS and ERCP TODAY; if negative, they strongly recommend proceeding with cholecystectomy.    - Surgery following as well   -sent IgG subsets - f/u     # HTN  -c/w amlodipine    # BPH   -on flomax    # DM II   -monitor on FS AC HS.     #h/o HLD  -c/w atorvastatin    #DVT ppx: Lovenox on hold for procedure today, resume when cleared by GI     Progress Note Handoff:  Pending: EUS/ERCP today, possible CCY this admission   plan of care d/w patient at the bedside.   Dispo: acute

## 2022-11-02 NOTE — PROGRESS NOTE ADULT - SUBJECTIVE AND OBJECTIVE BOX
Patient 67 year old with a PMHx of HTN, DLD, Diabetes mellitus on Metformin,  BPH, GERD, prostate CA s/p seeding on remission, recurrent pancreatitis last admission in 4/22 presented for epigastric pain associated with n/v for 2 days. Followed by Trauma as will need CCY this admission. They care concerned as T adenike increasing. Patient doing better overall.        PAST MEDICAL & SURGICAL HISTORY:  Diabetes mellitus, type II  Hypertension  Prostate cancer s/p history of seeding  Hyperlipidemia  Osteoarthritis  Pancreatitis  History of appendectomy  S/P hip replacement, left        Home Medications:  acetaminophen 325 mg oral tablet: 2 tab(s) orally every 4 hours, As needed, Mild Pain (1 - 3) (02 Apr 2022 11:53)  albuterol 2.5 mg/3 mL (0.083%) inhalation solution: 1 spray(s) inhaled 2 times a day (06 Apr 2022 09:34)  albuterol 90 mcg/inh inhalation aerosol: 1 puff(s) inhaled every 4 hours, As needed, Wheezing (06 Apr 2022 09:34)  amLODIPine 5 mg oral tablet: 1 tab(s) orally once a day (02 Apr 2022 11:53)  aspirin 81 mg oral tablet: 1 tab(s) orally once a day (02 Apr 2022 11:53)  atorvastatin 40 mg oral tablet: 1 tab(s) orally once a day (at bedtime) (02 Apr 2022 11:53)  Flomax 0.4 mg oral capsule: 1 cap(s) orally once a day (at bedtime) (02 Apr 2022 11:53)  metFORMIN 500 mg oral tablet: 2 tab(s) orally 2 times a day (02 Apr 2022 11:53)        MEDICATIONS  (STANDING):  amLODIPine   Tablet 5 milliGRAM(s) Oral daily  aspirin  chewable 81 milliGRAM(s) Oral daily  atorvastatin 40 milliGRAM(s) Oral at bedtime  enoxaparin Injectable 40 milliGRAM(s) SubCutaneous every 24 hours  influenza  Vaccine (HIGH DOSE) 0.7 milliLiter(s) IntraMuscular once  lactated ringers. 1000 milliLiter(s) (200 mL/Hr) IV Continuous <Continuous>  polyethylene glycol 3350 17 Gram(s) Oral daily  senna 2 Tablet(s) Oral at bedtime  tamsulosin 0.4 milliGRAM(s) Oral at bedtime    MEDICATIONS  (PRN):  acetaminophen     Tablet .. 650 milliGRAM(s) Oral every 6 hours PRN Temp greater or equal to 38C (100.4F), Mild Pain (1 - 3)  ALBUTerol    90 MICROgram(s) HFA Inhaler 1 Puff(s) Inhalation every 4 hours PRN Wheezing  bisacodyl 5 milliGRAM(s) Oral daily PRN Constipation  morphine  - Injectable 4 milliGRAM(s) IV Push every 4 hours PRN Severe Pain (7 - 10)  ondansetron Injectable 4 milliGRAM(s) IV Push every 6 hours PRN Nausea and/or Vomiting      Allergies  No Known Allergies      Review of Systems:   Constitutional:  No Fever, No Chills  ENT/Mouth:  No Hearing Changes,  No Difficulty Swallowing  Eyes:  No Eye Pain, No Vision Changes  Cardiovascular:  No Chest Pain, No Palpitations  Respiratory:  No Cough, No Dyspnea  Gastrointestinal:  As described in HPI  Musculoskeletal:  No Joint Swelling, No Back Pain  Skin:  No Skin Lesions, No Jaundice  Neuro:  No Syncope, No Dizziness  Heme/Lymph:  No Bruising, No Bleeding.        Vital Signs Last 24 Hrs  T(C): 35.8 (31 Oct 2022 08:29), Max: 36.4 (30 Oct 2022 15:02)  T(F): 96.4 (31 Oct 2022 08:29), Max: 97.5 (30 Oct 2022 15:02)  HR: 72 (31 Oct 2022 08:29) (68 - 76)  BP: 126/75 (31 Oct 2022 08:29) (126/75 - 152/76)  BP(mean): --  RR: 18 (31 Oct 2022 08:29) (18 - 18)  SpO2: 98% (31 Oct 2022 08:29) (97% - 99%)    Parameters below as of 31 Oct 2022 05:13  Patient On (Oxygen Delivery Method): room air        GENERAL: AAOx3, no acute distress.  HEAD:  Atraumatic, Normocephalic  EYES: conjunctiva and sclera clear  NECK: Supple, no JVD or thyromegaly  CHEST/LUNG: Clear to auscultation bilaterally  HEART: Regular rate and rhythm; normal S1, S2, No murmurs.  ABDOMEN: Soft, nontender, nondistended  NEUROLOGY: No asterixis or tremor.   SKIN: Intact, no jaundice      Labs:                        14.7   5.59  )-----------( 232      ( 30 Oct 2022 11:24 )             43.0     10-30    139  |  100  |  7<L>  ----------------------------<  91  4.6   |  25  |  0.7    Ca    9.9      30 Oct 2022 11:24    TPro  7.6  /  Alb  4.5  /  TBili  2.1<H>  /  DBili  x   /  AST  22  /  ALT  17  /  AlkPhos  110  10-30          Radiology:    US Abdomen Upper Quadrant Right:     Liver: Within normal limits.  Bile ducts: Normal caliber. Common bile duct measures 5 mm.  Gallbladder: Sludge is seen within the gallbladder.  Pancreas: Visualized portions are within normal limits.  Right kidney: 10.7 cm. No hydronephrosis.  Ascites: None.  IVC: Visualized portions are within normal limits.    IMPRESSION:  Normal right upper quadrant abdominal ultrasound.    
WILI ALFARO  67y, Male  Allergy: No Known Allergies    Hospital Day: 2d    Patient seen and examined earlier today.  No acute events overnight.  reports feeling better.   ROS: neg except as noted above    PMH/PSH:  PAST MEDICAL & SURGICAL HISTORY:  Diabetes mellitus, type II      Hypertension      Prostate cancer  s/p history of seeding      Hyperlipidemia      Osteoarthritis      Pancreatitis  recurrent      Prostate cancer  s/p history of seeding      History of appendectomy      S/P hip replacement, left  9/2015          LAST 24-Hr EVENTS:    VITALS:  T(F): 96.2 (10-29-22 @ 09:25), Max: 97.7 (10-28-22 @ 16:17)  HR: 68 (10-29-22 @ 09:25)  BP: 129/74 (10-29-22 @ 09:25) (129/74 - 170/87)  RR: 18 (10-29-22 @ 09:25)  SpO2: 98% (10-29-22 @ 09:25)          TESTS & MEASUREMENTS:  Weight/BMI  89.4 (10-28-22 @ 18:31)  30.9 (10-28-22 @ 18:31)                          13.3   5.56  )-----------( 225      ( 29 Oct 2022 07:09 )             39.1         10-29    138  |  100  |  6<L>  ----------------------------<  112<H>  4.0   |  26  |  0.6<L>    Ca    9.1      29 Oct 2022 07:09  Mg     2.0     10-28    TPro  6.8  /  Alb  4.1  /  TBili  1.7<H>  /  DBili  x   /  AST  17  /  ALT  15  /  AlkPhos  99  10-29    LIVER FUNCTIONS - ( 29 Oct 2022 07:09 )  Alb: 4.1 g/dL / Pro: 6.8 g/dL / ALK PHOS: 99 U/L / ALT: 15 U/L / AST: 17 U/L / GGT: x                           COVID-19 PCR: NotDetec (10-27-22 @ 04:45)        A1C with Estimated Average Glucose Result: 7.5 % (10-28-22 @ 07:57)  A1C with Estimated Average Glucose Result: 7.7 % (04-03-22 @ 07:31)          RADIOLOGY, ECG, & ADDITIONAL TESTS:  12 Lead ECG:   Ventricular Rate 69 BPM    Atrial Rate 69 BPM    P-R Interval 210 ms    QRS Duration 92 ms    Q-T Interval 370 ms    QTC Calculation(Bazett) 396 ms    P Axis 60 degrees    R Axis -62 degrees    T Axis 13 degrees    Diagnosis Line Sinus rhythm with 1st degree A-V block  Left axis deviation  Pulmonary disease pattern  Incomplete right bundle branch block  Minimal voltage criteria for LVH, may be normal variant ( R in aVL )  Nonspecific T wave abnormality  Abnormal ECG    Confirmed by Gonzalo Ervin ([id number]) on 10/27/2022 8:08:15 AM (10-27-22 @ 03:55)      RECENT DIAGNOSTIC ORDERS:  Diet, Consistent Carbohydrate Clear Liquid (10-29-22 @ 11:42)      MEDICATIONS:  MEDICATIONS  (STANDING):  amLODIPine   Tablet 5 milliGRAM(s) Oral daily  aspirin  chewable 81 milliGRAM(s) Oral daily  atorvastatin 40 milliGRAM(s) Oral at bedtime  enoxaparin Injectable 40 milliGRAM(s) SubCutaneous every 24 hours  influenza  Vaccine (HIGH DOSE) 0.7 milliLiter(s) IntraMuscular once  lactated ringers. 1000 milliLiter(s) (200 mL/Hr) IV Continuous <Continuous>  polyethylene glycol 3350 17 Gram(s) Oral daily  senna 2 Tablet(s) Oral at bedtime  tamsulosin 0.4 milliGRAM(s) Oral at bedtime    MEDICATIONS  (PRN):  acetaminophen     Tablet .. 650 milliGRAM(s) Oral every 6 hours PRN Temp greater or equal to 38C (100.4F), Mild Pain (1 - 3)  ALBUTerol    90 MICROgram(s) HFA Inhaler 1 Puff(s) Inhalation every 4 hours PRN Wheezing  bisacodyl 5 milliGRAM(s) Oral daily PRN Constipation  morphine  - Injectable 4 milliGRAM(s) IV Push every 4 hours PRN Severe Pain (7 - 10)  ondansetron Injectable 4 milliGRAM(s) IV Push every 6 hours PRN Nausea and/or Vomiting      HOME MEDICATIONS:  acetaminophen 325 mg oral tablet (04-02)  albuterol 2.5 mg/3 mL (0.083%) inhalation solution (04-06)  albuterol 90 mcg/inh inhalation aerosol (04-06)  amLODIPine 5 mg oral tablet (04-02)  aspirin 81 mg oral tablet (04-02)  atorvastatin 40 mg oral tablet (04-02)  Flomax 0.4 mg oral capsule (04-02)  metFORMIN 500 mg oral tablet (04-02)  MiraLax oral powder for reconstitution (04-06)      PHYSICAL EXAM:  GEN: no distress, comfortable  Eyes: no nystagmus  PULM: BS heard b/l equal, No wheezing  CVS: S1S2 present, no rubs or gallops  ABD: Soft, nontender, no guarding   EXT: No lower extremity edema  NEURO: A&Ox3, moving all extremities, normal coordination  Psych: normal affect     
GENERAL SURGERY PROGRESS NOTE     WILI ALFARO  [address] [place] [address] day :4d    Surgical Attending: Dr. Dupree  Overnight events: No acute events overnight. Patients abdominal pain is improving.    T(F): 96.4 (10-31-22 @ 08:29), Max: 97.5 (10-30-22 @ 15:02)  HR: 72 (10-31-22 @ 08:29) (68 - 76)  BP: 126/75 (10-31-22 @ 08:29) (126/75 - 152/76)  RR: 18 (10-31-22 @ 08:29) (18 - 18)  SpO2: 98% (10-31-22 @ 08:29) (97% - 99%)      10-30-22 @ 07:01  -  10-31-22 @ 07:00  --------------------------------------------------------  IN:    Lactated Ringers: 2750 mL  Total IN: 2750 mL    OUT:  Total OUT: 0 mL    Total NET: 2750 mL    DIET/FLUIDS: lactated ringers. 1000 milliLiter(s) (250 mL/Hr) IV Continuous <Continuous>  GI proph:    AC/ proph: aspirin  chewable 81 milliGRAM(s) Oral daily  enoxaparin Injectable 40 milliGRAM(s) SubCutaneous every 24 hours    PHYSICAL EXAM:  GENERAL: NAD, well-appearing  CHEST/LUNG: Clear to auscultation bilaterally  HEART: Regular rate and rhythm  ABDOMEN: Soft, Nontender, Nondistended;   EXTREMITIES:  No clubbing, cyanosis, or edema      LABS  Labs:  CAPILLARY BLOOD GLUCOSE                      14.7   5.59  )-----------( 232      ( 30 Oct 2022 11:24 )             43.0       Auto Neutrophil %: 65.2 % (10-30-22 @ 11:24)  Auto Immature Granulocyte %: 0.4 % (10-30-22 @ 11:24)    10-30    139  |  100  |  7<L>  ----------------------------<  91  4.6   |  25  |  0.7    Calcium, Total Serum: 9.9 mg/dL (10-30-22 @ 11:24)  LFTs:           7.6  | 2.1  | 22       ------------------[110     ( 30 Oct 2022 11:24 )  4.5  | x    | 17          
  WILI ALFARO  [facility] [place] [address] [facility] [place]      Patient is a 67y old  Male who presents with a chief complaint of abd pain (01 Nov 2022 14:20)        Interval events:  Patient seen and examined at bedside. No acute events overnight. Tolerated diet yesterday. NPO currently for EUS/ERCP. No pain. Having BM's. No fevers, nausea, or vomiting.     -PMHx: Diabetes mellitus, type II    Hypertension    Prostate cancer    Hyperlipidemia    Osteoarthritis    Pancreatitis      -PSHx:Prostate cancer    History of appendectomy    S/P hip replacement, left            REVIEW OF SYSTEMS:  CONSTITUTIONAL: No fever, weight loss, or fatigue  RESPIRATORY: No cough, wheezing, chills or hemoptysis; No shortness of breath  CARDIOVASCULAR: No chest pain, palpitations, dizziness, or leg swelling  GASTROINTESTINAL: No abdominal or epigastric pain. No nausea, vomiting, or hematemesis; No diarrhea or constipation. No melena or hematochezia.  NEUROLOGICAL: No headaches  LYMPH NODES: No enlarged glands  MUSCULOSKELETAL: No joint pain or swelling; No muscle, back, or extremity pain      T(C): , Max: 36.2 (11-02-22 @ 05:00)  HR: 66 (11-02-22 @ 12:16)  BP: 137/85 (11-02-22 @ 12:16)  RR: 18 (11-02-22 @ 12:16)  SpO2: 98% (11-02-22 @ 11:40)  CAPILLARY BLOOD GLUCOSE          PHYSICAL EXAM:  GEN: no distress, comfortable  Eyes: no nystagmus  PULM: BS heard b/l equal, No wheezing  CVS: S1S2 present, no rubs or gallops  ABD: Soft, nontender, no guarding   EXT: No lower extremity edema  NEURO: A&Ox3, moving all extremities, normal coordination  Psych: normal affect        LABS:          14.0  5.97  )-------(211          41.2  N=60.0  L=25.0  MCV=87.3    138|103|6<L>  ------------------<140<H>  4.3|23|0.6<L>  eGFR:--  Ca:9.3      PT/INR - ( 01 Nov 2022 17:33 )   PT: 12.70 sec;   INR: 1.11 ratio         PTT - ( 01 Nov 2022 17:33 )  PTT:32.9 sec      Microbiology:      RADIOLOGY & ADDITIONAL TESTS:  < from: US Abdomen Upper Quadrant Right (10.28.22 @ 06:24) >  IMPRESSION:  Normal right upper quadrant abdominal ultrasound.    < end of copied text >        Medications:  acetaminophen     Tablet .. 650 milliGRAM(s) Oral every 6 hours PRN  ALBUTerol    90 MICROgram(s) HFA Inhaler 1 Puff(s) Inhalation every 4 hours PRN  amLODIPine   Tablet 5 milliGRAM(s) Oral daily  atorvastatin 40 milliGRAM(s) Oral at bedtime  bisacodyl 5 milliGRAM(s) Oral daily PRN  influenza  Vaccine (HIGH DOSE) 0.7 milliLiter(s) IntraMuscular once  ondansetron Injectable 4 milliGRAM(s) IV Push every 6 hours PRN  pancrelipase  (CREON 12,000 Lipase Units) 3 Capsule(s) Oral three times a day with meals  polyethylene glycol 3350 17 Gram(s) Oral daily  senna 2 Tablet(s) Oral at bedtime  tamsulosin 0.4 milliGRAM(s) Oral at bedtime      
HPI  Patient is a 67y old Male who presents with a chief complaint of abd pain (27 Oct 2022 11:00)    Currently admitted to medicine with the primary diagnosis of Acute pancreatitis       Today is hospital day 1d.     INTERVAL HPI / OVERNIGHT EVENTS:  Patient was seen and examined at bedside  still has abdominal pain  states he know how morphine reacts to his body and it makes him itch, Patient states he did not receive morphine because he did not feel the reaction.   Denies any complains of chest pain or shortness of breath  still has nausea  last drink more than 1 month before      PAST MEDICAL & SURGICAL HISTORY  Diabetes mellitus, type II    Hypertension    Prostate cancer  s/p history of seeding    Hyperlipidemia    Osteoarthritis    Pancreatitis  recurrent    Prostate cancer  s/p history of seeding    History of appendectomy    S/P hip replacement, left  9/2015      ALLERGIES  No Known Allergies    MEDICATIONS  STANDING MEDICATIONS  amLODIPine   Tablet 5 milliGRAM(s) Oral daily  aspirin  chewable 81 milliGRAM(s) Oral daily  atorvastatin 40 milliGRAM(s) Oral at bedtime  enoxaparin Injectable 40 milliGRAM(s) SubCutaneous every 24 hours  lactated ringers. 1000 milliLiter(s) IV Continuous <Continuous>  polyethylene glycol 3350 17 Gram(s) Oral daily  senna 2 Tablet(s) Oral at bedtime  tamsulosin 0.4 milliGRAM(s) Oral at bedtime    PRN MEDICATIONS  acetaminophen     Tablet .. 650 milliGRAM(s) Oral every 6 hours PRN  ALBUTerol    90 MICROgram(s) HFA Inhaler 1 Puff(s) Inhalation every 4 hours PRN  bisacodyl 5 milliGRAM(s) Oral daily PRN  morphine  - Injectable 4 milliGRAM(s) IV Push every 4 hours PRN  ondansetron Injectable 4 milliGRAM(s) IV Push every 6 hours PRN    VITALS:  T(F): 97.6  HR: 64  BP: 136/89  RR: 18  SpO2: 100%    PHYSICAL EXAM  GEN: no distress, comfortable  Eyes: no nystagmus  PULM: BS heard b/l equal, No wheezing  CVS: S1S2 present, no rubs or gallops  ABD: Soft, tenderness present in epigastric region, no guarding  EXT: No lower extremity edema  NEURO: A&Ox3, moving all extremities, normal coordination    LABS                        14.2   7.63  )-----------( 229      ( 28 Oct 2022 07:57 )             42.6     10-28    137  |  99  |  6<L>  ----------------------------<  135<H>  4.2   |  25  |  0.6<L>    Ca    9.6      28 Oct 2022 07:57  Mg     2.0     10-28    TPro  7.0  /  Alb  4.6  /  TBili  1.3<H>  /  DBili  x   /  AST  15  /  ALT  16  /  AlkPhos  111  10-28              CARDIAC MARKERS ( 27 Oct 2022 06:00 )  x     / <0.01 ng/mL / x     / x     / x          RADIOLOGY    
  WILI ALFARO  67y, Male  Allergy: No Known Allergies    Hospital Day: 4d    Patient seen and examined earlier today. Feeling improved, tolerating diet, pending EUS ERCP on wednesday.     PMH/PSH:  PAST MEDICAL & SURGICAL HISTORY:  Diabetes mellitus, type II      Hypertension      Prostate cancer  s/p history of seeding      Hyperlipidemia      Osteoarthritis      Pancreatitis  recurrent      Prostate cancer  s/p history of seeding      History of appendectomy      S/P hip replacement, left  9/2015          LAST 24-Hr EVENTS:    VITALS:  T(F): 96.7 (10-31-22 @ 12:06), Max: 97.5 (10-30-22 @ 15:02)  HR: 74 (10-31-22 @ 12:06)  BP: 124/73 (10-31-22 @ 12:06) (124/73 - 152/76)  RR: 18 (10-31-22 @ 12:06)  SpO2: 98% (10-31-22 @ 12:06)        TESTS & MEASUREMENTS:  Weight (Kg):   BMI (kg/m2): 30.9 (10-28)    10-30-22 @ 07:01  -  10-31-22 @ 07:00  --------------------------------------------------------  IN: 2750 mL / OUT: 0 mL / NET: 2750 mL                            12.6   5.56  )-----------( 208      ( 31 Oct 2022 12:18 )             35.8       10-31    134<L>  |  100  |  8<L>  ----------------------------<  132<H>  3.8   |  25  |  0.6<L>    Ca    8.9      31 Oct 2022 12:18  Mg     1.9     10-31    TPro  6.7  /  Alb  4.2  /  TBili  0.9  /  DBili  x   /  AST  18  /  ALT  16  /  AlkPhos  116<H>  10-31    LIVER FUNCTIONS - ( 31 Oct 2022 12:18 )  Alb: 4.2 g/dL / Pro: 6.7 g/dL / ALK PHOS: 116 U/L / ALT: 16 U/L / AST: 18 U/L / GGT: x                           COVID-19 PCR: NotDetec (10-27-22 @ 04:45)      RADIOLOGY, ECG, & ADDITIONAL TESTS:      RECENT DIAGNOSTIC ORDERS:  Diet, Low Fiber:   Low Fat (LOWFAT) (10-31-22 @ 14:51)  Type + Screen: AM  Sched. Collection:01-Nov-2022 04:30 (10-31-22 @ 13:05)  Magnesium, Serum: AM Sched. Collection: 01-Nov-2022 04:30 (10-31-22 @ 11:51)  Comprehensive Metabolic Panel: AM Sched. Collection: 01-Nov-2022 04:30 (10-31-22 @ 11:51)  Complete Blood Count + Automated Diff: AM Sched. Collection: 01-Nov-2022 04:30 (10-31-22 @ 11:51)  Bilirubin - Total and Direct: AM Sched. Collection: 31-Oct-2022 04:30 (10-31-22 @ 00:30)      MEDICATIONS:  MEDICATIONS  (STANDING):  amLODIPine   Tablet 5 milliGRAM(s) Oral daily  atorvastatin 40 milliGRAM(s) Oral at bedtime  enoxaparin Injectable 40 milliGRAM(s) SubCutaneous every 24 hours  influenza  Vaccine (HIGH DOSE) 0.7 milliLiter(s) IntraMuscular once  lactated ringers. 1000 milliLiter(s) (250 mL/Hr) IV Continuous <Continuous>  pancrelipase  (CREON 12,000 Lipase Units) 3 Capsule(s) Oral three times a day with meals  polyethylene glycol 3350 17 Gram(s) Oral daily  senna 2 Tablet(s) Oral at bedtime  tamsulosin 0.4 milliGRAM(s) Oral at bedtime    MEDICATIONS  (PRN):  acetaminophen     Tablet .. 650 milliGRAM(s) Oral every 6 hours PRN Temp greater or equal to 38C (100.4F), Mild Pain (1 - 3)  ALBUTerol    90 MICROgram(s) HFA Inhaler 1 Puff(s) Inhalation every 4 hours PRN Wheezing  bisacodyl 5 milliGRAM(s) Oral daily PRN Constipation  morphine  - Injectable 4 milliGRAM(s) IV Push every 4 hours PRN Severe Pain (7 - 10)  ondansetron Injectable 4 milliGRAM(s) IV Push every 6 hours PRN Nausea and/or Vomiting      HOME MEDICATIONS:  acetaminophen 325 mg oral tablet (04-02)  albuterol 2.5 mg/3 mL (0.083%) inhalation solution (04-06)  albuterol 90 mcg/inh inhalation aerosol (04-06)  amLODIPine 5 mg oral tablet (04-02)  aspirin 81 mg oral tablet (04-02)  atorvastatin 40 mg oral tablet (04-02)  Flomax 0.4 mg oral capsule (04-02)  metFORMIN 500 mg oral tablet (04-02)      PHYSICAL EXAM:  GEN: no distress, comfortable  Eyes: no nystagmus  PULM: BS heard b/l equal, No wheezing  CVS: S1S2 present, no rubs or gallops  ABD: Soft, nontender, no guarding   EXT: No lower extremity edema  NEURO: A&Ox3, moving all extremities, normal coordination  Psych: normal affect       
WILI ALFARO  67y, Male  Allergy: No Known Allergies    Hospital Day: 2d    Patient seen and examined earlier today.  No acute events overnight.  reports abd pain and N/V with eating.     ROS: neg except as noted above    PMH/PSH:  PAST MEDICAL & SURGICAL HISTORY:  Diabetes mellitus, type II      Hypertension      Prostate cancer  s/p history of seeding      Hyperlipidemia      Osteoarthritis      Pancreatitis  recurrent      Prostate cancer  s/p history of seeding      History of appendectomy      S/P hip replacement, left  9/2015          LAST 24-Hr EVENTS:    VITALS:  T(F): 96.2 (10-29-22 @ 09:25), Max: 97.7 (10-28-22 @ 16:17)  HR: 68 (10-29-22 @ 09:25)  BP: 129/74 (10-29-22 @ 09:25) (129/74 - 170/87)  RR: 18 (10-29-22 @ 09:25)  SpO2: 98% (10-29-22 @ 09:25)          TESTS & MEASUREMENTS:  Weight/BMI  89.4 (10-28-22 @ 18:31)  30.9 (10-28-22 @ 18:31)                          13.3   5.56  )-----------( 225      ( 29 Oct 2022 07:09 )             39.1         10-29    138  |  100  |  6<L>  ----------------------------<  112<H>  4.0   |  26  |  0.6<L>    Ca    9.1      29 Oct 2022 07:09  Mg     2.0     10-28    TPro  6.8  /  Alb  4.1  /  TBili  1.7<H>  /  DBili  x   /  AST  17  /  ALT  15  /  AlkPhos  99  10-29    LIVER FUNCTIONS - ( 29 Oct 2022 07:09 )  Alb: 4.1 g/dL / Pro: 6.8 g/dL / ALK PHOS: 99 U/L / ALT: 15 U/L / AST: 17 U/L / GGT: x                           COVID-19 PCR: NotDetec (10-27-22 @ 04:45)        A1C with Estimated Average Glucose Result: 7.5 % (10-28-22 @ 07:57)  A1C with Estimated Average Glucose Result: 7.7 % (04-03-22 @ 07:31)          RADIOLOGY, ECG, & ADDITIONAL TESTS:  12 Lead ECG:   Ventricular Rate 69 BPM    Atrial Rate 69 BPM    P-R Interval 210 ms    QRS Duration 92 ms    Q-T Interval 370 ms    QTC Calculation(Bazett) 396 ms    P Axis 60 degrees    R Axis -62 degrees    T Axis 13 degrees    Diagnosis Line Sinus rhythm with 1st degree A-V block  Left axis deviation  Pulmonary disease pattern  Incomplete right bundle branch block  Minimal voltage criteria for LVH, may be normal variant ( R in aVL )  Nonspecific T wave abnormality  Abnormal ECG    Confirmed by Gonzalo Ervin ([id number]) on 10/27/2022 8:08:15 AM (10-27-22 @ 03:55)      RECENT DIAGNOSTIC ORDERS:  Diet, Consistent Carbohydrate Clear Liquid (10-29-22 @ 11:42)      MEDICATIONS:  MEDICATIONS  (STANDING):  amLODIPine   Tablet 5 milliGRAM(s) Oral daily  aspirin  chewable 81 milliGRAM(s) Oral daily  atorvastatin 40 milliGRAM(s) Oral at bedtime  enoxaparin Injectable 40 milliGRAM(s) SubCutaneous every 24 hours  influenza  Vaccine (HIGH DOSE) 0.7 milliLiter(s) IntraMuscular once  lactated ringers. 1000 milliLiter(s) (200 mL/Hr) IV Continuous <Continuous>  polyethylene glycol 3350 17 Gram(s) Oral daily  senna 2 Tablet(s) Oral at bedtime  tamsulosin 0.4 milliGRAM(s) Oral at bedtime    MEDICATIONS  (PRN):  acetaminophen     Tablet .. 650 milliGRAM(s) Oral every 6 hours PRN Temp greater or equal to 38C (100.4F), Mild Pain (1 - 3)  ALBUTerol    90 MICROgram(s) HFA Inhaler 1 Puff(s) Inhalation every 4 hours PRN Wheezing  bisacodyl 5 milliGRAM(s) Oral daily PRN Constipation  morphine  - Injectable 4 milliGRAM(s) IV Push every 4 hours PRN Severe Pain (7 - 10)  ondansetron Injectable 4 milliGRAM(s) IV Push every 6 hours PRN Nausea and/or Vomiting      HOME MEDICATIONS:  acetaminophen 325 mg oral tablet (04-02)  albuterol 2.5 mg/3 mL (0.083%) inhalation solution (04-06)  albuterol 90 mcg/inh inhalation aerosol (04-06)  amLODIPine 5 mg oral tablet (04-02)  aspirin 81 mg oral tablet (04-02)  atorvastatin 40 mg oral tablet (04-02)  Flomax 0.4 mg oral capsule (04-02)  metFORMIN 500 mg oral tablet (04-02)  MiraLax oral powder for reconstitution (04-06)      PHYSICAL EXAM:  GEN: no distress, comfortable  Eyes: no nystagmus  PULM: BS heard b/l equal, No wheezing  CVS: S1S2 present, no rubs or gallops  ABD: Soft, tenderness present in epigastric region, no guarding  EXT: No lower extremity edema  NEURO: A&Ox3, moving all extremities, normal coordination  Psych: normal affect     
  WILI AL[facility]ARO  [id number] [place] [address] ([facility] [place]      Patient is a 67y old  Male who presents with a chief complaint of abd pain (30 Oct 2022 13:53)        Interval events:  Patient seen and examined at bedside. Still having abdominal pain, not really tolerating po intake. No nausea/vomiting.     -PMHx: Diabetes mellitus, type II    Hypertension    Prostate cancer    Hyperlipidemia    Osteoarthritis    Pancreatitis      -PSHx:Prostate cancer    History of appendectomy    S/P hip replacement, left            REVIEW OF SYSTEMS:  CONSTITUTIONAL: No fever, weight loss, or fatigue  RESPIRATORY: No cough, wheezing, chills or hemoptysis; No shortness of breath  CARDIOVASCULAR: No chest pain, palpitations, dizziness, or leg swelling  GASTROINTESTINAL: +abd pain   NEUROLOGICAL: No headaches  LYMPH NODES: No enlarged glands  MUSCULOSKELETAL: No joint pain or swelling; No muscle, back, or extremity pain      T(C): , Max: 36.3 (10-30-22 @ 05:02)  HR: 70 (10-30-22 @ 05:02)  BP: 146/85 (10-30-22 @ 05:02)  RR: 18 (10-30-22 @ 05:02)  SpO2: 99% (10-30-22 @ 05:02)  CAPILLARY BLOOD GLUCOSE          PHYSICAL EXAM:  GEN: no distress, comfortable  Eyes: no nystagmus  PULM: BS heard b/l equal, No wheezing  CVS: S1S2 present, no rubs or gallops  ABD: Soft, tenderness present in epigastric region, no guarding  EXT: No lower extremity edema  NEURO: A&Ox3, moving all extremities, normal coordination  Psych: normal affect           LABS:          14.7  5.59  )-------(232          43.0  N=65.2  L=20.6  MCV=86.7    139|100|7<L>  ------------------<91  4.6|25|0.7  eGFR:--  Ca:9.9            Microbiology:      RADIOLOGY & ADDITIONAL TESTS:  < from: US Abdomen Upper Quadrant Right (10.28.22 @ 06:24) >  FINDINGS:  Liver: Within normal limits.  Bile ducts: Normal caliber. Common bile duct measures 5 mm.  Gallbladder: Sludge is seen within the gallbladder.  Pancreas: Visualized portions are within normal limits.  Right kidney: 10.7 cm. No hydronephrosis.  Ascites: None.  IVC: Visualized portions are within normal limits.    IMPRESSION:  Normal right upper quadrant abdominal ultrasound.    < end of copied text >    < from: Xray Chest 1 View- PORTABLE-Urgent (10.27.22 @ 05:59) >  Impression:    No radiographic evidence of acute cardiopulmonary disease.    < end of copied text >    < from: CT Abdomen and Pelvis w/ IV Cont (10.27.22 @ 05:50) >  IMPRESSION:    Mild fat stranding surrounding the head of the pancreas, consistent with   acute interstitial edematous pancreatitis; no acute fluid collection.    < end of copied text >        Medications:  acetaminophen     Tablet .. 650 milliGRAM(s) Oral every 6 hours PRN  ALBUTerol    90 MICROgram(s) HFA Inhaler 1 Puff(s) Inhalation every 4 hours PRN  amLODIPine   Tablet 5 milliGRAM(s) Oral daily  aspirin  chewable 81 milliGRAM(s) Oral daily  atorvastatin 40 milliGRAM(s) Oral at bedtime  bisacodyl 5 milliGRAM(s) Oral daily PRN  enoxaparin Injectable 40 milliGRAM(s) SubCutaneous every 24 hours  influenza  Vaccine (HIGH DOSE) 0.7 milliLiter(s) IntraMuscular once  lactated ringers. 1000 milliLiter(s) IV Continuous <Continuous>  morphine  - Injectable 4 milliGRAM(s) IV Push every 4 hours PRN  ondansetron Injectable 4 milliGRAM(s) IV Push every 6 hours PRN  polyethylene glycol 3350 17 Gram(s) Oral daily  senna 2 Tablet(s) Oral at bedtime  tamsulosin 0.4 milliGRAM(s) Oral at bedtime

## 2022-11-03 ENCOUNTER — TRANSCRIPTION ENCOUNTER (OUTPATIENT)
Age: 67
End: 2022-11-03

## 2022-11-03 VITALS
OXYGEN SATURATION: 100 % | RESPIRATION RATE: 18 BRPM | TEMPERATURE: 97 F | HEART RATE: 67 BPM | SYSTOLIC BLOOD PRESSURE: 129 MMHG | DIASTOLIC BLOOD PRESSURE: 76 MMHG

## 2022-11-03 LAB
ALBUMIN SERPL ELPH-MCNC: 4.3 G/DL — SIGNIFICANT CHANGE UP (ref 3.5–5.2)
ALP SERPL-CCNC: 115 U/L — SIGNIFICANT CHANGE UP (ref 30–115)
ALT FLD-CCNC: 21 U/L — SIGNIFICANT CHANGE UP (ref 0–41)
ANION GAP SERPL CALC-SCNC: 11 MMOL/L — SIGNIFICANT CHANGE UP (ref 7–14)
AST SERPL-CCNC: 20 U/L — SIGNIFICANT CHANGE UP (ref 0–41)
BASOPHILS # BLD AUTO: 0.04 K/UL — SIGNIFICANT CHANGE UP (ref 0–0.2)
BASOPHILS NFR BLD AUTO: 0.6 % — SIGNIFICANT CHANGE UP (ref 0–1)
BILIRUB SERPL-MCNC: 0.6 MG/DL — SIGNIFICANT CHANGE UP (ref 0.2–1.2)
BUN SERPL-MCNC: 7 MG/DL — LOW (ref 10–20)
CALCIUM SERPL-MCNC: 9 MG/DL — SIGNIFICANT CHANGE UP (ref 8.4–10.5)
CHLORIDE SERPL-SCNC: 101 MMOL/L — SIGNIFICANT CHANGE UP (ref 98–110)
CO2 SERPL-SCNC: 24 MMOL/L — SIGNIFICANT CHANGE UP (ref 17–32)
CREAT SERPL-MCNC: 0.6 MG/DL — LOW (ref 0.7–1.5)
EGFR: 106 ML/MIN/1.73M2 — SIGNIFICANT CHANGE UP
EOSINOPHIL # BLD AUTO: 0.33 K/UL — SIGNIFICANT CHANGE UP (ref 0–0.7)
EOSINOPHIL NFR BLD AUTO: 4.6 % — SIGNIFICANT CHANGE UP (ref 0–8)
GLUCOSE SERPL-MCNC: 152 MG/DL — HIGH (ref 70–99)
HCT VFR BLD CALC: 37.9 % — LOW (ref 42–52)
HGB BLD-MCNC: 13.1 G/DL — LOW (ref 14–18)
IMM GRANULOCYTES NFR BLD AUTO: 0.4 % — HIGH (ref 0.1–0.3)
LYMPHOCYTES # BLD AUTO: 1.57 K/UL — SIGNIFICANT CHANGE UP (ref 1.2–3.4)
LYMPHOCYTES # BLD AUTO: 21.9 % — SIGNIFICANT CHANGE UP (ref 20.5–51.1)
MAGNESIUM SERPL-MCNC: 2.1 MG/DL — SIGNIFICANT CHANGE UP (ref 1.8–2.4)
MCHC RBC-ENTMCNC: 29.6 PG — SIGNIFICANT CHANGE UP (ref 27–31)
MCHC RBC-ENTMCNC: 34.6 G/DL — SIGNIFICANT CHANGE UP (ref 32–37)
MCV RBC AUTO: 85.7 FL — SIGNIFICANT CHANGE UP (ref 80–94)
MONOCYTES # BLD AUTO: 0.63 K/UL — HIGH (ref 0.1–0.6)
MONOCYTES NFR BLD AUTO: 8.8 % — SIGNIFICANT CHANGE UP (ref 1.7–9.3)
NEUTROPHILS # BLD AUTO: 4.57 K/UL — SIGNIFICANT CHANGE UP (ref 1.4–6.5)
NEUTROPHILS NFR BLD AUTO: 63.7 % — SIGNIFICANT CHANGE UP (ref 42.2–75.2)
NRBC # BLD: 0 /100 WBCS — SIGNIFICANT CHANGE UP (ref 0–0)
PLATELET # BLD AUTO: 223 K/UL — SIGNIFICANT CHANGE UP (ref 130–400)
POTASSIUM SERPL-MCNC: 3.9 MMOL/L — SIGNIFICANT CHANGE UP (ref 3.5–5)
POTASSIUM SERPL-SCNC: 3.9 MMOL/L — SIGNIFICANT CHANGE UP (ref 3.5–5)
PROT SERPL-MCNC: 6.7 G/DL — SIGNIFICANT CHANGE UP (ref 6–8)
RBC # BLD: 4.42 M/UL — LOW (ref 4.7–6.1)
RBC # FLD: 12.3 % — SIGNIFICANT CHANGE UP (ref 11.5–14.5)
SODIUM SERPL-SCNC: 136 MMOL/L — SIGNIFICANT CHANGE UP (ref 135–146)
WBC # BLD: 7.17 K/UL — SIGNIFICANT CHANGE UP (ref 4.8–10.8)
WBC # FLD AUTO: 7.17 K/UL — SIGNIFICANT CHANGE UP (ref 4.8–10.8)

## 2022-11-03 PROCEDURE — 99239 HOSP IP/OBS DSCHRG MGMT >30: CPT

## 2022-11-03 PROCEDURE — 99232 SBSQ HOSP IP/OBS MODERATE 35: CPT

## 2022-11-03 RX ORDER — LIPASE/PROTEASE/AMYLASE 16-48-48K
3 CAPSULE,DELAYED RELEASE (ENTERIC COATED) ORAL
Qty: 270 | Refills: 0
Start: 2022-11-03 | End: 2022-12-02

## 2022-11-03 RX ORDER — ASPIRIN/CALCIUM CARB/MAGNESIUM 324 MG
1 TABLET ORAL
Qty: 0 | Refills: 0 | DISCHARGE

## 2022-11-03 RX ADMIN — Medication 3 CAPSULE(S): at 08:30

## 2022-11-03 RX ADMIN — AMLODIPINE BESYLATE 5 MILLIGRAM(S): 2.5 TABLET ORAL at 06:16

## 2022-11-03 NOTE — CHART NOTE - NSCHARTNOTEFT_GEN_A_CORE
EUS unrevealing for stones. Noted Gallbladder sludge and would benefit from Cholecystectomy this admission given repeat episodes of Biliary colic and Pancreatitis. If surgery not planning intervention can advance diet and discharge home  - Follow up GI [facility] [phone] for non urgent follow up  - Recall advanced as needed

## 2022-11-03 NOTE — DISCHARGE NOTE NURSING/CASE MANAGEMENT/SOCIAL WORK - NSDCVIVACCINE_GEN_ALL_CORE_FT
influenza, injectable, quadrivalent, preservative free; 03-Feb-2019 13:02; Stacy Joe (RN); [employer]; [id number] (Exp. Date: 30-Jun-2019); IntraMuscular; Deltoid Left.; 0.5 milliLiter(s); VIS (VIS Published: 07-Aug-2015, VIS Presented: 03-Feb-2019);   Tdap; 01-Feb-2019 22:39; Angie Serna (RN); [employer]; [id number] (Exp. Date: 02-Nov-2020); IntraMuscular; Deltoid Left.; 0.5 milliLiter(s); VIS (VIS Published: 09-May-2013, VIS Presented: 01-Feb-2019);   Tdap; 23-Oct-2020 20:52; Whitney Hsu (RN); [employer]; [id number] (Exp. Date: 21-Jul-2022); IntraMuscular; Deltoid Right.; 0.5 milliLiter(s); VIS (VIS Published: 09-May-2013, VIS Presented: 23-Oct-2020);

## 2022-11-03 NOTE — DISCHARGE NOTE PROVIDER - CARE PROVIDERS DIRECT ADDRESSES
,DirectAddress_Unknown,[email or web address]

## 2022-11-03 NOTE — DISCHARGE NOTE PROVIDER - ATTENDING DISCHARGE PHYSICAL EXAMINATION:
Patient seen and examined at bedside. Had EUS yesterday, showing GB sludge. Patient's pain is controlled, tolerating diet, no nausea or vomiting, and having BM's. He will f/u with Dr. Dupree on 11/9 for cholecystectomy. Needs COVID swab 3 days prior.    PHYSICAL EXAM:  GENERAL: NAD, lying in bed comfortably  HEAD:  Atraumatic, Normocephalic  EYES: EOMI, PERRLA, conjunctiva and sclera clear  ENT: Moist mucous membranes  NECK: Supple, No JVD  CHEST/LUNG: Clear to auscultation bilaterally; No rales, rhonchi, wheezing, or rubs. Unlabored respirations  HEART: Regular rate and rhythm; No murmurs, rubs, or gallops  ABDOMEN: Bowel sounds present; Soft, Nontender, Nondistended. No hepatomegaly  EXTREMITIES:  2+ Peripheral Pulses, brisk capillary refill. No clubbing, cyanosis, or edema  NERVOUS SYSTEM:  Alert & Oriented X3, speech clear. No deficits   MSK: FROM all 4 extremities, full and equal strength  SKIN: No rashes or lesions    Vital Signs Last 24 Hrs  T(C): 36.2 (03 Nov 2022 08:56), Max: 36.6 (02 Nov 2022 20:31)  T(F): 97.2 (03 Nov 2022 08:56), Max: 97.9 (02 Nov 2022 20:31)  HR: 67 (03 Nov 2022 08:56) (63 - 84)  BP: 129/76 (03 Nov 2022 08:56) (126/63 - 142/64)  BP(mean): --  RR: 18 (03 Nov 2022 08:56) (12 - 22)  SpO2: 100% (03 Nov 2022 08:56) (97% - 100%)    Parameters below as of 03 Nov 2022 08:56  Patient On (Oxygen Delivery Method): room air

## 2022-11-03 NOTE — DISCHARGE NOTE PROVIDER - CARE PROVIDER_API CALL
Marcela Dupree)  Surgery; Surgical Critical Care  [address] [facility] [place] [address]  Phone: [phone]  Fax: [phone]  Established Patient  Follow Up Time: 1-3 days    Junior Magana)  Gastroenterology; Internal Medicine  [address] [place] [address]  Phone: [phone]  Fax: [phone]  Established Patient  Follow Up Time: 1 week

## 2022-11-03 NOTE — CHART NOTE - NSCHARTNOTEFT_GEN_A_CORE
CC:  Clay Ibrahim is here today for a physical.    Medications: medications verified and updated  Refills needed today?  YES  Denies known Latex allergy or symptoms of Latex sensitivity.  Patient would like communication of their results via:    Cell Phone:   Telephone Information:   Mobile [phone]     Okay to leave a message containing results? Yes  Tobacco history: verified    Health Maintenance Due   Topic Date Due   • Influenza Vaccine (1) Never done   • COVID-19 Vaccine (2 - Booster for Wilfrido series) 09/15/2021     Patient is due for topics as listed above but is not proceeding with them.     [facility] status addressed. Patient Active.          Patient underwent yesterday EUS with GI and was found with GB sludge.  GI and Medical teams recommend removal of his gallbladder due to possible reason for his Gallstone Pancreatitis.    Patient is clinically stable, afebrile.  Tolerates po diet and has no abdominal pain.    Due to the lack of available OR time for scheduling his surgery today or tomorrow, I advised him to stay on low fat diet and can be discharged by the Medical team.  Patient was booked by my Office for Laparoscopic Cholecystectomy, Possible Open on 11/09/2022 (Wednesday).  I explained to him that my Office will call him additionally regarding the time and location that he needs to arrive on Wednesday for the scheduled surgery.  He understood and agreed. All questions were answered.  The above was communicated with Medical attending too.

## 2022-11-03 NOTE — DISCHARGE NOTE PROVIDER - HOSPITAL COURSE
Patient 67 year old with a PMHx of HTN, DLD, Diabetes mellitus on Metformin,  BPH, GERD, prostate CA s/p seeding on remission, recurrent pancreatitis last admission in 4/22 presented for epigastric pain associated with n/v for 2 days. Followed by Trauma as will need CCY this admission. Due to rise in T adenike patient had EGD+EUS done on 11/2. EGD: non erosive gastritis, biopsies obtained. EUS focused examination: normal CBD of 3.7 mm, no CBD stones found, there is gallbladder sludge. Surgery team was made aware of EUS findings and due to OR availability recommended to follow up outpatient for cholecystectomy.

## 2022-11-03 NOTE — DISCHARGE NOTE NURSING/CASE MANAGEMENT/SOCIAL WORK - PATIENT PORTAL LINK FT
You can access the FollowMyHealth Patient Portal offered by [facility] by registering at the following website: [email or web address]. By joining [employer]’s FollowMyHealth portal, you will also be able to view your health information using other applications (apps) compatible with our system.

## 2022-11-03 NOTE — DISCHARGE NOTE NURSING/CASE MANAGEMENT/SOCIAL WORK - NSDCPEFALRISK_GEN_ALL_CORE
For information on Fall & Injury Prevention, visit: [email or web address] OR  [email or web address] OR  https://www.cdc.gov/steadi/patient.html

## 2022-11-03 NOTE — DISCHARGE NOTE NURSING/CASE MANAGEMENT/SOCIAL WORK - NSDCFUADDAPPT_GEN_ALL_CORE_FT
-Per surgery, your procedure is scheduled for Wednesday, 11/9. Please reach out to them (Dr. Dupree) to confirm. You will need a COVID swab within 3 days prior to procedure.     - MRI abdomen as outpatient and follow up with gastroenterology in the clinic in 2 - 4 weeks

## 2022-11-03 NOTE — DISCHARGE NOTE PROVIDER - NSDCMRMEDTOKEN_GEN_ALL_CORE_FT
acetaminophen 325 mg oral tablet: 2 tab(s) orally every 4 hours, As needed, Mild Pain (1 - 3)  albuterol 2.5 mg/3 mL (0.083%) inhalation solution: 1 spray(s) inhaled 2 times a day  albuterol 90 mcg/inh inhalation aerosol: 1 puff(s) inhaled every 4 hours, As needed, Wheezing  amLODIPine 5 mg oral tablet: 1 tab(s) orally once a day  aspirin 81 mg oral tablet: 1 tab(s) orally once a day - Hold until surgery.   atorvastatin 40 mg oral tablet: 1 tab(s) orally once a day (at bedtime)  Flomax 0.4 mg oral capsule: 1 cap(s) orally once a day (at bedtime)  metFORMIN 500 mg oral tablet: 2 tab(s) orally 2 times a day  MiraLax oral powder for reconstitution: 17 gram(s) orally once a day, As Needed -for constipation   pancrelipase 12,000 units-38,000 units-60,000 units oral delayed release capsule: 3 cap(s) orally 3 times a day (with meals)

## 2022-11-03 NOTE — DISCHARGE NOTE PROVIDER - NSDCFUSCHEDAPPT_GEN_ALL_CORE_FT
[facility]  INTMED [address]  Scheduled Appointment: 12/14/2022    [facility]  GASTRO  [address]  Scheduled Appointment: 01/25/2023

## 2022-11-03 NOTE — DISCHARGE NOTE PROVIDER - PROVIDER TOKENS
PROVIDER:[TOKEN:[[id number]],FOLLOWUP:[1-3 days],ESTABLISHEDPATIENT:[T]],PROVIDER:[TOKEN:[[id number]],FOLLOWUP:[1 week],ESTABLISHEDPATIENT:[T]]

## 2022-11-03 NOTE — DISCHARGE NOTE PROVIDER - NSDCCPCAREPLAN_GEN_ALL_CORE_FT
PRINCIPAL DISCHARGE DIAGNOSIS  Diagnosis: Acute pancreatitis  Assessment and Plan of Treatment: Pancreatitis  Pancreatitis is a condition in which the pancreas becomes irritated and swollen (inflammation). The pancreas is a gland that is located behind the stomach. It produces enzymes that help to digest food. Most acute attacks last a couple of days and can cause serious problems and even be life threatening. The most common causes are alcohol abuse and gallstones. Symptoms include pain in the upper abdomen that may radiate to the back, nausea, and vomiting. Diagnosis is made with a medical history and physical exam as well as additional diagnostic testing. At home, eat smaller, more frequent meals and avoid alcohol and fatty foods. Drink enough fluid to keep your urine clear or pale yellow.   SEEK IMMEDIATE MEDICAL CARE IF YOU HAVE ANY OF THE FOLLOWING SYMPTOMS: inability to keep fluids down, increasing pain, yellowing of your skin or eyes, or lightheadedness/dizziness.  Follow up with Surgery team Dr. Dupree to schedule cholecystectomy. Please call [phone] to schedule an appointment.       PRINCIPAL DISCHARGE DIAGNOSIS  Diagnosis: Acute pancreatitis  Assessment and Plan of Treatment: Pancreatitis  Pancreatitis is a condition in which the pancreas becomes irritated and swollen (inflammation). The pancreas is a gland that is located behind the stomach. It produces enzymes that help to digest food. Most acute attacks last a couple of days and can cause serious problems and even be life threatening. The most common causes are alcohol abuse and gallstones. Symptoms include pain in the upper abdomen that may radiate to the back, nausea, and vomiting. Diagnosis is made with a medical history and physical exam as well as additional diagnostic testing. At home, eat smaller, more frequent meals and avoid alcohol and fatty foods. Drink enough fluid to keep your urine clear or pale yellow.   SEEK IMMEDIATE MEDICAL CARE IF YOU HAVE ANY OF THE FOLLOWING SYMPTOMS: inability to keep fluids down, increasing pain, yellowing of your skin or eyes, or lightheadedness/dizziness.  Follow up with Surgery team Dr. Dupree to schedule cholecystectomy. Please call [phone] to schedule an appointment.  You need a COVID swab within 3 days prior to procedure (procedure scheduled for Wednesday, 11/9).  **HOLD YOUR ASPIRIN UNTIL PROCEDURE**

## 2022-11-03 NOTE — DISCHARGE NOTE PROVIDER - NSDCFUADDAPPT_GEN_ALL_CORE_FT
- MRI abdomen as outpatient and follow up with gastroenterology in the clinic in 2 - 4 weeks  -Per surgery, your procedure is scheduled for Wednesday, 11/9. Please reach out to them (Dr. Dupree) to confirm. You will need a COVID swab within 3 days prior to procedure.     - MRI abdomen as outpatient and follow up with gastroenterology in the clinic in 2 - 4 weeks

## 2022-11-04 LAB — SURGICAL PATHOLOGY STUDY: SIGNIFICANT CHANGE UP

## 2022-11-07 ENCOUNTER — LABORATORY RESULT (OUTPATIENT)
Age: 67
End: 2022-11-07

## 2022-11-10 LAB
6-ACETYLCODEINE UR CFM-MCNC: NEGATIVE NG/ML — SIGNIFICANT CHANGE UP
AMPHET UR-MCNC: NEGATIVE — SIGNIFICANT CHANGE UP
BARBITURATES, URINE.: NEGATIVE — SIGNIFICANT CHANGE UP
BENZODIAZ UR-MCNC: NEGATIVE — SIGNIFICANT CHANGE UP
COCAINE METAB.OTHER UR-MCNC: NEGATIVE — SIGNIFICANT CHANGE UP
CODEINE UR CFM-MCNC: SIGNIFICANT CHANGE UP NG/ML
CREATININE, URINE THERAPEUTIC: 62.5 MG/DL — SIGNIFICANT CHANGE UP
HYDROCODONE UR CFM-MCNC: SIGNIFICANT CHANGE UP NG/ML
HYDROMORPHONE UR CFM-MCNC: SIGNIFICANT CHANGE UP NG/ML
METHADONE UR-MCNC: NEGATIVE — SIGNIFICANT CHANGE UP
METHAQUALONE UR QL: NEGATIVE — SIGNIFICANT CHANGE UP
METHAQUALONE UR-MCNC: NEGATIVE — SIGNIFICANT CHANGE UP
MORPHINE UR QL CFM: 3244 NG/ML — SIGNIFICANT CHANGE UP
OPIATES UR-MCNC: SIGNIFICANT CHANGE UP
PCP UR-MCNC: NEGATIVE — SIGNIFICANT CHANGE UP
PROPOXYPH UR QL: NEGATIVE — SIGNIFICANT CHANGE UP
THC UR QL CFM: 246 NG/ML — SIGNIFICANT CHANGE UP
THC UR QL: SIGNIFICANT CHANGE UP

## 2022-12-14 ENCOUNTER — APPOINTMENT (OUTPATIENT)
Dept: INTERNAL MEDICINE | Facility: CLINIC | Age: 67
End: 2022-12-14

## 2023-01-25 ENCOUNTER — APPOINTMENT (OUTPATIENT)
Dept: GASTROENTEROLOGY | Facility: CLINIC | Age: 68
End: 2023-01-25

## 2023-02-17 ENCOUNTER — OUTPATIENT (OUTPATIENT)
Dept: OUTPATIENT SERVICES | Facility: HOSPITAL | Age: 68
LOS: 1 days | End: 2023-02-17

## 2023-02-17 ENCOUNTER — APPOINTMENT (OUTPATIENT)
Dept: INTERNAL MEDICINE | Facility: CLINIC | Age: 68
End: 2023-02-17

## 2023-02-17 VITALS
DIASTOLIC BLOOD PRESSURE: 87 MMHG | HEIGHT: 67 IN | WEIGHT: 207.5 LBS | SYSTOLIC BLOOD PRESSURE: 133 MMHG | OXYGEN SATURATION: 99 % | TEMPERATURE: 97.2 F | HEART RATE: 73 BPM | BODY MASS INDEX: 32.57 KG/M2

## 2023-02-17 DIAGNOSIS — I10 ESSENTIAL (PRIMARY) HYPERTENSION: ICD-10-CM

## 2023-02-17 DIAGNOSIS — E11.9 TYPE 2 DIABETES MELLITUS WITHOUT COMPLICATIONS: ICD-10-CM

## 2023-02-17 DIAGNOSIS — C61 MALIGNANT NEOPLASM OF PROSTATE: Chronic | ICD-10-CM

## 2023-02-17 DIAGNOSIS — Z98.890 OTHER SPECIFIED POSTPROCEDURAL STATES: Chronic | ICD-10-CM

## 2023-02-17 DIAGNOSIS — Z00.00 ENCOUNTER FOR GENERAL ADULT MEDICAL EXAMINATION WITHOUT ABNORMAL FINDINGS: ICD-10-CM

## 2023-02-17 DIAGNOSIS — M54.9 DORSALGIA, UNSPECIFIED: ICD-10-CM

## 2023-02-17 DIAGNOSIS — Z96.642 PRESENCE OF LEFT ARTIFICIAL HIP JOINT: Chronic | ICD-10-CM

## 2023-02-17 DIAGNOSIS — E78.5 HYPERLIPIDEMIA, UNSPECIFIED: ICD-10-CM

## 2023-02-17 PROCEDURE — 99204 OFFICE O/P NEW MOD 45 MIN: CPT

## 2023-02-17 NOTE — PHYSICAL EXAM
[No Acute Distress] : no acute distress [Normal Sclera/Conjunctiva] : normal sclera/conjunctiva [Normal Outer Ear/Nose] : the outer ears and nose were normal in appearance [No Lymphadenopathy] : no lymphadenopathy [No Respiratory Distress] : no respiratory distress  [Clear to Auscultation] : lungs were clear to auscultation bilaterally [Normal Rate] : normal rate  [Regular Rhythm] : with a regular rhythm [Soft] : abdomen soft [Non Tender] : non-tender [Normal Bowel Sounds] : normal bowel sounds [No CVA Tenderness] : no CVA  tenderness [Coordination Grossly Intact] : coordination grossly intact [No Focal Deficits] : no focal deficits [Normal Affect] : the affect was normal [Normal Insight/Judgement] : insight and judgment were intact [de-identified] : distended

## 2023-02-17 NOTE — HISTORY OF PRESENT ILLNESS
[FreeTextEntry1] : follow up  [de-identified] : 68 yo M w/ hx of HTN, DLD, DM, prostate CA s/p seeding on remission, OA, hx of pancreatitis (most recent episode in 12/2015) presents here w/ for follow up and medication refill. Pt also complaining of lower back pain. Also complaining of constipation.\par No other complaints.

## 2023-02-17 NOTE — ASSESSMENT
[FreeTextEntry1] : \par 65 year old male with PMHx of non-obstructive CAD, DL, HTN, DM2, Prostate Ca s/p seeding (in remission), Hx of pancreatitis (last episode 2015) presenting for follow-up\par \par \par #Constiptation\par - pt complaining of constipation \par - distended abdomen\par - start senna and miralex PRN\par \par #recurrent pancreat\par - send to GI, unclear etiology\par \par #DM2, uncontrolled\par poor compliance with f/u\par check a1c\par rtc 6 months\par \par #Back pain\par - complaining of back pain today\par - start Ibuprofen PRN for pain\par \par \par #HTN\par well controlled\par Continue Amlodipine 5mg qd\par \par #Non-obstructive CAD\par Continue Aspirin 81mg, Lipitor 40mg qd\par \par #HCM\par Colonoscopy in 2020, with 3 adenomas, the largest 8mm,\par tdap- in 2012. \par Got 2  covid vaccine\par f/u in 6 months, with repeat HBa1c, CBC, CMP, Lipid panel today

## 2023-02-19 NOTE — ED PROVIDER NOTE - NSICDXPASTSURGICALHX_GEN_ALL_CORE_FT
PAST SURGICAL HISTORY:  History of appendectomy     Prostate cancer s/p history of seeding    S/P hip replacement, left 9/2015    
None

## 2023-02-28 ENCOUNTER — APPOINTMENT (OUTPATIENT)
Dept: PODIATRY | Facility: CLINIC | Age: 68
End: 2023-02-28

## 2023-03-23 NOTE — ED ADULT NURSE NOTE - NSFALLRSKASSESSDT_ED_ALL_ED
Pharmacy notified staff - they are having problems with the  - working on sending the medication.    01-Feb-2019 21:51

## 2023-03-24 NOTE — ED PROCEDURE NOTE - CPROC ED POST PROC CARE GUIDE1
Patient
Verbal/written post procedure instructions were given to patient/caregiver./Instructed patient/caregiver regarding signs and symptoms of infection./Instructed patient/caregiver to follow-up with primary care physician.

## 2023-04-18 ENCOUNTER — OUTPATIENT (OUTPATIENT)
Dept: OUTPATIENT SERVICES | Facility: HOSPITAL | Age: 68
LOS: 1 days | End: 2023-04-18
Payer: MEDICAID

## 2023-04-18 ENCOUNTER — APPOINTMENT (OUTPATIENT)
Dept: INTERNAL MEDICINE | Facility: CLINIC | Age: 68
End: 2023-04-18
Payer: MEDICARE

## 2023-04-18 VITALS
WEIGHT: 207 LBS | HEART RATE: 92 BPM | TEMPERATURE: 97 F | HEIGHT: 67 IN | BODY MASS INDEX: 32.49 KG/M2 | SYSTOLIC BLOOD PRESSURE: 116 MMHG | DIASTOLIC BLOOD PRESSURE: 82 MMHG | OXYGEN SATURATION: 100 %

## 2023-04-18 DIAGNOSIS — C61 MALIGNANT NEOPLASM OF PROSTATE: Chronic | ICD-10-CM

## 2023-04-18 DIAGNOSIS — M19.90 UNSPECIFIED OSTEOARTHRITIS, UNSPECIFIED SITE: ICD-10-CM

## 2023-04-18 DIAGNOSIS — Z00.00 ENCOUNTER FOR GENERAL ADULT MEDICAL EXAMINATION WITHOUT ABNORMAL FINDINGS: ICD-10-CM

## 2023-04-18 DIAGNOSIS — R05.9 COUGH, UNSPECIFIED: ICD-10-CM

## 2023-04-18 PROCEDURE — 99213 OFFICE O/P EST LOW 20 MIN: CPT | Mod: GC

## 2023-04-18 PROCEDURE — 99213 OFFICE O/P EST LOW 20 MIN: CPT

## 2023-04-18 RX ORDER — IBUPROFEN 800 MG/1
800 TABLET, FILM COATED ORAL 3 TIMES DAILY
Qty: 90 | Refills: 1 | Status: DISCONTINUED | COMMUNITY
Start: 2023-02-17 | End: 2023-04-18

## 2023-04-18 RX ORDER — IBUPROFEN 800 MG/1
800 TABLET, FILM COATED ORAL TWICE DAILY
Qty: 60 | Refills: 3 | Status: DISCONTINUED | COMMUNITY
Start: 2018-04-04 | End: 2023-04-18

## 2023-04-18 NOTE — ASSESSMENT
[FreeTextEntry1] : hx L hip replacement\par now R hip pain\par remote hx prostate ca\par prior xr reviewed, arthritis on R side\par in light of prostate ca hx must exclude pathologic fx/ met\par repeat xr\par trial PT\par trial naproxen, counselled how/when to take\par stop ibuprofen\par check labs\par patient wants repeat of psa lvl too\par orthopedic sx referral\par \par for cough is concerned\par check cxr

## 2023-04-18 NOTE — PHYSICAL EXAM
[Normal Sclera/Conjunctiva] : normal sclera/conjunctiva [No Accessory Muscle Use] : no accessory muscle use [Clear to Auscultation] : lungs were clear to auscultation bilaterally [Regular Rhythm] : with a regular rhythm [Normal S1, S2] : normal S1 and S2 [Non Tender] : non-tender [Non-distended] : non-distended [No Focal Deficits] : no focal deficits [Normal Insight/Judgement] : insight and judgment were intact [de-identified] : obese [de-identified] : R hip limited rom, tenderness on abduction and adduction

## 2023-04-18 NOTE — HISTORY OF PRESENT ILLNESS
[FreeTextEntry1] : 2mth follow up [de-identified] : c/o r hip pain. was taking ibuprofen prn but taking 1600mg at a time. we discussed risks of doing so. patient also mentions scant cough and remote cigarette smoking hx, but extensive marijuana smoking hx. also c/o constipation

## 2023-04-26 DIAGNOSIS — M19.90 UNSPECIFIED OSTEOARTHRITIS, UNSPECIFIED SITE: ICD-10-CM

## 2023-04-26 DIAGNOSIS — R05.9 COUGH, UNSPECIFIED: ICD-10-CM

## 2023-04-26 DIAGNOSIS — C61 MALIGNANT NEOPLASM OF PROSTATE: ICD-10-CM

## 2023-04-28 ENCOUNTER — RESULT REVIEW (OUTPATIENT)
Age: 68
End: 2023-04-28

## 2023-04-28 ENCOUNTER — OUTPATIENT (OUTPATIENT)
Dept: OUTPATIENT SERVICES | Facility: HOSPITAL | Age: 68
LOS: 1 days | End: 2023-04-28
Payer: MEDICAID

## 2023-04-28 DIAGNOSIS — Z96.642 PRESENCE OF LEFT ARTIFICIAL HIP JOINT: Chronic | ICD-10-CM

## 2023-04-28 DIAGNOSIS — Z98.890 OTHER SPECIFIED POSTPROCEDURAL STATES: Chronic | ICD-10-CM

## 2023-04-28 DIAGNOSIS — C61 MALIGNANT NEOPLASM OF PROSTATE: Chronic | ICD-10-CM

## 2023-04-28 DIAGNOSIS — C61 MALIGNANT NEOPLASM OF PROSTATE: ICD-10-CM

## 2023-04-28 PROCEDURE — 73522 X-RAY EXAM HIPS BI 3-4 VIEWS: CPT | Mod: 26

## 2023-04-28 PROCEDURE — 73522 X-RAY EXAM HIPS BI 3-4 VIEWS: CPT

## 2023-04-28 PROCEDURE — 71046 X-RAY EXAM CHEST 2 VIEWS: CPT

## 2023-04-28 PROCEDURE — 71046 X-RAY EXAM CHEST 2 VIEWS: CPT | Mod: 26

## 2023-04-29 DIAGNOSIS — C61 MALIGNANT NEOPLASM OF PROSTATE: ICD-10-CM

## 2023-05-01 LAB
ALBUMIN SERPL ELPH-MCNC: 4.7 G/DL
ALP BLD-CCNC: 91 U/L
ALT SERPL-CCNC: 18 U/L
ANION GAP SERPL CALC-SCNC: 10 MMOL/L
AST SERPL-CCNC: 15 U/L
BASOPHILS # BLD AUTO: 0.03 K/UL
BASOPHILS NFR BLD AUTO: 0.5 %
BILIRUB SERPL-MCNC: 0.5 MG/DL
BUN SERPL-MCNC: 11 MG/DL
CALCIUM SERPL-MCNC: 8.9 MG/DL
CHLORIDE SERPL-SCNC: 108 MMOL/L
CHOLEST SERPL-MCNC: 162 MG/DL
CO2 SERPL-SCNC: 22 MMOL/L
CREAT SERPL-MCNC: 0.7 MG/DL
CREAT SPEC-SCNC: 234 MG/DL
EGFR: 101 ML/MIN/1.73M2
EOSINOPHIL # BLD AUTO: 0.21 K/UL
EOSINOPHIL NFR BLD AUTO: 3.3 %
ESTIMATED AVERAGE GLUCOSE: 171 MG/DL
GLUCOSE SERPL-MCNC: 97 MG/DL
HBA1C MFR BLD HPLC: 7.6 %
HCT VFR BLD CALC: 38.9 %
HDLC SERPL-MCNC: 51 MG/DL
HGB BLD-MCNC: 13 G/DL
IMM GRANULOCYTES NFR BLD AUTO: 0.3 %
LDLC SERPL CALC-MCNC: 98 MG/DL
LYMPHOCYTES # BLD AUTO: 1.51 K/UL
LYMPHOCYTES NFR BLD AUTO: 24.1 %
MAN DIFF?: NORMAL
MCHC RBC-ENTMCNC: 29.9 PG
MCHC RBC-ENTMCNC: 33.4 G/DL
MCV RBC AUTO: 89.4 FL
MICROALBUMIN 24H UR DL<=1MG/L-MCNC: 3.7 MG/DL
MICROALBUMIN/CREAT 24H UR-RTO: 16 MG/G
MONOCYTES # BLD AUTO: 0.68 K/UL
MONOCYTES NFR BLD AUTO: 10.8 %
NEUTROPHILS # BLD AUTO: 3.82 K/UL
NEUTROPHILS NFR BLD AUTO: 61 %
NONHDLC SERPL-MCNC: 111 MG/DL
PLATELET # BLD AUTO: 235 K/UL
POTASSIUM SERPL-SCNC: 4.2 MMOL/L
PROT SERPL-MCNC: 7.2 G/DL
PSA SERPL-MCNC: 0.55 NG/ML
RBC # BLD: 4.35 M/UL
RBC # FLD: 12.8 %
SODIUM SERPL-SCNC: 140 MMOL/L
TRIGL SERPL-MCNC: 65 MG/DL
TSH SERPL-ACNC: 1.69 UIU/ML
WBC # FLD AUTO: 6.27 K/UL

## 2023-05-03 ENCOUNTER — OUTPATIENT (OUTPATIENT)
Dept: OUTPATIENT SERVICES | Facility: HOSPITAL | Age: 68
LOS: 1 days | End: 2023-05-03
Payer: MEDICARE

## 2023-05-03 ENCOUNTER — APPOINTMENT (OUTPATIENT)
Dept: GASTROENTEROLOGY | Facility: CLINIC | Age: 68
End: 2023-05-03
Payer: MEDICARE

## 2023-05-03 ENCOUNTER — NON-APPOINTMENT (OUTPATIENT)
Age: 68
End: 2023-05-03

## 2023-05-03 VITALS
WEIGHT: 206 LBS | DIASTOLIC BLOOD PRESSURE: 77 MMHG | TEMPERATURE: 98 F | OXYGEN SATURATION: 99 % | SYSTOLIC BLOOD PRESSURE: 126 MMHG | HEIGHT: 66 IN | HEART RATE: 71 BPM | BODY MASS INDEX: 33.11 KG/M2

## 2023-05-03 DIAGNOSIS — K85.90 ACUTE PANCREATITIS WITHOUT NECROSIS OR INFECTION, UNSPECIFIED: ICD-10-CM

## 2023-05-03 DIAGNOSIS — C61 MALIGNANT NEOPLASM OF PROSTATE: Chronic | ICD-10-CM

## 2023-05-03 DIAGNOSIS — Z12.11 ENCOUNTER FOR SCREENING FOR MALIGNANT NEOPLASM OF COLON: ICD-10-CM

## 2023-05-03 DIAGNOSIS — Z96.642 PRESENCE OF LEFT ARTIFICIAL HIP JOINT: Chronic | ICD-10-CM

## 2023-05-03 DIAGNOSIS — K59.00 CONSTIPATION, UNSPECIFIED: ICD-10-CM

## 2023-05-03 DIAGNOSIS — D64.9 ANEMIA, UNSPECIFIED: ICD-10-CM

## 2023-05-03 DIAGNOSIS — Z00.00 ENCOUNTER FOR GENERAL ADULT MEDICAL EXAMINATION WITHOUT ABNORMAL FINDINGS: ICD-10-CM

## 2023-05-03 DIAGNOSIS — Z98.890 OTHER SPECIFIED POSTPROCEDURAL STATES: Chronic | ICD-10-CM

## 2023-05-03 LAB
ALBUMIN SERPL ELPH-MCNC: 4.5 G/DL
ALP BLD-CCNC: 91 U/L
ALT SERPL-CCNC: 14 U/L
ANION GAP SERPL CALC-SCNC: 13 MMOL/L
AST SERPL-CCNC: 14 U/L
BASOPHILS # BLD AUTO: 0.03 K/UL
BASOPHILS NFR BLD AUTO: 0.5 %
BILIRUB SERPL-MCNC: 0.6 MG/DL
BUN SERPL-MCNC: 14 MG/DL
CALCIUM SERPL-MCNC: 9 MG/DL
CHLORIDE SERPL-SCNC: 107 MMOL/L
CO2 SERPL-SCNC: 23 MMOL/L
CREAT SERPL-MCNC: 0.7 MG/DL
EGFR: 101 ML/MIN/1.73M2
EOSINOPHIL # BLD AUTO: 0.17 K/UL
EOSINOPHIL NFR BLD AUTO: 2.9 %
GLUCOSE SERPL-MCNC: 97 MG/DL
HCT VFR BLD CALC: 37.5 %
HGB BLD-MCNC: 12.4 G/DL
IMM GRANULOCYTES NFR BLD AUTO: 0.5 %
INR PPP: 1.08 RATIO
IRON SATN MFR SERPL: 16 %
IRON SERPL-MCNC: 51 UG/DL
LYMPHOCYTES # BLD AUTO: 1.23 K/UL
LYMPHOCYTES NFR BLD AUTO: 21.1 %
MAN DIFF?: NORMAL
MCHC RBC-ENTMCNC: 29 PG
MCHC RBC-ENTMCNC: 33.1 G/DL
MCV RBC AUTO: 87.6 FL
MONOCYTES # BLD AUTO: 0.55 K/UL
MONOCYTES NFR BLD AUTO: 9.5 %
NEUTROPHILS # BLD AUTO: 3.81 K/UL
NEUTROPHILS NFR BLD AUTO: 65.5 %
PLATELET # BLD AUTO: 218 K/UL
POTASSIUM SERPL-SCNC: 4.1 MMOL/L
PROT SERPL-MCNC: 7 G/DL
PT BLD: 12.4 SEC
RBC # BLD: 4.28 M/UL
RBC # FLD: 12.8 %
SODIUM SERPL-SCNC: 143 MMOL/L
TIBC SERPL-MCNC: 322 UG/DL
UIBC SERPL-MCNC: 271 UG/DL
WBC # FLD AUTO: 5.82 K/UL

## 2023-05-03 PROCEDURE — 83540 ASSAY OF IRON: CPT

## 2023-05-03 PROCEDURE — 99214 OFFICE O/P EST MOD 30 MIN: CPT

## 2023-05-03 PROCEDURE — 99213 OFFICE O/P EST LOW 20 MIN: CPT | Mod: GC

## 2023-05-03 PROCEDURE — 85027 COMPLETE CBC AUTOMATED: CPT

## 2023-05-03 PROCEDURE — 82728 ASSAY OF FERRITIN: CPT

## 2023-05-03 PROCEDURE — 83550 IRON BINDING TEST: CPT

## 2023-05-03 PROCEDURE — 80053 COMPREHEN METABOLIC PANEL: CPT

## 2023-05-03 PROCEDURE — 85610 PROTHROMBIN TIME: CPT

## 2023-05-05 LAB — FERRITIN SERPL-MCNC: 54 NG/ML

## 2023-05-05 NOTE — END OF VISIT
[] : Resident [FreeTextEntry3] : 68yo male presents for follow up to discuss surveillance colonoscopy. He has had recurrent episodes of pancreatitis without obvious etiology. Prior EGD/EUS, colonoscopy results reviewed. Recommend schedule surveillance colonoscopy and MRI/MRCP. If evidence of iron deficiency would also recommend EGD.

## 2023-05-05 NOTE — REVIEW OF SYSTEMS
[Constipation] : constipation [Negative] : Neurological [Abdominal Pain] : no abdominal pain [Diarrhea] : no diarrhea [Melena (black stool)] : no melena [Bleeding] : no bleeding

## 2023-05-05 NOTE — HISTORY OF PRESENT ILLNESS
[FreeTextEntry1] : 66 y/o M PMHx pancreatitis HTN, HLD, DM and prostate Ca s/p seeding on remission presenting for follow up on screening colonoscopy. \par Pt reports that has been having constipation for the past year, he was prescribed senna and miralax but feared side effects and has not been using it. He takes prune juice every 3 days and reports large bms once he drinks it.\par Denies any melena or hematochezia.

## 2023-05-05 NOTE — ASSESSMENT
[FreeTextEntry1] : 66 y/o M PMHx pancreatitis HTN, HLD, DM and prostate Ca s/p seeding on remission presenting for follow up on screening colonoscopy. \par \par # Screening colonoscopy\par - last colonoscopy done 2020: 3 polyps removed from descending colon (8mm, 8mm, and 1 cm), all adenomatous polyps, no high grade dysplasia\par - no family history colon cancer or colon disease\par - Needs repeat colonoscopy this year, ordered\par - miralax daily for a week prior to procedure\par - clear liquid diet day before procedure\par - golytely and dulcolax prescribed\par \par # Constipation\par - TSH normal \par - uses prune juice every 3 days\par - has senna and miralax but does not wish to use them, worried about side effects\par \par # Recurrent pancreatitis of unknown etiology\par - occasional alcohol only\par - NL IgG 4 in 11/2022\par - EGD/EUS in 11/2022, GB sludge, NL CBD\par - MRCP ordered\par \par # Normocytic anemia\par - denies any bleeding or melena\par - iron studies ordered

## 2023-05-18 ENCOUNTER — OUTPATIENT (OUTPATIENT)
Dept: OUTPATIENT SERVICES | Facility: HOSPITAL | Age: 68
LOS: 1 days | End: 2023-05-18

## 2023-05-18 ENCOUNTER — APPOINTMENT (OUTPATIENT)
Dept: ENDOCRINOLOGY | Facility: CLINIC | Age: 68
End: 2023-05-18

## 2023-05-18 VITALS
SYSTOLIC BLOOD PRESSURE: 134 MMHG | HEART RATE: 62 BPM | BODY MASS INDEX: 32.28 KG/M2 | WEIGHT: 200 LBS | DIASTOLIC BLOOD PRESSURE: 87 MMHG

## 2023-05-18 DIAGNOSIS — Z98.890 OTHER SPECIFIED POSTPROCEDURAL STATES: Chronic | ICD-10-CM

## 2023-05-18 DIAGNOSIS — Z96.642 PRESENCE OF LEFT ARTIFICIAL HIP JOINT: Chronic | ICD-10-CM

## 2023-05-18 DIAGNOSIS — C61 MALIGNANT NEOPLASM OF PROSTATE: Chronic | ICD-10-CM

## 2023-05-18 DIAGNOSIS — Z00.00 ENCOUNTER FOR GENERAL ADULT MEDICAL EXAMINATION WITHOUT ABNORMAL FINDINGS: ICD-10-CM

## 2023-05-18 PROCEDURE — 99204 OFFICE O/P NEW MOD 45 MIN: CPT

## 2023-05-18 NOTE — HISTORY OF PRESENT ILLNESS
[FreeTextEntry1] : Pt is a 66 yo M with PMHx of prostate cancer in remission, DM, HTN, HLD, and CAD presents to endocrine clinic for DM.

## 2023-05-18 NOTE — REVIEW OF SYSTEMS
[Fatigue] : no fatigue [Neck Pain] : no neck pain [Chest Pain] : no chest pain [Palpitations] : no palpitations [Shortness Of Breath] : no shortness of breath [Nausea] : no nausea [Vomiting] : no vomiting [Polyuria] : no polyuria [Joint Pain] : no joint pain

## 2023-05-18 NOTE — PHYSICAL EXAM
[Alert] : alert [Well Nourished] : well nourished [No Acute Distress] : no acute distress [Normal Outer Ear/Nose] : the ears and nose were normal in appearance [Normal Hearing] : hearing was normal [Supple] : the neck was supple [No Respiratory Distress] : no respiratory distress [No Accessory Muscle Use] : no accessory muscle use [Clear to Auscultation] : lungs were clear to auscultation bilaterally [Normal S1, S2] : normal S1 and S2 [Normal Rate] : heart rate was normal [Regular Rhythm] : with a regular rhythm [Normal Bowel Sounds] : normal bowel sounds [Not Tender] : non-tender [Soft] : abdomen soft

## 2023-05-18 NOTE — ADDENDUM
[FreeTextEntry1] :  66 yo M with PMHx of prostate cancer in remission, DM, HTN, HLD, and CAD presents to endocrine clinic for DM.\par \par #type 2 DM \par -A1c 7.6 under fair control\par -Currently on metformin 1000 mg twice a day recommend to continue same\par -May benefit from a GLP-1 analog as patient has a BMI of greater than 30, will start with Mounjaro 2.5 mg every week, if not covered will consider Ozempic 0.25 mg every week for 4 weeks followed by 0.5 mg every week\par -We will check an A1c lipid panel urine albumin creatinine ratio in 3 months\par -Up-to-date with podiatry and ophthalmology appointments\par - will refer to diabetes education , ma benefit from review of injection technique and comprehensive overview

## 2023-05-18 NOTE — ASSESSMENT
[FreeTextEntry1] : 66 yo M with a PMHx of HLD, HTN, DM presents to the endocrine clinic to re-establish care.\par \par \par #DM\par - Last A1C 7.6\par - FS avg at home 120s\par - Currently on Metformin 1000mg BID\par - Pt reports he saw eye doctor last month and he will follow up with podiatry\par \par #Obesity\par - BMI 32\par - Start Mounjaro 2.5mg once a week\par \par \par Follow up in 4 months\par \par \par \par \par \par

## 2023-05-25 ENCOUNTER — INPATIENT (INPATIENT)
Facility: HOSPITAL | Age: 68
LOS: 3 days | Discharge: HOME | DRG: 556 | End: 2023-05-29
Attending: HOSPITALIST | Admitting: HOSPITALIST
Payer: COMMERCIAL

## 2023-05-25 VITALS
SYSTOLIC BLOOD PRESSURE: 120 MMHG | DIASTOLIC BLOOD PRESSURE: 76 MMHG | HEART RATE: 80 BPM | RESPIRATION RATE: 18 BRPM | TEMPERATURE: 98 F | OXYGEN SATURATION: 98 %

## 2023-05-25 DIAGNOSIS — Z96.642 PRESENCE OF LEFT ARTIFICIAL HIP JOINT: Chronic | ICD-10-CM

## 2023-05-25 DIAGNOSIS — S72.009A FRACTURE OF UNSPECIFIED PART OF NECK OF UNSPECIFIED FEMUR, INITIAL ENCOUNTER FOR CLOSED FRACTURE: ICD-10-CM

## 2023-05-25 DIAGNOSIS — Z98.890 OTHER SPECIFIED POSTPROCEDURAL STATES: Chronic | ICD-10-CM

## 2023-05-25 DIAGNOSIS — C61 MALIGNANT NEOPLASM OF PROSTATE: Chronic | ICD-10-CM

## 2023-05-25 LAB
ALBUMIN SERPL ELPH-MCNC: 4.3 G/DL — SIGNIFICANT CHANGE UP (ref 3.5–5.2)
ALP SERPL-CCNC: 89 U/L — SIGNIFICANT CHANGE UP (ref 30–115)
ALT FLD-CCNC: 15 U/L — SIGNIFICANT CHANGE UP (ref 0–41)
ANION GAP SERPL CALC-SCNC: 11 MMOL/L — SIGNIFICANT CHANGE UP (ref 7–14)
AST SERPL-CCNC: 14 U/L — SIGNIFICANT CHANGE UP (ref 0–41)
BASOPHILS # BLD AUTO: 0.05 K/UL — SIGNIFICANT CHANGE UP (ref 0–0.2)
BASOPHILS NFR BLD AUTO: 0.6 % — SIGNIFICANT CHANGE UP (ref 0–1)
BILIRUB SERPL-MCNC: 0.5 MG/DL — SIGNIFICANT CHANGE UP (ref 0.2–1.2)
BUN SERPL-MCNC: 12 MG/DL — SIGNIFICANT CHANGE UP (ref 10–20)
CALCIUM SERPL-MCNC: 9.4 MG/DL — SIGNIFICANT CHANGE UP (ref 8.4–10.4)
CHLORIDE SERPL-SCNC: 104 MMOL/L — SIGNIFICANT CHANGE UP (ref 98–110)
CO2 SERPL-SCNC: 24 MMOL/L — SIGNIFICANT CHANGE UP (ref 17–32)
CREAT SERPL-MCNC: 0.6 MG/DL — LOW (ref 0.7–1.5)
EGFR: 106 ML/MIN/1.73M2 — SIGNIFICANT CHANGE UP
EOSINOPHIL # BLD AUTO: 0.18 K/UL — SIGNIFICANT CHANGE UP (ref 0–0.7)
EOSINOPHIL NFR BLD AUTO: 2 % — SIGNIFICANT CHANGE UP (ref 0–8)
GLUCOSE SERPL-MCNC: 118 MG/DL — HIGH (ref 70–99)
HCT VFR BLD CALC: 36.5 % — LOW (ref 42–52)
HGB BLD-MCNC: 12.4 G/DL — LOW (ref 14–18)
IMM GRANULOCYTES NFR BLD AUTO: 0.4 % — HIGH (ref 0.1–0.3)
LIDOCAIN IGE QN: 19 U/L — SIGNIFICANT CHANGE UP (ref 7–60)
LYMPHOCYTES # BLD AUTO: 1.31 K/UL — SIGNIFICANT CHANGE UP (ref 1.2–3.4)
LYMPHOCYTES # BLD AUTO: 14.6 % — LOW (ref 20.5–51.1)
MCHC RBC-ENTMCNC: 29.5 PG — SIGNIFICANT CHANGE UP (ref 27–31)
MCHC RBC-ENTMCNC: 34 G/DL — SIGNIFICANT CHANGE UP (ref 32–37)
MCV RBC AUTO: 86.9 FL — SIGNIFICANT CHANGE UP (ref 80–94)
MONOCYTES # BLD AUTO: 0.66 K/UL — HIGH (ref 0.1–0.6)
MONOCYTES NFR BLD AUTO: 7.4 % — SIGNIFICANT CHANGE UP (ref 1.7–9.3)
NEUTROPHILS # BLD AUTO: 6.71 K/UL — HIGH (ref 1.4–6.5)
NEUTROPHILS NFR BLD AUTO: 75 % — SIGNIFICANT CHANGE UP (ref 42.2–75.2)
NRBC # BLD: 0 /100 WBCS — SIGNIFICANT CHANGE UP (ref 0–0)
PLATELET # BLD AUTO: 222 K/UL — SIGNIFICANT CHANGE UP (ref 130–400)
PMV BLD: 10.5 FL — HIGH (ref 7.4–10.4)
POTASSIUM SERPL-MCNC: 4.2 MMOL/L — SIGNIFICANT CHANGE UP (ref 3.5–5)
POTASSIUM SERPL-SCNC: 4.2 MMOL/L — SIGNIFICANT CHANGE UP (ref 3.5–5)
PROT SERPL-MCNC: 6.8 G/DL — SIGNIFICANT CHANGE UP (ref 6–8)
RBC # BLD: 4.2 M/UL — LOW (ref 4.7–6.1)
RBC # FLD: 12.6 % — SIGNIFICANT CHANGE UP (ref 11.5–14.5)
SODIUM SERPL-SCNC: 139 MMOL/L — SIGNIFICANT CHANGE UP (ref 135–146)
WBC # BLD: 8.95 K/UL — SIGNIFICANT CHANGE UP (ref 4.8–10.8)
WBC # FLD AUTO: 8.95 K/UL — SIGNIFICANT CHANGE UP (ref 4.8–10.8)

## 2023-05-25 PROCEDURE — 85025 COMPLETE CBC W/AUTO DIFF WBC: CPT

## 2023-05-25 PROCEDURE — 80053 COMPREHEN METABOLIC PANEL: CPT

## 2023-05-25 PROCEDURE — 72100 X-RAY EXAM L-S SPINE 2/3 VWS: CPT

## 2023-05-25 PROCEDURE — 99285 EMERGENCY DEPT VISIT HI MDM: CPT

## 2023-05-25 PROCEDURE — 82746 ASSAY OF FOLIC ACID SERUM: CPT

## 2023-05-25 PROCEDURE — 72170 X-RAY EXAM OF PELVIS: CPT | Mod: 26

## 2023-05-25 PROCEDURE — 87070 CULTURE OTHR SPECIMN AEROBIC: CPT

## 2023-05-25 PROCEDURE — 82962 GLUCOSE BLOOD TEST: CPT

## 2023-05-25 PROCEDURE — 74177 CT ABD & PELVIS W/CONTRAST: CPT | Mod: 26,MA

## 2023-05-25 PROCEDURE — 87205 SMEAR GRAM STAIN: CPT

## 2023-05-25 PROCEDURE — 87077 CULTURE AEROBIC IDENTIFY: CPT

## 2023-05-25 PROCEDURE — 83540 ASSAY OF IRON: CPT

## 2023-05-25 PROCEDURE — 83036 HEMOGLOBIN GLYCOSYLATED A1C: CPT

## 2023-05-25 PROCEDURE — 71045 X-RAY EXAM CHEST 1 VIEW: CPT | Mod: 26

## 2023-05-25 PROCEDURE — 83550 IRON BINDING TEST: CPT

## 2023-05-25 PROCEDURE — 73130 X-RAY EXAM OF HAND: CPT | Mod: LT

## 2023-05-25 PROCEDURE — 83735 ASSAY OF MAGNESIUM: CPT

## 2023-05-25 PROCEDURE — 99223 1ST HOSP IP/OBS HIGH 75: CPT

## 2023-05-25 PROCEDURE — 82728 ASSAY OF FERRITIN: CPT

## 2023-05-25 PROCEDURE — 36415 COLL VENOUS BLD VENIPUNCTURE: CPT

## 2023-05-25 PROCEDURE — 97162 PT EVAL MOD COMPLEX 30 MIN: CPT | Mod: GP

## 2023-05-25 PROCEDURE — 82607 VITAMIN B-12: CPT

## 2023-05-25 RX ORDER — IBUPROFEN 200 MG
600 TABLET ORAL ONCE
Refills: 0 | Status: COMPLETED | OUTPATIENT
Start: 2023-05-25 | End: 2023-05-25

## 2023-05-25 RX ORDER — METHOCARBAMOL 500 MG/1
1500 TABLET, FILM COATED ORAL ONCE
Refills: 0 | Status: COMPLETED | OUTPATIENT
Start: 2023-05-25 | End: 2023-05-25

## 2023-05-25 RX ORDER — LIDOCAINE 4 G/100G
1 CREAM TOPICAL ONCE
Refills: 0 | Status: COMPLETED | OUTPATIENT
Start: 2023-05-25 | End: 2023-05-25

## 2023-05-25 RX ORDER — MORPHINE SULFATE 50 MG/1
6 CAPSULE, EXTENDED RELEASE ORAL ONCE
Refills: 0 | Status: DISCONTINUED | OUTPATIENT
Start: 2023-05-25 | End: 2023-05-25

## 2023-05-25 RX ORDER — ACETAMINOPHEN 500 MG
650 TABLET ORAL ONCE
Refills: 0 | Status: COMPLETED | OUTPATIENT
Start: 2023-05-25 | End: 2023-05-25

## 2023-05-25 RX ORDER — MORPHINE SULFATE 50 MG/1
4 CAPSULE, EXTENDED RELEASE ORAL ONCE
Refills: 0 | Status: DISCONTINUED | OUTPATIENT
Start: 2023-05-25 | End: 2023-05-25

## 2023-05-25 RX ADMIN — Medication 600 MILLIGRAM(S): at 17:51

## 2023-05-25 RX ADMIN — MORPHINE SULFATE 4 MILLIGRAM(S): 50 CAPSULE, EXTENDED RELEASE ORAL at 19:38

## 2023-05-25 RX ADMIN — Medication 650 MILLIGRAM(S): at 17:52

## 2023-05-25 RX ADMIN — METHOCARBAMOL 1500 MILLIGRAM(S): 500 TABLET, FILM COATED ORAL at 17:51

## 2023-05-25 RX ADMIN — Medication 600 MILLIGRAM(S): at 18:44

## 2023-05-25 RX ADMIN — Medication 650 MILLIGRAM(S): at 18:35

## 2023-05-25 RX ADMIN — LIDOCAINE 1 PATCH: 4 CREAM TOPICAL at 17:52

## 2023-05-25 NOTE — ED PROVIDER NOTE - PHYSICAL EXAMINATION
Constitutional: Well developed, well nourished. NAD  TRAUMA: ABC intact. GCS 15.  Head: Normocephalic, atraumatic.  Eyes: PERRL. EOMI. No Raccoon eyes.   ENT: No nasal discharge. No septal hematoma. No Disla sign. Mucous membranes moist.  Neck: Supple. Painless ROM. No midline tenderness, stepoffs.  Cardiovascular: Normal S1, S2. Regular rate and rhythm. No murmurs, rubs, or gallops.  Pulmonary: Normal respiratory rate and effort. Lungs clear to auscultation bilaterally. No wheezing, rales, or rhonchi.  CHEST: No chest wall tenderness, crepitus.  Abdominal: Soft. Nondistended. Nontender. No rebound, guarding, rigidity.  BACK: No midline T/L/S tenderness, stepoffs. No saddle paresthesia.  Extremities. Pelvis stable. No traumatic deformities. +tenderness to palpation of R hip. Able to range all extremities.  Skin: No rashes, cyanosis, lacerations, abrasions.  Neuro: AAOx3. Strength of RLE limited by pain. Sensation intact throughout. No focal neurological deficits.  Psych: Normal mood. Normal affect.
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
## 2023-05-25 NOTE — ED PROVIDER NOTE - PROGRESS NOTE DETAILS
Joni: Patient reassessed, still complaining of severe right hip pain despite pain medications.  On reassessment plus tenderness palpation right hip, mild diffuse abdominal tenderness.  Labs, CT, IV pain meds ordered. ffearns: received signout from  - pt with persistent right hip pain, unable to ambulate xrays neg - labs, ct abdomen and pelvis ordered; patient still with pain, unable to ambulate, requiring more morphine. will admit.
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
## 2023-05-25 NOTE — H&P ADULT - NSHPSOCIALHISTORY_GEN_ALL_CORE
past smoker about 35 pack years, quit 17 years ago  drinks alcohol occasionally, 2 drinks on weekends  Denies illicit drug use

## 2023-05-25 NOTE — H&P ADULT - NSHPLABSRESULTS_GEN_ALL_CORE
Labs - Hb 12.6(at baseline) MCV 84  Xray pelvis - Status post left hip arthroplasty. No evidence of acute displaced   fracture. Moderate to severe degenerative changes of the right hip.   Degenerative changes of the spine.    Xray  chest -  No radiographic evidence of acute cardiopulmonary disease.    CT AP with IV contrast - No acute fractures of the pelvis including the right hip. Right hip   degenerative changes. No CT evidence of acute intra-abdominal pathology.
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
## 2023-05-26 LAB
ALBUMIN SERPL ELPH-MCNC: 4.4 G/DL — SIGNIFICANT CHANGE UP (ref 3.5–5.2)
ALP SERPL-CCNC: 81 U/L — SIGNIFICANT CHANGE UP (ref 30–115)
ALT FLD-CCNC: 14 U/L — SIGNIFICANT CHANGE UP (ref 0–41)
ANION GAP SERPL CALC-SCNC: 11 MMOL/L — SIGNIFICANT CHANGE UP (ref 7–14)
AST SERPL-CCNC: 13 U/L — SIGNIFICANT CHANGE UP (ref 0–41)
BASOPHILS # BLD AUTO: 0.05 K/UL — SIGNIFICANT CHANGE UP (ref 0–0.2)
BASOPHILS NFR BLD AUTO: 0.6 % — SIGNIFICANT CHANGE UP (ref 0–1)
BILIRUB SERPL-MCNC: 0.8 MG/DL — SIGNIFICANT CHANGE UP (ref 0.2–1.2)
BUN SERPL-MCNC: 11 MG/DL — SIGNIFICANT CHANGE UP (ref 10–20)
CALCIUM SERPL-MCNC: 9.2 MG/DL — SIGNIFICANT CHANGE UP (ref 8.4–10.4)
CHLORIDE SERPL-SCNC: 104 MMOL/L — SIGNIFICANT CHANGE UP (ref 98–110)
CO2 SERPL-SCNC: 24 MMOL/L — SIGNIFICANT CHANGE UP (ref 17–32)
CREAT SERPL-MCNC: 0.5 MG/DL — LOW (ref 0.7–1.5)
EGFR: 112 ML/MIN/1.73M2 — SIGNIFICANT CHANGE UP
EOSINOPHIL # BLD AUTO: 0.13 K/UL — SIGNIFICANT CHANGE UP (ref 0–0.7)
EOSINOPHIL NFR BLD AUTO: 1.6 % — SIGNIFICANT CHANGE UP (ref 0–8)
FERRITIN SERPL-MCNC: 46 NG/ML — SIGNIFICANT CHANGE UP (ref 30–400)
FOLATE SERPL-MCNC: 9.9 NG/ML — SIGNIFICANT CHANGE UP
GLUCOSE SERPL-MCNC: 130 MG/DL — HIGH (ref 70–99)
HCT VFR BLD CALC: 37.1 % — LOW (ref 42–52)
HGB BLD-MCNC: 12.4 G/DL — LOW (ref 14–18)
IMM GRANULOCYTES NFR BLD AUTO: 0.4 % — HIGH (ref 0.1–0.3)
IRON SATN MFR SERPL: 118 UG/DL — SIGNIFICANT CHANGE UP (ref 35–150)
IRON SATN MFR SERPL: 35 % — SIGNIFICANT CHANGE UP (ref 15–50)
LYMPHOCYTES # BLD AUTO: 1.78 K/UL — SIGNIFICANT CHANGE UP (ref 1.2–3.4)
LYMPHOCYTES # BLD AUTO: 21.9 % — SIGNIFICANT CHANGE UP (ref 20.5–51.1)
MAGNESIUM SERPL-MCNC: 1.9 MG/DL — SIGNIFICANT CHANGE UP (ref 1.8–2.4)
MCHC RBC-ENTMCNC: 29.4 PG — SIGNIFICANT CHANGE UP (ref 27–31)
MCHC RBC-ENTMCNC: 33.4 G/DL — SIGNIFICANT CHANGE UP (ref 32–37)
MCV RBC AUTO: 87.9 FL — SIGNIFICANT CHANGE UP (ref 80–94)
MONOCYTES # BLD AUTO: 0.59 K/UL — SIGNIFICANT CHANGE UP (ref 0.1–0.6)
MONOCYTES NFR BLD AUTO: 7.3 % — SIGNIFICANT CHANGE UP (ref 1.7–9.3)
NEUTROPHILS # BLD AUTO: 5.53 K/UL — SIGNIFICANT CHANGE UP (ref 1.4–6.5)
NEUTROPHILS NFR BLD AUTO: 68.2 % — SIGNIFICANT CHANGE UP (ref 42.2–75.2)
NRBC # BLD: 0 /100 WBCS — SIGNIFICANT CHANGE UP (ref 0–0)
PLATELET # BLD AUTO: 211 K/UL — SIGNIFICANT CHANGE UP (ref 130–400)
PMV BLD: 10.5 FL — HIGH (ref 7.4–10.4)
POTASSIUM SERPL-MCNC: 4.2 MMOL/L — SIGNIFICANT CHANGE UP (ref 3.5–5)
POTASSIUM SERPL-SCNC: 4.2 MMOL/L — SIGNIFICANT CHANGE UP (ref 3.5–5)
PROT SERPL-MCNC: 6.8 G/DL — SIGNIFICANT CHANGE UP (ref 6–8)
RBC # BLD: 4.22 M/UL — LOW (ref 4.7–6.1)
RBC # FLD: 12.7 % — SIGNIFICANT CHANGE UP (ref 11.5–14.5)
SODIUM SERPL-SCNC: 139 MMOL/L — SIGNIFICANT CHANGE UP (ref 135–146)
TIBC SERPL-MCNC: 334 UG/DL — SIGNIFICANT CHANGE UP (ref 220–430)
UIBC SERPL-MCNC: 216 UG/DL — SIGNIFICANT CHANGE UP (ref 110–370)
VIT B12 SERPL-MCNC: 447 PG/ML — SIGNIFICANT CHANGE UP (ref 232–1245)
WBC # BLD: 8.11 K/UL — SIGNIFICANT CHANGE UP (ref 4.8–10.8)
WBC # FLD AUTO: 8.11 K/UL — SIGNIFICANT CHANGE UP (ref 4.8–10.8)

## 2023-05-26 PROCEDURE — 99232 SBSQ HOSP IP/OBS MODERATE 35: CPT

## 2023-05-26 PROCEDURE — 72100 X-RAY EXAM L-S SPINE 2/3 VWS: CPT | Mod: 26

## 2023-05-26 RX ORDER — ASPIRIN/CALCIUM CARB/MAGNESIUM 324 MG
81 TABLET ORAL DAILY
Refills: 0 | Status: DISCONTINUED | OUTPATIENT
Start: 2023-05-26 | End: 2023-05-29

## 2023-05-26 RX ORDER — IBUPROFEN 200 MG
400 TABLET ORAL THREE TIMES A DAY
Refills: 0 | Status: DISCONTINUED | OUTPATIENT
Start: 2023-05-26 | End: 2023-05-29

## 2023-05-26 RX ORDER — MORPHINE SULFATE 50 MG/1
2 CAPSULE, EXTENDED RELEASE ORAL ONCE
Refills: 0 | Status: DISCONTINUED | OUTPATIENT
Start: 2023-05-26 | End: 2023-05-26

## 2023-05-26 RX ORDER — SENNA PLUS 8.6 MG/1
2 TABLET ORAL AT BEDTIME
Refills: 0 | Status: DISCONTINUED | OUTPATIENT
Start: 2023-05-26 | End: 2023-05-29

## 2023-05-26 RX ORDER — POLYETHYLENE GLYCOL 3350 17 G/17G
17 POWDER, FOR SOLUTION ORAL DAILY
Refills: 0 | Status: DISCONTINUED | OUTPATIENT
Start: 2023-05-26 | End: 2023-05-29

## 2023-05-26 RX ORDER — MORPHINE SULFATE 50 MG/1
2 CAPSULE, EXTENDED RELEASE ORAL EVERY 6 HOURS
Refills: 0 | Status: DISCONTINUED | OUTPATIENT
Start: 2023-05-26 | End: 2023-05-26

## 2023-05-26 RX ORDER — ATORVASTATIN CALCIUM 80 MG/1
40 TABLET, FILM COATED ORAL AT BEDTIME
Refills: 0 | Status: DISCONTINUED | OUTPATIENT
Start: 2023-05-26 | End: 2023-05-29

## 2023-05-26 RX ORDER — AMLODIPINE BESYLATE 2.5 MG/1
5 TABLET ORAL DAILY
Refills: 0 | Status: DISCONTINUED | OUTPATIENT
Start: 2023-05-26 | End: 2023-05-29

## 2023-05-26 RX ORDER — TAMSULOSIN HYDROCHLORIDE 0.4 MG/1
0.4 CAPSULE ORAL AT BEDTIME
Refills: 0 | Status: DISCONTINUED | OUTPATIENT
Start: 2023-05-26 | End: 2023-05-29

## 2023-05-26 RX ORDER — METHOCARBAMOL 500 MG/1
500 TABLET, FILM COATED ORAL
Refills: 0 | Status: DISCONTINUED | OUTPATIENT
Start: 2023-05-26 | End: 2023-05-29

## 2023-05-26 RX ORDER — ENOXAPARIN SODIUM 100 MG/ML
40 INJECTION SUBCUTANEOUS EVERY 24 HOURS
Refills: 0 | Status: DISCONTINUED | OUTPATIENT
Start: 2023-05-26 | End: 2023-05-29

## 2023-05-26 RX ORDER — ACETAMINOPHEN 500 MG
650 TABLET ORAL EVERY 6 HOURS
Refills: 0 | Status: DISCONTINUED | OUTPATIENT
Start: 2023-05-26 | End: 2023-05-29

## 2023-05-26 RX ADMIN — MORPHINE SULFATE 2 MILLIGRAM(S): 50 CAPSULE, EXTENDED RELEASE ORAL at 12:14

## 2023-05-26 RX ADMIN — TAMSULOSIN HYDROCHLORIDE 0.4 MILLIGRAM(S): 0.4 CAPSULE ORAL at 21:02

## 2023-05-26 RX ADMIN — AMLODIPINE BESYLATE 5 MILLIGRAM(S): 2.5 TABLET ORAL at 05:44

## 2023-05-26 RX ADMIN — METHOCARBAMOL 500 MILLIGRAM(S): 500 TABLET, FILM COATED ORAL at 17:09

## 2023-05-26 RX ADMIN — METHOCARBAMOL 500 MILLIGRAM(S): 500 TABLET, FILM COATED ORAL at 05:44

## 2023-05-26 RX ADMIN — MORPHINE SULFATE 2 MILLIGRAM(S): 50 CAPSULE, EXTENDED RELEASE ORAL at 11:44

## 2023-05-26 RX ADMIN — MORPHINE SULFATE 2 MILLIGRAM(S): 50 CAPSULE, EXTENDED RELEASE ORAL at 01:28

## 2023-05-26 RX ADMIN — Medication 81 MILLIGRAM(S): at 11:14

## 2023-05-26 RX ADMIN — ATORVASTATIN CALCIUM 40 MILLIGRAM(S): 80 TABLET, FILM COATED ORAL at 21:02
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
## 2023-05-26 NOTE — ED ADULT NURSE REASSESSMENT NOTE - NS ED NURSE REASSESS COMMENT FT1
Report received from RN. Pt assessed. Pt updated on plan of care at this time. Pt has no complaints at this time. Will update as needed.

## 2023-05-26 NOTE — PHYSICAL THERAPY INITIAL EVALUATION ADULT - GENERAL OBSERVATIONS, REHAB EVAL
[id number] Chart reviewed. Pt. seen semirecline in bed , in No apparent distress , + IV lock , c/o right hip pain, Pt. alert and oriented X 4, Pt. agreed to activity/therapy.

## 2023-05-26 NOTE — PHYSICAL THERAPY INITIAL EVALUATION ADULT - SPECIFY REASON(S)
1020 am PT unable to see pt for eval without activity orders. will notify MD.
Upon assessment pt is modified independent with transfers and ambulation , will not require skilled PT at this time but will benefit from outpatient PT.

## 2023-05-26 NOTE — PATIENT PROFILE ADULT - FALL HARM RISK - HARM RISK INTERVENTIONS

## 2023-05-26 NOTE — ED ADULT NURSE REASSESSMENT NOTE - NS ED NURSE REASSESS COMMENT FT1
Assumed care of pt; A&O4, Neg SOB at this time. Pt resting on stretcher in position of comfort, no s/s of distress. Pt requesting more pain medications. MD notified, waiting for new orders. Pt safety and comfort measures in place. Pt pending MED bed placement. Will continue to monitor at this time.

## 2023-05-27 LAB
ALBUMIN SERPL ELPH-MCNC: 4.5 G/DL — SIGNIFICANT CHANGE UP (ref 3.5–5.2)
ALP SERPL-CCNC: 88 U/L — SIGNIFICANT CHANGE UP (ref 30–115)
ALT FLD-CCNC: 16 U/L — SIGNIFICANT CHANGE UP (ref 0–41)
ANION GAP SERPL CALC-SCNC: 11 MMOL/L — SIGNIFICANT CHANGE UP (ref 7–14)
AST SERPL-CCNC: 15 U/L — SIGNIFICANT CHANGE UP (ref 0–41)
BASOPHILS # BLD AUTO: 0.04 K/UL — SIGNIFICANT CHANGE UP (ref 0–0.2)
BASOPHILS NFR BLD AUTO: 0.6 % — SIGNIFICANT CHANGE UP (ref 0–1)
BILIRUB SERPL-MCNC: 0.7 MG/DL — SIGNIFICANT CHANGE UP (ref 0.2–1.2)
BUN SERPL-MCNC: 9 MG/DL — LOW (ref 10–20)
CALCIUM SERPL-MCNC: 9.2 MG/DL — SIGNIFICANT CHANGE UP (ref 8.4–10.5)
CHLORIDE SERPL-SCNC: 97 MMOL/L — LOW (ref 98–110)
CO2 SERPL-SCNC: 24 MMOL/L — SIGNIFICANT CHANGE UP (ref 17–32)
CREAT SERPL-MCNC: 0.6 MG/DL — LOW (ref 0.7–1.5)
EGFR: 106 ML/MIN/1.73M2 — SIGNIFICANT CHANGE UP
EOSINOPHIL # BLD AUTO: 0.12 K/UL — SIGNIFICANT CHANGE UP (ref 0–0.7)
EOSINOPHIL NFR BLD AUTO: 1.9 % — SIGNIFICANT CHANGE UP (ref 0–8)
GLUCOSE BLDC GLUCOMTR-MCNC: 188 MG/DL — HIGH (ref 70–99)
GLUCOSE BLDC GLUCOMTR-MCNC: 191 MG/DL — HIGH (ref 70–99)
GLUCOSE BLDC GLUCOMTR-MCNC: 192 MG/DL — HIGH (ref 70–99)
GLUCOSE BLDC GLUCOMTR-MCNC: 215 MG/DL — HIGH (ref 70–99)
GLUCOSE SERPL-MCNC: 156 MG/DL — HIGH (ref 70–99)
HCT VFR BLD CALC: 39.4 % — LOW (ref 42–52)
HGB BLD-MCNC: 13.6 G/DL — LOW (ref 14–18)
IMM GRANULOCYTES NFR BLD AUTO: 0.3 % — SIGNIFICANT CHANGE UP (ref 0.1–0.3)
LYMPHOCYTES # BLD AUTO: 1.25 K/UL — SIGNIFICANT CHANGE UP (ref 1.2–3.4)
LYMPHOCYTES # BLD AUTO: 19.4 % — LOW (ref 20.5–51.1)
MAGNESIUM SERPL-MCNC: 2.1 MG/DL — SIGNIFICANT CHANGE UP (ref 1.8–2.4)
MCHC RBC-ENTMCNC: 29.9 PG — SIGNIFICANT CHANGE UP (ref 27–31)
MCHC RBC-ENTMCNC: 34.5 G/DL — SIGNIFICANT CHANGE UP (ref 32–37)
MCV RBC AUTO: 86.6 FL — SIGNIFICANT CHANGE UP (ref 80–94)
MONOCYTES # BLD AUTO: 0.58 K/UL — SIGNIFICANT CHANGE UP (ref 0.1–0.6)
MONOCYTES NFR BLD AUTO: 9 % — SIGNIFICANT CHANGE UP (ref 1.7–9.3)
NEUTROPHILS # BLD AUTO: 4.44 K/UL — SIGNIFICANT CHANGE UP (ref 1.4–6.5)
NEUTROPHILS NFR BLD AUTO: 68.8 % — SIGNIFICANT CHANGE UP (ref 42.2–75.2)
NRBC # BLD: 0 /100 WBCS — SIGNIFICANT CHANGE UP (ref 0–0)
PLATELET # BLD AUTO: 227 K/UL — SIGNIFICANT CHANGE UP (ref 130–400)
PMV BLD: 10.2 FL — SIGNIFICANT CHANGE UP (ref 7.4–10.4)
POTASSIUM SERPL-MCNC: 3.8 MMOL/L — SIGNIFICANT CHANGE UP (ref 3.5–5)
POTASSIUM SERPL-SCNC: 3.8 MMOL/L — SIGNIFICANT CHANGE UP (ref 3.5–5)
PROT SERPL-MCNC: 7.1 G/DL — SIGNIFICANT CHANGE UP (ref 6–8)
RBC # BLD: 4.55 M/UL — LOW (ref 4.7–6.1)
RBC # FLD: 12.4 % — SIGNIFICANT CHANGE UP (ref 11.5–14.5)
SODIUM SERPL-SCNC: 132 MMOL/L — LOW (ref 135–146)
WBC # BLD: 6.45 K/UL — SIGNIFICANT CHANGE UP (ref 4.8–10.8)
WBC # FLD AUTO: 6.45 K/UL — SIGNIFICANT CHANGE UP (ref 4.8–10.8)

## 2023-05-27 PROCEDURE — 73130 X-RAY EXAM OF HAND: CPT | Mod: 26,LT

## 2023-05-27 PROCEDURE — 99232 SBSQ HOSP IP/OBS MODERATE 35: CPT

## 2023-05-27 RX ORDER — INSULIN LISPRO 100/ML
VIAL (ML) SUBCUTANEOUS
Refills: 0 | Status: DISCONTINUED | OUTPATIENT
Start: 2023-05-27 | End: 2023-05-29

## 2023-05-27 RX ORDER — AMPICILLIN SODIUM AND SULBACTAM SODIUM 250; 125 MG/ML; MG/ML
INJECTION, POWDER, FOR SUSPENSION INTRAMUSCULAR; INTRAVENOUS
Refills: 0 | Status: DISCONTINUED | OUTPATIENT
Start: 2023-05-27 | End: 2023-05-29

## 2023-05-27 RX ORDER — AMPICILLIN SODIUM AND SULBACTAM SODIUM 250; 125 MG/ML; MG/ML
3 INJECTION, POWDER, FOR SUSPENSION INTRAMUSCULAR; INTRAVENOUS EVERY 6 HOURS
Refills: 0 | Status: DISCONTINUED | OUTPATIENT
Start: 2023-05-27 | End: 2023-05-29

## 2023-05-27 RX ORDER — GLUCAGON INJECTION, SOLUTION 0.5 MG/.1ML
1 INJECTION, SOLUTION SUBCUTANEOUS ONCE
Refills: 0 | Status: DISCONTINUED | OUTPATIENT
Start: 2023-05-27 | End: 2023-05-29

## 2023-05-27 RX ORDER — DEXTROSE 50 % IN WATER 50 %
25 SYRINGE (ML) INTRAVENOUS ONCE
Refills: 0 | Status: DISCONTINUED | OUTPATIENT
Start: 2023-05-27 | End: 2023-05-29

## 2023-05-27 RX ORDER — DEXTROSE 50 % IN WATER 50 %
15 SYRINGE (ML) INTRAVENOUS ONCE
Refills: 0 | Status: DISCONTINUED | OUTPATIENT
Start: 2023-05-27 | End: 2023-05-29

## 2023-05-27 RX ORDER — MORPHINE SULFATE 50 MG/1
2 CAPSULE, EXTENDED RELEASE ORAL ONCE
Refills: 0 | Status: DISCONTINUED | OUTPATIENT
Start: 2023-05-27 | End: 2023-05-27

## 2023-05-27 RX ORDER — DEXTROSE 50 % IN WATER 50 %
12.5 SYRINGE (ML) INTRAVENOUS ONCE
Refills: 0 | Status: DISCONTINUED | OUTPATIENT
Start: 2023-05-27 | End: 2023-05-29

## 2023-05-27 RX ORDER — AMPICILLIN SODIUM AND SULBACTAM SODIUM 250; 125 MG/ML; MG/ML
3 INJECTION, POWDER, FOR SUSPENSION INTRAMUSCULAR; INTRAVENOUS ONCE
Refills: 0 | Status: COMPLETED | OUTPATIENT
Start: 2023-05-27 | End: 2023-05-27

## 2023-05-27 RX ORDER — SODIUM CHLORIDE 9 MG/ML
1000 INJECTION, SOLUTION INTRAVENOUS
Refills: 0 | Status: DISCONTINUED | OUTPATIENT
Start: 2023-05-27 | End: 2023-05-29

## 2023-05-27 RX ADMIN — AMPICILLIN SODIUM AND SULBACTAM SODIUM 200 GRAM(S): 250; 125 INJECTION, POWDER, FOR SUSPENSION INTRAMUSCULAR; INTRAVENOUS at 23:22

## 2023-05-27 RX ADMIN — Medication 2: at 08:23

## 2023-05-27 RX ADMIN — MORPHINE SULFATE 2 MILLIGRAM(S): 50 CAPSULE, EXTENDED RELEASE ORAL at 12:33

## 2023-05-27 RX ADMIN — AMPICILLIN SODIUM AND SULBACTAM SODIUM 200 GRAM(S): 250; 125 INJECTION, POWDER, FOR SUSPENSION INTRAMUSCULAR; INTRAVENOUS at 17:56

## 2023-05-27 RX ADMIN — ATORVASTATIN CALCIUM 40 MILLIGRAM(S): 80 TABLET, FILM COATED ORAL at 22:04

## 2023-05-27 RX ADMIN — AMLODIPINE BESYLATE 5 MILLIGRAM(S): 2.5 TABLET ORAL at 05:38

## 2023-05-27 RX ADMIN — TAMSULOSIN HYDROCHLORIDE 0.4 MILLIGRAM(S): 0.4 CAPSULE ORAL at 22:02

## 2023-05-27 RX ADMIN — AMPICILLIN SODIUM AND SULBACTAM SODIUM 200 GRAM(S): 250; 125 INJECTION, POWDER, FOR SUSPENSION INTRAMUSCULAR; INTRAVENOUS at 12:33

## 2023-05-27 RX ADMIN — METHOCARBAMOL 500 MILLIGRAM(S): 500 TABLET, FILM COATED ORAL at 05:37

## 2023-05-27 RX ADMIN — Medication 1: at 12:38

## 2023-05-27 RX ADMIN — ENOXAPARIN SODIUM 40 MILLIGRAM(S): 100 INJECTION SUBCUTANEOUS at 05:39

## 2023-05-27 RX ADMIN — POLYETHYLENE GLYCOL 3350 17 GRAM(S): 17 POWDER, FOR SOLUTION ORAL at 12:37

## 2023-05-27 RX ADMIN — Medication 81 MILLIGRAM(S): at 12:37

## 2023-05-27 RX ADMIN — Medication 1: at 18:05

## 2023-05-27 RX ADMIN — METHOCARBAMOL 500 MILLIGRAM(S): 500 TABLET, FILM COATED ORAL at 18:05
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
## 2023-05-27 NOTE — PROCEDURE NOTE - NSPERFORMEDBY_GEN_A_CORE
Tia/Resident
You can access the FollowMyHealth Patient Portal offered by [facility] by registering at the following website: [email or web address]. By joining [facility]’s FollowMyHealth portal, you will also be able to view your health information using other applications (apps) compatible with our system.
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
## 2023-05-28 LAB
ALBUMIN SERPL ELPH-MCNC: 4.5 G/DL — SIGNIFICANT CHANGE UP (ref 3.5–5.2)
ALP SERPL-CCNC: 95 U/L — SIGNIFICANT CHANGE UP (ref 30–115)
ALT FLD-CCNC: 16 U/L — SIGNIFICANT CHANGE UP (ref 0–41)
ANION GAP SERPL CALC-SCNC: 11 MMOL/L — SIGNIFICANT CHANGE UP (ref 7–14)
AST SERPL-CCNC: 15 U/L — SIGNIFICANT CHANGE UP (ref 0–41)
BASOPHILS # BLD AUTO: 0.04 K/UL — SIGNIFICANT CHANGE UP (ref 0–0.2)
BASOPHILS NFR BLD AUTO: 0.6 % — SIGNIFICANT CHANGE UP (ref 0–1)
BILIRUB SERPL-MCNC: 1.2 MG/DL — SIGNIFICANT CHANGE UP (ref 0.2–1.2)
BUN SERPL-MCNC: 8 MG/DL — LOW (ref 10–20)
CALCIUM SERPL-MCNC: 9.5 MG/DL — SIGNIFICANT CHANGE UP (ref 8.4–10.4)
CHLORIDE SERPL-SCNC: 101 MMOL/L — SIGNIFICANT CHANGE UP (ref 98–110)
CO2 SERPL-SCNC: 24 MMOL/L — SIGNIFICANT CHANGE UP (ref 17–32)
CREAT SERPL-MCNC: 0.6 MG/DL — LOW (ref 0.7–1.5)
EGFR: 106 ML/MIN/1.73M2 — SIGNIFICANT CHANGE UP
EOSINOPHIL # BLD AUTO: 0.2 K/UL — SIGNIFICANT CHANGE UP (ref 0–0.7)
EOSINOPHIL NFR BLD AUTO: 2.9 % — SIGNIFICANT CHANGE UP (ref 0–8)
GLUCOSE BLDC GLUCOMTR-MCNC: 162 MG/DL — HIGH (ref 70–99)
GLUCOSE BLDC GLUCOMTR-MCNC: 164 MG/DL — HIGH (ref 70–99)
GLUCOSE BLDC GLUCOMTR-MCNC: 177 MG/DL — HIGH (ref 70–99)
GLUCOSE BLDC GLUCOMTR-MCNC: 230 MG/DL — HIGH (ref 70–99)
GLUCOSE SERPL-MCNC: 160 MG/DL — HIGH (ref 70–99)
HCT VFR BLD CALC: 40.1 % — LOW (ref 42–52)
HGB BLD-MCNC: 13.8 G/DL — LOW (ref 14–18)
IMM GRANULOCYTES NFR BLD AUTO: 0.6 % — HIGH (ref 0.1–0.3)
LYMPHOCYTES # BLD AUTO: 1.21 K/UL — SIGNIFICANT CHANGE UP (ref 1.2–3.4)
LYMPHOCYTES # BLD AUTO: 17.5 % — LOW (ref 20.5–51.1)
MAGNESIUM SERPL-MCNC: 2.3 MG/DL — SIGNIFICANT CHANGE UP (ref 1.8–2.4)
MCHC RBC-ENTMCNC: 29.7 PG — SIGNIFICANT CHANGE UP (ref 27–31)
MCHC RBC-ENTMCNC: 34.4 G/DL — SIGNIFICANT CHANGE UP (ref 32–37)
MCV RBC AUTO: 86.4 FL — SIGNIFICANT CHANGE UP (ref 80–94)
MONOCYTES # BLD AUTO: 0.57 K/UL — SIGNIFICANT CHANGE UP (ref 0.1–0.6)
MONOCYTES NFR BLD AUTO: 8.2 % — SIGNIFICANT CHANGE UP (ref 1.7–9.3)
NEUTROPHILS # BLD AUTO: 4.86 K/UL — SIGNIFICANT CHANGE UP (ref 1.4–6.5)
NEUTROPHILS NFR BLD AUTO: 70.2 % — SIGNIFICANT CHANGE UP (ref 42.2–75.2)
NRBC # BLD: 0 /100 WBCS — SIGNIFICANT CHANGE UP (ref 0–0)
PLATELET # BLD AUTO: 234 K/UL — SIGNIFICANT CHANGE UP (ref 130–400)
PMV BLD: 10.8 FL — HIGH (ref 7.4–10.4)
POTASSIUM SERPL-MCNC: 4.3 MMOL/L — SIGNIFICANT CHANGE UP (ref 3.5–5)
POTASSIUM SERPL-SCNC: 4.3 MMOL/L — SIGNIFICANT CHANGE UP (ref 3.5–5)
PROT SERPL-MCNC: 7.3 G/DL — SIGNIFICANT CHANGE UP (ref 6–8)
RBC # BLD: 4.64 M/UL — LOW (ref 4.7–6.1)
RBC # FLD: 12.2 % — SIGNIFICANT CHANGE UP (ref 11.5–14.5)
SODIUM SERPL-SCNC: 136 MMOL/L — SIGNIFICANT CHANGE UP (ref 135–146)
WBC # BLD: 6.92 K/UL — SIGNIFICANT CHANGE UP (ref 4.8–10.8)
WBC # FLD AUTO: 6.92 K/UL — SIGNIFICANT CHANGE UP (ref 4.8–10.8)

## 2023-05-28 PROCEDURE — 99232 SBSQ HOSP IP/OBS MODERATE 35: CPT

## 2023-05-28 RX ADMIN — AMLODIPINE BESYLATE 5 MILLIGRAM(S): 2.5 TABLET ORAL at 05:58

## 2023-05-28 RX ADMIN — Medication 81 MILLIGRAM(S): at 11:57

## 2023-05-28 RX ADMIN — AMPICILLIN SODIUM AND SULBACTAM SODIUM 200 GRAM(S): 250; 125 INJECTION, POWDER, FOR SUSPENSION INTRAMUSCULAR; INTRAVENOUS at 17:57

## 2023-05-28 RX ADMIN — Medication 2: at 11:57

## 2023-05-28 RX ADMIN — ATORVASTATIN CALCIUM 40 MILLIGRAM(S): 80 TABLET, FILM COATED ORAL at 22:15

## 2023-05-28 RX ADMIN — ENOXAPARIN SODIUM 40 MILLIGRAM(S): 100 INJECTION SUBCUTANEOUS at 05:58

## 2023-05-28 RX ADMIN — AMPICILLIN SODIUM AND SULBACTAM SODIUM 200 GRAM(S): 250; 125 INJECTION, POWDER, FOR SUSPENSION INTRAMUSCULAR; INTRAVENOUS at 23:19

## 2023-05-28 RX ADMIN — TAMSULOSIN HYDROCHLORIDE 0.4 MILLIGRAM(S): 0.4 CAPSULE ORAL at 22:16

## 2023-05-28 RX ADMIN — AMPICILLIN SODIUM AND SULBACTAM SODIUM 200 GRAM(S): 250; 125 INJECTION, POWDER, FOR SUSPENSION INTRAMUSCULAR; INTRAVENOUS at 11:58

## 2023-05-28 RX ADMIN — AMPICILLIN SODIUM AND SULBACTAM SODIUM 200 GRAM(S): 250; 125 INJECTION, POWDER, FOR SUSPENSION INTRAMUSCULAR; INTRAVENOUS at 06:01

## 2023-05-28 RX ADMIN — SENNA PLUS 2 TABLET(S): 8.6 TABLET ORAL at 22:16

## 2023-05-28 RX ADMIN — METHOCARBAMOL 500 MILLIGRAM(S): 500 TABLET, FILM COATED ORAL at 17:57

## 2023-05-28 RX ADMIN — Medication 1: at 08:45

## 2023-05-28 RX ADMIN — METHOCARBAMOL 500 MILLIGRAM(S): 500 TABLET, FILM COATED ORAL at 05:58
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
## 2023-05-29 ENCOUNTER — TRANSCRIPTION ENCOUNTER (OUTPATIENT)
Age: 68
End: 2023-05-29

## 2023-05-29 VITALS
TEMPERATURE: 96 F | SYSTOLIC BLOOD PRESSURE: 118 MMHG | DIASTOLIC BLOOD PRESSURE: 81 MMHG | HEART RATE: 80 BPM | OXYGEN SATURATION: 98 % | RESPIRATION RATE: 18 BRPM

## 2023-05-29 LAB
A1C WITH ESTIMATED AVERAGE GLUCOSE RESULT: 8 % — HIGH (ref 4–5.6)
ALBUMIN SERPL ELPH-MCNC: 4.4 G/DL — SIGNIFICANT CHANGE UP (ref 3.5–5.2)
ALP SERPL-CCNC: 89 U/L — SIGNIFICANT CHANGE UP (ref 30–115)
ALT FLD-CCNC: 17 U/L — SIGNIFICANT CHANGE UP (ref 0–41)
ANION GAP SERPL CALC-SCNC: 14 MMOL/L — SIGNIFICANT CHANGE UP (ref 7–14)
AST SERPL-CCNC: 15 U/L — SIGNIFICANT CHANGE UP (ref 0–41)
BASOPHILS # BLD AUTO: 0.04 K/UL — SIGNIFICANT CHANGE UP (ref 0–0.2)
BASOPHILS NFR BLD AUTO: 0.6 % — SIGNIFICANT CHANGE UP (ref 0–1)
BILIRUB SERPL-MCNC: 1 MG/DL — SIGNIFICANT CHANGE UP (ref 0.2–1.2)
BUN SERPL-MCNC: 13 MG/DL — SIGNIFICANT CHANGE UP (ref 10–20)
CALCIUM SERPL-MCNC: 9.4 MG/DL — SIGNIFICANT CHANGE UP (ref 8.4–10.5)
CHLORIDE SERPL-SCNC: 103 MMOL/L — SIGNIFICANT CHANGE UP (ref 98–110)
CO2 SERPL-SCNC: 22 MMOL/L — SIGNIFICANT CHANGE UP (ref 17–32)
CREAT SERPL-MCNC: 0.7 MG/DL — SIGNIFICANT CHANGE UP (ref 0.7–1.5)
EGFR: 101 ML/MIN/1.73M2 — SIGNIFICANT CHANGE UP
EOSINOPHIL # BLD AUTO: 0.23 K/UL — SIGNIFICANT CHANGE UP (ref 0–0.7)
EOSINOPHIL NFR BLD AUTO: 3.3 % — SIGNIFICANT CHANGE UP (ref 0–8)
ESTIMATED AVERAGE GLUCOSE: 183 MG/DL — HIGH (ref 68–114)
GLUCOSE BLDC GLUCOMTR-MCNC: 187 MG/DL — HIGH (ref 70–99)
GLUCOSE SERPL-MCNC: 167 MG/DL — HIGH (ref 70–99)
HCT VFR BLD CALC: 41.2 % — LOW (ref 42–52)
HGB BLD-MCNC: 13.8 G/DL — LOW (ref 14–18)
IMM GRANULOCYTES NFR BLD AUTO: 0.4 % — HIGH (ref 0.1–0.3)
LYMPHOCYTES # BLD AUTO: 1.53 K/UL — SIGNIFICANT CHANGE UP (ref 1.2–3.4)
LYMPHOCYTES # BLD AUTO: 22.2 % — SIGNIFICANT CHANGE UP (ref 20.5–51.1)
MAGNESIUM SERPL-MCNC: 2.1 MG/DL — SIGNIFICANT CHANGE UP (ref 1.8–2.4)
MCHC RBC-ENTMCNC: 29.2 PG — SIGNIFICANT CHANGE UP (ref 27–31)
MCHC RBC-ENTMCNC: 33.5 G/DL — SIGNIFICANT CHANGE UP (ref 32–37)
MCV RBC AUTO: 87.1 FL — SIGNIFICANT CHANGE UP (ref 80–94)
MONOCYTES # BLD AUTO: 0.57 K/UL — SIGNIFICANT CHANGE UP (ref 0.1–0.6)
MONOCYTES NFR BLD AUTO: 8.3 % — SIGNIFICANT CHANGE UP (ref 1.7–9.3)
NEUTROPHILS # BLD AUTO: 4.5 K/UL — SIGNIFICANT CHANGE UP (ref 1.4–6.5)
NEUTROPHILS NFR BLD AUTO: 65.2 % — SIGNIFICANT CHANGE UP (ref 42.2–75.2)
NRBC # BLD: 0 /100 WBCS — SIGNIFICANT CHANGE UP (ref 0–0)
PLATELET # BLD AUTO: 222 K/UL — SIGNIFICANT CHANGE UP (ref 130–400)
PMV BLD: 10.9 FL — HIGH (ref 7.4–10.4)
POTASSIUM SERPL-MCNC: 4.2 MMOL/L — SIGNIFICANT CHANGE UP (ref 3.5–5)
POTASSIUM SERPL-SCNC: 4.2 MMOL/L — SIGNIFICANT CHANGE UP (ref 3.5–5)
PROT SERPL-MCNC: 7 G/DL — SIGNIFICANT CHANGE UP (ref 6–8)
RBC # BLD: 4.73 M/UL — SIGNIFICANT CHANGE UP (ref 4.7–6.1)
RBC # FLD: 12.3 % — SIGNIFICANT CHANGE UP (ref 11.5–14.5)
SODIUM SERPL-SCNC: 139 MMOL/L — SIGNIFICANT CHANGE UP (ref 135–146)
WBC # BLD: 6.9 K/UL — SIGNIFICANT CHANGE UP (ref 4.8–10.8)
WBC # FLD AUTO: 6.9 K/UL — SIGNIFICANT CHANGE UP (ref 4.8–10.8)

## 2023-05-29 PROCEDURE — 99239 HOSP IP/OBS DSCHRG MGMT >30: CPT

## 2023-05-29 RX ORDER — MORPHINE SULFATE 50 MG/1
2 CAPSULE, EXTENDED RELEASE ORAL ONCE
Refills: 0 | Status: DISCONTINUED | OUTPATIENT
Start: 2023-05-29 | End: 2023-05-29

## 2023-05-29 RX ORDER — IBUPROFEN 200 MG
1 TABLET ORAL
Qty: 0 | Refills: 0 | DISCHARGE
Start: 2023-05-29

## 2023-05-29 RX ORDER — OXYCODONE HYDROCHLORIDE 5 MG/1
1 TABLET ORAL
Qty: 10 | Refills: 0
Start: 2023-05-29

## 2023-05-29 RX ORDER — OXYCODONE HYDROCHLORIDE 5 MG/1
1 TABLET ORAL
Qty: 6 | Refills: 0
Start: 2023-05-29

## 2023-05-29 RX ADMIN — AMPICILLIN SODIUM AND SULBACTAM SODIUM 200 GRAM(S): 250; 125 INJECTION, POWDER, FOR SUSPENSION INTRAMUSCULAR; INTRAVENOUS at 11:18

## 2023-05-29 RX ADMIN — MORPHINE SULFATE 2 MILLIGRAM(S): 50 CAPSULE, EXTENDED RELEASE ORAL at 03:09

## 2023-05-29 RX ADMIN — AMLODIPINE BESYLATE 5 MILLIGRAM(S): 2.5 TABLET ORAL at 06:21

## 2023-05-29 RX ADMIN — MORPHINE SULFATE 2 MILLIGRAM(S): 50 CAPSULE, EXTENDED RELEASE ORAL at 08:22

## 2023-05-29 RX ADMIN — MORPHINE SULFATE 2 MILLIGRAM(S): 50 CAPSULE, EXTENDED RELEASE ORAL at 02:07

## 2023-05-29 RX ADMIN — METHOCARBAMOL 500 MILLIGRAM(S): 500 TABLET, FILM COATED ORAL at 06:19

## 2023-05-29 RX ADMIN — ENOXAPARIN SODIUM 40 MILLIGRAM(S): 100 INJECTION SUBCUTANEOUS at 06:20

## 2023-05-29 RX ADMIN — POLYETHYLENE GLYCOL 3350 17 GRAM(S): 17 POWDER, FOR SOLUTION ORAL at 11:17

## 2023-05-29 RX ADMIN — Medication 81 MILLIGRAM(S): at 11:17

## 2023-05-29 RX ADMIN — MORPHINE SULFATE 2 MILLIGRAM(S): 50 CAPSULE, EXTENDED RELEASE ORAL at 08:21

## 2023-05-29 RX ADMIN — Medication 1: at 08:19

## 2023-05-29 RX ADMIN — AMPICILLIN SODIUM AND SULBACTAM SODIUM 200 GRAM(S): 250; 125 INJECTION, POWDER, FOR SUSPENSION INTRAMUSCULAR; INTRAVENOUS at 06:19

## 2023-05-29 NOTE — DISCHARGE NOTE PROVIDER - CARE PROVIDER_API CALL
Sujata [address], Admin - [address]  [place] [address]  Phone: [phone]  Fax: [phone]  Follow Up Time: 2 weeks    Orthopedic surgeon,   [facility]  Phone: (   )    -  Fax: (   )    -  Follow Up Time: 2 weeks
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
## 2023-05-29 NOTE — DISCHARGE NOTE PROVIDER - NSDCMRMEDTOKEN_GEN_ALL_CORE_FT
albuterol 2.5 mg/3 mL (0.083%) inhalation solution: 1 spray(s) inhaled 2 times a day  albuterol 90 mcg/inh inhalation aerosol: 1 puff(s) inhaled every 4 hours, As needed, Wheezing  amLODIPine 5 mg oral tablet: 1 tab(s) orally once a day  aspirin 81 mg oral tablet: 1 tab(s) orally once a day - Hold until surgery.   atorvastatin 40 mg oral tablet: 1 tab(s) orally once a day (at bedtime)  Flomax 0.4 mg oral capsule: 1 cap(s) orally once a day (at bedtime)  metFORMIN 500 mg oral tablet: 2 tab(s) orally 2 times a day  MiraLax oral powder for reconstitution: 17 gram(s) orally once a day, As Needed -for constipation    albuterol 2.5 mg/3 mL (0.083%) inhalation solution: 1 spray(s) inhaled 2 times a day  albuterol 90 mcg/inh inhalation aerosol: 1 puff(s) inhaled every 4 hours, As needed, Wheezing  amLODIPine 5 mg oral tablet: 1 tab(s) orally once a day  amoxicillin-clavulanate 875 mg-125 mg oral tablet: 1 tab(s) orally 2 times a day  aspirin 81 mg oral tablet: 1 tab(s) orally once a day - Hold until surgery.   atorvastatin 40 mg oral tablet: 1 tab(s) orally once a day (at bedtime)  Flomax 0.4 mg oral capsule: 1 cap(s) orally once a day (at bedtime)  ibuprofen 400 mg oral tablet: 1 tab(s) orally 3 times a day As needed Moderate Pain (4 - 6)  metFORMIN 500 mg oral tablet: 2 tab(s) orally 2 times a day  MiraLax oral powder for reconstitution: 17 gram(s) orally once a day, As Needed -for constipation   oxyCODONE 10 mg oral tablet, extended release: 1 tab(s) orally 2 times a day as needed for  severe pain MDD: 2 tabs  oxyCODONE 5 mg oral tablet: 1 tab(s) orally 3 times a day as needed for  severe pain MDD: 3 tabs

## 2023-05-29 NOTE — DISCHARGE NOTE PROVIDER - NSDCFUSCHEDAPPT_GEN_ALL_CORE_FT
Benjie Ahuja  [facility]  Scheduled Appointment: 05/31/2023    [facility]  ORTHOSURG  [address]  Scheduled Appointment: 05/31/2023    Mark Ernst  [facility]  Scheduled Appointment: 08/22/2023    Mark Ernst  [facility]  INTMED  [address]  Scheduled Appointment: 08/22/2023

## 2023-05-29 NOTE — DISCHARGE NOTE NURSING/CASE MANAGEMENT/SOCIAL WORK - NSDCPEFALRISK_GEN_ALL_CORE
For information on Fall & Injury Prevention, visit: [email or web address] OR  [email or web address] OR  https://www.cdc.gov/steadi/patient.html

## 2023-05-29 NOTE — DISCHARGE NOTE NURSING/CASE MANAGEMENT/SOCIAL WORK - NSDCVIVACCINE_GEN_ALL_CORE_FT
influenza, injectable, quadrivalent, preservative free; 03-Feb-2019 13:02; Stacy Joe (RN); [employer]; [id number] (Exp. Date: 30-Jun-2019); IntraMuscular; Deltoid Left.; 0.5 milliLiter(s); VIS (VIS Published: 07-Aug-2015, VIS Presented: 03-Feb-2019);   Tdap; 01-Feb-2019 22:39; Angie Serna (RN); [employer]; [id number] (Exp. Date: 02-Nov-2020); IntraMuscular; Deltoid Left.; 0.5 milliLiter(s); VIS (VIS Published: 09-May-2013, VIS Presented: 01-Feb-2019);   Tdap; 23-Oct-2020 20:52; Whitney Hsu (RN); [employer]; [id number] (Exp. Date: 21-Jul-2022); IntraMuscular; Deltoid Right.; 0.5 milliLiter(s); VIS (VIS Published: 09-May-2013, VIS Presented: 23-Oct-2020);

## 2023-05-29 NOTE — DISCHARGE NOTE PROVIDER - PROVIDER TOKENS
PROVIDER:[TOKEN:[[id number]],FOLLOWUP:[2 weeks]],FREE:[LAST:[Orthopedic surgeon],PHONE:[(   )    -],FAX:[(   )    -],ADDRESS:[[facility]],FOLLOWUP:[2 weeks]]

## 2023-05-29 NOTE — DISCHARGE NOTE NURSING/CASE MANAGEMENT/SOCIAL WORK - PATIENT PORTAL LINK FT
You can access the FollowMyHealth Patient Portal offered by [facility] by registering at the following website: [email or web address]. By joining [employer]’s FollowMyHealth portal, you will also be able to view your health information using other applications (apps) compatible with our system.
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
## 2023-05-30 LAB
CULTURE RESULTS: SIGNIFICANT CHANGE UP
SPECIMEN SOURCE: SIGNIFICANT CHANGE UP

## 2023-06-01 DIAGNOSIS — Z85.46 PERSONAL HISTORY OF MALIGNANT NEOPLASM OF PROSTATE: ICD-10-CM

## 2023-06-01 DIAGNOSIS — D64.9 ANEMIA, UNSPECIFIED: ICD-10-CM

## 2023-06-01 DIAGNOSIS — S60.453A SUPERFICIAL FOREIGN BODY OF LEFT MIDDLE FINGER, INITIAL ENCOUNTER: ICD-10-CM

## 2023-06-01 DIAGNOSIS — Z79.82 LONG TERM (CURRENT) USE OF ASPIRIN: ICD-10-CM

## 2023-06-01 DIAGNOSIS — E66.9 OBESITY, UNSPECIFIED: ICD-10-CM

## 2023-06-01 DIAGNOSIS — E11.9 TYPE 2 DIABETES MELLITUS WITHOUT COMPLICATIONS: ICD-10-CM

## 2023-06-01 DIAGNOSIS — Z79.84 LONG TERM (CURRENT) USE OF ORAL HYPOGLYCEMIC DRUGS: ICD-10-CM

## 2023-06-01 DIAGNOSIS — E11.65 TYPE 2 DIABETES MELLITUS WITH HYPERGLYCEMIA: ICD-10-CM

## 2023-06-01 DIAGNOSIS — M25.551 PAIN IN RIGHT HIP: ICD-10-CM
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
## 2023-06-01 DIAGNOSIS — I10 ESSENTIAL (PRIMARY) HYPERTENSION: ICD-10-CM

## 2023-06-01 DIAGNOSIS — E78.5 HYPERLIPIDEMIA, UNSPECIFIED: ICD-10-CM
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
## 2023-06-01 DIAGNOSIS — Z87.891 PERSONAL HISTORY OF NICOTINE DEPENDENCE: ICD-10-CM

## 2023-06-01 DIAGNOSIS — K59.00 CONSTIPATION, UNSPECIFIED: ICD-10-CM

## 2023-07-05 ENCOUNTER — APPOINTMENT (OUTPATIENT)
Dept: ORTHOPEDIC SURGERY | Facility: CLINIC | Age: 68
End: 2023-07-05

## 2023-07-12 NOTE — ED CDU PROVIDER INITIAL DAY NOTE - NS_HEARTSCTROPONIN_ED_A_ED
[facility] CARE COORDINATION  [facility]  July 31, 2023    Sid Miranda    [address] [address]      Dear Sid,        I am a  clinic care coordinator who works with Dr. Francisco Cannon LP at the [facility] in [place], [place]. I wanted to introduce myself and provide you with my contact information for you to be able to call me with any questions or concerns. I wanted to thank you for spending the time to talk with me.  Below is a description of clinic care coordination and how I can further assist you.       The clinic care coordination team is made up of a registered nurse, , and financial resource worker who understand the health care system. The goal of clinic care coordination is to help you manage your health and improve access to the health care system. Our team works alongside your provider to assist you in determining your health and social needs. We can help you obtain health care and community resources, providing you with necessary information and education. We can work with you through any barriers and develop a care plan that helps coordinate and strengthen the communication between you and your care team.  Our services are voluntary and are offered without charge to you personally.    Please feel free to contact me with any questions or concerns regarding care coordination and what we can offer.      We are focused on providing you with the highest-quality healthcare experience possible.    Sincerely,     Dave Cortez [facility]  Social Work Care Coordinator  [facility]  [phone]  [email or web address]  Pronouns: They/He      I have included psychiatric resources on the next page      Psychiatric Resources:    [facility]     Phone: [phone]     Website: [email or web address]    [facility]     Phone: [phone]      Website:[email or web address]    [facility]      Phone:[phone]     Website: [email or web address]        Less than or equal to Normal Limit

## 2023-07-25 RX ORDER — SILDENAFIL 100 MG/1
100 TABLET, FILM COATED ORAL
Qty: 30 | Refills: 0 | Status: ACTIVE | COMMUNITY
Start: 2019-03-20 | End: 1900-01-01

## 2023-08-04 ENCOUNTER — EMERGENCY (EMERGENCY)
Facility: HOSPITAL | Age: 68
LOS: 0 days | Discharge: HOME | End: 2023-08-05
Attending: EMERGENCY MEDICINE
Payer: MEDICARE

## 2023-08-04 VITALS
WEIGHT: 208.56 LBS | RESPIRATION RATE: 20 BRPM | TEMPERATURE: 98 F | OXYGEN SATURATION: 100 % | SYSTOLIC BLOOD PRESSURE: 127 MMHG | HEART RATE: 73 BPM | DIASTOLIC BLOOD PRESSURE: 78 MMHG

## 2023-08-04 DIAGNOSIS — E78.5 HYPERLIPIDEMIA, UNSPECIFIED: ICD-10-CM

## 2023-08-04 DIAGNOSIS — I10 ESSENTIAL (PRIMARY) HYPERTENSION: ICD-10-CM

## 2023-08-04 DIAGNOSIS — Z79.82 LONG TERM (CURRENT) USE OF ASPIRIN: ICD-10-CM

## 2023-08-04 DIAGNOSIS — Z87.39 PERSONAL HISTORY OF OTHER DISEASES OF THE MUSCULOSKELETAL SYSTEM AND CONNECTIVE TISSUE: ICD-10-CM

## 2023-08-04 DIAGNOSIS — E11.9 TYPE 2 DIABETES MELLITUS WITHOUT COMPLICATIONS: ICD-10-CM

## 2023-08-04 DIAGNOSIS — Z87.19 PERSONAL HISTORY OF OTHER DISEASES OF THE DIGESTIVE SYSTEM: ICD-10-CM

## 2023-08-04 DIAGNOSIS — Z90.49 ACQUIRED ABSENCE OF OTHER SPECIFIED PARTS OF DIGESTIVE TRACT: ICD-10-CM

## 2023-08-04 DIAGNOSIS — Z87.891 PERSONAL HISTORY OF NICOTINE DEPENDENCE: ICD-10-CM

## 2023-08-04 DIAGNOSIS — Z79.84 LONG TERM (CURRENT) USE OF ORAL HYPOGLYCEMIC DRUGS: ICD-10-CM

## 2023-08-04 DIAGNOSIS — Z96.642 PRESENCE OF LEFT ARTIFICIAL HIP JOINT: Chronic | ICD-10-CM

## 2023-08-04 DIAGNOSIS — R10.13 EPIGASTRIC PAIN: ICD-10-CM

## 2023-08-04 DIAGNOSIS — C61 MALIGNANT NEOPLASM OF PROSTATE: Chronic | ICD-10-CM

## 2023-08-04 DIAGNOSIS — R11.0 NAUSEA: ICD-10-CM

## 2023-08-04 DIAGNOSIS — Z98.890 OTHER SPECIFIED POSTPROCEDURAL STATES: Chronic | ICD-10-CM

## 2023-08-04 DIAGNOSIS — Z96.642 PRESENCE OF LEFT ARTIFICIAL HIP JOINT: ICD-10-CM

## 2023-08-04 DIAGNOSIS — Z85.46 PERSONAL HISTORY OF MALIGNANT NEOPLASM OF PROSTATE: ICD-10-CM

## 2023-08-04 PROCEDURE — 36415 COLL VENOUS BLD VENIPUNCTURE: CPT

## 2023-08-04 PROCEDURE — 99284 EMERGENCY DEPT VISIT MOD MDM: CPT | Mod: 25

## 2023-08-04 PROCEDURE — 99285 EMERGENCY DEPT VISIT HI MDM: CPT | Mod: 25

## 2023-08-04 PROCEDURE — 96374 THER/PROPH/DIAG INJ IV PUSH: CPT | Mod: XU

## 2023-08-04 PROCEDURE — 74177 CT ABD & PELVIS W/CONTRAST: CPT | Mod: MA

## 2023-08-04 PROCEDURE — 83605 ASSAY OF LACTIC ACID: CPT

## 2023-08-04 PROCEDURE — 83690 ASSAY OF LIPASE: CPT

## 2023-08-04 PROCEDURE — 71045 X-RAY EXAM CHEST 1 VIEW: CPT

## 2023-08-04 PROCEDURE — 85025 COMPLETE CBC W/AUTO DIFF WBC: CPT

## 2023-08-04 PROCEDURE — 96375 TX/PRO/DX INJ NEW DRUG ADDON: CPT

## 2023-08-04 PROCEDURE — 80053 COMPREHEN METABOLIC PANEL: CPT

## 2023-08-04 NOTE — ED ADULT NURSE NOTE - NSICDXFAMILYHX_GEN_ALL_CORE_FT
Rivaroxaban/Xarelto increases your risk for bleeding. Notify your doctor if you experience any of the following side effects: unusual bleeding or bruising, vomiting blood or coffee ground-like material, red or black stool, itching or hives, chest tightness, trouble breathing, swelling in your face or hands, swelling in your mouth or throat, change in how much or how often you urinate, red or brown urine, heavy menstrual or vaginal bleeding, or blistering or peeling skin. When Rivaroxaban/Xarelto is taken with other medicines, they can affect how it works. Taking other medications such as aspirin, antibiotics, antifungals, blood thinners, nonsteroidal anti-inflammatories, and medications that treat depression can increase your risk of bleeding. It is very important to tell your health care provider about all of the other medicines, including over-the-counter medications, herbs, and vitamins you are taking.  DO NOT start, stop, or change the dosage of any medicine, including over-the-counter medicines, vitamins, and herbal products without your doctor’s approval.  Any products containing aspirin or are nonsteroidal anti-inflammatories lessen the blood’s ability to form clots and adds to the effect of Rivaroxaban/Xarelto. Never take aspirin or medicines that contain aspirin without speaking to your doctor. FAMILY HISTORY:  Father  Still living? No  Family history of diabetes mellitus in father, Age at diagnosis: Age Unknown

## 2023-08-04 NOTE — ED ADULT TRIAGE NOTE - ESI TRIAGE ACUITY LEVEL, MLM
Seeing you are her new provider, are you willing to take over prescribing these 2 medications? Sign off on meds if correct.    Thank you   3

## 2023-08-04 NOTE — ED ADULT NURSE NOTE - OBJECTIVE STATEMENT
Pt c/o abdominal pain that started 1 day ago. Pt states pmh of pancreatitis and having extreme pain after eating. Aox4.

## 2023-08-05 LAB
ALBUMIN SERPL ELPH-MCNC: 4.2 G/DL — SIGNIFICANT CHANGE UP (ref 3.5–5.2)
ALP SERPL-CCNC: 91 U/L — SIGNIFICANT CHANGE UP (ref 30–115)
ALT FLD-CCNC: 18 U/L — SIGNIFICANT CHANGE UP (ref 0–41)
ANION GAP SERPL CALC-SCNC: 11 MMOL/L — SIGNIFICANT CHANGE UP (ref 7–14)
AST SERPL-CCNC: 18 U/L — SIGNIFICANT CHANGE UP (ref 0–41)
BASOPHILS # BLD AUTO: 0.05 K/UL — SIGNIFICANT CHANGE UP (ref 0–0.2)
BASOPHILS NFR BLD AUTO: 0.7 % — SIGNIFICANT CHANGE UP (ref 0–1)
BILIRUB SERPL-MCNC: 0.5 MG/DL — SIGNIFICANT CHANGE UP (ref 0.2–1.2)
BUN SERPL-MCNC: 8 MG/DL — LOW (ref 10–20)
CALCIUM SERPL-MCNC: 9.1 MG/DL — SIGNIFICANT CHANGE UP (ref 8.4–10.5)
CHLORIDE SERPL-SCNC: 103 MMOL/L — SIGNIFICANT CHANGE UP (ref 98–110)
CO2 SERPL-SCNC: 23 MMOL/L — SIGNIFICANT CHANGE UP (ref 17–32)
CREAT SERPL-MCNC: 0.6 MG/DL — LOW (ref 0.7–1.5)
EGFR: 106 ML/MIN/1.73M2 — SIGNIFICANT CHANGE UP
EOSINOPHIL # BLD AUTO: 0.25 K/UL — SIGNIFICANT CHANGE UP (ref 0–0.7)
EOSINOPHIL NFR BLD AUTO: 3.5 % — SIGNIFICANT CHANGE UP (ref 0–8)
GLUCOSE SERPL-MCNC: 162 MG/DL — HIGH (ref 70–99)
HCT VFR BLD CALC: 36.3 % — LOW (ref 42–52)
HGB BLD-MCNC: 12.2 G/DL — LOW (ref 14–18)
IMM GRANULOCYTES NFR BLD AUTO: 0.3 % — SIGNIFICANT CHANGE UP (ref 0.1–0.3)
LACTATE SERPL-SCNC: 1.4 MMOL/L — SIGNIFICANT CHANGE UP (ref 0.7–2)
LIDOCAIN IGE QN: 13 U/L — SIGNIFICANT CHANGE UP (ref 7–60)
LYMPHOCYTES # BLD AUTO: 1.9 K/UL — SIGNIFICANT CHANGE UP (ref 1.2–3.4)
LYMPHOCYTES # BLD AUTO: 26.8 % — SIGNIFICANT CHANGE UP (ref 20.5–51.1)
MCHC RBC-ENTMCNC: 29.5 PG — SIGNIFICANT CHANGE UP (ref 27–31)
MCHC RBC-ENTMCNC: 33.6 G/DL — SIGNIFICANT CHANGE UP (ref 32–37)
MCV RBC AUTO: 87.9 FL — SIGNIFICANT CHANGE UP (ref 80–94)
MONOCYTES # BLD AUTO: 0.67 K/UL — HIGH (ref 0.1–0.6)
MONOCYTES NFR BLD AUTO: 9.4 % — HIGH (ref 1.7–9.3)
NEUTROPHILS # BLD AUTO: 4.21 K/UL — SIGNIFICANT CHANGE UP (ref 1.4–6.5)
NEUTROPHILS NFR BLD AUTO: 59.3 % — SIGNIFICANT CHANGE UP (ref 42.2–75.2)
NRBC # BLD: 0 /100 WBCS — SIGNIFICANT CHANGE UP (ref 0–0)
PLATELET # BLD AUTO: 194 K/UL — SIGNIFICANT CHANGE UP (ref 130–400)
PMV BLD: 10.4 FL — SIGNIFICANT CHANGE UP (ref 7.4–10.4)
POTASSIUM SERPL-MCNC: 4.4 MMOL/L — SIGNIFICANT CHANGE UP (ref 3.5–5)
POTASSIUM SERPL-SCNC: 4.4 MMOL/L — SIGNIFICANT CHANGE UP (ref 3.5–5)
PROT SERPL-MCNC: 6.5 G/DL — SIGNIFICANT CHANGE UP (ref 6–8)
RBC # BLD: 4.13 M/UL — LOW (ref 4.7–6.1)
RBC # FLD: 12.7 % — SIGNIFICANT CHANGE UP (ref 11.5–14.5)
SODIUM SERPL-SCNC: 137 MMOL/L — SIGNIFICANT CHANGE UP (ref 135–146)
WBC # BLD: 7.1 K/UL — SIGNIFICANT CHANGE UP (ref 4.8–10.8)
WBC # FLD AUTO: 7.1 K/UL — SIGNIFICANT CHANGE UP (ref 4.8–10.8)

## 2023-08-05 PROCEDURE — 71045 X-RAY EXAM CHEST 1 VIEW: CPT | Mod: 26

## 2023-08-05 PROCEDURE — 74177 CT ABD & PELVIS W/CONTRAST: CPT | Mod: 26,MA

## 2023-08-05 RX ORDER — FAMOTIDINE 10 MG/ML
20 INJECTION INTRAVENOUS ONCE
Refills: 0 | Status: COMPLETED | OUTPATIENT
Start: 2023-08-05 | End: 2023-08-05

## 2023-08-05 RX ORDER — KETOROLAC TROMETHAMINE 30 MG/ML
30 SYRINGE (ML) INJECTION ONCE
Refills: 0 | Status: DISCONTINUED | OUTPATIENT
Start: 2023-08-05 | End: 2023-08-05

## 2023-08-05 RX ORDER — MORPHINE SULFATE 50 MG/1
4 CAPSULE, EXTENDED RELEASE ORAL ONCE
Refills: 0 | Status: DISCONTINUED | OUTPATIENT
Start: 2023-08-05 | End: 2023-08-05

## 2023-08-05 RX ORDER — SODIUM CHLORIDE 9 MG/ML
1000 INJECTION, SOLUTION INTRAVENOUS ONCE
Refills: 0 | Status: COMPLETED | OUTPATIENT
Start: 2023-08-05 | End: 2023-08-05

## 2023-08-05 RX ORDER — ONDANSETRON 8 MG/1
4 TABLET, FILM COATED ORAL ONCE
Refills: 0 | Status: COMPLETED | OUTPATIENT
Start: 2023-08-05 | End: 2023-08-05

## 2023-08-05 RX ADMIN — SODIUM CHLORIDE 1000 MILLILITER(S): 9 INJECTION, SOLUTION INTRAVENOUS at 01:31

## 2023-08-05 RX ADMIN — FAMOTIDINE 20 MILLIGRAM(S): 10 INJECTION INTRAVENOUS at 01:31

## 2023-08-05 RX ADMIN — Medication 30 MILLIGRAM(S): at 03:53

## 2023-08-05 RX ADMIN — MORPHINE SULFATE 4 MILLIGRAM(S): 50 CAPSULE, EXTENDED RELEASE ORAL at 01:30

## 2023-08-05 RX ADMIN — ONDANSETRON 4 MILLIGRAM(S): 8 TABLET, FILM COATED ORAL at 01:30

## 2023-08-05 NOTE — ED PROVIDER NOTE - CLINICAL SUMMARY MEDICAL DECISION MAKING FREE TEXT BOX
Patient evaluated for abdominal pain, labs reviewed, lactate within normal limits, CT abdomen pelvis without acute pathology.  Chest x-ray without acute pathology noted.  Patient reported feeling symptomatic improvement to ED.  Advise close follow-up with PCP for reevaluation and patient agreed.  Strict return precautions advised.

## 2023-08-05 NOTE — ED PROVIDER NOTE - OBJECTIVE STATEMENT
67 yold male to ED Pmhx Htn, Hld, Pancreatitis, Prostate cancer s/p seeds, s/p appendectomy; pt presents to Ed c/o epigastric and diffuse abdominal pain started last night similar to prior episodes of pancreatitis; pt admits to drinking alcohol 2 days ago excessively; pt with nausea no vomiting; pain worse with food; pt denies fever, chills, cough, sob;

## 2023-08-05 NOTE — ED PROVIDER NOTE - ATTENDING APP SHARED VISIT CONTRIBUTION OF CARE
67-year-old male with PMH HTN, HLD, history of pancreatitis, history of prostate cancer presents for evaluation of diffuse abdominal pain for 1 day.  Patient reports similar to prior episodes of pancreatitis.  States he has nausea without any vomiting or diarrhea.  Symptoms worse with food.  Patient admits he went out with his friends 2 days earlier and drank heavily.  No hematochezia or melena.  Denies any fevers, chills, cough, shortness of breath, dizziness or weakness.    VITAL SIGNS: noted  CONSTITUTIONAL: Well-developed; well-nourished; in no acute distress  HEAD: Normocephalic; atraumatic  EYES: PERRL, EOM intact; conjunctiva and sclera clear  ENT: No nasal discharge; airway clear. MMM  NECK: Supple; non tender.   CARD: S1, S2 normal; no murmurs, gallops, or rubs. Regular rate and rhythm  RESP: CTAB/L, no wheezes, rales or rhonchi  ABD: Normal bowel sounds; soft; non-distended; Mild diffuse tenderness, no Rebound or guarding, no CVA tenderness  EXT: Normal ROM. No calf tenderness or edema. Distal pulses intact  NEURO: Alert, oriented. Grossly unremarkable. No focal deficits  SKIN: Skin exam is warm and dry

## 2023-08-05 NOTE — ED PROVIDER NOTE - CARE PROVIDER_API CALL
Sujata [address], Admin - [address]  [place] [address]  Phone: [phone]  Fax: [phone]  Established Patient  Follow Up Time: 4-6 Days

## 2023-08-05 NOTE — ED PROVIDER NOTE - PATIENT PORTAL LINK FT
You can access the FollowMyHealth Patient Portal offered by [facility] by registering at the following website: [email or web address]. By joining [employer]’s FollowMyHealth portal, you will also be able to view your health information using other applications (apps) compatible with our system.

## 2023-08-05 NOTE — ED PROVIDER NOTE - PHYSICAL EXAMINATION
Constitutional: Well developed, well nourished. NAD  Head: Normocephalic, atraumatic.  Eyes: PERRL, EOMI.  ENT: No nasal discharge. Mucous membranes dry.  Neck: Supple. Painless ROM.  Cardiovascular:   Regular rate and rhythm.    Pulmonary:  Lungs clear to auscultation bilaterally.    Abdominal: Soft + mild tenderness noted to epigastric and diffusely to entire abdomen; no rebound, guarding or flank pain;  Extremities. Pelvis stable. No lower extremity edema, symmetric calves.  Skin: No rashes, cyanosis.  Neuro: AAOx3. No focal neurological deficits.  Psych: Normal mood. Normal affect.

## 2023-08-22 ENCOUNTER — APPOINTMENT (OUTPATIENT)
Dept: INTERNAL MEDICINE | Facility: CLINIC | Age: 68
End: 2023-08-22
Payer: MEDICARE

## 2023-08-22 ENCOUNTER — OUTPATIENT (OUTPATIENT)
Dept: OUTPATIENT SERVICES | Facility: HOSPITAL | Age: 68
LOS: 1 days | End: 2023-08-22
Payer: MEDICARE

## 2023-08-22 VITALS
SYSTOLIC BLOOD PRESSURE: 137 MMHG | HEIGHT: 66 IN | DIASTOLIC BLOOD PRESSURE: 81 MMHG | WEIGHT: 200 LBS | BODY MASS INDEX: 32.14 KG/M2 | HEART RATE: 74 BPM | TEMPERATURE: 96.3 F | OXYGEN SATURATION: 96 %

## 2023-08-22 DIAGNOSIS — Z96.642 PRESENCE OF LEFT ARTIFICIAL HIP JOINT: Chronic | ICD-10-CM

## 2023-08-22 DIAGNOSIS — Z00.00 ENCOUNTER FOR GENERAL ADULT MEDICAL EXAMINATION WITHOUT ABNORMAL FINDINGS: ICD-10-CM

## 2023-08-22 DIAGNOSIS — C61 MALIGNANT NEOPLASM OF PROSTATE: Chronic | ICD-10-CM

## 2023-08-22 DIAGNOSIS — E11.9 TYPE 2 DIABETES MELLITUS W/OUT COMPLICATIONS: ICD-10-CM

## 2023-08-22 DIAGNOSIS — M25.559 PAIN IN UNSPECIFIED HIP: ICD-10-CM

## 2023-08-22 DIAGNOSIS — Z98.890 OTHER SPECIFIED POSTPROCEDURAL STATES: Chronic | ICD-10-CM

## 2023-08-22 DIAGNOSIS — C61 MALIGNANT NEOPLASM OF PROSTATE: ICD-10-CM

## 2023-08-22 PROCEDURE — 99214 OFFICE O/P EST MOD 30 MIN: CPT

## 2023-08-22 PROCEDURE — 99214 OFFICE O/P EST MOD 30 MIN: CPT | Mod: GC

## 2023-08-22 RX ORDER — IBUPROFEN 800 MG/1
800 TABLET, FILM COATED ORAL EVERY 8 HOURS
Qty: 90 | Refills: 3 | Status: ACTIVE | COMMUNITY
Start: 2023-08-22 | End: 1900-01-01

## 2023-08-22 RX ORDER — LANCETS 33 GAUGE
EACH MISCELLANEOUS
Qty: 1 | Refills: 5 | Status: ACTIVE | COMMUNITY
Start: 2020-01-21 | End: 1900-01-01

## 2023-08-22 RX ORDER — BLOOD-GLUCOSE METER
W/DEVICE KIT MISCELLANEOUS
Qty: 1 | Refills: 0 | Status: ACTIVE | COMMUNITY
Start: 2020-01-21 | End: 1900-01-01

## 2023-08-22 RX ORDER — BLOOD SUGAR DIAGNOSTIC
STRIP MISCELLANEOUS
Qty: 1 | Refills: 3 | Status: ACTIVE | COMMUNITY
Start: 2020-01-21 | End: 1900-01-01

## 2023-08-22 NOTE — ASSESSMENT
[FreeTextEntry1] : 67-year old male with PMH of HTN, DLD, DM, prostate CA s/p seeding on remission, OA, hx of pancreatitis presents for follow up appointment.   #Hip pain- 2/2 severe b/l OA s/p L THR - X-ray Hip (5/2023): unremarkable left hip arthroplasty, moderate-severe degenerative change of the right hip - pt did not want to take naproxen - on ibuprofen 800mg every 8 hours - pt to go to [facility] who did his prior arthroplasty   #Diabetes undercontrolled - A1c in 5/2023 7.6% - C/w metformin 1000mg BID (was only taking qd) - referral to diabetes educator for mounjaro training, was not taking, agreeable to take - pt was not able to get glucose monitoring supplies, resent to pharmacy  pharmacy to confirm  #DLD - Lipid panel 5/2023 WNL Hyperlipidemia; Low fat, low cholesterol diet. Discussed importance of eating a heart healthy diet Counseled on aerobic exercise and weight loss Fasting lipid panel every 3 months Treatment options and possible side effects discussed  Smoking cessation discussed, if applicable Patient counseled on effects of ETOH on lipid levels  #HTN - 137/81 in clinic today HTN; DASH diet discussed and recommended Exercise and weight loss counseled  Frequency and target at home BP readings discussed Decrease caffeine intake Treatment options and possible side effects discussed Patient counseled on symptoms of hypo/hypertension Counseled: Yearly Ophthalmology exams controlled  #Prostate Ca - Last PSA 0.55  stable asymptomatic  #HCN - Colonoscopy in 2020, with 3 adenomas, the largest 8mm  RTC in 3 months.

## 2023-08-22 NOTE — HISTORY OF PRESENT ILLNESS
[FreeTextEntry1] : Follow up [de-identified] : 67-year old male with PMH of HTN, DLD, DM, prostate CA s/p seeding on remission, OA, hx of pancreatitis presents for follow-up appointment.  not taking mounjaro. still having b/l hip pain L>R but ambulating

## 2023-08-22 NOTE — PHYSICAL EXAM
[Normal] : no CVA or spinal tenderness [No Acute Distress] : no acute distress [Soft] : abdomen soft [Non Tender] : non-tender [de-identified] : rotund [de-identified] : b/l hip crepitus and limited rom

## 2023-09-05 DIAGNOSIS — E78.5 HYPERLIPIDEMIA, UNSPECIFIED: ICD-10-CM

## 2023-09-05 DIAGNOSIS — E11.9 TYPE 2 DIABETES MELLITUS WITHOUT COMPLICATIONS: ICD-10-CM

## 2023-09-05 DIAGNOSIS — I10 ESSENTIAL (PRIMARY) HYPERTENSION: ICD-10-CM

## 2023-09-05 DIAGNOSIS — M25.559 PAIN IN UNSPECIFIED HIP: ICD-10-CM

## 2023-09-05 DIAGNOSIS — C61 MALIGNANT NEOPLASM OF PROSTATE: ICD-10-CM

## 2023-09-05 RX ORDER — TIRZEPATIDE 2.5 MG/.5ML
2.5 INJECTION, SOLUTION SUBCUTANEOUS
Qty: 4 | Refills: 4 | Status: COMPLETED | COMMUNITY
Start: 2023-05-18 | End: 2023-09-05

## 2023-09-14 ENCOUNTER — APPOINTMENT (OUTPATIENT)
Dept: ENDOCRINOLOGY | Facility: CLINIC | Age: 68
End: 2023-09-14

## 2023-09-14 NOTE — ED CDU PROVIDER INITIAL DAY NOTE - PMH
daily (Patient not taking: Reported on 5/1/2023), Disp: , Rfl:     traMADol (ULTRAM) 50 MG tablet, Take 50 mg by mouth every 6 hours as needed. , Disp: , Rfl:     No Known Allergies    Social History     Socioeconomic History    Marital status:      Spouse name: Not on file    Number of children: Not on file    Years of education: Not on file    Highest education level: Not on file   Occupational History    Not on file   Tobacco Use    Smoking status: Never    Smokeless tobacco: Never   Substance and Sexual Activity    Alcohol use: Yes    Drug use: Never    Sexual activity: Not on file   Other Topics Concern    Not on file   Social History Narrative    Not on file     Social Determinants of Health     Financial Resource Strain: Not on file   Food Insecurity: Not on file   Transportation Needs: Not on file   Physical Activity: Inactive (6/20/2022)    Exercise Vital Sign     Days of Exercise per Week: 0 days     Minutes of Exercise per Session: 0 min   Stress: Not on file   Social Connections: Not on file   Intimate Partner Violence: Not At Risk (6/20/2022)    Humiliation, Afraid, Rape, and Kick questionnaire     Fear of Current or Ex-Partner: No     Emotionally Abused: No     Physically Abused: No     Sexually Abused: No   Housing Stability: Not on file       Past Surgical History:   Procedure Laterality Date    KNEE SURGERY Left 5/1/2023    LAURI KNEE MANIPULATION performed by Sherley Naranjo MD at [address] [place] [address] Pkw      spinal fusion X10 Lumbar region       Patient seen evaluated today for bilateral knees. The patient is now 8 months status post left total knee replacement surgery. He did require manipulation under anesthesia there noncompliance. He is having a little bit of soreness in his left knee at times. He denies any start up pain. No feelings of instability. He had no injuries or falls to report. He has a little discomfort with stairs.     His right knee has been acting up as
Diabetes mellitus, type II    Hyperlipidemia    Hypertension    Osteoarthritis    Pancreatitis  recurrent  Prostate cancer  s/p history of seeding

## 2023-10-13 ENCOUNTER — NON-APPOINTMENT (OUTPATIENT)
Age: 68
End: 2023-10-13

## 2023-10-16 ENCOUNTER — NON-APPOINTMENT (OUTPATIENT)
Age: 68
End: 2023-10-16

## 2023-10-18 ENCOUNTER — APPOINTMENT (OUTPATIENT)
Dept: ORTHOPEDIC SURGERY | Facility: CLINIC | Age: 68
End: 2023-10-18

## 2023-11-16 ENCOUNTER — APPOINTMENT (OUTPATIENT)
Dept: ENDOCRINOLOGY | Facility: CLINIC | Age: 68
End: 2023-11-16

## 2023-12-19 ENCOUNTER — APPOINTMENT (OUTPATIENT)
Dept: PODIATRY | Facility: CLINIC | Age: 68
End: 2023-12-19

## 2023-12-26 ENCOUNTER — APPOINTMENT (OUTPATIENT)
Dept: INTERNAL MEDICINE | Facility: CLINIC | Age: 68
End: 2023-12-26

## 2023-12-27 ENCOUNTER — APPOINTMENT (OUTPATIENT)
Dept: ENDOCRINOLOGY | Facility: CLINIC | Age: 68
End: 2023-12-27

## 2024-01-03 ENCOUNTER — APPOINTMENT (OUTPATIENT)
Dept: ORTHOPEDIC SURGERY | Facility: CLINIC | Age: 69
End: 2024-01-03

## 2024-01-23 NOTE — ED ADULT NURSE NOTE - FALLEN IN THE PAST
Refills requested on Protonix 40 mg BID; last refill given on 09/05/23 60 tabs with 3 refills; last office visit on 09/05/23    no

## 2024-01-31 NOTE — DISCHARGE NOTE ADULT - HOSPITAL COURSE
From: Susu Mcmullen  To: Dr. Boogie López  Sent: 1/31/2024 8:11 AM EST  Subject: Test results from 1-30-24    Can a copy of labs be sent to Sandra March”s NP office at [facility]?    Patient was evaluated for abdominal pain, suspected due to mild gastritis, patient was seen by GI specialist, recommended to tx. with protonix, and avoid illicit drugs and alcohol consumption, with outpatient GI f/up .   -for chronic medical conditions , patient was resumed on home regimen tx.

## 2024-02-08 NOTE — ED PROVIDER NOTE - IV ALTEPLASE ADMIN OUTSIDE HIDDEN
----- Message from Phill Morales MD sent at 5/26/2022  1:46 PM CDT -----  Mammogram    
Order placed as per Dr Morales.  
show
warm

## 2024-03-01 NOTE — ED ADULT NURSE NOTE - NS ED NURSE RECORD ANOTHER HT AND WT
SURVEY:    You may be receiving a survey from [employer] regarding your visit today.    Please complete the survey to enable us to provide the highest quality of care to you and your family.    If you cannot score us a very good on any question, please call the office to discuss how we could have made your experience a very good one.    Thank you.   
Yes

## 2024-03-21 RX ORDER — AMLODIPINE BESYLATE 5 MG/1
5 TABLET ORAL DAILY
Qty: 30 | Refills: 1 | Status: ACTIVE | COMMUNITY
Start: 2018-04-04 | End: 1900-01-01

## 2024-03-21 RX ORDER — DULAGLUTIDE 0.75 MG/.5ML
0.75 INJECTION, SOLUTION SUBCUTANEOUS
Qty: 3 | Refills: 1 | Status: ACTIVE | COMMUNITY
Start: 2023-09-05 | End: 1900-01-01

## 2024-03-21 RX ORDER — TAMSULOSIN HYDROCHLORIDE 0.4 MG/1
0.4 CAPSULE ORAL
Qty: 30 | Refills: 1 | Status: ACTIVE | COMMUNITY
Start: 2017-12-24 | End: 1900-01-01

## 2024-03-21 RX ORDER — ATORVASTATIN CALCIUM 40 MG/1
40 TABLET, FILM COATED ORAL
Qty: 30 | Refills: 1 | Status: ACTIVE | COMMUNITY
Start: 2018-04-04 | End: 1900-01-01

## 2024-04-25 ENCOUNTER — APPOINTMENT (OUTPATIENT)
Dept: SPEECH THERAPY | Facility: CLINIC | Age: 69
End: 2024-04-25

## 2024-05-16 ENCOUNTER — OUTPATIENT (OUTPATIENT)
Dept: OUTPATIENT SERVICES | Facility: HOSPITAL | Age: 69
LOS: 1 days | End: 2024-05-16
Payer: MEDICARE

## 2024-05-16 ENCOUNTER — APPOINTMENT (OUTPATIENT)
Dept: INTERNAL MEDICINE | Facility: CLINIC | Age: 69
End: 2024-05-16
Payer: MEDICARE

## 2024-05-16 VITALS
HEIGHT: 66 IN | HEART RATE: 60 BPM | DIASTOLIC BLOOD PRESSURE: 77 MMHG | WEIGHT: 204 LBS | SYSTOLIC BLOOD PRESSURE: 123 MMHG | OXYGEN SATURATION: 98 % | BODY MASS INDEX: 32.78 KG/M2 | TEMPERATURE: 96.2 F

## 2024-05-16 DIAGNOSIS — C61 MALIGNANT NEOPLASM OF PROSTATE: Chronic | ICD-10-CM

## 2024-05-16 DIAGNOSIS — H61.20 IMPACTED CERUMEN, UNSPECIFIED EAR: ICD-10-CM

## 2024-05-16 DIAGNOSIS — E66.9 OBESITY, UNSPECIFIED: ICD-10-CM

## 2024-05-16 DIAGNOSIS — M54.9 DORSALGIA, UNSPECIFIED: ICD-10-CM

## 2024-05-16 DIAGNOSIS — Z96.642 PRESENCE OF LEFT ARTIFICIAL HIP JOINT: Chronic | ICD-10-CM

## 2024-05-16 DIAGNOSIS — I10 ESSENTIAL (PRIMARY) HYPERTENSION: ICD-10-CM

## 2024-05-16 DIAGNOSIS — E78.5 HYPERLIPIDEMIA, UNSPECIFIED: ICD-10-CM

## 2024-05-16 DIAGNOSIS — D36.9 BENIGN NEOPLASM, UNSPECIFIED SITE: ICD-10-CM

## 2024-05-16 DIAGNOSIS — R68.3 CLUBBING OF FINGERS: ICD-10-CM

## 2024-05-16 DIAGNOSIS — H91.90 UNSPECIFIED HEARING LOSS, UNSPECIFIED EAR: ICD-10-CM

## 2024-05-16 DIAGNOSIS — R04.0 EPISTAXIS: ICD-10-CM

## 2024-05-16 DIAGNOSIS — K59.00 CONSTIPATION, UNSPECIFIED: ICD-10-CM

## 2024-05-16 DIAGNOSIS — Z00.00 ENCOUNTER FOR GENERAL ADULT MEDICAL EXAMINATION WITHOUT ABNORMAL FINDINGS: ICD-10-CM

## 2024-05-16 PROCEDURE — 99214 OFFICE O/P EST MOD 30 MIN: CPT

## 2024-05-16 PROCEDURE — G2211 COMPLEX E/M VISIT ADD ON: CPT

## 2024-05-16 RX ORDER — ISOPROPYL ALCOHOL 70 ML/100ML
SWAB TOPICAL
Qty: 1 | Refills: 0 | Status: DISCONTINUED | COMMUNITY
Start: 2020-01-21 | End: 2024-05-16

## 2024-05-16 RX ORDER — NAPROXEN 500 MG/1
500 TABLET ORAL
Qty: 60 | Refills: 5 | Status: DISCONTINUED | COMMUNITY
Start: 2023-04-18 | End: 2024-05-16

## 2024-05-16 RX ORDER — POLYETHYLENE GLYCOL 3350 17 G/17G
17 POWDER, FOR SOLUTION ORAL DAILY
Qty: 90 | Refills: 3 | Status: DISCONTINUED | COMMUNITY
Start: 2023-02-17 | End: 2024-05-16

## 2024-05-16 RX ORDER — ATORVASTATIN CALCIUM 20 MG/1
20 TABLET, FILM COATED ORAL AT BEDTIME
Qty: 1 | Refills: 1 | Status: ACTIVE | COMMUNITY
Start: 2024-05-16 | End: 1900-01-01

## 2024-05-16 RX ORDER — POLYETHYLENE GLYCOL 3350 AND ELECTROLYTES WITH LEMON FLAVOR 236; 22.74; 6.74; 5.86; 2.97 G/4L; G/4L; G/4L; G/4L; G/4L
236 POWDER, FOR SOLUTION ORAL
Qty: 1 | Refills: 0 | Status: DISCONTINUED | COMMUNITY
Start: 2023-05-03 | End: 2024-05-16

## 2024-05-16 RX ORDER — SENNOSIDES 8.6 MG TABLETS 8.6 MG/1
8.6 TABLET ORAL
Qty: 180 | Refills: 3 | Status: DISCONTINUED | COMMUNITY
Start: 2023-02-17 | End: 2024-05-16

## 2024-05-16 RX ORDER — POLYETHYLENE GLYCOL 3350 17 G/17G
17 POWDER, FOR SOLUTION ORAL DAILY
Qty: 7 | Refills: 0 | Status: DISCONTINUED | COMMUNITY
Start: 2023-05-03 | End: 2024-05-16

## 2024-05-16 RX ORDER — SENNOSIDES 8.6 MG TABLETS 8.6 MG/1
8.6 TABLET ORAL
Qty: 180 | Refills: 1 | Status: DISCONTINUED | COMMUNITY
Start: 2023-04-18 | End: 2024-05-16

## 2024-05-16 RX ORDER — ASPIRIN ENTERIC COATED TABLETS 81 MG 81 MG/1
81 TABLET, DELAYED RELEASE ORAL DAILY
Qty: 30 | Refills: 0 | Status: ACTIVE | COMMUNITY
Start: 2018-04-04 | End: 1900-01-01

## 2024-05-16 RX ORDER — ISOPROPYL ALCOHOL 0.7 ML/ML
SWAB TOPICAL
Qty: 1 | Refills: 5 | Status: DISCONTINUED | COMMUNITY
Start: 2023-05-12 | End: 2024-05-16

## 2024-05-16 RX ORDER — METFORMIN HYDROCHLORIDE 1000 MG/1
1000 TABLET, COATED ORAL
Qty: 60 | Refills: 2 | Status: DISCONTINUED | COMMUNITY
Start: 2018-04-04 | End: 2024-05-16

## 2024-05-16 RX ORDER — NAPHAZOLINE HCL/PHENIRAMINE .0268-.315
6.5 DROPS OPHTHALMIC (EYE)
Qty: 1 | Refills: 0 | Status: ACTIVE | COMMUNITY
Start: 2024-05-16 | End: 1900-01-01

## 2024-05-16 NOTE — REVIEW OF SYSTEMS
[Joint Pain] : joint pain [Negative] : Neurological [Joint Stiffness] : no joint stiffness [Muscle Pain] : no muscle pain [Muscle Weakness] : no muscle weakness [Back Pain] : no back pain [FreeTextEntry9] : Bilateral hip pain, L>R

## 2024-05-16 NOTE — HISTORY OF PRESENT ILLNESS
[FreeTextEntry1] : Follow up [de-identified] : 68-year old male with PMH of HTN, DLD, DM, prostate CA s/p seeding on remission, OA, hx of pancreatitis presents for follow-up appointment.  not taking mounjaro. still having b/l hip pain L>R but ambulating

## 2024-05-16 NOTE — END OF VISIT
[] : Resident [FreeTextEntry3] : I saw the patient, examined the patient independently, reviewed medical record, and provided the medical services. Agree with resident note as personally edited above. debrox trial, then ent if no improvement in cerumen weight loss exercise, glp1 cont

## 2024-05-16 NOTE — ASSESSMENT
[FreeTextEntry1] : 67-year old male with PMH of HTN, DLD, DM, prostate CA s/p seeding on remission, OA, hx of pancreatitis presents for follow up appointment.   #Epistaxis, twice, blood clots - Plt count, INR ptt - BP is controlled - Vaseline topical counselled patient on how/when to take medication and about side effects to nares  #Conductive hearing loss - on exam lots of impacted wax - given ear drops/ ear wax removal kit - Audiology ref only if no improvement - adviced to stop using Q tips counselled patient on how/when to take medication and about side effects   #Hip pain- 2/2 severe b/l OA s/p L THR - X-ray Hip (5/2023): unremarkable left hip arthroplasty, moderate-severe degenerative change of the right hip  - on ibuprofen 800mg every 8 hours, takes then for pain twice a week   #Diabetes II, needs fu a1c - A1c in 5/2023 7.6%, no follow up will send one - was on metformin 1000mg BID (was only taking qd) pt was told to stop in clinic per his own words - on Trulicity 0.75mg once weekly - pt is not able to get glucose monitoring supplies, resent to pharmacy still despite being sent and confirmed last visit  #DLD - Lipid panel 5/2023 WNL LDL 98 - ASCVD score: Intermediate risk, will follow up labs - started on int intensity statin 20mg atorvastatin to follow up Hyperlipidemia; Low fat, low cholesterol diet. Discussed importance of eating a heart healthy diet Counseled on aerobic exercise and weight loss Fasting lipid panel every 3 months Treatment options and possible side effects discussed  Smoking cessation discussed, if applicable Patient counseled on effects of ETOH on lipid levels  #HTN - controlled - amlodipine 5mg od  HTN; DASH diet discussed and recommended Exercise and weight loss counseled  Frequency and target at home BP readings discussed Decrease caffeine intake Treatment options and possible side effects discussed Patient counseled on symptoms of hypo/hypertension Counseled: Yearly Ophthalmology exams controlled  #Prostate Ca - Last PSA 0.46 in 2022 stable asymptomatic - sent follow up PSA  #clubbing on fingernail exam coarse BS check cxr  #HCN - Colonoscopy in 2020, with 3 adenomas, the largest 8mm - Sent GI referral for colonscopy pt agreeable Colon Cancer Screening; Patient counseled on the importance of colonoscopy screening and directed to follow-up with GI doctor. Failure to perform test can result in Colon Cancer and Death. due for repeat No need for CT scan last cig 20yrs ago  RTC in 3 months.

## 2024-05-16 NOTE — PHYSICAL EXAM
[No Acute Distress] : no acute distress [Soft] : abdomen soft [Normal] : no CVA or spinal tenderness [No Focal Deficits] : no focal deficits [Normal Insight/Judgement] : insight and judgment were intact [de-identified] : glasses [de-identified] : b/l richardson [de-identified] : Chi basal coarse crackles [de-identified] : r sided tenderness without rebound, obese, negative mcburneys and murphys [de-identified] : b/l hip crepitus and limited rom

## 2024-05-23 DIAGNOSIS — H61.20 IMPACTED CERUMEN, UNSPECIFIED EAR: ICD-10-CM

## 2024-05-23 DIAGNOSIS — D36.9 BENIGN NEOPLASM, UNSPECIFIED SITE: ICD-10-CM

## 2024-05-23 DIAGNOSIS — M54.9 DORSALGIA, UNSPECIFIED: ICD-10-CM

## 2024-05-23 DIAGNOSIS — E66.9 OBESITY, UNSPECIFIED: ICD-10-CM

## 2024-05-23 DIAGNOSIS — E78.5 HYPERLIPIDEMIA, UNSPECIFIED: ICD-10-CM

## 2024-05-23 DIAGNOSIS — R04.0 EPISTAXIS: ICD-10-CM

## 2024-05-23 DIAGNOSIS — R68.3 CLUBBING OF FINGERS: ICD-10-CM

## 2024-05-23 DIAGNOSIS — K59.00 CONSTIPATION, UNSPECIFIED: ICD-10-CM

## 2024-05-23 DIAGNOSIS — I10 ESSENTIAL (PRIMARY) HYPERTENSION: ICD-10-CM

## 2024-05-23 DIAGNOSIS — H91.90 UNSPECIFIED HEARING LOSS, UNSPECIFIED EAR: ICD-10-CM

## 2024-06-21 NOTE — ED PROVIDER NOTE - IV ALTEPLASE DOOR HIDDEN
0825: Pt had complaints of 10/10 pulsating chest pain. Pt Vitlas 138/78, 72, 36.8, 18. Pt Pox on RA was 83%.  Pt placed on 2l NC. Pox improved to 95%. Team paged. CNP and PA at bedside. EKG obtained and showed NSR. Labs collected and sent. PRN Dilaudid given. Pt pain decreased to 8 .   show

## 2024-07-08 ENCOUNTER — APPOINTMENT (OUTPATIENT)
Dept: CARE COORDINATION | Facility: HOME HEALTH | Age: 69
End: 2024-07-08

## 2024-08-13 ENCOUNTER — RESULT REVIEW (OUTPATIENT)
Age: 69
End: 2024-08-13

## 2024-08-20 ENCOUNTER — APPOINTMENT (OUTPATIENT)
Dept: INTERNAL MEDICINE | Facility: CLINIC | Age: 69
End: 2024-08-20

## 2024-08-25 NOTE — ED ADULT NURSE NOTE - PMH
Grossly Intact
Diabetes mellitus, type II    Hyperlipidemia    Hypertension    Osteoarthritis    Pancreatitis  recurrent  Prostate cancer  s/p history of seeding

## 2024-09-03 ENCOUNTER — INPATIENT (INPATIENT)
Facility: HOSPITAL | Age: 69
LOS: 3 days | Discharge: HOME | DRG: 603 | End: 2024-09-07
Attending: INTERNAL MEDICINE | Admitting: STUDENT IN AN ORGANIZED HEALTH CARE EDUCATION/TRAINING PROGRAM
Payer: MEDICARE

## 2024-09-03 VITALS
OXYGEN SATURATION: 99 % | WEIGHT: 205.91 LBS | HEIGHT: 68 IN | SYSTOLIC BLOOD PRESSURE: 136 MMHG | TEMPERATURE: 98 F | DIASTOLIC BLOOD PRESSURE: 81 MMHG | HEART RATE: 73 BPM | RESPIRATION RATE: 18 BRPM

## 2024-09-03 DIAGNOSIS — C61 MALIGNANT NEOPLASM OF PROSTATE: Chronic | ICD-10-CM

## 2024-09-03 DIAGNOSIS — Z98.890 OTHER SPECIFIED POSTPROCEDURAL STATES: Chronic | ICD-10-CM

## 2024-09-03 DIAGNOSIS — Z96.642 PRESENCE OF LEFT ARTIFICIAL HIP JOINT: Chronic | ICD-10-CM

## 2024-09-03 LAB
ALBUMIN SERPL ELPH-MCNC: 4.5 G/DL — SIGNIFICANT CHANGE UP (ref 3.5–5.2)
ALP SERPL-CCNC: 107 U/L — SIGNIFICANT CHANGE UP (ref 30–115)
ALT FLD-CCNC: 15 U/L — SIGNIFICANT CHANGE UP (ref 0–41)
ANION GAP SERPL CALC-SCNC: 11 MMOL/L — SIGNIFICANT CHANGE UP (ref 7–14)
AST SERPL-CCNC: 17 U/L — SIGNIFICANT CHANGE UP (ref 0–41)
BASOPHILS # BLD AUTO: 0.03 K/UL — SIGNIFICANT CHANGE UP (ref 0–0.2)
BASOPHILS NFR BLD AUTO: 0.4 % — SIGNIFICANT CHANGE UP (ref 0–1)
BILIRUB SERPL-MCNC: 0.4 MG/DL — SIGNIFICANT CHANGE UP (ref 0.2–1.2)
BUN SERPL-MCNC: 14 MG/DL — SIGNIFICANT CHANGE UP (ref 10–20)
CALCIUM SERPL-MCNC: 8.8 MG/DL — SIGNIFICANT CHANGE UP (ref 8.4–10.5)
CHLORIDE SERPL-SCNC: 107 MMOL/L — SIGNIFICANT CHANGE UP (ref 98–110)
CO2 SERPL-SCNC: 25 MMOL/L — SIGNIFICANT CHANGE UP (ref 17–32)
CREAT SERPL-MCNC: 0.7 MG/DL — SIGNIFICANT CHANGE UP (ref 0.7–1.5)
EGFR: 100 ML/MIN/1.73M2 — SIGNIFICANT CHANGE UP
EGFR: 100 ML/MIN/1.73M2 — SIGNIFICANT CHANGE UP
EOSINOPHIL # BLD AUTO: 0.34 K/UL — SIGNIFICANT CHANGE UP (ref 0–0.7)
EOSINOPHIL NFR BLD AUTO: 4.5 % — SIGNIFICANT CHANGE UP (ref 0–8)
GLUCOSE SERPL-MCNC: 104 MG/DL — HIGH (ref 70–99)
HCT VFR BLD CALC: 34.2 % — LOW (ref 42–52)
HGB BLD-MCNC: 11.1 G/DL — LOW (ref 14–18)
IMM GRANULOCYTES NFR BLD AUTO: 0.3 % — SIGNIFICANT CHANGE UP (ref 0.1–0.3)
LACTATE SERPL-SCNC: 1.3 MMOL/L — SIGNIFICANT CHANGE UP (ref 0.7–2)
LYMPHOCYTES # BLD AUTO: 1.61 K/UL — SIGNIFICANT CHANGE UP (ref 1.2–3.4)
LYMPHOCYTES # BLD AUTO: 21.5 % — SIGNIFICANT CHANGE UP (ref 20.5–51.1)
MCHC RBC-ENTMCNC: 28.7 PG — SIGNIFICANT CHANGE UP (ref 27–31)
MCHC RBC-ENTMCNC: 32.5 G/DL — SIGNIFICANT CHANGE UP (ref 32–37)
MCV RBC AUTO: 88.4 FL — SIGNIFICANT CHANGE UP (ref 80–94)
MONOCYTES # BLD AUTO: 0.78 K/UL — HIGH (ref 0.1–0.6)
MONOCYTES NFR BLD AUTO: 10.4 % — HIGH (ref 1.7–9.3)
NEUTROPHILS # BLD AUTO: 4.71 K/UL — SIGNIFICANT CHANGE UP (ref 1.4–6.5)
NEUTROPHILS NFR BLD AUTO: 62.9 % — SIGNIFICANT CHANGE UP (ref 42.2–75.2)
NRBC # BLD: 0 /100 WBCS — SIGNIFICANT CHANGE UP (ref 0–0)
NRBC BLD-RTO: 0 /100 WBCS — SIGNIFICANT CHANGE UP (ref 0–0)
NT-PROBNP SERPL-SCNC: 99 PG/ML — SIGNIFICANT CHANGE UP (ref 0–300)
PLATELET # BLD AUTO: 212 K/UL — SIGNIFICANT CHANGE UP (ref 130–400)
PMV BLD: 11 FL — HIGH (ref 7.4–10.4)
POTASSIUM SERPL-MCNC: 4.1 MMOL/L — SIGNIFICANT CHANGE UP (ref 3.5–5)
POTASSIUM SERPL-SCNC: 4.1 MMOL/L — SIGNIFICANT CHANGE UP (ref 3.5–5)
PROT SERPL-MCNC: 6.7 G/DL — SIGNIFICANT CHANGE UP (ref 6–8)
RBC # BLD: 3.87 M/UL — LOW (ref 4.7–6.1)
RBC # FLD: 13.2 % — SIGNIFICANT CHANGE UP (ref 11.5–14.5)
SODIUM SERPL-SCNC: 143 MMOL/L — SIGNIFICANT CHANGE UP (ref 135–146)
WBC # BLD: 7.49 K/UL — SIGNIFICANT CHANGE UP (ref 4.8–10.8)
WBC # FLD AUTO: 7.49 K/UL — SIGNIFICANT CHANGE UP (ref 4.8–10.8)

## 2024-09-03 PROCEDURE — 99285 EMERGENCY DEPT VISIT HI MDM: CPT

## 2024-09-03 PROCEDURE — 93010 ELECTROCARDIOGRAM REPORT: CPT

## 2024-09-03 PROCEDURE — 73590 X-RAY EXAM OF LOWER LEG: CPT | Mod: 26,LT,RT

## 2024-09-03 RX ORDER — CLINDAMYCIN PHOSPHATE 150 MG/ML
600 VIAL (ML) INJECTION ONCE
Refills: 0 | Status: COMPLETED | OUTPATIENT
Start: 2024-09-03 | End: 2024-09-03

## 2024-09-03 RX ADMIN — Medication 100 MILLIGRAM(S): at 23:00

## 2024-09-04 DIAGNOSIS — L03.90 CELLULITIS, UNSPECIFIED: ICD-10-CM

## 2024-09-04 LAB
ALBUMIN SERPL ELPH-MCNC: 3.9 G/DL — SIGNIFICANT CHANGE UP (ref 3.5–5.2)
ALP SERPL-CCNC: 92 U/L — SIGNIFICANT CHANGE UP (ref 30–115)
ALT FLD-CCNC: 15 U/L — SIGNIFICANT CHANGE UP (ref 0–41)
ANION GAP SERPL CALC-SCNC: 11 MMOL/L — SIGNIFICANT CHANGE UP (ref 7–14)
AST SERPL-CCNC: 17 U/L — SIGNIFICANT CHANGE UP (ref 0–41)
BASOPHILS # BLD AUTO: 0.03 K/UL — SIGNIFICANT CHANGE UP (ref 0–0.2)
BASOPHILS NFR BLD AUTO: 0.5 % — SIGNIFICANT CHANGE UP (ref 0–1)
BILIRUB SERPL-MCNC: 0.5 MG/DL — SIGNIFICANT CHANGE UP (ref 0.2–1.2)
BUN SERPL-MCNC: 11 MG/DL — SIGNIFICANT CHANGE UP (ref 10–20)
CALCIUM SERPL-MCNC: 8.7 MG/DL — SIGNIFICANT CHANGE UP (ref 8.4–10.5)
CHLORIDE SERPL-SCNC: 104 MMOL/L — SIGNIFICANT CHANGE UP (ref 98–110)
CO2 SERPL-SCNC: 22 MMOL/L — SIGNIFICANT CHANGE UP (ref 17–32)
CREAT SERPL-MCNC: 0.6 MG/DL — LOW (ref 0.7–1.5)
EGFR: 105 ML/MIN/1.73M2 — SIGNIFICANT CHANGE UP
EGFR: 105 ML/MIN/1.73M2 — SIGNIFICANT CHANGE UP
EOSINOPHIL # BLD AUTO: 0.24 K/UL — SIGNIFICANT CHANGE UP (ref 0–0.7)
EOSINOPHIL NFR BLD AUTO: 4.1 % — SIGNIFICANT CHANGE UP (ref 0–8)
GLUCOSE BLDC GLUCOMTR-MCNC: 120 MG/DL — HIGH (ref 70–99)
GLUCOSE BLDC GLUCOMTR-MCNC: 120 MG/DL — HIGH (ref 70–99)
GLUCOSE BLDC GLUCOMTR-MCNC: 126 MG/DL — HIGH (ref 70–99)
GLUCOSE BLDC GLUCOMTR-MCNC: 182 MG/DL — HIGH (ref 70–99)
GLUCOSE SERPL-MCNC: 119 MG/DL — HIGH (ref 70–99)
HCT VFR BLD CALC: 34.2 % — LOW (ref 42–52)
HGB BLD-MCNC: 11 G/DL — LOW (ref 14–18)
IMM GRANULOCYTES NFR BLD AUTO: 0.3 % — SIGNIFICANT CHANGE UP (ref 0.1–0.3)
LYMPHOCYTES # BLD AUTO: 1.03 K/UL — LOW (ref 1.2–3.4)
LYMPHOCYTES # BLD AUTO: 17.7 % — LOW (ref 20.5–51.1)
MAGNESIUM SERPL-MCNC: 2 MG/DL — SIGNIFICANT CHANGE UP (ref 1.8–2.4)
MCHC RBC-ENTMCNC: 28.4 PG — SIGNIFICANT CHANGE UP (ref 27–31)
MCHC RBC-ENTMCNC: 32.2 G/DL — SIGNIFICANT CHANGE UP (ref 32–37)
MCV RBC AUTO: 88.1 FL — SIGNIFICANT CHANGE UP (ref 80–94)
MONOCYTES # BLD AUTO: 0.66 K/UL — HIGH (ref 0.1–0.6)
MONOCYTES NFR BLD AUTO: 11.4 % — HIGH (ref 1.7–9.3)
MRSA PCR RESULT.: NEGATIVE — SIGNIFICANT CHANGE UP
NEUTROPHILS # BLD AUTO: 3.83 K/UL — SIGNIFICANT CHANGE UP (ref 1.4–6.5)
NEUTROPHILS NFR BLD AUTO: 66 % — SIGNIFICANT CHANGE UP (ref 42.2–75.2)
NRBC # BLD: 0 /100 WBCS — SIGNIFICANT CHANGE UP (ref 0–0)
NRBC BLD-RTO: 0 /100 WBCS — SIGNIFICANT CHANGE UP (ref 0–0)
PLATELET # BLD AUTO: 163 K/UL — SIGNIFICANT CHANGE UP (ref 130–400)
PMV BLD: 11.1 FL — HIGH (ref 7.4–10.4)
POTASSIUM SERPL-MCNC: 4.2 MMOL/L — SIGNIFICANT CHANGE UP (ref 3.5–5)
POTASSIUM SERPL-SCNC: 4.2 MMOL/L — SIGNIFICANT CHANGE UP (ref 3.5–5)
PROT SERPL-MCNC: 6.4 G/DL — SIGNIFICANT CHANGE UP (ref 6–8)
RBC # BLD: 3.88 M/UL — LOW (ref 4.7–6.1)
RBC # FLD: 13.3 % — SIGNIFICANT CHANGE UP (ref 11.5–14.5)
SODIUM SERPL-SCNC: 137 MMOL/L — SIGNIFICANT CHANGE UP (ref 135–146)
WBC # BLD: 5.81 K/UL — SIGNIFICANT CHANGE UP (ref 4.8–10.8)
WBC # FLD AUTO: 5.81 K/UL — SIGNIFICANT CHANGE UP (ref 4.8–10.8)

## 2024-09-04 PROCEDURE — 93970 EXTREMITY STUDY: CPT

## 2024-09-04 PROCEDURE — 99223 1ST HOSP IP/OBS HIGH 75: CPT | Mod: GC

## 2024-09-04 PROCEDURE — 87640 STAPH A DNA AMP PROBE: CPT

## 2024-09-04 PROCEDURE — 82962 GLUCOSE BLOOD TEST: CPT

## 2024-09-04 PROCEDURE — 73560 X-RAY EXAM OF KNEE 1 OR 2: CPT | Mod: RT

## 2024-09-04 PROCEDURE — 93306 TTE W/DOPPLER COMPLETE: CPT

## 2024-09-04 PROCEDURE — 80053 COMPREHEN METABOLIC PANEL: CPT

## 2024-09-04 PROCEDURE — 36415 COLL VENOUS BLD VENIPUNCTURE: CPT

## 2024-09-04 PROCEDURE — 87641 MR-STAPH DNA AMP PROBE: CPT

## 2024-09-04 PROCEDURE — 85652 RBC SED RATE AUTOMATED: CPT

## 2024-09-04 PROCEDURE — 97162 PT EVAL MOD COMPLEX 30 MIN: CPT | Mod: GP

## 2024-09-04 PROCEDURE — 85025 COMPLETE CBC W/AUTO DIFF WBC: CPT

## 2024-09-04 PROCEDURE — 83735 ASSAY OF MAGNESIUM: CPT

## 2024-09-04 PROCEDURE — 87635 SARS-COV-2 COVID-19 AMP PRB: CPT

## 2024-09-04 PROCEDURE — 73502 X-RAY EXAM HIP UNI 2-3 VIEWS: CPT | Mod: RT

## 2024-09-04 PROCEDURE — 83036 HEMOGLOBIN GLYCOSYLATED A1C: CPT

## 2024-09-04 PROCEDURE — 84550 ASSAY OF BLOOD/URIC ACID: CPT

## 2024-09-04 PROCEDURE — 82550 ASSAY OF CK (CPK): CPT

## 2024-09-04 PROCEDURE — 86140 C-REACTIVE PROTEIN: CPT

## 2024-09-04 RX ORDER — DEXTROSE 50 % IN WATER 50 %
15 SYRINGE (ML) INTRAVENOUS ONCE
Refills: 0 | Status: DISCONTINUED | OUTPATIENT
Start: 2024-09-04 | End: 2024-09-07

## 2024-09-04 RX ORDER — ATORVASTATIN CALCIUM 80 MG/1
40 TABLET, FILM COATED ORAL AT BEDTIME
Refills: 0 | Status: DISCONTINUED | OUTPATIENT
Start: 2024-09-04 | End: 2024-09-07

## 2024-09-04 RX ORDER — AMLODIPINE BESYLATE 10 MG/1
5 TABLET ORAL DAILY
Refills: 0 | Status: DISCONTINUED | OUTPATIENT
Start: 2024-09-04 | End: 2024-09-07

## 2024-09-04 RX ORDER — MELATONIN 5 MG
3 TABLET ORAL AT BEDTIME
Refills: 0 | Status: DISCONTINUED | OUTPATIENT
Start: 2024-09-04 | End: 2024-09-07

## 2024-09-04 RX ORDER — SODIUM CHLORIDE 9 G/1000ML
1000 INJECTION, SOLUTION INTRAVENOUS
Refills: 0 | Status: DISCONTINUED | OUTPATIENT
Start: 2024-09-04 | End: 2024-09-07

## 2024-09-04 RX ORDER — TAMSULOSIN HYDROCHLORIDE 0.4 MG/1
0.4 CAPSULE ORAL AT BEDTIME
Refills: 0 | Status: DISCONTINUED | OUTPATIENT
Start: 2024-09-04 | End: 2024-09-07

## 2024-09-04 RX ORDER — CEFAZOLIN SODIUM IN 0.9 % NACL 3 G/100 ML
1000 INTRAVENOUS SOLUTION, PIGGYBACK (ML) INTRAVENOUS EVERY 8 HOURS
Refills: 0 | Status: DISCONTINUED | OUTPATIENT
Start: 2024-09-04 | End: 2024-09-05

## 2024-09-04 RX ORDER — DEXTROSE 50 % IN WATER 50 %
25 SYRINGE (ML) INTRAVENOUS ONCE
Refills: 0 | Status: DISCONTINUED | OUTPATIENT
Start: 2024-09-04 | End: 2024-09-07

## 2024-09-04 RX ORDER — MAGNESIUM, ALUMINUM HYDROXIDE 200-200 MG
30 TABLET,CHEWABLE ORAL EVERY 4 HOURS
Refills: 0 | Status: DISCONTINUED | OUTPATIENT
Start: 2024-09-04 | End: 2024-09-07

## 2024-09-04 RX ORDER — DEXTROSE 50 % IN WATER 50 %
12.5 SYRINGE (ML) INTRAVENOUS ONCE
Refills: 0 | Status: DISCONTINUED | OUTPATIENT
Start: 2024-09-04 | End: 2024-09-07

## 2024-09-04 RX ORDER — OXYCODONE HYDROCHLORIDE 30 MG/1
5 TABLET ORAL EVERY 8 HOURS
Refills: 0 | Status: DISCONTINUED | OUTPATIENT
Start: 2024-09-04 | End: 2024-09-07

## 2024-09-04 RX ORDER — ALBUTEROL SULFATE 2.5 MG/3ML
1 VIAL, NEBULIZER (ML) INHALATION EVERY 4 HOURS
Refills: 0 | Status: DISCONTINUED | OUTPATIENT
Start: 2024-09-04 | End: 2024-09-07

## 2024-09-04 RX ORDER — POLYETHYLENE GLYCOL 3350 17 G/17G
17 POWDER, FOR SOLUTION ORAL DAILY
Refills: 0 | Status: DISCONTINUED | OUTPATIENT
Start: 2024-09-04 | End: 2024-09-07

## 2024-09-04 RX ORDER — ACETAMINOPHEN 500 MG/5ML
650 LIQUID (ML) ORAL EVERY 6 HOURS
Refills: 0 | Status: DISCONTINUED | OUTPATIENT
Start: 2024-09-04 | End: 2024-09-07

## 2024-09-04 RX ORDER — FUROSEMIDE 10 MG/ML
20 INJECTION INTRAMUSCULAR; INTRAVENOUS EVERY 24 HOURS
Refills: 0 | Status: DISCONTINUED | OUTPATIENT
Start: 2024-09-04 | End: 2024-09-07

## 2024-09-04 RX ORDER — GLUCAGON 3 MG/1
1 POWDER NASAL ONCE
Refills: 0 | Status: DISCONTINUED | OUTPATIENT
Start: 2024-09-04 | End: 2024-09-07

## 2024-09-04 RX ORDER — INSULIN LISPRO 100 U/ML
INJECTION, SOLUTION INTRAVENOUS; SUBCUTANEOUS
Refills: 0 | Status: DISCONTINUED | OUTPATIENT
Start: 2024-09-04 | End: 2024-09-07

## 2024-09-04 RX ORDER — ENOXAPARIN SODIUM 100 MG/ML
40 INJECTION SUBCUTANEOUS EVERY 24 HOURS
Refills: 0 | Status: DISCONTINUED | OUTPATIENT
Start: 2024-09-04 | End: 2024-09-07

## 2024-09-04 RX ADMIN — OXYCODONE HYDROCHLORIDE 5 MILLIGRAM(S): 30 TABLET ORAL at 16:00

## 2024-09-04 RX ADMIN — FUROSEMIDE 20 MILLIGRAM(S): 10 INJECTION INTRAMUSCULAR; INTRAVENOUS at 11:39

## 2024-09-04 RX ADMIN — TAMSULOSIN HYDROCHLORIDE 0.4 MILLIGRAM(S): 0.4 CAPSULE ORAL at 21:23

## 2024-09-04 RX ADMIN — ATORVASTATIN CALCIUM 40 MILLIGRAM(S): 80 TABLET, FILM COATED ORAL at 21:23

## 2024-09-04 RX ADMIN — Medication 2 MILLIGRAM(S): at 12:17

## 2024-09-04 RX ADMIN — Medication 2 MILLIGRAM(S): at 11:40

## 2024-09-04 RX ADMIN — Medication 100 MILLIGRAM(S): at 06:04

## 2024-09-04 RX ADMIN — Medication 100 MILLIGRAM(S): at 21:23

## 2024-09-04 RX ADMIN — OXYCODONE HYDROCHLORIDE 5 MILLIGRAM(S): 30 TABLET ORAL at 23:30

## 2024-09-04 RX ADMIN — OXYCODONE HYDROCHLORIDE 5 MILLIGRAM(S): 30 TABLET ORAL at 15:30

## 2024-09-04 RX ADMIN — Medication 100 MILLIGRAM(S): at 14:02

## 2024-09-04 RX ADMIN — Medication 2 MILLIGRAM(S): at 19:00

## 2024-09-04 RX ADMIN — Medication 2 MILLIGRAM(S): at 18:00

## 2024-09-05 LAB
A1C WITH ESTIMATED AVERAGE GLUCOSE RESULT: 6.7 % — HIGH (ref 4–5.6)
ALBUMIN SERPL ELPH-MCNC: 4.6 G/DL — SIGNIFICANT CHANGE UP (ref 3.5–5.2)
ALP SERPL-CCNC: 104 U/L — SIGNIFICANT CHANGE UP (ref 30–115)
ALT FLD-CCNC: 16 U/L — SIGNIFICANT CHANGE UP (ref 0–41)
ANION GAP SERPL CALC-SCNC: 13 MMOL/L — SIGNIFICANT CHANGE UP (ref 7–14)
AST SERPL-CCNC: 17 U/L — SIGNIFICANT CHANGE UP (ref 0–41)
BASOPHILS # BLD AUTO: 0.03 K/UL — SIGNIFICANT CHANGE UP (ref 0–0.2)
BASOPHILS NFR BLD AUTO: 0.5 % — SIGNIFICANT CHANGE UP (ref 0–1)
BILIRUB SERPL-MCNC: 0.9 MG/DL — SIGNIFICANT CHANGE UP (ref 0.2–1.2)
BUN SERPL-MCNC: 7 MG/DL — LOW (ref 10–20)
CALCIUM SERPL-MCNC: 9.1 MG/DL — SIGNIFICANT CHANGE UP (ref 8.4–10.5)
CHLORIDE SERPL-SCNC: 102 MMOL/L — SIGNIFICANT CHANGE UP (ref 98–110)
CK SERPL-CCNC: 186 U/L — SIGNIFICANT CHANGE UP (ref 0–225)
CO2 SERPL-SCNC: 25 MMOL/L — SIGNIFICANT CHANGE UP (ref 17–32)
CREAT SERPL-MCNC: 0.7 MG/DL — SIGNIFICANT CHANGE UP (ref 0.7–1.5)
CRP SERPL-MCNC: 3.2 MG/L — SIGNIFICANT CHANGE UP
EGFR: 100 ML/MIN/1.73M2 — SIGNIFICANT CHANGE UP
EGFR: 100 ML/MIN/1.73M2 — SIGNIFICANT CHANGE UP
EOSINOPHIL # BLD AUTO: 0.19 K/UL — SIGNIFICANT CHANGE UP (ref 0–0.7)
EOSINOPHIL NFR BLD AUTO: 2.9 % — SIGNIFICANT CHANGE UP (ref 0–8)
ERYTHROCYTE [SEDIMENTATION RATE] IN BLOOD: 25 MM/HR — HIGH (ref 0–10)
ESTIMATED AVERAGE GLUCOSE: 146 MG/DL — HIGH (ref 68–114)
GLUCOSE BLDC GLUCOMTR-MCNC: 124 MG/DL — HIGH (ref 70–99)
GLUCOSE BLDC GLUCOMTR-MCNC: 140 MG/DL — HIGH (ref 70–99)
GLUCOSE BLDC GLUCOMTR-MCNC: 143 MG/DL — HIGH (ref 70–99)
GLUCOSE BLDC GLUCOMTR-MCNC: 151 MG/DL — HIGH (ref 70–99)
GLUCOSE SERPL-MCNC: 169 MG/DL — HIGH (ref 70–99)
HCT VFR BLD CALC: 37.5 % — LOW (ref 42–52)
HGB BLD-MCNC: 12.4 G/DL — LOW (ref 14–18)
IMM GRANULOCYTES NFR BLD AUTO: 0.3 % — SIGNIFICANT CHANGE UP (ref 0.1–0.3)
LYMPHOCYTES # BLD AUTO: 1.09 K/UL — LOW (ref 1.2–3.4)
LYMPHOCYTES # BLD AUTO: 16.8 % — LOW (ref 20.5–51.1)
MAGNESIUM SERPL-MCNC: 2.1 MG/DL — SIGNIFICANT CHANGE UP (ref 1.8–2.4)
MCHC RBC-ENTMCNC: 28.2 PG — SIGNIFICANT CHANGE UP (ref 27–31)
MCHC RBC-ENTMCNC: 33.1 G/DL — SIGNIFICANT CHANGE UP (ref 32–37)
MCV RBC AUTO: 85.4 FL — SIGNIFICANT CHANGE UP (ref 80–94)
MONOCYTES # BLD AUTO: 0.49 K/UL — SIGNIFICANT CHANGE UP (ref 0.1–0.6)
MONOCYTES NFR BLD AUTO: 7.5 % — SIGNIFICANT CHANGE UP (ref 1.7–9.3)
NEUTROPHILS # BLD AUTO: 4.68 K/UL — SIGNIFICANT CHANGE UP (ref 1.4–6.5)
NEUTROPHILS NFR BLD AUTO: 72 % — SIGNIFICANT CHANGE UP (ref 42.2–75.2)
NRBC # BLD: 0 /100 WBCS — SIGNIFICANT CHANGE UP (ref 0–0)
NRBC BLD-RTO: 0 /100 WBCS — SIGNIFICANT CHANGE UP (ref 0–0)
PLATELET # BLD AUTO: 241 K/UL — SIGNIFICANT CHANGE UP (ref 130–400)
PMV BLD: 10.4 FL — SIGNIFICANT CHANGE UP (ref 7.4–10.4)
POTASSIUM SERPL-MCNC: 4.1 MMOL/L — SIGNIFICANT CHANGE UP (ref 3.5–5)
POTASSIUM SERPL-SCNC: 4.1 MMOL/L — SIGNIFICANT CHANGE UP (ref 3.5–5)
PROT SERPL-MCNC: 7.2 G/DL — SIGNIFICANT CHANGE UP (ref 6–8)
RBC # BLD: 4.39 M/UL — LOW (ref 4.7–6.1)
RBC # FLD: 12.9 % — SIGNIFICANT CHANGE UP (ref 11.5–14.5)
SODIUM SERPL-SCNC: 140 MMOL/L — SIGNIFICANT CHANGE UP (ref 135–146)
WBC # BLD: 6.5 K/UL — SIGNIFICANT CHANGE UP (ref 4.8–10.8)
WBC # FLD AUTO: 6.5 K/UL — SIGNIFICANT CHANGE UP (ref 4.8–10.8)

## 2024-09-05 PROCEDURE — 99233 SBSQ HOSP IP/OBS HIGH 50: CPT

## 2024-09-05 PROCEDURE — 93970 EXTREMITY STUDY: CPT | Mod: 26

## 2024-09-05 RX ORDER — CEPHALEXIN 250 MG/1
500 CAPSULE ORAL
Refills: 0 | Status: DISCONTINUED | OUTPATIENT
Start: 2024-09-05 | End: 2024-09-07

## 2024-09-05 RX ADMIN — Medication 2 MILLIGRAM(S): at 18:33

## 2024-09-05 RX ADMIN — Medication 2 MILLIGRAM(S): at 00:01

## 2024-09-05 RX ADMIN — INSULIN LISPRO 0: 100 INJECTION, SOLUTION INTRAVENOUS; SUBCUTANEOUS at 17:09

## 2024-09-05 RX ADMIN — Medication 2 MILLIGRAM(S): at 12:30

## 2024-09-05 RX ADMIN — INSULIN LISPRO 1: 100 INJECTION, SOLUTION INTRAVENOUS; SUBCUTANEOUS at 07:56

## 2024-09-05 RX ADMIN — CEPHALEXIN 500 MILLIGRAM(S): 250 CAPSULE ORAL at 22:27

## 2024-09-05 RX ADMIN — Medication 2 MILLIGRAM(S): at 06:02

## 2024-09-05 RX ADMIN — OXYCODONE HYDROCHLORIDE 5 MILLIGRAM(S): 30 TABLET ORAL at 15:26

## 2024-09-05 RX ADMIN — Medication 2 MILLIGRAM(S): at 15:25

## 2024-09-05 RX ADMIN — OXYCODONE HYDROCHLORIDE 5 MILLIGRAM(S): 30 TABLET ORAL at 00:00

## 2024-09-05 RX ADMIN — AMLODIPINE BESYLATE 5 MILLIGRAM(S): 10 TABLET ORAL at 05:25

## 2024-09-05 RX ADMIN — TAMSULOSIN HYDROCHLORIDE 0.4 MILLIGRAM(S): 0.4 CAPSULE ORAL at 22:27

## 2024-09-05 RX ADMIN — Medication 2 MILLIGRAM(S): at 06:32

## 2024-09-05 RX ADMIN — ATORVASTATIN CALCIUM 40 MILLIGRAM(S): 80 TABLET, FILM COATED ORAL at 22:27

## 2024-09-05 RX ADMIN — Medication 100 MILLIGRAM(S): at 05:24

## 2024-09-05 RX ADMIN — Medication 100 MILLIGRAM(S): at 13:50

## 2024-09-05 RX ADMIN — CEPHALEXIN 500 MILLIGRAM(S): 250 CAPSULE ORAL at 17:21

## 2024-09-05 RX ADMIN — Medication 2 MILLIGRAM(S): at 00:31

## 2024-09-06 ENCOUNTER — TRANSCRIPTION ENCOUNTER (OUTPATIENT)
Age: 69
End: 2024-09-06

## 2024-09-06 LAB
GLUCOSE BLDC GLUCOMTR-MCNC: 114 MG/DL — HIGH (ref 70–99)
GLUCOSE BLDC GLUCOMTR-MCNC: 142 MG/DL — HIGH (ref 70–99)
GLUCOSE BLDC GLUCOMTR-MCNC: 142 MG/DL — HIGH (ref 70–99)
GLUCOSE BLDC GLUCOMTR-MCNC: 162 MG/DL — HIGH (ref 70–99)
URATE SERPL-MCNC: 4.1 MG/DL — SIGNIFICANT CHANGE UP (ref 3.4–8.8)

## 2024-09-06 PROCEDURE — 73502 X-RAY EXAM HIP UNI 2-3 VIEWS: CPT | Mod: 26,RT

## 2024-09-06 PROCEDURE — 99233 SBSQ HOSP IP/OBS HIGH 50: CPT

## 2024-09-06 PROCEDURE — 73560 X-RAY EXAM OF KNEE 1 OR 2: CPT | Mod: 26,RT

## 2024-09-06 RX ORDER — CEPHALEXIN 250 MG/1
1 CAPSULE ORAL
Qty: 24 | Refills: 0
Start: 2024-09-06 | End: 2024-09-11

## 2024-09-06 RX ORDER — GABAPENTIN 400 MG/1
100 CAPSULE ORAL EVERY 8 HOURS
Refills: 0 | Status: DISCONTINUED | OUTPATIENT
Start: 2024-09-06 | End: 2024-09-07

## 2024-09-06 RX ORDER — IBUPROFEN 200 MG
1 TABLET ORAL
Qty: 15 | Refills: 0
Start: 2024-09-06 | End: 2024-09-10

## 2024-09-06 RX ORDER — ACETAMINOPHEN 500 MG/5ML
1 LIQUID (ML) ORAL
Qty: 21 | Refills: 0
Start: 2024-09-06 | End: 2024-09-12

## 2024-09-06 RX ADMIN — Medication 2 MILLIGRAM(S): at 07:59

## 2024-09-06 RX ADMIN — CEPHALEXIN 500 MILLIGRAM(S): 250 CAPSULE ORAL at 05:39

## 2024-09-06 RX ADMIN — Medication 2 MILLIGRAM(S): at 00:59

## 2024-09-06 RX ADMIN — OXYCODONE HYDROCHLORIDE 5 MILLIGRAM(S): 30 TABLET ORAL at 19:55

## 2024-09-06 RX ADMIN — TAMSULOSIN HYDROCHLORIDE 0.4 MILLIGRAM(S): 0.4 CAPSULE ORAL at 21:29

## 2024-09-06 RX ADMIN — GABAPENTIN 100 MILLIGRAM(S): 400 CAPSULE ORAL at 16:01

## 2024-09-06 RX ADMIN — Medication 2 MILLIGRAM(S): at 22:12

## 2024-09-06 RX ADMIN — AMLODIPINE BESYLATE 5 MILLIGRAM(S): 10 TABLET ORAL at 05:40

## 2024-09-06 RX ADMIN — CEPHALEXIN 500 MILLIGRAM(S): 250 CAPSULE ORAL at 17:11

## 2024-09-06 RX ADMIN — CEPHALEXIN 500 MILLIGRAM(S): 250 CAPSULE ORAL at 11:56

## 2024-09-06 RX ADMIN — Medication 2 MILLIGRAM(S): at 22:45

## 2024-09-06 RX ADMIN — Medication 2 MILLIGRAM(S): at 16:02

## 2024-09-06 RX ADMIN — OXYCODONE HYDROCHLORIDE 5 MILLIGRAM(S): 30 TABLET ORAL at 20:50

## 2024-09-06 RX ADMIN — ENOXAPARIN SODIUM 40 MILLIGRAM(S): 100 INJECTION SUBCUTANEOUS at 11:57

## 2024-09-06 RX ADMIN — ATORVASTATIN CALCIUM 40 MILLIGRAM(S): 80 TABLET, FILM COATED ORAL at 21:29

## 2024-09-06 RX ADMIN — Medication 2 MILLIGRAM(S): at 00:28

## 2024-09-06 RX ADMIN — FUROSEMIDE 20 MILLIGRAM(S): 10 INJECTION INTRAMUSCULAR; INTRAVENOUS at 11:56

## 2024-09-06 RX ADMIN — GABAPENTIN 100 MILLIGRAM(S): 400 CAPSULE ORAL at 11:56

## 2024-09-06 RX ADMIN — GABAPENTIN 100 MILLIGRAM(S): 400 CAPSULE ORAL at 21:30

## 2024-09-07 ENCOUNTER — RESULT REVIEW (OUTPATIENT)
Age: 69
End: 2024-09-07

## 2024-09-07 ENCOUNTER — TRANSCRIPTION ENCOUNTER (OUTPATIENT)
Age: 69
End: 2024-09-07

## 2024-09-07 VITALS
DIASTOLIC BLOOD PRESSURE: 70 MMHG | HEART RATE: 68 BPM | SYSTOLIC BLOOD PRESSURE: 117 MMHG | RESPIRATION RATE: 18 BRPM | TEMPERATURE: 97 F

## 2024-09-07 LAB
GLUCOSE BLDC GLUCOMTR-MCNC: 126 MG/DL — HIGH (ref 70–99)
GLUCOSE BLDC GLUCOMTR-MCNC: 154 MG/DL — HIGH (ref 70–99)
SARS-COV-2 RNA SPEC QL NAA+PROBE: SIGNIFICANT CHANGE UP

## 2024-09-07 PROCEDURE — 93306 TTE W/DOPPLER COMPLETE: CPT | Mod: 26

## 2024-09-07 PROCEDURE — 99239 HOSP IP/OBS DSCHRG MGMT >30: CPT

## 2024-09-07 RX ORDER — IBUPROFEN 200 MG
1 TABLET ORAL
Qty: 15 | Refills: 0
Start: 2024-09-07 | End: 2024-09-11

## 2024-09-07 RX ORDER — ACETAMINOPHEN 500 MG/5ML
1 LIQUID (ML) ORAL
Qty: 21 | Refills: 0
Start: 2024-09-07 | End: 2024-09-13

## 2024-09-07 RX ORDER — OXYCODONE HYDROCHLORIDE 30 MG/1
1 TABLET ORAL
Qty: 9 | Refills: 0
Start: 2024-09-07 | End: 2024-09-09

## 2024-09-07 RX ORDER — CEPHALEXIN 250 MG/1
1 CAPSULE ORAL
Qty: 20 | Refills: 0
Start: 2024-09-07 | End: 2024-09-11

## 2024-09-07 RX ORDER — GABAPENTIN 400 MG/1
1 CAPSULE ORAL
Qty: 42 | Refills: 0
Start: 2024-09-07 | End: 2024-09-20

## 2024-09-07 RX ADMIN — CEPHALEXIN 500 MILLIGRAM(S): 250 CAPSULE ORAL at 00:30

## 2024-09-07 RX ADMIN — GABAPENTIN 100 MILLIGRAM(S): 400 CAPSULE ORAL at 05:32

## 2024-09-07 RX ADMIN — AMLODIPINE BESYLATE 5 MILLIGRAM(S): 10 TABLET ORAL at 05:32

## 2024-09-07 RX ADMIN — INSULIN LISPRO 1: 100 INJECTION, SOLUTION INTRAVENOUS; SUBCUTANEOUS at 11:22

## 2024-09-07 RX ADMIN — CEPHALEXIN 500 MILLIGRAM(S): 250 CAPSULE ORAL at 11:20

## 2024-09-07 RX ADMIN — CEPHALEXIN 500 MILLIGRAM(S): 250 CAPSULE ORAL at 05:32

## 2024-09-07 RX ADMIN — FUROSEMIDE 20 MILLIGRAM(S): 10 INJECTION INTRAMUSCULAR; INTRAVENOUS at 11:22

## 2024-09-07 RX ADMIN — Medication 2 MILLIGRAM(S): at 11:23

## 2024-09-10 ENCOUNTER — TRANSCRIPTION ENCOUNTER (OUTPATIENT)
Age: 69
End: 2024-09-10

## 2024-09-13 DIAGNOSIS — Z79.84 LONG TERM (CURRENT) USE OF ORAL HYPOGLYCEMIC DRUGS: ICD-10-CM

## 2024-09-13 DIAGNOSIS — Z96.642 PRESENCE OF LEFT ARTIFICIAL HIP JOINT: ICD-10-CM

## 2024-09-13 DIAGNOSIS — E11.40 TYPE 2 DIABETES MELLITUS WITH DIABETIC NEUROPATHY, UNSPECIFIED: ICD-10-CM

## 2024-09-13 DIAGNOSIS — Z85.46 PERSONAL HISTORY OF MALIGNANT NEOPLASM OF PROSTATE: ICD-10-CM

## 2024-09-13 DIAGNOSIS — Z11.52 ENCOUNTER FOR SCREENING FOR COVID-19: ICD-10-CM

## 2024-09-13 DIAGNOSIS — Z79.82 LONG TERM (CURRENT) USE OF ASPIRIN: ICD-10-CM

## 2024-09-13 DIAGNOSIS — I10 ESSENTIAL (PRIMARY) HYPERTENSION: ICD-10-CM

## 2024-09-13 DIAGNOSIS — J45.909 UNSPECIFIED ASTHMA, UNCOMPLICATED: ICD-10-CM

## 2024-09-13 DIAGNOSIS — L03.116 CELLULITIS OF LEFT LOWER LIMB: ICD-10-CM

## 2024-09-13 DIAGNOSIS — E78.5 HYPERLIPIDEMIA, UNSPECIFIED: ICD-10-CM

## 2024-09-13 DIAGNOSIS — D84.9 IMMUNODEFICIENCY, UNSPECIFIED: ICD-10-CM

## 2024-09-13 DIAGNOSIS — E66.9 OBESITY, UNSPECIFIED: ICD-10-CM

## 2024-09-13 DIAGNOSIS — L03.115 CELLULITIS OF RIGHT LOWER LIMB: ICD-10-CM

## 2024-09-13 DIAGNOSIS — M17.11 UNILATERAL PRIMARY OSTEOARTHRITIS, RIGHT KNEE: ICD-10-CM

## 2024-09-13 DIAGNOSIS — N40.0 BENIGN PROSTATIC HYPERPLASIA WITHOUT LOWER URINARY TRACT SYMPTOMS: ICD-10-CM

## 2024-09-13 DIAGNOSIS — M16.11 UNILATERAL PRIMARY OSTEOARTHRITIS, RIGHT HIP: ICD-10-CM

## 2024-09-19 ENCOUNTER — INPATIENT (INPATIENT)
Facility: HOSPITAL | Age: 69
LOS: 6 days | Discharge: HOME | DRG: 300 | End: 2024-09-26
Attending: INTERNAL MEDICINE | Admitting: INTERNAL MEDICINE
Payer: MEDICARE

## 2024-09-19 VITALS
DIASTOLIC BLOOD PRESSURE: 82 MMHG | RESPIRATION RATE: 16 BRPM | WEIGHT: 205.47 LBS | HEIGHT: 68 IN | SYSTOLIC BLOOD PRESSURE: 142 MMHG | HEART RATE: 71 BPM | OXYGEN SATURATION: 96 % | TEMPERATURE: 98 F

## 2024-09-19 DIAGNOSIS — Z98.890 OTHER SPECIFIED POSTPROCEDURAL STATES: Chronic | ICD-10-CM

## 2024-09-19 DIAGNOSIS — C61 MALIGNANT NEOPLASM OF PROSTATE: Chronic | ICD-10-CM

## 2024-09-19 DIAGNOSIS — Z96.642 PRESENCE OF LEFT ARTIFICIAL HIP JOINT: Chronic | ICD-10-CM

## 2024-09-19 PROCEDURE — 99285 EMERGENCY DEPT VISIT HI MDM: CPT | Mod: 25

## 2024-09-19 NOTE — ED ADULT TRIAGE NOTE - CHIEF COMPLAINT QUOTE
states being treated for infection to b/l lower legs  currently on antibiotics x1 week with no relief, sent in by PMD

## 2024-09-20 DIAGNOSIS — I73.9 PERIPHERAL VASCULAR DISEASE, UNSPECIFIED: ICD-10-CM

## 2024-09-20 LAB
ALBUMIN SERPL ELPH-MCNC: 4.8 G/DL — SIGNIFICANT CHANGE UP (ref 3.5–5.2)
ALP SERPL-CCNC: 115 U/L — SIGNIFICANT CHANGE UP (ref 30–115)
ALT FLD-CCNC: 19 U/L — SIGNIFICANT CHANGE UP (ref 0–41)
ANION GAP SERPL CALC-SCNC: 10 MMOL/L — SIGNIFICANT CHANGE UP (ref 7–14)
AST SERPL-CCNC: 18 U/L — SIGNIFICANT CHANGE UP (ref 0–41)
BASOPHILS # BLD AUTO: 0.04 K/UL — SIGNIFICANT CHANGE UP (ref 0–0.2)
BASOPHILS NFR BLD AUTO: 0.5 % — SIGNIFICANT CHANGE UP (ref 0–1)
BILIRUB SERPL-MCNC: 0.5 MG/DL — SIGNIFICANT CHANGE UP (ref 0.2–1.2)
BUN SERPL-MCNC: 11 MG/DL — SIGNIFICANT CHANGE UP (ref 10–20)
CALCIUM SERPL-MCNC: 9 MG/DL — SIGNIFICANT CHANGE UP (ref 8.4–10.5)
CHLORIDE SERPL-SCNC: 103 MMOL/L — SIGNIFICANT CHANGE UP (ref 98–110)
CO2 SERPL-SCNC: 26 MMOL/L — SIGNIFICANT CHANGE UP (ref 17–32)
CREAT SERPL-MCNC: 0.8 MG/DL — SIGNIFICANT CHANGE UP (ref 0.7–1.5)
EGFR: 96 ML/MIN/1.73M2 — SIGNIFICANT CHANGE UP
EOSINOPHIL # BLD AUTO: 0.29 K/UL — SIGNIFICANT CHANGE UP (ref 0–0.7)
EOSINOPHIL NFR BLD AUTO: 3.9 % — SIGNIFICANT CHANGE UP (ref 0–8)
GLUCOSE SERPL-MCNC: 89 MG/DL — SIGNIFICANT CHANGE UP (ref 70–99)
HCT VFR BLD CALC: 37.6 % — LOW (ref 42–52)
HGB BLD-MCNC: 12 G/DL — LOW (ref 14–18)
IMM GRANULOCYTES NFR BLD AUTO: 0.4 % — HIGH (ref 0.1–0.3)
LACTATE SERPL-SCNC: 1.1 MMOL/L — SIGNIFICANT CHANGE UP (ref 0.7–2)
LYMPHOCYTES # BLD AUTO: 1.59 K/UL — SIGNIFICANT CHANGE UP (ref 1.2–3.4)
LYMPHOCYTES # BLD AUTO: 21.6 % — SIGNIFICANT CHANGE UP (ref 20.5–51.1)
MCHC RBC-ENTMCNC: 27.9 PG — SIGNIFICANT CHANGE UP (ref 27–31)
MCHC RBC-ENTMCNC: 31.9 G/DL — LOW (ref 32–37)
MCV RBC AUTO: 87.4 FL — SIGNIFICANT CHANGE UP (ref 80–94)
MONOCYTES # BLD AUTO: 0.67 K/UL — HIGH (ref 0.1–0.6)
MONOCYTES NFR BLD AUTO: 9.1 % — SIGNIFICANT CHANGE UP (ref 1.7–9.3)
NEUTROPHILS # BLD AUTO: 4.73 K/UL — SIGNIFICANT CHANGE UP (ref 1.4–6.5)
NEUTROPHILS NFR BLD AUTO: 64.5 % — SIGNIFICANT CHANGE UP (ref 42.2–75.2)
NRBC # BLD: 0 /100 WBCS — SIGNIFICANT CHANGE UP (ref 0–0)
PLATELET # BLD AUTO: 247 K/UL — SIGNIFICANT CHANGE UP (ref 130–400)
PMV BLD: 10.5 FL — HIGH (ref 7.4–10.4)
POTASSIUM SERPL-MCNC: 4.2 MMOL/L — SIGNIFICANT CHANGE UP (ref 3.5–5)
POTASSIUM SERPL-SCNC: 4.2 MMOL/L — SIGNIFICANT CHANGE UP (ref 3.5–5)
PROT SERPL-MCNC: 7.4 G/DL — SIGNIFICANT CHANGE UP (ref 6–8)
RBC # BLD: 4.3 M/UL — LOW (ref 4.7–6.1)
RBC # FLD: 13.3 % — SIGNIFICANT CHANGE UP (ref 11.5–14.5)
SODIUM SERPL-SCNC: 139 MMOL/L — SIGNIFICANT CHANGE UP (ref 135–146)
WBC # BLD: 7.35 K/UL — SIGNIFICANT CHANGE UP (ref 4.8–10.8)
WBC # FLD AUTO: 7.35 K/UL — SIGNIFICANT CHANGE UP (ref 4.8–10.8)

## 2024-09-20 PROCEDURE — 73590 X-RAY EXAM OF LOWER LEG: CPT | Mod: 26,LT

## 2024-09-20 PROCEDURE — 86140 C-REACTIVE PROTEIN: CPT

## 2024-09-20 PROCEDURE — 36415 COLL VENOUS BLD VENIPUNCTURE: CPT

## 2024-09-20 PROCEDURE — 73701 CT LOWER EXTREMITY W/DYE: CPT | Mod: MC,50

## 2024-09-20 PROCEDURE — 84145 PROCALCITONIN (PCT): CPT

## 2024-09-20 PROCEDURE — 93970 EXTREMITY STUDY: CPT | Mod: 26

## 2024-09-20 PROCEDURE — 80053 COMPREHEN METABOLIC PANEL: CPT

## 2024-09-20 PROCEDURE — 85652 RBC SED RATE AUTOMATED: CPT

## 2024-09-20 PROCEDURE — 93925 LOWER EXTREMITY STUDY: CPT

## 2024-09-20 PROCEDURE — 84100 ASSAY OF PHOSPHORUS: CPT

## 2024-09-20 PROCEDURE — 99222 1ST HOSP IP/OBS MODERATE 55: CPT

## 2024-09-20 PROCEDURE — 82962 GLUCOSE BLOOD TEST: CPT

## 2024-09-20 PROCEDURE — 83735 ASSAY OF MAGNESIUM: CPT

## 2024-09-20 PROCEDURE — 73600 X-RAY EXAM OF ANKLE: CPT | Mod: 50

## 2024-09-20 PROCEDURE — 73701 CT LOWER EXTREMITY W/DYE: CPT | Mod: 26,LT,RT

## 2024-09-20 PROCEDURE — 85025 COMPLETE CBC W/AUTO DIFF WBC: CPT

## 2024-09-20 RX ORDER — KETOROLAC TROMETHAMINE 10 MG/1
15 TABLET, FILM COATED ORAL EVERY 8 HOURS
Refills: 0 | Status: DISCONTINUED | OUTPATIENT
Start: 2024-09-20 | End: 2024-09-21

## 2024-09-20 RX ORDER — KETOROLAC TROMETHAMINE 10 MG/1
15 TABLET, FILM COATED ORAL ONCE
Refills: 0 | Status: DISCONTINUED | OUTPATIENT
Start: 2024-09-20 | End: 2024-09-20

## 2024-09-20 RX ORDER — AMPICILLIN, SULBACTAM 250; 125 MG/ML; MG/ML
INJECTION, POWDER, FOR SOLUTION INTRAMUSCULAR; INTRAVENOUS
Refills: 0 | Status: DISCONTINUED | OUTPATIENT
Start: 2024-09-20 | End: 2024-09-21

## 2024-09-20 RX ORDER — AMPICILLIN, SULBACTAM 250; 125 MG/ML; MG/ML
3 INJECTION, POWDER, FOR SOLUTION INTRAMUSCULAR; INTRAVENOUS ONCE
Refills: 0 | Status: COMPLETED | OUTPATIENT
Start: 2024-09-20 | End: 2024-09-20

## 2024-09-20 RX ORDER — MORPHINE SULFATE 30 MG/1
4 TABLET, FILM COATED, EXTENDED RELEASE ORAL ONCE
Refills: 0 | Status: DISCONTINUED | OUTPATIENT
Start: 2024-09-20 | End: 2024-09-20

## 2024-09-20 RX ORDER — ACETAMINOPHEN 325 MG
975 TABLET ORAL ONCE
Refills: 0 | Status: COMPLETED | OUTPATIENT
Start: 2024-09-20 | End: 2024-09-20

## 2024-09-20 RX ORDER — MORPHINE SULFATE 30 MG/1
2 TABLET, FILM COATED, EXTENDED RELEASE ORAL EVERY 4 HOURS
Refills: 0 | Status: DISCONTINUED | OUTPATIENT
Start: 2024-09-20 | End: 2024-09-25

## 2024-09-20 RX ORDER — SODIUM CHLORIDE 0.9 % (FLUSH) 0.9 %
1000 SYRINGE (ML) INJECTION ONCE
Refills: 0 | Status: COMPLETED | OUTPATIENT
Start: 2024-09-20 | End: 2024-09-20

## 2024-09-20 RX ORDER — AMPICILLIN, SULBACTAM 250; 125 MG/ML; MG/ML
3 INJECTION, POWDER, FOR SOLUTION INTRAMUSCULAR; INTRAVENOUS EVERY 6 HOURS
Refills: 0 | Status: DISCONTINUED | OUTPATIENT
Start: 2024-09-21 | End: 2024-09-21

## 2024-09-20 RX ORDER — FUROSEMIDE 10 MG/ML
40 INJECTION INTRAVENOUS DAILY
Refills: 0 | Status: DISCONTINUED | OUTPATIENT
Start: 2024-09-20 | End: 2024-09-21

## 2024-09-20 RX ADMIN — AMPICILLIN, SULBACTAM 200 GRAM(S): 250; 125 INJECTION, POWDER, FOR SOLUTION INTRAMUSCULAR; INTRAVENOUS at 23:19

## 2024-09-20 RX ADMIN — MORPHINE SULFATE 2 MILLIGRAM(S): 30 TABLET, FILM COATED, EXTENDED RELEASE ORAL at 22:00

## 2024-09-20 RX ADMIN — MORPHINE SULFATE 4 MILLIGRAM(S): 30 TABLET, FILM COATED, EXTENDED RELEASE ORAL at 11:58

## 2024-09-20 RX ADMIN — Medication 975 MILLIGRAM(S): at 10:17

## 2024-09-20 RX ADMIN — Medication 1000 MILLILITER(S): at 01:38

## 2024-09-20 RX ADMIN — AMPICILLIN, SULBACTAM 200 GRAM(S): 250; 125 INJECTION, POWDER, FOR SOLUTION INTRAMUSCULAR; INTRAVENOUS at 11:53

## 2024-09-20 RX ADMIN — KETOROLAC TROMETHAMINE 15 MILLIGRAM(S): 10 TABLET, FILM COATED ORAL at 09:47

## 2024-09-20 RX ADMIN — KETOROLAC TROMETHAMINE 15 MILLIGRAM(S): 10 TABLET, FILM COATED ORAL at 10:17

## 2024-09-20 RX ADMIN — MORPHINE SULFATE 2 MILLIGRAM(S): 30 TABLET, FILM COATED, EXTENDED RELEASE ORAL at 21:40

## 2024-09-20 RX ADMIN — FUROSEMIDE 40 MILLIGRAM(S): 10 INJECTION INTRAVENOUS at 23:22

## 2024-09-20 RX ADMIN — MORPHINE SULFATE 4 MILLIGRAM(S): 30 TABLET, FILM COATED, EXTENDED RELEASE ORAL at 01:38

## 2024-09-20 RX ADMIN — Medication 975 MILLIGRAM(S): at 09:47

## 2024-09-20 NOTE — ED PROVIDER NOTE - PROGRESS NOTE DETAILS
Dr. Torres: Patient signed out to me at shift change.  Results of labs and imaging discussed with patient, he prefers admission for persistent pain.  Patient seen bedside by me, appears with stigmata of chronic peripheral vascular disease and no acute emergent findings.  I discussed the results with him bedside, he prefers inpatient management for his symptoms.  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  Pulses are intact distally bilaterally, rest pain unlikely but patient is apparently uncomfortable when his legs are palpated arranged.  Will get vascular to see patient bedside prior to formal disposition to better account for patient's symptoms and plan. Dr. Torres: Patient seen by vascular team bedside, concern for incomplete treatment with former antibiotics, recommend IV antibiotics and admission, they will follow-up.

## 2024-09-20 NOTE — H&P ADULT - NSHPPHYSICALEXAM_GEN_ALL_CORE
PHYSICAL EXAM:  GENERAL: NAD, well-groomed, well-developed  HEAD:  Atraumatic, Normocephalic  EYES: EOMI, PERRLA, conjunctiva and sclera clear  ENMT: No tonsillar erythema, exudates, or enlargement; Moist mucous membranes  NECK: Supple, No JVD, Normal thyroid  HEART: Regular rate and rhythm; No murmurs, rubs, or gallops  RESPIRATORY: CTA B/L, No W/R/R  ABDOMEN: Soft, Nontender, Nondistended; Bowel sounds present  NEUROLOGY: A&Ox3, nonfocal, moving all extremities  EXTREMITIES:  2+ Peripheral Pulses, No clubbing, cyanosis, or edema  SKIN: warm, dry, normal color, no rash or abnormal lesions PHYSICAL EXAM:  GENERAL: NAD, well-groomed, well-developed  HEAD:  Atraumatic, Normocephalic  EYES: EOMI, PERRLA, conjunctiva and sclera clear  ENMT: No tonsillar erythema, exudates, or enlargement; Moist mucous membranes  NECK: Supple, No JVD, Normal thyroid  HEART: Regular rate and rhythm; No murmurs, rubs, or gallops  RESPIRATORY: CTA B/L, No W/R/R  ABDOMEN: Soft, Nontender, Nondistended; Bowel sounds present  NEUROLOGY: A&Ox3, nonfocal, moving all extremities  EXTREMITIES:  LLE warm and tender to touch  SKIN: warm, dry, normal color, no rash or abnormal lesions

## 2024-09-20 NOTE — H&P ADULT - ATTENDING COMMENTS
Medicine Attending Addendum  Patient was seen and examined with medicine team.  Nursing records reviewed. I agree with the resident/PA/NP's note including past medical history, home medications, social history, allergies, surgical history, family history, and review of system. I have reviewed relevant vitals, laboratory values, imaging studies, and microbiology.   - Above resident's note was edited by me  - rest of A/P as per detailed housestaff note above except above modifications    Total time spent to complete patient's bedside assessment, reviewed medical chart, discussed medical plan of care with team was more than 35 minutes with >50% of time spent face to face with patient, discussion with patient/family and/or coordination of care. Medicine Attending Addendum  Patient was seen and examined with medicine team.  Nursing records reviewed. I agree with the resident/PA/NP's note including past medical history, home medications, social history, allergies, surgical history, family history, and review of system. I have reviewed relevant vitals, laboratory values, imaging studies, and microbiology.   - Above resident's note was edited by me  - rest of A/P as per detailed housestaff note above except above modifications    Seen and examined patient on 09/20/2024    Total time spent to complete patient's bedside assessment, reviewed medical chart, discussed medical plan of care with team was more than 35 minutes with >50% of time spent face to face with patient, discussion with patient/family and/or coordination of care.

## 2024-09-20 NOTE — ED PROVIDER NOTE - CLINICAL SUMMARY MEDICAL DECISION MAKING FREE TEXT BOX
68-year-old male presents to the emergency department for evaluation of intractable leg pain.  Admits to swelling as well, has known peripheral vascular disease and was lost to follow-up with outpatient vascular surgery.  No constitutional symptoms.  In the emergency department he had screening exam, labs and imaging, no acute emergent findings despite persistent pain.  Patient has dark complexion, no obvious redness but very tender to palpation and tender with range of motion of the ankles.  Distal pulses intact.  Patient requesting admission "because that is what happened last time".  Had vascular surgery consult bedside, recommendations are for IV antibiotics and inpatient management, will start IV antibiotics in ED and admit to inpatient service.

## 2024-09-20 NOTE — H&P ADULT - NSHPLABSRESULTS_GEN_ALL_CORE
LABS:                         12.0   7.35  )-----------( 247      ( 20 Sep 2024 01:30 )             37.6     09-20    139  |  103  |  11  ----------------------------<  89  4.2   |  26  |  0.8    Ca    9.0      20 Sep 2024 01:30    TPro  7.4  /  Alb  4.8  /  TBili  0.5  /  DBili  x   /  AST  18  /  ALT  19  /  AlkPhos  115  09-20      Urinalysis Basic - ( 20 Sep 2024 01:30 )    Color: x / Appearance: x / SG: x / pH: x  Gluc: 89 mg/dL / Ketone: x  / Bili: x / Urobili: x   Blood: x / Protein: x / Nitrite: x   Leuk Esterase: x / RBC: x / WBC x   Sq Epi: x / Non Sq Epi: x / Bacteria: x      RADIOLOGY, EKG & ADDITIONAL TESTS: Reviewed.

## 2024-09-20 NOTE — ED PROVIDER NOTE - PHYSICAL EXAMINATION
CONSTITUTIONAL: Well-appearing; well-nourished; in no apparent distress.   EYES: PERRL; EOM intact.   ENT: normal nose; no rhinorrhea; normal pharynx with no tonsillar hypertrophy.   NECK: Supple; non-tender; no cervical lymphadenopathy.   CARDIOVASCULAR: Normal S1, S2; no murmurs, rubs, or gallops. Equal radial pulses  RESPIRATORY: Normal chest excursion with respiration; breath sounds clear and equal bilaterally; no wheezes, rhonchi, or rales.  GI/: Normal bowel sounds; non-distended; non-tender; no palpable organomegaly.   MS: No evidence of trauma or deformity. Normal ROM in all four extremities; non-tender to palpation; distal pulses are normal.   Extrem: + LLE redness/warmth/edema/ttp. Pedal pulses intact  SKIN: Normal for age and race; warm; dry; good turgor; no apparent lesions or exudate.   NEURO/PSYCH: A & O x 4; grossly unremarkable. neurovascular intact. Sensation intact

## 2024-09-20 NOTE — H&P ADULT - HISTORY OF PRESENT ILLNESS
68-year-old male history of DM2, HLD, HTN, OA, Asthma, recurrent pancreatitis, Prostate cancer s/p hx of seeding, appendectomy, s/p Hip L replacement 9/2015.  recent admission 09/04 - 09/07 for cellulitis of left lower extremity presenting to ER for evaluation of left lower extremity pain redness and swelling.  Patient states finished course of p.o. antibiotics. States over the past few days has had no recurrent pain redness and swelling to left lower extremity. States initially had small cuts to area before symptoms started. He has no fever, chills, nausea, vomiting, recent trauma injury or falls. Patient reports inability to bear weight or ambulate.     In ED:  VITALS: BP: 142/82  HR: 71  RR: 16  TEMP: 98.3  SPO2: 96%      LABS: Unremarkable     VA DUPLEX b/l LE negative for any acute DVT  XRAY Tibia+Fibula negative for any acute injury

## 2024-09-20 NOTE — H&P ADULT - ASSESSMENT
68-year-old male history of DM2, HLD, HTN, OA, Asthma, recurrent pancreatitis, Prostate cancer s/p hx of seeding, appendectomy, s/p Hip L replacement 9/2015.  recent admission 09/04 - 09/07 for cellulitis of left lower extremity presenting to ER for evaluation of left lower extremity pain redness and swelling.  Patient states finished course of p.o. antibiotics. States over the past few days has had no recurrent pain redness and swelling to left lower extremity. States initially had small cuts to area before symptoms started. He has no fever, chills, nausea, vomiting, recent trauma injury or falls. Patient reports inability to bear weight or ambulate.       # B/L LE pain and edema   - Possible cellulitis iso diabetes sp trauma   -Continue cefazolin 1g IV 50mL q8  -ID consulted   -f/u ESR, CRP   -Xray b/l ankle, LLE  -Pain management  - F/u TTE  - started on lasix     #Asthma  - home albuterol inhaler as needed    #BPH  - c/w flomax    #Hld  - c/w home statin    #Htn  - c/w home amlodipine   - Amlodipine can be cause of LE swelling: can consider holding     Diet: consistent carb  DVT ppx: Lovenox  Dispo: Medicine     68-year-old male history of DM2, HLD, HTN, OA, Asthma, recurrent pancreatitis, Prostate cancer s/p hx of seeding, appendectomy, s/p Hip L replacement 9/2015.  recent admission 09/04 - 09/07 for cellulitis of left lower extremity presenting to ER for evaluation of left lower extremity pain redness and swelling.  Patient states finished course of p.o. antibiotics. States over the past few days has had no recurrent pain redness and swelling to left lower extremity. States initially had small cuts to area before symptoms started. He has no fever, chills, nausea, vomiting, recent trauma injury or falls. Patient reports inability to bear weight or ambulate.       # B/L LE pain and edema   - Possible cellulitis iso diabetes  - on exam LLE warm and tender on touch   - Continue cefazolin 1g IV 50mL q8  - f/u ESR, CRP   - Xray b/l ankle negative  - adequate pain control with percocet  - started on Unasyn      #Asthma  - home albuterol inhaler as needed      #BPH  - c/w flomax      #Hld  - c/w home statin      #Htn  - c/w home amlodipine       #MISC  - DVT PPx: lovenox  - GI PPx: not indicated  - Diet: DASH/TLC/CC  - Activity: AAT  - Labs: ordered  - Dispo: acute   68-year-old male history of DM2, HLD, HTN, OA, Asthma, recurrent pancreatitis, Prostate cancer s/p hx of seeding, appendectomy, s/p Hip L replacement 9/2015.  recent admission 09/04 - 09/07 for cellulitis of left lower extremity presenting to ER for evaluation of left lower extremity pain redness and swelling.  Patient states finished course of p.o. antibiotics. States over the past few days has had no recurrent pain redness and swelling to left lower extremity. States initially had small cuts to area before symptoms started. He has no fever, chills, nausea, vomiting, recent trauma injury or falls. Patient reports inability to bear weight or ambulate.       *PLEASE CONFIRM MED REC IN AM, [facility]*    # B/L LE pain and edema   - Possible cellulitis iso diabetes  - on exam LLE warm and tender on touch   - Continue cefazolin 1g IV 50mL q8  - f/u ESR, CRP   - Xray b/l ankle negative  - adequate pain control with percocet  - started on Unasyn      #Asthma  - home albuterol inhaler as needed      #BPH  - c/w flomax      #Hld  - c/w home statin      #Htn  - c/w home amlodipine       #MISC  - DVT PPx: lovenox  - GI PPx: not indicated  - Diet: DASH/TLC/CC  - Activity: AAT  - Labs: ordered  - Dispo: acute   68-year-old male history of DM2, HLD, HTN, OA, Asthma, recurrent pancreatitis, Prostate cancer s/p hx of seeding, appendectomy, s/p Hip L replacement 9/2015.  recent admission 09/04 - 09/07 for cellulitis of left lower extremity presenting to ER for evaluation of left lower extremity pain redness and swelling.  Patient states finished course of p.o. antibiotics. States over the past few days has had no recurrent pain redness and swelling to left lower extremity. States initially had small cuts to area before symptoms started. He has no fever, chills, nausea, vomiting, recent trauma injury or falls. Patient reports inability to bear weight or ambulate.     *PLEASE CONFIRM MED REC IN AM, [facility]*    # B/L LE pain and edema   - Possible cellulitis iso diabetes  - on exam LLE warm and tender on touch   - f/u ESR, CRP   - Xray b/l ankle negative for OM  - f/u CT LE with IV contrast to r/o abscess  - f/u US Doppler to r/o DVT  - Continue cefazolin 1g IV 50mL q8  - adequate pain control with percocet  - started on Unasyn  - Trial of IV lasix for symptomatic relief  - Vascular surgery consulted    #Asthma  - home albuterol inhaler as needed    #BPH  - c/w flomax    #Hld  - c/w home statin    #Htn  - c/w home amlodipine     #MISC  - DVT PPx: lovenox  - GI PPx: not indicated  - Diet: DASH/TLC/CC  - Activity: AAT  - Labs: ordered  - Dispo: acute   68-year-old male history of DM2, HLD, HTN, OA, Asthma, recurrent pancreatitis, Prostate cancer s/p hx of seeding, appendectomy, s/p Hip L replacement 9/2015.  recent admission 09/04 - 09/07 for cellulitis of left lower extremity presenting to ER for evaluation of left lower extremity pain redness and swelling.  Patient states finished course of p.o. antibiotics. States over the past few days has had no recurrent pain redness and swelling to left lower extremity. States initially had small cuts to area before symptoms started. He has no fever, chills, nausea, vomiting, recent trauma injury or falls. Patient reports inability to bear weight or ambulate.     *PLEASE CONFIRM MED REC IN AM, [facility]*    # B/L LE pain and edema   - Possible cellulitis iso diabetes  - on exam LLE warm and tender on touch   - f/u ESR, CRP   - Xray b/l ankle negative for OM  - f/u CT LE with IV contrast to r/o abscess  - f/u US Doppler to r/o DVT  - Continue cefazolin 1g IV 50mL q8  - adequate pain control with PRN Ketorolac and Morphine IM  - started on Unasyn  - Trial of IV lasix for symptomatic relief  - Vascular surgery consulted    #Asthma  - home albuterol inhaler as needed    #BPH  - c/w flomax    #Hld  - c/w home statin    #Htn  - c/w home amlodipine     #MISC  - DVT PPx: lovenox  - GI PPx: not indicated  - Diet: DASH/TLC/CC  - Activity: AAT  - Labs: ordered  - Dispo: acute

## 2024-09-20 NOTE — ED ADULT NURSE REASSESSMENT NOTE - NS ED NURSE REASSESS COMMENT FT1
Pt. received from previous RN. Pt. lying on stretcher, breathing with ease on RA. A&O x4. 18g noted to the LUE; IV intact, no redness/swelling to the site. Awaiting MD dispo.

## 2024-09-20 NOTE — ED PROVIDER NOTE - OBJECTIVE STATEMENT
68-year-old male history of diabetes, hypertension, hyperlipidemia, osteoarthritis, asthma, recurrent pancreatitis, prostate cancer status post eating, appendectomy, hip replacement, recent admission 09/04 - 09/07 for cellulitis of left lower extremity presenting to ER for evaluation of left lower extremity pain redness and swelling.  Patient states finished course of p.o. antibiotics.  States over the past few days has had no recurrent pain redness and swelling to left lower extremity.  States initially had small cuts to area before symptoms started.  He has no fever, chills, nausea, vomiting, recent trauma injury or falls, inability to bear weight or ambulate.

## 2024-09-21 LAB
ALBUMIN SERPL ELPH-MCNC: 4.3 G/DL — SIGNIFICANT CHANGE UP (ref 3.5–5.2)
ALP SERPL-CCNC: 107 U/L — SIGNIFICANT CHANGE UP (ref 30–115)
ALT FLD-CCNC: 16 U/L — SIGNIFICANT CHANGE UP (ref 0–41)
ANION GAP SERPL CALC-SCNC: 15 MMOL/L — HIGH (ref 7–14)
AST SERPL-CCNC: 17 U/L — SIGNIFICANT CHANGE UP (ref 0–41)
BASOPHILS # BLD AUTO: 0.05 K/UL — SIGNIFICANT CHANGE UP (ref 0–0.2)
BASOPHILS NFR BLD AUTO: 0.8 % — SIGNIFICANT CHANGE UP (ref 0–1)
BILIRUB SERPL-MCNC: 0.6 MG/DL — SIGNIFICANT CHANGE UP (ref 0.2–1.2)
BUN SERPL-MCNC: 9 MG/DL — LOW (ref 10–20)
CALCIUM SERPL-MCNC: 9 MG/DL — SIGNIFICANT CHANGE UP (ref 8.4–10.5)
CHLORIDE SERPL-SCNC: 101 MMOL/L — SIGNIFICANT CHANGE UP (ref 98–110)
CO2 SERPL-SCNC: 24 MMOL/L — SIGNIFICANT CHANGE UP (ref 17–32)
CREAT SERPL-MCNC: 0.7 MG/DL — SIGNIFICANT CHANGE UP (ref 0.7–1.5)
CRP SERPL-MCNC: <3 MG/L — SIGNIFICANT CHANGE UP
EGFR: 100 ML/MIN/1.73M2 — SIGNIFICANT CHANGE UP
EOSINOPHIL # BLD AUTO: 0.4 K/UL — SIGNIFICANT CHANGE UP (ref 0–0.7)
EOSINOPHIL NFR BLD AUTO: 6 % — SIGNIFICANT CHANGE UP (ref 0–8)
ERYTHROCYTE [SEDIMENTATION RATE] IN BLOOD: 14 MM/HR — HIGH (ref 0–10)
ERYTHROCYTE [SEDIMENTATION RATE] IN BLOOD: 14 MM/HR — HIGH (ref 0–10)
GLUCOSE BLDC GLUCOMTR-MCNC: 107 MG/DL — HIGH (ref 70–99)
GLUCOSE BLDC GLUCOMTR-MCNC: 111 MG/DL — HIGH (ref 70–99)
GLUCOSE BLDC GLUCOMTR-MCNC: 119 MG/DL — HIGH (ref 70–99)
GLUCOSE BLDC GLUCOMTR-MCNC: 132 MG/DL — HIGH (ref 70–99)
GLUCOSE SERPL-MCNC: 159 MG/DL — HIGH (ref 70–99)
HCT VFR BLD CALC: 36.4 % — LOW (ref 42–52)
HGB BLD-MCNC: 12 G/DL — LOW (ref 14–18)
IMM GRANULOCYTES NFR BLD AUTO: 0.3 % — SIGNIFICANT CHANGE UP (ref 0.1–0.3)
LYMPHOCYTES # BLD AUTO: 1.22 K/UL — SIGNIFICANT CHANGE UP (ref 1.2–3.4)
LYMPHOCYTES # BLD AUTO: 18.4 % — LOW (ref 20.5–51.1)
MAGNESIUM SERPL-MCNC: 2.1 MG/DL — SIGNIFICANT CHANGE UP (ref 1.8–2.4)
MCHC RBC-ENTMCNC: 28 PG — SIGNIFICANT CHANGE UP (ref 27–31)
MCHC RBC-ENTMCNC: 33 G/DL — SIGNIFICANT CHANGE UP (ref 32–37)
MCV RBC AUTO: 85 FL — SIGNIFICANT CHANGE UP (ref 80–94)
MONOCYTES # BLD AUTO: 0.55 K/UL — SIGNIFICANT CHANGE UP (ref 0.1–0.6)
MONOCYTES NFR BLD AUTO: 8.3 % — SIGNIFICANT CHANGE UP (ref 1.7–9.3)
NEUTROPHILS # BLD AUTO: 4.39 K/UL — SIGNIFICANT CHANGE UP (ref 1.4–6.5)
NEUTROPHILS NFR BLD AUTO: 66.2 % — SIGNIFICANT CHANGE UP (ref 42.2–75.2)
NRBC # BLD: 0 /100 WBCS — SIGNIFICANT CHANGE UP (ref 0–0)
PHOSPHATE SERPL-MCNC: 3.6 MG/DL — SIGNIFICANT CHANGE UP (ref 2.1–4.9)
PLATELET # BLD AUTO: 253 K/UL — SIGNIFICANT CHANGE UP (ref 130–400)
PMV BLD: 10.9 FL — HIGH (ref 7.4–10.4)
POTASSIUM SERPL-MCNC: 3.8 MMOL/L — SIGNIFICANT CHANGE UP (ref 3.5–5)
POTASSIUM SERPL-SCNC: 3.8 MMOL/L — SIGNIFICANT CHANGE UP (ref 3.5–5)
PROCALCITONIN SERPL-MCNC: 0.04 NG/ML — SIGNIFICANT CHANGE UP (ref 0.02–0.1)
PROT SERPL-MCNC: 7.2 G/DL — SIGNIFICANT CHANGE UP (ref 6–8)
RBC # BLD: 4.28 M/UL — LOW (ref 4.7–6.1)
RBC # FLD: 13.3 % — SIGNIFICANT CHANGE UP (ref 11.5–14.5)
SODIUM SERPL-SCNC: 140 MMOL/L — SIGNIFICANT CHANGE UP (ref 135–146)
WBC # BLD: 6.63 K/UL — SIGNIFICANT CHANGE UP (ref 4.8–10.8)
WBC # FLD AUTO: 6.63 K/UL — SIGNIFICANT CHANGE UP (ref 4.8–10.8)

## 2024-09-21 PROCEDURE — 99232 SBSQ HOSP IP/OBS MODERATE 35: CPT

## 2024-09-21 RX ORDER — INFLUENZA VIRUS VACCINE 15; 15; 15; 15 UG/.5ML; UG/.5ML; UG/.5ML; UG/.5ML
0.5 SUSPENSION INTRAMUSCULAR ONCE
Refills: 0 | Status: DISCONTINUED | OUTPATIENT
Start: 2024-09-21 | End: 2024-09-26

## 2024-09-21 RX ORDER — ENOXAPARIN SODIUM 150 MG/ML
40 INJECTION SUBCUTANEOUS EVERY 24 HOURS
Refills: 0 | Status: DISCONTINUED | OUTPATIENT
Start: 2024-09-21 | End: 2024-09-26

## 2024-09-21 RX ORDER — ALCOHOL ANTISEPTIC PADS
25 PADS, MEDICATED (EA) TOPICAL ONCE
Refills: 0 | Status: DISCONTINUED | OUTPATIENT
Start: 2024-09-21 | End: 2024-09-26

## 2024-09-21 RX ORDER — ASPIRIN 325 MG
81 TABLET ORAL DAILY
Refills: 0 | Status: DISCONTINUED | OUTPATIENT
Start: 2024-09-21 | End: 2024-09-26

## 2024-09-21 RX ORDER — INSULIN LISPRO 100/ML
VIAL (ML) SUBCUTANEOUS AT BEDTIME
Refills: 0 | Status: DISCONTINUED | OUTPATIENT
Start: 2024-09-21 | End: 2024-09-26

## 2024-09-21 RX ORDER — BISACODYL 5 MG/1
5 TABLET, COATED ORAL AT BEDTIME
Refills: 0 | Status: DISCONTINUED | OUTPATIENT
Start: 2024-09-21 | End: 2024-09-26

## 2024-09-21 RX ORDER — DULAGLUTIDE 4.5 MG/.5ML
0.75 INJECTION, SOLUTION SUBCUTANEOUS
Refills: 0 | DISCHARGE

## 2024-09-21 RX ORDER — ACETAMINOPHEN 325 MG
1 TABLET ORAL
Refills: 0 | DISCHARGE

## 2024-09-21 RX ORDER — ATORVASTATIN CALCIUM 10 MG/1
40 TABLET, FILM COATED ORAL AT BEDTIME
Refills: 0 | Status: DISCONTINUED | OUTPATIENT
Start: 2024-09-21 | End: 2024-09-26

## 2024-09-21 RX ORDER — INSULIN LISPRO 100/ML
VIAL (ML) SUBCUTANEOUS
Refills: 0 | Status: DISCONTINUED | OUTPATIENT
Start: 2024-09-21 | End: 2024-09-26

## 2024-09-21 RX ORDER — AMLODIPINE BESYLATE 5 MG
5 TABLET ORAL DAILY
Refills: 0 | Status: DISCONTINUED | OUTPATIENT
Start: 2024-09-21 | End: 2024-09-26

## 2024-09-21 RX ORDER — TAMSULOSIN HCL 0.4 MG
0.4 CAPSULE ORAL AT BEDTIME
Refills: 0 | Status: DISCONTINUED | OUTPATIENT
Start: 2024-09-21 | End: 2024-09-26

## 2024-09-21 RX ORDER — GLUCAGON INJECTION, SOLUTION 0.5 MG/.1ML
1 INJECTION, SOLUTION SUBCUTANEOUS ONCE
Refills: 0 | Status: DISCONTINUED | OUTPATIENT
Start: 2024-09-21 | End: 2024-09-26

## 2024-09-21 RX ORDER — SODIUM CHLORIDE IRRIG SOLUTION 0.9 %
1000 SOLUTION, IRRIGATION IRRIGATION
Refills: 0 | Status: DISCONTINUED | OUTPATIENT
Start: 2024-09-21 | End: 2024-09-26

## 2024-09-21 RX ORDER — FUROSEMIDE 10 MG/ML
40 INJECTION INTRAVENOUS DAILY
Refills: 0 | Status: COMPLETED | OUTPATIENT
Start: 2024-09-21 | End: 2024-09-22

## 2024-09-21 RX ORDER — SENNOSIDES 8.6 MG
2 TABLET ORAL AT BEDTIME
Refills: 0 | Status: DISCONTINUED | OUTPATIENT
Start: 2024-09-21 | End: 2024-09-26

## 2024-09-21 RX ORDER — ALCOHOL ANTISEPTIC PADS
12.5 PADS, MEDICATED (EA) TOPICAL ONCE
Refills: 0 | Status: DISCONTINUED | OUTPATIENT
Start: 2024-09-21 | End: 2024-09-26

## 2024-09-21 RX ORDER — ALBUTEROL 90 MCG
2.5 AEROSOL (GRAM) INHALATION ONCE
Refills: 0 | Status: DISCONTINUED | OUTPATIENT
Start: 2024-09-21 | End: 2024-09-26

## 2024-09-21 RX ORDER — ALCOHOL ANTISEPTIC PADS
15 PADS, MEDICATED (EA) TOPICAL ONCE
Refills: 0 | Status: DISCONTINUED | OUTPATIENT
Start: 2024-09-21 | End: 2024-09-26

## 2024-09-21 RX ADMIN — MORPHINE SULFATE 2 MILLIGRAM(S): 30 TABLET, FILM COATED, EXTENDED RELEASE ORAL at 15:40

## 2024-09-21 RX ADMIN — MORPHINE SULFATE 2 MILLIGRAM(S): 30 TABLET, FILM COATED, EXTENDED RELEASE ORAL at 07:01

## 2024-09-21 RX ADMIN — Medication 0.4 MILLIGRAM(S): at 21:24

## 2024-09-21 RX ADMIN — MORPHINE SULFATE 2 MILLIGRAM(S): 30 TABLET, FILM COATED, EXTENDED RELEASE ORAL at 11:03

## 2024-09-21 RX ADMIN — MORPHINE SULFATE 2 MILLIGRAM(S): 30 TABLET, FILM COATED, EXTENDED RELEASE ORAL at 15:07

## 2024-09-21 RX ADMIN — MORPHINE SULFATE 2 MILLIGRAM(S): 30 TABLET, FILM COATED, EXTENDED RELEASE ORAL at 02:45

## 2024-09-21 RX ADMIN — ATORVASTATIN CALCIUM 40 MILLIGRAM(S): 10 TABLET, FILM COATED ORAL at 21:24

## 2024-09-21 RX ADMIN — MORPHINE SULFATE 2 MILLIGRAM(S): 30 TABLET, FILM COATED, EXTENDED RELEASE ORAL at 21:24

## 2024-09-21 RX ADMIN — AMPICILLIN, SULBACTAM 200 GRAM(S): 250; 125 INJECTION, POWDER, FOR SOLUTION INTRAMUSCULAR; INTRAVENOUS at 05:27

## 2024-09-21 RX ADMIN — Medication 81 MILLIGRAM(S): at 11:03

## 2024-09-21 RX ADMIN — MORPHINE SULFATE 2 MILLIGRAM(S): 30 TABLET, FILM COATED, EXTENDED RELEASE ORAL at 22:16

## 2024-09-21 RX ADMIN — KETOROLAC TROMETHAMINE 15 MILLIGRAM(S): 10 TABLET, FILM COATED ORAL at 05:38

## 2024-09-21 RX ADMIN — KETOROLAC TROMETHAMINE 15 MILLIGRAM(S): 10 TABLET, FILM COATED ORAL at 20:09

## 2024-09-21 RX ADMIN — FUROSEMIDE 40 MILLIGRAM(S): 10 INJECTION INTRAVENOUS at 05:27

## 2024-09-21 RX ADMIN — MORPHINE SULFATE 2 MILLIGRAM(S): 30 TABLET, FILM COATED, EXTENDED RELEASE ORAL at 11:35

## 2024-09-21 RX ADMIN — Medication 5 MILLIGRAM(S): at 05:27

## 2024-09-21 RX ADMIN — KETOROLAC TROMETHAMINE 15 MILLIGRAM(S): 10 TABLET, FILM COATED ORAL at 18:41

## 2024-09-21 NOTE — CONSULT NOTE ADULT - ASSESSMENT
68-year-old male history of DM2, HLD, HTN, OA, Asthma, recurrent pancreatitis, Prostate cancer s/p hx of seeding, appendectomy, s/p Hip L replacement 9/2015.  recent admission 09/04 - 09/07 for cellulitis of left lower extremity presenting to ER for evaluation of left lower extremity pain redness and swelling.     IMPRESSION  #Possible cellulitis?? Doubting though exam is unremarkable.   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   -Could be neuropathic pain.   -Can obtain leg and knee Xray.   -Management as per primary team.     If any questions , please call or send a message on [employer] teams  Please update ID in real time with any pertinent new laboratory /microbiological/radiographically findings or a change in the clinical characteristics    Shine Velez M.D  Infectious Diseases Attending/   [employer] at [facility]

## 2024-09-21 NOTE — PROGRESS NOTE ADULT - ATTENDING COMMENTS
HPI:  68-year-old male history of DM2, HLD, HTN, OA, Asthma, recurrent pancreatitis, Prostate cancer s/p hx of seeding, appendectomy, s/p Hip L replacement 9/2015.  recent admission 09/04 - 09/07 for cellulitis of left lower extremity presenting to ER for evaluation of left lower extremity pain redness and swelling.  Patient states finished course of p.o. antibiotics. States over the past few days has had no recurrent pain redness and swelling to left lower extremity. States initially had small cuts to area before symptoms started. He has no fever, chills, nausea, vomiting, recent trauma injury or falls. Patient reports inability to bear weight or ambulate.     # severe bilat ankle tenderness   no redness , no edema. no tenderness on toes, pulses wnl   no fever, no leukocytosis,   Sedimentation Rate, Erythrocyte: 14 mm/Hr (09.21.24 @ 06:55)  T(F): 98.6 (09-21-24 @ 05:15), Max: 98.6 (09-21-24 @ 05:15)  WBC Count: 6.63 K/uL (09.21.24 @ 06:55)    doubt infectious process, possible DJD /OA , hold abx for now   xray bilat ankles  podiatry consult     # DM  A1C with Estimated Average Glucose Result: 6.7 (09.05.24 @ 09:21)  CAPILLARY BLOOD GLUCOSE  POCT Blood Glucose.: 132 mg/dL (21 Sep 2024 11:37)  POCT Blood Glucose.: 107 mg/dL (21 Sep 2024 07:49)    insulin per protocol     # HTN, HLD, CAD , stable cont meds  # hx of prostate ca, sp seed , outpt fu   # hx of asthma nebs prn   # hx of pancreatitis, now has no pain     #Progress Note Handoff    Pending :  podiatry consult   Family discussion: dw pt   Disposition: home   code status: full code

## 2024-09-21 NOTE — CONSULT NOTE ADULT - SUBJECTIVE AND OBJECTIVE BOX
VASCULAR SURGERY CONSULT NOTE      HPI:    A 68-year-old male with a medical history significant for diabetes, hypertension, hyperlipidemia, osteoarthritis, asthma, recurrent pancreatitis, and prostate cancer, status post appendectomy and hip replacement, presents to the emergency room for evaluation of pain, redness, and swelling in his left lower extremity. The patient was recently admitted to the hospital from September 4 to September 7 for cellulitis in the same area. He reports that he completed a course of oral antibiotics and has not experienced any recurrence of pain, redness, or swelling in the left lower extremity over the past few days. He mentions that he initially had small cuts in the area prior to the onset of his symptoms.    On physical examination, there is mild swelling in the left leg, noted as +1 edema. The patient does not exhibit any fever, chills, nausea, or vomiting. There is pain on passive flexion of the ankle, and the left leg feels warmer to the touch compared to the right leg. Before the previous admission, the patient reported no issues with bearing weight or ambulating.    PAST MEDICAL & SURGICAL HISTORY:  Diabetes mellitus, type II  Hypertension  Prostate cancer  s/p history of seeding  Hyperlipidemia  Osteoarthritis  Pancreatitis  recurrent  Prostate cancer  s/p history of seeding  History of appendectomy  S/P hip replacement, left 9/2015    Home Medications:  albuterol 2.5 mg/3 mL (0.083%) inhalation solution: 1 spray(s) inhaled 2 times a day (06 Apr 2022 09:34)  albuterol 90 mcg/inh inhalation aerosol: 1 puff(s) inhaled every 4 hours, As needed, Wheezing (06 Apr 2022 09:34)  amLODIPine 5 mg oral tablet: 1 tab(s) orally once a day (02 Apr 2022 11:53)  aspirin 81 mg oral tablet: 1 tab(s) orally once a day - Hold until surgery.  (03 Nov 2022 10:46)  atorvastatin 40 mg oral tablet: 1 tab(s) orally once a day (at bedtime) (02 Apr 2022 11:53)  Flomax 0.4 mg oral capsule: 1 cap(s) orally once a day (at bedtime) (02 Apr 2022 11:53)  metFORMIN 500 mg oral tablet: 2 tab(s) orally 2 times a day (02 Apr 2022 11:53)    No permtinent family history of PVD    REVIEW OF SYSTEMS:  GENERAL:                                         negative  SKIN:                                                 negative  OPTHALMOLOGIC:                          negative  ENMT:                                               negative  RESPIRATORY AND THORAX:        Check assesment  CARDIOVASCULAR:                         Check assesment  GASTROINTESTINAL:                       negative  NEPHROLOGY:                                  Check assesment  MUSCULOSKELETAL:                       Check assesment  NEUROLOGIC:                                   negative  PSYCHIATRIC:                                    negative  HEMATOLOGY/LYMPHATICS:         Check assesment  ENDOCRINE:                                     Check assesment  ALLERGIC/IMMUNOLOGIC:            negative    12 point ROS otherwise normal except as stated in HPI  FHx: none    PHYSICAL EXAM  Vital Signs Last 24 Hrs  T(C): 36.6 (20 Sep 2024 01:04), Max: 36.8 (19 Sep 2024 22:03)  T(F): 97.8 (20 Sep 2024 01:04), Max: 98.3 (19 Sep 2024 22:03)  HR: 62 (20 Sep 2024 01:04) (62 - 71)  BP: 136/81 (20 Sep 2024 01:04) (136/81 - 142/82)  BP(mean): --  RR: 16 (20 Sep 2024 01:04) (16 - 16)  SpO2: 100% (20 Sep 2024 01:04) (96% - 100%)    Parameters below as of 19 Sep 2024 22:03  Patient On (Oxygen Delivery Method): room air    Appearance: Normal	  HEENT:   Normal oral mucosa, PERRL, EOMI	  Neck: Supple, - JVD; Carotid Bruit   Cardiovascular: RRR,   Respiratory: Lungs clear to auscultation, No Rales, Rhonchi, Wheezing	  Gastrointestinal:  Soft, Non-tender, positive BS	  Skin: No rashes, No ecchymoses, No cyanosis  Extremities: Normal range of motion, No clubbing, cyanosis or edema  Vascular: DP and PT pulses palpable 2+ bilaterally  Neurologic: Non-focal  Psychiatry: A & O x 3, Mood & affect appropriate    MEDICATIONS:   MEDICATIONS  (STANDING):  ampicillin/sulbactam  IVPB 3 Gram(s) IV Intermittent Once    MEDICATIONS  (PRN):      LAB/STUDIES:                        12.0   7.35  )-----------( 247      ( 20 Sep 2024 01:30 )             37.6     09-20    139  |  103  |  11  ----------------------------<  89  4.2   |  26  |  0.8    Ca    9.0      20 Sep 2024 01:30    TPro  7.4  /  Alb  4.8  /  TBili  0.5  /  DBili  x   /  AST  18  /  ALT  19  /  AlkPhos  115  09-20      LIVER FUNCTIONS - ( 20 Sep 2024 01:30 )  Alb: 4.8 g/dL / Pro: 7.4 g/dL / ALK PHOS: 115 U/L / ALT: 19 U/L / AST: 18 U/L / GGT: x             Urinalysis Basic - ( 20 Sep 2024 01:30 )    Color: x / Appearance: x / SG: x / pH: x  Gluc: 89 mg/dL / Ketone: x  / Bili: x / Urobili: x   Blood: x / Protein: x / Nitrite: x   Leuk Esterase: x / RBC: x / WBC x   Sq Epi: x / Non Sq Epi: x / Bacteria: x    ASSESSMENT    Patient: 68-year-old male  Medical History: Diabetes, hypertension, hyperlipidemia, osteoarthritis, asthma, recurrent pancreatitis, prostate cancer, appendectomy and hip replacement.   Recent Admission: September 4 to September 7 for cellulitis of the left lower extremity; discharged on oral antibiotics.  Chief Complaint: The patient reports pain and swelling in the left lower extremity over the past few days.    Physical Examination:  Left Lower Extremity: Mild swelling noted, +1 edema.  Skin Temperature: Left leg is warmer to the touch compared to the right leg.  Pain Assessment: Pain present on passive flexion of the ankle.  Systemic Symptoms: The patient exhibits no fever, chills, nausea, or vomiting.  Functional Status: Prior to the recent admission, the patient had no difficulties with weight-bearing or ambulation. There is no history of rest claudication.    Impression: The patient demonstrates mild edema and localized warmth in the left lower extremity, suggestive of possible residual effects from recent cellulitis or another inflammatory process. Continued monitoring and evaluation may be necessary to assess the response to the oral antibiotics and to rule out any complications.      Plan:   1- I have seen and examined the patient.   2- I have reviewed the patients charts, duplex and labs.  3- I have the discussed the plan with attending Dr. Mckinnon.   4- No acute vascular intervention.   5- Admit the patient to medicine for IV Abx.   6- Consult ID for possible unresolved cellulitis.     VASCULAR SURGERY SPECTRA: [id number]
WILI ALFARO  68y, Male  Allergies    No Known Allergies    Intolerances        LOS  1d    HPI  HPI:  68-year-old male history of DM2, HLD, HTN, OA, Asthma, recurrent pancreatitis, Prostate cancer s/p hx of seeding, appendectomy, s/p Hip L replacement 9/2015.  recent admission 09/04 - 09/07 for cellulitis of left lower extremity presenting to ER for evaluation of left lower extremity pain redness and swelling.  Patient states finished course of p.o. antibiotics. States over the past few days has had no recurrent pain redness and swelling to left lower extremity. States initially had small cuts to area before symptoms started. He has no fever, chills, nausea, vomiting, recent trauma injury or falls. Patient reports inability to bear weight or ambulate.     In ED:  VITALS: BP: 142/82  HR: 71  RR: 16  TEMP: 98.3  SPO2: 96%      LABS: Unremarkable     VA DUPLEX b/l LE negative for any acute DVT  XRAY Tibia+Fibula negative for any acute injury   (20 Sep 2024 22:55)      INFECTIOUS DISEASE HISTORY:  Hospital course-  ID consulted for antimicrobial recommendations.     Prior hospital charts reviewed [Yes]  Primary team notes reviewed [Yes]  Other consultant notes reviewed [Yes]    REVIEW OF SYSTEMS:  CONSTITUTIONAL: No fever or chills  HEENT: No sore throat  RESPIRATORY: No cough, no shortness of breath  CARDIOVASCULAR: No chest pain or palpitations  GASTROINTESTINAL: No abdominal or epigastric pain  GENITOURINARY: No dysuria  NEUROLOGICAL: No headache/dizziness  MSK: No joint pain, erythema, or swelling; no back pain  SKIN: No itching, rashes  All other ROS negative except noted above    PAST MEDICAL & SURGICAL HISTORY:  Diabetes mellitus, type II      Hypertension      Prostate cancer  s/p history of seeding      Hyperlipidemia      Osteoarthritis      Pancreatitis  recurrent      Prostate cancer  s/p history of seeding      History of appendectomy      S/P hip replacement, left  9/2015      SOCIAL HISTORY:  - No recent travel    FAMILY HISTORY:  Family history of diabetes mellitus in father (Father)    ANTIMICROBIALS:      ANTIMICROBIALS (past 90 days):  MEDICATIONS  (STANDING):  ampicillin/sulbactam  IVPB   200 mL/Hr IV Intermittent (09-20-24 @ 11:53)    ampicillin/sulbactam  IVPB   200 mL/Hr IV Intermittent (09-20-24 @ 23:19)    ampicillin/sulbactam  IVPB   200 mL/Hr IV Intermittent (09-21-24 @ 05:27)      OTHER MEDS:   MEDICATIONS  (STANDING):  albuterol    0.083%. 2.5 once  amLODIPine   Tablet 5 daily  aspirin enteric coated 81 daily  atorvastatin 40 at bedtime  bisacodyl 5 at bedtime  dextrose 50% Injectable 25 once  dextrose 50% Injectable 12.5 once  dextrose 50% Injectable 25 once  dextrose Oral Gel 15 once PRN  enoxaparin Injectable 40 every 24 hours  furosemide   Injectable 40 daily  glucagon  Injectable 1 once  influenza  Vaccine (HIGH DOSE) 0.5 once  insulin lispro (ADMELOG) corrective regimen sliding scale  at bedtime  insulin lispro (ADMELOG) corrective regimen sliding scale  three times a day before meals  ketorolac   Injectable 15 every 8 hours PRN  morphine  - Injectable 2 every 4 hours PRN  polyethylene glycol 3350 17 daily PRN  senna 2 at bedtime  tamsulosin 0.4 at bedtime      VITALS:  Vital Signs Last 24 Hrs  T(F): 98.6 (09-21-24 @ 05:15), Max: 98.6 (09-21-24 @ 05:15)    Vital Signs Last 24 Hrs  HR: 66 (09-21-24 @ 07:41) (59 - 66)  BP: 156/82 (09-21-24 @ 05:15) (130/82 - 156/82)  RR: 18 (09-21-24 @ 07:41)  SpO2: 98% (09-21-24 @ 07:41) (97% - 98%)  Wt(kg): --    EXAM:  GENERAL: NAD  HEAD: No head lesions  NECK: Supple  CHEST/LUNG: Clear to auscultation bilaterally  HEART: S1 S2  ABDOMEN: Soft, nontender  EXTREMITIES: No wounds or drainage. May be mild redness. Mostly neuropathic pain.   NERVOUS SYSTEM: Alert and oriented to person  MSK: No joint erythema, swelling or pain  SKIN: No rashes or lesions, no superficial thrombophlebitis    Labs:                        12.0   6.63  )-----------( 253      ( 21 Sep 2024 06:55 )             36.4     09-21    140  |  101  |  9[L]  ----------------------------<  159[H]  3.8   |  24  |  0.7    Ca    9.0      21 Sep 2024 06:55  Phos  3.6     09-21  Mg     2.1     09-21    TPro  7.2  /  Alb  4.3  /  TBili  0.6  /  DBili  x   /  AST  17  /  ALT  16  /  AlkPhos  107  09-21      WBC Trend:  WBC Count: 6.63 (09-21-24 @ 06:55)  WBC Count: 7.35 (09-20-24 @ 01:30)      Auto Neutrophil #: 4.39 K/uL (09-21-24 @ 06:55)  Auto Neutrophil #: 4.73 K/uL (09-20-24 @ 01:30)  Auto Neutrophil #: 4.68 K/uL (09-05-24 @ 09:21)  Auto Neutrophil #: 3.83 K/uL (09-04-24 @ 07:57)  Auto Neutrophil #: 4.71 K/uL (09-03-24 @ 22:57)      Creatine Trend:  Creatinine: 0.7 (09-21)  Creatinine: 0.8 (09-20)      Liver Biochemical Testing Trend:  Alanine Aminotransferase (ALT/SGPT): 16 (09-21)  Alanine Aminotransferase (ALT/SGPT): 19 (09-20)  Alanine Aminotransferase (ALT/SGPT): 16 (09-05)  Alanine Aminotransferase (ALT/SGPT): 15 (09-04)  Alanine Aminotransferase (ALT/SGPT): 15 (09-03)  Aspartate Aminotransferase (AST/SGOT): 17 (09-21-24 @ 06:55)  Aspartate Aminotransferase (AST/SGOT): 18 (09-20-24 @ 01:30)  Aspartate Aminotransferase (AST/SGOT): 17 (09-05-24 @ 09:21)  Aspartate Aminotransferase (AST/SGOT): 17 (09-04-24 @ 07:57)  Aspartate Aminotransferase (AST/SGOT): 17 (09-03-24 @ 22:57)  Bilirubin Total: 0.6 (09-21)  Bilirubin Total: 0.5 (09-20)  Bilirubin Total: 0.9 (09-05)  Bilirubin Total: 0.5 (09-04)  Bilirubin Total: 0.4 (09-03)      Trend LDH      Auto Eosinophil %: 6.0 % (09-21-24 @ 06:55)  Auto Eosinophil %: 3.9 % (09-20-24 @ 01:30)      Urinalysis Basic - ( 21 Sep 2024 06:55 )    Color: x / Appearance: x / SG: x / pH: x  Gluc: 159 mg/dL / Ketone: x  / Bili: x / Urobili: x   Blood: x / Protein: x / Nitrite: x   Leuk Esterase: x / RBC: x / WBC x   Sq Epi: x / Non Sq Epi: x / Bacteria: x        MICROBIOLOGY:    Male      COVID-19 PCR: NotDetec (09-06-24 @ 17:39)          C-Reactive Protein: <3.0 (09-21)              Lactate, Blood: 1.1 (09-20 @ 01:30)    A1C with Estimated Average Glucose Result: 6.7 % (09-05-24 @ 09:21)      INFLAMMATORY MARKERS  C-Reactive Protein: <3.0 mg/L (09-21-24 @ 06:55)      RADIOLOGY & ADDITIONAL TESTS:  I have personally reviewed the imagings.  CXR      CT    < from: VA Duplex Lower Ext Vein Scan, Bilat (09.20.24 @ 08:57) >      INTERPRETATION:  CLINICAL INFORMATION: This is a 68-year-old male   complaining of left lower extremity swelling    TECHNIQUE: Duplex sonography of the BILATERAL LOWER extremity veins with   color and spectral Doppler, with and without compression.    FINDINGS:    RIGHT:  Normal compressibility of the RIGHT common femoral, femoral and popliteal   veins.  Doppler examination shows normal spontaneous and phasic flow.  No RIGHT calf vein thrombosis is detected.    LEFT:  Normal compressibility of the LEFT common femoral, femoral and popliteal   veins.  Doppler examination shows normal spontaneous and phasic flow.  No LEFT calf vein thrombosis is detected.    IMPRESSION:  No evidence of deep venous thrombosis in either lower extremity.            --- End of Report ---    < end of copied text >  < from: Xray Tibia + Fibula 2 Views, Left (09.20.24 @ 02:55) >  INTERPRETATION:  Clinical indication: Lower extremity pain.    TECHNIQUE: 4 views of the left tibia-fibula.    COMPARISON: September 3, 2024.    Findings/  impression:    No acute fracture or dislocation. No radiopaque foreign bodies.    --- End of Report ---            ELLIOT LANDAU MD; Attending Radiologist    < end of copied text >    CARDIOLOGY TESTING            
Podiatry Consult Note    Subjective:  WILI ALFARO  Seen Bedside 68y Male  .   Patient is a 68y old  Male who presents with a chief complaint of b/l LE pain (21 Sep 2024 09:40)    HPI:  68-year-old male history of DM2, HLD, HTN, OA, Asthma, recurrent pancreatitis, Prostate cancer s/p hx of seeding, appendectomy, s/p Hip L replacement 9/2015.  recent admission 09/04 - 09/07 for cellulitis of left lower extremity presenting to ER for evaluation of left lower extremity pain redness and swelling.  Patient states finished course of p.o. antibiotics. States over the past few days has had no recurrent pain redness and swelling to left lower extremity. States initially had small cuts to area before symptoms started. He has no fever, chills, nausea, vomiting, recent trauma injury or falls. Patient reports inability to bear weight or ambulate.     In ED:  VITALS: BP: 142/82  HR: 71  RR: 16  TEMP: 98.3  SPO2: 96%      LABS: Unremarkable     VA DUPLEX b/l LE negative for any acute DVT  XRAY Tibia+Fibula negative for any acute injury   (20 Sep 2024 22:55)      Past Medical History and Surgical History  PAST MEDICAL & SURGICAL HISTORY:  Diabetes mellitus, type II      Hypertension      Prostate cancer  s/p history of seeding      Hyperlipidemia      Osteoarthritis      Pancreatitis  recurrent      Prostate cancer  s/p history of seeding      History of appendectomy      S/P hip replacement, left  9/2015           Review of Systems:  [X] Ten point review of systems is otherwise negative except as noted     Objective:  Vital Signs Last 24 Hrs  T(C): 36.5 (21 Sep 2024 12:00), Max: 37 (21 Sep 2024 05:15)  T(F): 97.7 (21 Sep 2024 12:00), Max: 98.6 (21 Sep 2024 05:15)  HR: 59 (21 Sep 2024 12:00) (59 - 66)  BP: 144/90 (21 Sep 2024 12:00) (130/82 - 156/82)  BP(mean): --  RR: 20 (21 Sep 2024 12:00) (18 - 20)  SpO2: 97% (21 Sep 2024 12:00) (97% - 98%)    Parameters below as of 21 Sep 2024 07:41  Patient On (Oxygen Delivery Method): room air                            12.0   6.63  )-----------( 253      ( 21 Sep 2024 06:55 )             36.4                 09-21    140  |  101  |  9[L]  ----------------------------<  159[H]  3.8   |  24  |  0.7    Ca    9.0      21 Sep 2024 06:55  Phos  3.6     09-21  Mg     2.1     09-21    TPro  7.2  /  Alb  4.3  /  TBili  0.6  /  DBili  x   /  AST  17  /  ALT  16  /  AlkPhos  107  09-21        Physical Exam - Lower Extremity Focused:   Derm: Skin supple. No wounds were appreciated BL to the ankles. No cardinal signs of infection including erythema or edema. No temperature gradient of note or concerning for acute infection.  Vascular: DP and PT Pulses Diminished; Foot is Warm to the touch; Capillary Refill Time < 3 Seconds;    Neuro: Protective Sensation intact  MSK: Pain and guarding upon Palpation of BL Lateral malleoli that do not appear genuine     Assessment:  Pain Left ankle  Pain right ankle    Plan:  Chart reviewed and Patient evaluated. All Questions and Concerns Addressed and Answered  XR Imaging normal findings  Weight Bearing Status; WBAT  Patient stable from podiatric standpoint, Podiatry signing out on patient.  Please re-consult podiatry should symptoms progress or new symptoms arise.  Discussed Plan w/ Dr. Hughes    Podiatry   Please message on teams for further questions

## 2024-09-21 NOTE — PROGRESS NOTE ADULT - SUBJECTIVE AND OBJECTIVE BOX
SUBJECTIVE/OVERNIGHT EVENTS  Today is hospital day 1d. This morning patient was seen and examined at bedside, resting comfortably in bed. No acute or major events overnight. Patient complains of severe b/l shin/ankle pain. Denies fever, chills, nausea, vomiting, diarrhea, constipation, hematochezia, dysuria, hematuria, chest pain, palpitations, sob. Patient was informed of his plan of care at this time.    CODE STATUS: FULL    MEDICATIONS  STANDING MEDICATIONS  albuterol    0.083%. 2.5 milliGRAM(s) Nebulizer once  amLODIPine   Tablet 5 milliGRAM(s) Oral daily  ampicillin/sulbactam  IVPB      ampicillin/sulbactam  IVPB 3 Gram(s) IV Intermittent every 6 hours  aspirin enteric coated 81 milliGRAM(s) Oral daily  atorvastatin 40 milliGRAM(s) Oral at bedtime  bisacodyl 5 milliGRAM(s) Oral at bedtime  dextrose 5%. 1000 milliLiter(s) IV Continuous <Continuous>  dextrose 5%. 1000 milliLiter(s) IV Continuous <Continuous>  dextrose 50% Injectable 25 Gram(s) IV Push once  dextrose 50% Injectable 12.5 Gram(s) IV Push once  dextrose 50% Injectable 25 Gram(s) IV Push once  furosemide   Injectable 40 milliGRAM(s) IV Push daily  glucagon  Injectable 1 milliGRAM(s) IntraMuscular once  influenza  Vaccine (HIGH DOSE) 0.5 milliLiter(s) IntraMuscular once  insulin lispro (ADMELOG) corrective regimen sliding scale   SubCutaneous at bedtime  insulin lispro (ADMELOG) corrective regimen sliding scale   SubCutaneous three times a day before meals  senna 2 Tablet(s) Oral at bedtime  tamsulosin 0.4 milliGRAM(s) Oral at bedtime    PRN MEDICATIONS  dextrose Oral Gel 15 Gram(s) Oral once PRN  ketorolac   Injectable 15 milliGRAM(s) IV Push every 8 hours PRN  morphine  - Injectable 2 milliGRAM(s) IV Push every 4 hours PRN  polyethylene glycol 3350 17 Gram(s) Oral daily PRN    VITALS  T(F): 98.6 (09-21-24 @ 05:15), Max: 98.6 (09-21-24 @ 05:15)  HR: 66 (09-21-24 @ 07:41) (59 - 66)  BP: 156/82 (09-21-24 @ 05:15) (130/82 - 156/82)  RR: 18 (09-21-24 @ 07:41) (18 - 18)  SpO2: 98% (09-21-24 @ 07:41) (97% - 98%)  POCT Blood Glucose.: 107 mg/dL (09-21-24 @ 07:49)    PHYSICAL EXAM  GENERAL  ( X ) NAD, lying in bed comfortably     (  ) obtunded     (  ) lethargic     (  ) somnolent    HEAD  ( X ) Atraumatic     (  ) hematoma     (  ) laceration (specify location:       )     NECK  ( X ) Supple     (  ) neck stiffness     (  ) nuchal rigidity     (  )  no JVD     (  ) JVD present ( -- cm)    HEART  Rate -->  ( X ) normal rate    (  ) bradycardic    (  ) tachycardic  Rhythm -->  ( X ) regular    (  ) regularly irregular    (  ) irregularly irregular  Murmurs -->  ( X ) normal s1/s2    (  ) systolic murmur    (  ) diastolic murmur    (  ) continuous murmur     (  ) S3 present    (  ) S4 present    LUNGS  ( X )Unlabored respirations     (  ) tachypnea  ( X ) B/L air entry     (  ) decreased breath sounds in:  (location     )    (  ) no adventitious sound     (  ) crackles     (  ) wheezing      (  ) rhonchi      (specify location:       )  (  ) chest wall tenderness (specify location:       )    ABDOMEN  ( X ) Soft     (  ) tense   |   ( X ) nondistended     (  ) distended   |   ( X ) +BS     (  ) hypoactive bowel sounds     (  ) hyperactive bowel sounds  ( X ) nontender     (  ) RUQ tenderness     (  ) RLQ tenderness     (  ) LLQ tenderness     (  ) epigastric tenderness     (  ) diffuse tenderness  (  ) Splenomegaly      (  ) Hepatomegaly      (  ) Jaundice     (  ) ecchymosis     EXTREMITIES  (  ) Normal     (  ) Rash     (  ) ecchymosis     (  ) varicose veins      (  ) pitting edema     (  ) non-pitting edema   (  ) ulceration     (  ) gangrene:     (location:     )  ( X ) b/l shin pain with TTP, dryness, and hyperpigmentation    NERVOUS SYSTEM  ( X ) A&Ox3     (  ) confused     (  ) lethargic  CN II-XII:     (  ) Intact     (  ) focal deficits  (Specify:     )   Upper extremities:     (  ) strength X/5     (  ) focal deficit (specify:    )  Lower extremities:     (  ) strength  X/5    (  ) focal deficit (specify:    )    SKIN  ( X ) No rashes or lesions     (  ) maculopapular rash     (  ) pustules     (  ) vesicles     (  ) ulcer     (  ) ecchymosis     (specify location:     )      LABS             12.0   6.63  )-----------( 253      ( 09-21-24 @ 06:55 )             36.4     140  |  101  |  9   -------------------------<  159   09-21-24 @ 06:55  3.8  |  24  |  0.7    Ca      9.0     09-21-24 @ 06:55  Phos   3.6     09-21-24 @ 06:55  Mg     2.1     09-21-24 @ 06:55    TPro  7.2  /  Alb  4.3  /  TBili  0.6  /  DBili  x   /  AST  17  /  ALT  16  /  AlkPhos  107  /  GGT  x     09-21-24 @ 06:55        Urinalysis Basic - ( 21 Sep 2024 06:55 )    Color: x / Appearance: x / SG: x / pH: x  Gluc: 159 mg/dL / Ketone: x  / Bili: x / Urobili: x   Blood: x / Protein: x / Nitrite: x   Leuk Esterase: x / RBC: x / WBC x   Sq Epi: x / Non Sq Epi: x / Bacteria: x          IMAGING  < from: [facility] Duplex Lower Ext Vein Scan, Bilat (09.20.24 @ 08:57) >  IMPRESSION:  No evidence of deep venous thrombosis in either lower extremity.    < end of copied text >    < from: Xray Tibia + Fibula 2 Views, Left (09.20.24 @ 02:55) >  Findings/  impression:    No acute fracture or dislocation. No radiopaque foreign bodies.    < end of copied text >

## 2024-09-21 NOTE — CONSULT NOTE ADULT - TIME BILLING
On this date of service, level of risk to patient is considered: Moderate.   I have personally seen and examined this patient.    I have reviewed all pertinent clinical information and reviewed all relevant imaging and diagnostic studies personally.   I discussed recommendations with the primary team. I discussed recommendations with the primary team.

## 2024-09-21 NOTE — PATIENT PROFILE ADULT - FALL HARM RISK - HARM RISK INTERVENTIONS

## 2024-09-21 NOTE — PROGRESS NOTE ADULT - ASSESSMENT
68-year-old male history of DM2, HLD, HTN, OA, Asthma, recurrent pancreatitis, Prostate cancer s/p hx of seeding, appendectomy, s/p Hip L replacement 9/2015.  recent admission 09/04 - 09/07 for cellulitis of left lower extremity presenting to ER for evaluation of left lower extremity pain redness and swelling.  Patient states finished course of p.o. antibiotics. States over the past few days has had no recurrent pain redness and swelling to left lower extremity. States initially had small cuts to area before symptoms started. He has no fever, chills, nausea, vomiting, recent trauma injury or falls. Patient reports inability to bear weight or ambulate.     #B/L LE pain and edema   #Possible cellulitis iso diabetes  #H/o 2 prior admissions this month for cellulitis s/p courses of abx  - on exam LE warm and tender on touch   - ESR inc, f/u CRP   - Xray b/l ankle negative   - f/u CT LE with IV contrast to r/o abscess  - Venous Doppler neg  - S/p cefazolin   - adequate pain control with PRN Ketorolac and Morphine IM  - C/w Unasyn  - Trial of IV lasix for symptomatic relief  - Vascular surgery recs appreciated       - No acute intervention at this time       - F/u w/ ID about possible unresolved cellulitis  - F/u podiatry c/s  - F/u ID c/s    #Asthma  - C/w home albuterol inhaler as needed    #BPH  - c/w flomax    #Hld  - c/w home statin    #Htn  - c/w home amlodipine     #DM2  - F/u A1c  - Started on ISS    #MISC  - DVT PPx: lovenox  - GI PPx: not indicated  - Diet: DASH/TLC/CC  - Activity: AAT    Pending: podiatry c/s, ID c/s, CT LE 68-year-old male history of DM2, HLD, HTN, OA, Asthma, recurrent pancreatitis, Prostate cancer s/p hx of seeding, appendectomy, s/p Hip L replacement 9/2015.  recent admission 09/04 - 09/07 for cellulitis of left lower extremity presenting to ER for evaluation of left lower extremity pain redness and swelling.  Patient states finished course of p.o. antibiotics. States over the past few days has had no recurrent pain redness and swelling to left lower extremity. States initially had small cuts to area before symptoms started. He has no fever, chills, nausea, vomiting, recent trauma injury or falls. Patient reports inability to bear weight or ambulate.     #B/L LE pain and edema   #Possible cellulitis iso diabetes  #H/o 2 prior admissions this month for cellulitis s/p courses of abx  - on exam LE warm and tender on touch   - ESR inc, CRP neg  - Xray b/l ankle negative   - f/u CT LE with IV contrast to r/o abscess  - Venous Doppler neg  - S/p cefazolin   - adequate pain control with PRN Ketorolac and Morphine IM  - C/w Unasyn  - Trial of IV lasix for symptomatic relief  - Vascular surgery recs appreciated       - No acute intervention at this time       - F/u w/ ID about possible unresolved cellulitis  - F/u podiatry c/s  - F/u ID c/s    #Asthma  - C/w home albuterol inhaler as needed    #BPH  - c/w flomax    #Hld  - c/w home statin    #Htn  - c/w home amlodipine     #DM2  - F/u A1c  - Started on ISS    #MISC  - DVT PPx: lovenox  - GI PPx: not indicated  - Diet: DASH/TLC/CC  - Activity: AAT    Pending: podiatry c/s, ID c/s, CT LE

## 2024-09-22 LAB
ALBUMIN SERPL ELPH-MCNC: 4.7 G/DL — SIGNIFICANT CHANGE UP (ref 3.5–5.2)
ALP SERPL-CCNC: 111 U/L — SIGNIFICANT CHANGE UP (ref 30–115)
ALT FLD-CCNC: 18 U/L — SIGNIFICANT CHANGE UP (ref 0–41)
ANION GAP SERPL CALC-SCNC: 13 MMOL/L — SIGNIFICANT CHANGE UP (ref 7–14)
AST SERPL-CCNC: 17 U/L — SIGNIFICANT CHANGE UP (ref 0–41)
BASOPHILS # BLD AUTO: 0.04 K/UL — SIGNIFICANT CHANGE UP (ref 0–0.2)
BASOPHILS NFR BLD AUTO: 0.6 % — SIGNIFICANT CHANGE UP (ref 0–1)
BILIRUB SERPL-MCNC: 1 MG/DL — SIGNIFICANT CHANGE UP (ref 0.2–1.2)
BUN SERPL-MCNC: 11 MG/DL — SIGNIFICANT CHANGE UP (ref 10–20)
CALCIUM SERPL-MCNC: 9.2 MG/DL — SIGNIFICANT CHANGE UP (ref 8.4–10.5)
CHLORIDE SERPL-SCNC: 102 MMOL/L — SIGNIFICANT CHANGE UP (ref 98–110)
CO2 SERPL-SCNC: 24 MMOL/L — SIGNIFICANT CHANGE UP (ref 17–32)
CREAT SERPL-MCNC: 0.7 MG/DL — SIGNIFICANT CHANGE UP (ref 0.7–1.5)
CRP SERPL-MCNC: <3 MG/L — SIGNIFICANT CHANGE UP
EGFR: 100 ML/MIN/1.73M2 — SIGNIFICANT CHANGE UP
EOSINOPHIL # BLD AUTO: 0.32 K/UL — SIGNIFICANT CHANGE UP (ref 0–0.7)
EOSINOPHIL NFR BLD AUTO: 5 % — SIGNIFICANT CHANGE UP (ref 0–8)
ERYTHROCYTE [SEDIMENTATION RATE] IN BLOOD: 25 MM/HR — HIGH (ref 0–10)
GLUCOSE BLDC GLUCOMTR-MCNC: 100 MG/DL — HIGH (ref 70–99)
GLUCOSE BLDC GLUCOMTR-MCNC: 114 MG/DL — HIGH (ref 70–99)
GLUCOSE BLDC GLUCOMTR-MCNC: 123 MG/DL — HIGH (ref 70–99)
GLUCOSE BLDC GLUCOMTR-MCNC: 130 MG/DL — HIGH (ref 70–99)
GLUCOSE SERPL-MCNC: 110 MG/DL — HIGH (ref 70–99)
HCT VFR BLD CALC: 38.6 % — LOW (ref 42–52)
HGB BLD-MCNC: 12.8 G/DL — LOW (ref 14–18)
IMM GRANULOCYTES NFR BLD AUTO: 0.3 % — SIGNIFICANT CHANGE UP (ref 0.1–0.3)
LYMPHOCYTES # BLD AUTO: 1.32 K/UL — SIGNIFICANT CHANGE UP (ref 1.2–3.4)
LYMPHOCYTES # BLD AUTO: 20.4 % — LOW (ref 20.5–51.1)
MAGNESIUM SERPL-MCNC: 2.1 MG/DL — SIGNIFICANT CHANGE UP (ref 1.8–2.4)
MCHC RBC-ENTMCNC: 27.9 PG — SIGNIFICANT CHANGE UP (ref 27–31)
MCHC RBC-ENTMCNC: 33.2 G/DL — SIGNIFICANT CHANGE UP (ref 32–37)
MCV RBC AUTO: 84.1 FL — SIGNIFICANT CHANGE UP (ref 80–94)
MONOCYTES # BLD AUTO: 0.59 K/UL — SIGNIFICANT CHANGE UP (ref 0.1–0.6)
MONOCYTES NFR BLD AUTO: 9.1 % — SIGNIFICANT CHANGE UP (ref 1.7–9.3)
NEUTROPHILS # BLD AUTO: 4.17 K/UL — SIGNIFICANT CHANGE UP (ref 1.4–6.5)
NEUTROPHILS NFR BLD AUTO: 64.6 % — SIGNIFICANT CHANGE UP (ref 42.2–75.2)
NRBC # BLD: 0 /100 WBCS — SIGNIFICANT CHANGE UP (ref 0–0)
PLATELET # BLD AUTO: 267 K/UL — SIGNIFICANT CHANGE UP (ref 130–400)
PMV BLD: 10.4 FL — SIGNIFICANT CHANGE UP (ref 7.4–10.4)
POTASSIUM SERPL-MCNC: 4 MMOL/L — SIGNIFICANT CHANGE UP (ref 3.5–5)
POTASSIUM SERPL-SCNC: 4 MMOL/L — SIGNIFICANT CHANGE UP (ref 3.5–5)
PROT SERPL-MCNC: 7.4 G/DL — SIGNIFICANT CHANGE UP (ref 6–8)
RBC # BLD: 4.59 M/UL — LOW (ref 4.7–6.1)
RBC # FLD: 13.2 % — SIGNIFICANT CHANGE UP (ref 11.5–14.5)
SODIUM SERPL-SCNC: 139 MMOL/L — SIGNIFICANT CHANGE UP (ref 135–146)
WBC # BLD: 6.46 K/UL — SIGNIFICANT CHANGE UP (ref 4.8–10.8)
WBC # FLD AUTO: 6.46 K/UL — SIGNIFICANT CHANGE UP (ref 4.8–10.8)

## 2024-09-22 PROCEDURE — 73600 X-RAY EXAM OF ANKLE: CPT | Mod: 26,LT,RT

## 2024-09-22 PROCEDURE — 99232 SBSQ HOSP IP/OBS MODERATE 35: CPT

## 2024-09-22 RX ORDER — PREGABALIN 25 MG/1
75 CAPSULE ORAL
Refills: 0 | Status: DISCONTINUED | OUTPATIENT
Start: 2024-09-22 | End: 2024-09-26

## 2024-09-22 RX ADMIN — PREGABALIN 75 MILLIGRAM(S): 25 CAPSULE ORAL at 11:40

## 2024-09-22 RX ADMIN — Medication 81 MILLIGRAM(S): at 11:13

## 2024-09-22 RX ADMIN — FUROSEMIDE 40 MILLIGRAM(S): 10 INJECTION INTRAVENOUS at 06:33

## 2024-09-22 RX ADMIN — MORPHINE SULFATE 2 MILLIGRAM(S): 30 TABLET, FILM COATED, EXTENDED RELEASE ORAL at 10:40

## 2024-09-22 RX ADMIN — Medication 5 MILLIGRAM(S): at 06:32

## 2024-09-22 RX ADMIN — MORPHINE SULFATE 2 MILLIGRAM(S): 30 TABLET, FILM COATED, EXTENDED RELEASE ORAL at 02:24

## 2024-09-22 RX ADMIN — Medication 0.4 MILLIGRAM(S): at 21:00

## 2024-09-22 RX ADMIN — MORPHINE SULFATE 2 MILLIGRAM(S): 30 TABLET, FILM COATED, EXTENDED RELEASE ORAL at 06:35

## 2024-09-22 RX ADMIN — MORPHINE SULFATE 2 MILLIGRAM(S): 30 TABLET, FILM COATED, EXTENDED RELEASE ORAL at 21:21

## 2024-09-22 RX ADMIN — PREGABALIN 75 MILLIGRAM(S): 25 CAPSULE ORAL at 17:32

## 2024-09-22 RX ADMIN — MORPHINE SULFATE 2 MILLIGRAM(S): 30 TABLET, FILM COATED, EXTENDED RELEASE ORAL at 06:56

## 2024-09-22 RX ADMIN — ATORVASTATIN CALCIUM 40 MILLIGRAM(S): 10 TABLET, FILM COATED ORAL at 21:00

## 2024-09-22 RX ADMIN — MORPHINE SULFATE 2 MILLIGRAM(S): 30 TABLET, FILM COATED, EXTENDED RELEASE ORAL at 20:51

## 2024-09-22 RX ADMIN — MORPHINE SULFATE 2 MILLIGRAM(S): 30 TABLET, FILM COATED, EXTENDED RELEASE ORAL at 04:37

## 2024-09-22 NOTE — PROGRESS NOTE ADULT - SUBJECTIVE AND OBJECTIVE BOX
pt seen and examined.     My notes supersede resident's notes in case of discrepancy       ROS: no cp, no sob, no n/v, no fever    Vital Signs Last 24 Hrs  T(C): 36.5 (22 Sep 2024 04:51), Max: 36.7 (21 Sep 2024 21:07)  T(F): 97.7 (22 Sep 2024 04:51), Max: 98 (21 Sep 2024 21:07)  HR: 63 (22 Sep 2024 07:13) (59 - 65)  BP: 147/85 (22 Sep 2024 04:51) (144/90 - 147/85)  BP(mean): --  RR: 18 (22 Sep 2024 07:13) (18 - 20)  SpO2: 95% (22 Sep 2024 07:13) (95% - 98%)    Parameters below as of 22 Sep 2024 07:13  Patient On (Oxygen Delivery Method): room air        physical exam  constitutional NAD, AAOX3, Respiratory  lungs CTA, CVS heart RRR, GI: abdomen Soft NT, ND, BS+, skin: intact, no swelling , has hyperpigmentation on the lower ext, severe tenderness on the legs around the ankles, not proportionate to physical findidngs, hyperesthesia present   neuro exam no focal deficit     MEDICATIONS  (STANDING):  albuterol    0.083%. 2.5 milliGRAM(s) Nebulizer once  amLODIPine   Tablet 5 milliGRAM(s) Oral daily  aspirin enteric coated 81 milliGRAM(s) Oral daily  atorvastatin 40 milliGRAM(s) Oral at bedtime  bisacodyl 5 milliGRAM(s) Oral at bedtime  dextrose 5%. 1000 milliLiter(s) (50 mL/Hr) IV Continuous <Continuous>  dextrose 5%. 1000 milliLiter(s) (100 mL/Hr) IV Continuous <Continuous>  dextrose 50% Injectable 25 Gram(s) IV Push once  dextrose 50% Injectable 12.5 Gram(s) IV Push once  dextrose 50% Injectable 25 Gram(s) IV Push once  enoxaparin Injectable 40 milliGRAM(s) SubCutaneous every 24 hours  glucagon  Injectable 1 milliGRAM(s) IntraMuscular once  influenza  Vaccine (HIGH DOSE) 0.5 milliLiter(s) IntraMuscular once  insulin lispro (ADMELOG) corrective regimen sliding scale   SubCutaneous three times a day before meals  insulin lispro (ADMELOG) corrective regimen sliding scale   SubCutaneous at bedtime  pregabalin 75 milliGRAM(s) Oral two times a day  senna 2 Tablet(s) Oral at bedtime  tamsulosin 0.4 milliGRAM(s) Oral at bedtime    MEDICATIONS  (PRN):  dextrose Oral Gel 15 Gram(s) Oral once PRN Blood Glucose LESS THAN 70 milliGRAM(s)/deciliter  ketorolac   Injectable 15 milliGRAM(s) IV Push every 8 hours PRN Moderate Pain (4 - 6)  morphine  - Injectable 2 milliGRAM(s) IV Push every 4 hours PRN Severe Pain (7 - 10)  polyethylene glycol 3350 17 Gram(s) Oral daily PRN Constipation                            12.8   6.46  )-----------( 267      ( 22 Sep 2024 07:06 )             38.6     09-22    139  |  102  |  11  ----------------------------<  110[H]  4.0   |  24  |  0.7    Ca    9.2      22 Sep 2024 07:06  Phos  3.6     09-21  Mg     2.1     09-22    TPro  7.4  /  Alb  4.7  /  TBili  1.0  /  DBili  x   /  AST  17  /  ALT  18  /  AlkPhos  111  09-22    Procalcitonin: 0.04 ng/mL [0.02 - 0.10] (09-21-24 @ 06:55)    COVID-19 PCR: NotDetec (09-06-24 @ 17:39)    a/p  68-year-old male history of DM2, HLD, HTN, OA, Asthma, recurrent pancreatitis, Prostate cancer s/p hx of seeding, appendectomy, s/p Hip L replacement 9/2015.  recent admission 09/04 - 09/07 for cellulitis of left lower extremity presenting to ER for evaluation of left lower extremity pain redness and swelling.  Patient states finished course of p.o. antibiotics. States over the past few days has had no recurrent pain redness and swelling to left lower extremity. States initially had small cuts to area before symptoms started. He has no fever, chills, nausea, vomiting, recent trauma injury or falls. Patient reports inability to bear weight or ambulate.     # severe bilat ankle tenderness , hyperesthesia, probably related to diabetic neuropathy  started lyrica today   repeat xrays pending  dw pod, they agree with the plan     doubt infectious process, possible DJD /OA , hold abx for now   xray bilat ankles  podiatry consult     # DM  A1C with Estimated Average Glucose Result: 6.7 (09.05.24 @ 09:21)  CAPILLARY BLOOD GLUCOSE    POCT Blood Glucose.: 123 mg/dL (22 Sep 2024 07:45)  POCT Blood Glucose.: 111 mg/dL (21 Sep 2024 21:26)  POCT Blood Glucose.: 119 mg/dL (21 Sep 2024 16:58)  POCT Blood Glucose.: 132 mg/dL (21 Sep 2024 11:37)    insulin per protocol     # HTN, HLD, CAD , stable cont meds  # hx of prostate ca, sp seed , outpt fu   # hx of asthma nebs prn   # hx of pancreatitis, now has no pain     #Progress Note Handoff    Pending :  repeat xrays, anticipate dc in am   Family discussion: oriana pt   Disposition: home   code status: full code

## 2024-09-23 LAB
ALBUMIN SERPL ELPH-MCNC: 4.5 G/DL — SIGNIFICANT CHANGE UP (ref 3.5–5.2)
ALP SERPL-CCNC: 113 U/L — SIGNIFICANT CHANGE UP (ref 30–115)
ALT FLD-CCNC: 17 U/L — SIGNIFICANT CHANGE UP (ref 0–41)
ANION GAP SERPL CALC-SCNC: 11 MMOL/L — SIGNIFICANT CHANGE UP (ref 7–14)
AST SERPL-CCNC: 16 U/L — SIGNIFICANT CHANGE UP (ref 0–41)
BASOPHILS # BLD AUTO: 0.05 K/UL — SIGNIFICANT CHANGE UP (ref 0–0.2)
BASOPHILS NFR BLD AUTO: 0.7 % — SIGNIFICANT CHANGE UP (ref 0–1)
BILIRUB SERPL-MCNC: 0.9 MG/DL — SIGNIFICANT CHANGE UP (ref 0.2–1.2)
BUN SERPL-MCNC: 12 MG/DL — SIGNIFICANT CHANGE UP (ref 10–20)
CALCIUM SERPL-MCNC: 9.1 MG/DL — SIGNIFICANT CHANGE UP (ref 8.4–10.5)
CHLORIDE SERPL-SCNC: 103 MMOL/L — SIGNIFICANT CHANGE UP (ref 98–110)
CO2 SERPL-SCNC: 23 MMOL/L — SIGNIFICANT CHANGE UP (ref 17–32)
CREAT SERPL-MCNC: 0.7 MG/DL — SIGNIFICANT CHANGE UP (ref 0.7–1.5)
CRP SERPL-MCNC: <3 MG/L — SIGNIFICANT CHANGE UP
EGFR: 100 ML/MIN/1.73M2 — SIGNIFICANT CHANGE UP
EOSINOPHIL # BLD AUTO: 0.4 K/UL — SIGNIFICANT CHANGE UP (ref 0–0.7)
EOSINOPHIL NFR BLD AUTO: 6 % — SIGNIFICANT CHANGE UP (ref 0–8)
ERYTHROCYTE [SEDIMENTATION RATE] IN BLOOD: 20 MM/HR — HIGH (ref 0–10)
GLUCOSE BLDC GLUCOMTR-MCNC: 125 MG/DL — HIGH (ref 70–99)
GLUCOSE BLDC GLUCOMTR-MCNC: 150 MG/DL — HIGH (ref 70–99)
GLUCOSE BLDC GLUCOMTR-MCNC: 162 MG/DL — HIGH (ref 70–99)
GLUCOSE BLDC GLUCOMTR-MCNC: 193 MG/DL — HIGH (ref 70–99)
GLUCOSE SERPL-MCNC: 121 MG/DL — HIGH (ref 70–99)
HCT VFR BLD CALC: 40.1 % — LOW (ref 42–52)
HGB BLD-MCNC: 13 G/DL — LOW (ref 14–18)
IMM GRANULOCYTES NFR BLD AUTO: 0.4 % — HIGH (ref 0.1–0.3)
LYMPHOCYTES # BLD AUTO: 1.52 K/UL — SIGNIFICANT CHANGE UP (ref 1.2–3.4)
LYMPHOCYTES # BLD AUTO: 22.7 % — SIGNIFICANT CHANGE UP (ref 20.5–51.1)
MAGNESIUM SERPL-MCNC: 2.1 MG/DL — SIGNIFICANT CHANGE UP (ref 1.8–2.4)
MCHC RBC-ENTMCNC: 27.6 PG — SIGNIFICANT CHANGE UP (ref 27–31)
MCHC RBC-ENTMCNC: 32.4 G/DL — SIGNIFICANT CHANGE UP (ref 32–37)
MCV RBC AUTO: 85.1 FL — SIGNIFICANT CHANGE UP (ref 80–94)
MONOCYTES # BLD AUTO: 0.66 K/UL — HIGH (ref 0.1–0.6)
MONOCYTES NFR BLD AUTO: 9.8 % — HIGH (ref 1.7–9.3)
NEUTROPHILS # BLD AUTO: 4.05 K/UL — SIGNIFICANT CHANGE UP (ref 1.4–6.5)
NEUTROPHILS NFR BLD AUTO: 60.4 % — SIGNIFICANT CHANGE UP (ref 42.2–75.2)
NRBC # BLD: 0 /100 WBCS — SIGNIFICANT CHANGE UP (ref 0–0)
PLATELET # BLD AUTO: 251 K/UL — SIGNIFICANT CHANGE UP (ref 130–400)
PMV BLD: 10.2 FL — SIGNIFICANT CHANGE UP (ref 7.4–10.4)
POTASSIUM SERPL-MCNC: 4.1 MMOL/L — SIGNIFICANT CHANGE UP (ref 3.5–5)
POTASSIUM SERPL-SCNC: 4.1 MMOL/L — SIGNIFICANT CHANGE UP (ref 3.5–5)
PROT SERPL-MCNC: 7.1 G/DL — SIGNIFICANT CHANGE UP (ref 6–8)
RBC # BLD: 4.71 M/UL — SIGNIFICANT CHANGE UP (ref 4.7–6.1)
RBC # FLD: 12.9 % — SIGNIFICANT CHANGE UP (ref 11.5–14.5)
SODIUM SERPL-SCNC: 137 MMOL/L — SIGNIFICANT CHANGE UP (ref 135–146)
WBC # BLD: 6.71 K/UL — SIGNIFICANT CHANGE UP (ref 4.8–10.8)
WBC # FLD AUTO: 6.71 K/UL — SIGNIFICANT CHANGE UP (ref 4.8–10.8)

## 2024-09-23 PROCEDURE — 99232 SBSQ HOSP IP/OBS MODERATE 35: CPT

## 2024-09-23 RX ORDER — CLINDAMYCIN PHOSPHATE 150 MG/ML
600 INJECTION, SOLUTION INTRAVENOUS EVERY 8 HOURS
Refills: 0 | Status: DISCONTINUED | OUTPATIENT
Start: 2024-09-23 | End: 2024-09-26

## 2024-09-23 RX ORDER — PREDNISONE 5 MG/1
20 TABLET ORAL DAILY
Refills: 0 | Status: COMPLETED | OUTPATIENT
Start: 2024-09-23 | End: 2024-09-25

## 2024-09-23 RX ADMIN — MORPHINE SULFATE 2 MILLIGRAM(S): 30 TABLET, FILM COATED, EXTENDED RELEASE ORAL at 08:53

## 2024-09-23 RX ADMIN — MORPHINE SULFATE 2 MILLIGRAM(S): 30 TABLET, FILM COATED, EXTENDED RELEASE ORAL at 20:57

## 2024-09-23 RX ADMIN — PREDNISONE 20 MILLIGRAM(S): 5 TABLET ORAL at 13:10

## 2024-09-23 RX ADMIN — PREGABALIN 75 MILLIGRAM(S): 25 CAPSULE ORAL at 05:16

## 2024-09-23 RX ADMIN — PREGABALIN 75 MILLIGRAM(S): 25 CAPSULE ORAL at 18:05

## 2024-09-23 RX ADMIN — ATORVASTATIN CALCIUM 40 MILLIGRAM(S): 10 TABLET, FILM COATED ORAL at 21:24

## 2024-09-23 RX ADMIN — Medication 5 MILLIGRAM(S): at 05:16

## 2024-09-23 RX ADMIN — Medication 1: at 18:06

## 2024-09-23 RX ADMIN — Medication 0.4 MILLIGRAM(S): at 21:24

## 2024-09-23 RX ADMIN — MORPHINE SULFATE 2 MILLIGRAM(S): 30 TABLET, FILM COATED, EXTENDED RELEASE ORAL at 15:07

## 2024-09-23 RX ADMIN — MORPHINE SULFATE 2 MILLIGRAM(S): 30 TABLET, FILM COATED, EXTENDED RELEASE ORAL at 01:56

## 2024-09-23 RX ADMIN — Medication 81 MILLIGRAM(S): at 11:32

## 2024-09-23 RX ADMIN — MORPHINE SULFATE 2 MILLIGRAM(S): 30 TABLET, FILM COATED, EXTENDED RELEASE ORAL at 20:27

## 2024-09-23 RX ADMIN — BISACODYL 5 MILLIGRAM(S): 5 TABLET, COATED ORAL at 21:24

## 2024-09-23 RX ADMIN — CLINDAMYCIN PHOSPHATE 100 MILLIGRAM(S): 150 INJECTION, SOLUTION INTRAVENOUS at 21:25

## 2024-09-23 RX ADMIN — MORPHINE SULFATE 2 MILLIGRAM(S): 30 TABLET, FILM COATED, EXTENDED RELEASE ORAL at 02:26

## 2024-09-23 RX ADMIN — ENOXAPARIN SODIUM 40 MILLIGRAM(S): 150 INJECTION SUBCUTANEOUS at 11:54

## 2024-09-23 RX ADMIN — CLINDAMYCIN PHOSPHATE 100 MILLIGRAM(S): 150 INJECTION, SOLUTION INTRAVENOUS at 13:11

## 2024-09-23 RX ADMIN — MORPHINE SULFATE 2 MILLIGRAM(S): 30 TABLET, FILM COATED, EXTENDED RELEASE ORAL at 15:21

## 2024-09-23 RX ADMIN — Medication 1: at 11:38

## 2024-09-23 NOTE — PROGRESS NOTE ADULT - ASSESSMENT
68-year-old male history of DM2, HLD, HTN, OA, Asthma, recurrent pancreatitis, Prostate cancer s/p hx of seeding, appendectomy, s/p Hip L replacement 9/2015.  recent admission 09/04 - 09/07 for cellulitis of left lower extremity presenting to ER for evaluation of left lower extremity pain redness and swelling.  Patient states finished course of p.o. antibiotics. States over the past few days has had no recurrent pain redness and swelling to left lower extremity. States initially had small cuts to area before symptoms started. He has no fever, chills, nausea, vomiting, recent trauma injury or falls. Patient reports inability to bear weight or ambulate.     #B/L LE pain and edema   #Possible cellulitis iso diabetes  #H/o 2 prior admissions this month for cellulitis s/p courses of abx  - on exam LE warm and tender on touch   - ESR inc, CRP neg  - Xray L tib/fib negative   - Xray b/l ankle- Right: No acute fracture or dislocation. Speckled densities in the medullary canal in the distal right tibial shaft measuring 4.6 cm in the craniocaudal dimension, likely representing chondroid tumor such as enchondroma. Left: No acute fracture or dislocation. Faint speckled densities in the medullary canal, distal left tibial shaft, likely representing chondroid tumor such as enchondroma.  - CT LE with IV contrast- Bilateral leg subcutaneous edema without drainable abscess. 1.4 x 1.1 cm nonspecific subcutaneous cystic structure along the medial aspect of the proximal right leg, with tubular appearance, of uncertain etiology.  - Venous Doppler neg  - S/p cefazolin, unasyn   - adequate pain control with PRN Ketorolac and Morphine IM  - Trial of IV lasix for symptomatic relief  - Vascular surgery recs appreciated       - No acute intervention at this time       - F/u w/ ID about possible unresolved cellulitis       - F/u Arterial duplex, if positive, will see patient again       - O/p f/u with Dr. Mckinnon  - Podiatry recs appreciated       - Patient is stable, podiatry signed off  - Spoke with Dr. Grazyna Osorio from ortho- there is no acute intervention or treatment for endochondroma and severe pain from endochondroma is unlikely  - ID recs appreciated       - Cellulitis of LLE with underlying PAD/neuropathic pain       - Start clindamycin 600mg IV q8h       - f/u w/ vascular surg    #Asthma  - C/w home albuterol inhaler as needed    #BPH  - c/w flomax    #Hld  - c/w home statin    #Htn  - c/w home amlodipine     #DM2  - A1c 6.7  - C/w ISS    #MISC  - DVT PPx: lovenox  - GI PPx: not needed  - Diet: DASH/TLC/CC  - Activity: AAT    Pending: arterial duplex, resolution of cellulitis

## 2024-09-23 NOTE — PROGRESS NOTE ADULT - SUBJECTIVE AND OBJECTIVE BOX
SUBJECTIVE/OVERNIGHT EVENTS  Today is hospital day 3d. This morning patient was seen and examined at bedside, resting comfortably in bed. No acute or major events overnight. Patient complains of persistent b/l ankle stabbing pain. Denies fever, chills, nausea, vomiting, diarrhea, constipation, hematochezia, dysuria, hematuria, chest pain, palpitations, sob. Patient was informed of his plan of care at this time.    CODE STATUS: FULL    MEDICATIONS  STANDING MEDICATIONS  albuterol    0.083%. 2.5 milliGRAM(s) Nebulizer once  amLODIPine   Tablet 5 milliGRAM(s) Oral daily  aspirin enteric coated 81 milliGRAM(s) Oral daily  atorvastatin 40 milliGRAM(s) Oral at bedtime  bisacodyl 5 milliGRAM(s) Oral at bedtime  clindamycin IVPB 600 milliGRAM(s) IV Intermittent every 8 hours  dextrose 5%. 1000 milliLiter(s) IV Continuous <Continuous>  dextrose 5%. 1000 milliLiter(s) IV Continuous <Continuous>  dextrose 50% Injectable 25 Gram(s) IV Push once  dextrose 50% Injectable 12.5 Gram(s) IV Push once  dextrose 50% Injectable 25 Gram(s) IV Push once  enoxaparin Injectable 40 milliGRAM(s) SubCutaneous every 24 hours  glucagon  Injectable 1 milliGRAM(s) IntraMuscular once  influenza  Vaccine (HIGH DOSE) 0.5 milliLiter(s) IntraMuscular once  insulin lispro (ADMELOG) corrective regimen sliding scale   SubCutaneous at bedtime  insulin lispro (ADMELOG) corrective regimen sliding scale   SubCutaneous three times a day before meals  pregabalin 75 milliGRAM(s) Oral two times a day  senna 2 Tablet(s) Oral at bedtime  tamsulosin 0.4 milliGRAM(s) Oral at bedtime    PRN MEDICATIONS  dextrose Oral Gel 15 Gram(s) Oral once PRN  ketorolac   Injectable 15 milliGRAM(s) IV Push every 8 hours PRN  morphine  - Injectable 2 milliGRAM(s) IV Push every 4 hours PRN  polyethylene glycol 3350 17 Gram(s) Oral daily PRN    VITALS  T(F): 98.6 (09-22-24 @ 21:30), Max: 98.6 (09-22-24 @ 21:30)  HR: 69 (09-23-24 @ 04:44) (64 - 69)  BP: 148/73 (09-23-24 @ 04:44) (137/78 - 152/78)  RR: 19 (09-23-24 @ 04:44) (19 - 20)  SpO2: 99% (09-23-24 @ 07:55) (95% - 99%)  POCT Blood Glucose.: 125 mg/dL (09-23-24 @ 07:54)  POCT Blood Glucose.: 130 mg/dL (09-22-24 @ 20:53)  POCT Blood Glucose.: 114 mg/dL (09-22-24 @ 17:17)  POCT Blood Glucose.: 100 mg/dL (09-22-24 @ 11:32)    PHYSICAL EXAM  GENERAL  ( X ) NAD, lying in bed comfortably     (  ) obtunded     (  ) lethargic     (  ) somnolent    HEAD  ( X ) Atraumatic     (  ) hematoma     (  ) laceration (specify location:       )     NECK  ( X ) Supple     (  ) neck stiffness     (  ) nuchal rigidity     (  )  no JVD     (  ) JVD present ( -- cm)    HEART  Rate -->  ( X ) normal rate    (  ) bradycardic    (  ) tachycardic  Rhythm -->  ( X ) regular    (  ) regularly irregular    (  ) irregularly irregular  Murmurs -->  ( X ) normal s1/s2    (  ) systolic murmur    (  ) diastolic murmur    (  ) continuous murmur     (  ) S3 present    (  ) S4 present    LUNGS  ( X )Unlabored respirations     (  ) tachypnea  ( X ) B/L air entry     (  ) decreased breath sounds in:  (location     )    (  ) no adventitious sound     (  ) crackles     (  ) wheezing      (  ) rhonchi      (specify location:       )  (  ) chest wall tenderness (specify location:       )    ABDOMEN  ( X ) Soft     (  ) tense   |   ( X ) nondistended     (  ) distended   |   ( X ) +BS     (  ) hypoactive bowel sounds     (  ) hyperactive bowel sounds  ( X ) nontender     (  ) RUQ tenderness     (  ) RLQ tenderness     (  ) LLQ tenderness     (  ) epigastric tenderness     (  ) diffuse tenderness  (  ) Splenomegaly      (  ) Hepatomegaly      (  ) Jaundice     (  ) ecchymosis     EXTREMITIES  (  ) Normal     (  ) Rash     (  ) ecchymosis     (  ) varicose veins      (  ) pitting edema     (  ) non-pitting edema   (  ) ulceration     (  ) gangrene:     (location:     )   ( X ) severe TTP b/l LE and hyperpigmentation    NERVOUS SYSTEM  ( X ) A&Ox3     (  ) confused     (  ) lethargic  CN II-XII:     (  ) Intact     (  ) focal deficits  (Specify:     )   Upper extremities:     (  ) strength X/5     (  ) focal deficit (specify:    )  Lower extremities:     (  ) strength  X/5    (  ) focal deficit (specify:    )    SKIN  ( X ) No rashes or lesions     (  ) maculopapular rash     (  ) pustules     (  ) vesicles     (  ) ulcer     (  ) ecchymosis     (specify location:     )      LABS             13.0   6.71  )-----------( 251      ( 09-23-24 @ 05:21 )             40.1     137  |  103  |  12  -------------------------<  121   09-23-24 @ 05:21  4.1  |  23  |  0.7    Ca      9.1     09-23-24 @ 05:21  Mg     2.1     09-23-24 @ 05:21    TPro  7.1  /  Alb  4.5  /  TBili  0.9  /  DBili  x   /  AST  16  /  ALT  17  /  AlkPhos  113  /  GGT  x     09-23-24 @ 05:21        Urinalysis Basic - ( 23 Sep 2024 05:21 )    Color: x / Appearance: x / SG: x / pH: x  Gluc: 121 mg/dL / Ketone: x  / Bili: x / Urobili: x   Blood: x / Protein: x / Nitrite: x   Leuk Esterase: x / RBC: x / WBC x   Sq Epi: x / Non Sq Epi: x / Bacteria: x          IMAGING  < from: Xray Ankle 2 Views, Bilateral (09.22.24 @ 10:20) >  Findings/  impression:    Right: No acute fracture or dislocation. Speckled densities in the   medullary canal in the distal right tibial shaft measuring 4.6 cm in the   craniocaudal dimension, likely representing chondroid tumor such as   enchondroma.    The ankle mortise is symmetric. Mild osteoarthritis of the tibiotalar   joint. Plantar calcaneal heel spur.    Left: No acute fracture or dislocation. Faint speckled densities in the   medullary canal, distal left tibial shaft, likely representing chondroid   tumor such asenchondroma.    The ankle mortise is symmetric. No ankle joint effusion. Mild   osteoarthritis of the tibiotalar joint. Vascular calcifications.    < end of copied text >

## 2024-09-23 NOTE — PROGRESS NOTE ADULT - SUBJECTIVE AND OBJECTIVE BOX
WILI ALFARO  68y, Male    All available historical data reviewed    OVERNIGHT EVENTS:  no fevers      ROS:  General: Denies rigors, nightsweats  HEENT: Denies headache, rhinorrhea, sore throat, eye pain  CV: Denies CP, palpitations  PULM: Denies wheezing, hemoptysis  GI: Denies hematemesis, hematochezia, melena  : Denies discharge, hematuria  MSK: Denies arthralgias, myalgias  SKIN: Denies rash, lesions  NEURO: Denies paresthesias, weakness  PSYCH: Denies depression, anxiety    VITALS:  T(F): 98.6, Max: 98.6 (09-22-24 @ 21:30)  HR: 69  BP: 148/73  RR: 19Vital Signs Last 24 Hrs  T(C): 37 (22 Sep 2024 21:30), Max: 37 (22 Sep 2024 21:30)  T(F): 98.6 (22 Sep 2024 21:30), Max: 98.6 (22 Sep 2024 21:30)  HR: 69 (23 Sep 2024 04:44) (64 - 69)  BP: 148/73 (23 Sep 2024 04:44) (137/78 - 152/78)  BP(mean): 79 (23 Sep 2024 04:44) (79 - 79)  RR: 19 (23 Sep 2024 04:44) (19 - 20)  SpO2: 99% (23 Sep 2024 07:55) (95% - 99%)    Parameters below as of 23 Sep 2024 07:55  Patient On (Oxygen Delivery Method): room air        TESTS & MEASUREMENTS:                        13.0   6.71  )-----------( 251      ( 23 Sep 2024 05:21 )             40.1     09-23    137  |  103  |  12  ----------------------------<  121[H]  4.1   |  23  |  0.7    Ca    9.1      23 Sep 2024 05:21  Mg     2.1     09-23    TPro  7.1  /  Alb  4.5  /  TBili  0.9  /  DBili  x   /  AST  16  /  ALT  17  /  AlkPhos  113  09-23    LIVER FUNCTIONS - ( 23 Sep 2024 05:21 )  Alb: 4.5 g/dL / Pro: 7.1 g/dL / ALK PHOS: 113 U/L / ALT: 17 U/L / AST: 16 U/L / GGT: x             Urinalysis Basic - ( 23 Sep 2024 05:21 )    Color: x / Appearance: x / SG: x / pH: x  Gluc: 121 mg/dL / Ketone: x  / Bili: x / Urobili: x   Blood: x / Protein: x / Nitrite: x   Leuk Esterase: x / RBC: x / WBC x   Sq Epi: x / Non Sq Epi: x / Bacteria: x          Social History:  Tobacco Use: No  Alcohol Use: No  Drug Use: No    RADIOLOGY & ADDITIONAL TESTS:  Personal review of radiological diagnostics performed  Echo and EKG results noted when applicable.     MEDICATIONS:  albuterol    0.083%. 2.5 milliGRAM(s) Nebulizer once  amLODIPine   Tablet 5 milliGRAM(s) Oral daily  aspirin enteric coated 81 milliGRAM(s) Oral daily  atorvastatin 40 milliGRAM(s) Oral at bedtime  bisacodyl 5 milliGRAM(s) Oral at bedtime  dextrose 5%. 1000 milliLiter(s) IV Continuous <Continuous>  dextrose 5%. 1000 milliLiter(s) IV Continuous <Continuous>  dextrose 50% Injectable 25 Gram(s) IV Push once  dextrose 50% Injectable 12.5 Gram(s) IV Push once  dextrose 50% Injectable 25 Gram(s) IV Push once  dextrose Oral Gel 15 Gram(s) Oral once PRN  enoxaparin Injectable 40 milliGRAM(s) SubCutaneous every 24 hours  glucagon  Injectable 1 milliGRAM(s) IntraMuscular once  influenza  Vaccine (HIGH DOSE) 0.5 milliLiter(s) IntraMuscular once  insulin lispro (ADMELOG) corrective regimen sliding scale   SubCutaneous three times a day before meals  insulin lispro (ADMELOG) corrective regimen sliding scale   SubCutaneous at bedtime  ketorolac   Injectable 15 milliGRAM(s) IV Push every 8 hours PRN  morphine  - Injectable 2 milliGRAM(s) IV Push every 4 hours PRN  polyethylene glycol 3350 17 Gram(s) Oral daily PRN  pregabalin 75 milliGRAM(s) Oral two times a day  senna 2 Tablet(s) Oral at bedtime  tamsulosin 0.4 milliGRAM(s) Oral at bedtime      ANTIBIOTICS:

## 2024-09-23 NOTE — PROGRESS NOTE ADULT - ASSESSMENT
· Assessment	  68-year-old male history of DM2, HLD, HTN, OA, Asthma, recurrent pancreatitis, Prostate cancer s/p hx of seeding, appendectomy, s/p Hip L replacement 9/2015.  recent admission 09/04 - 09/07 for cellulitis of left lower extremity presenting to ER for evaluation of left lower extremity pain redness and swelling.     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

## 2024-09-23 NOTE — PROGRESS NOTE ADULT - SUBJECTIVE AND OBJECTIVE BOX
pt seen and examined.     My notes supersede resident's notes in case of discrepancy       ROS: no cp, no sob, no n/v, no fever    Vital Signs Last 24 Hrs  T(C): 37 (22 Sep 2024 21:30), Max: 37 (22 Sep 2024 21:30)  T(F): 98.6 (22 Sep 2024 21:30), Max: 98.6 (22 Sep 2024 21:30)  HR: 69 (23 Sep 2024 04:44) (64 - 69)  BP: 148/73 (23 Sep 2024 04:44) (137/78 - 152/78)  BP(mean): 79 (23 Sep 2024 04:44) (79 - 79)  RR: 19 (23 Sep 2024 04:44) (19 - 20)  SpO2: 99% (23 Sep 2024 07:55) (95% - 99%)    Parameters below as of 23 Sep 2024 07:55  Patient On (Oxygen Delivery Method): room air        physical exam  constitutional NAD, AAOX3, Respiratory  lungs CTA, CVS heart RRR, GI: abdomen Soft NT, ND, BS+, skin: intact  neuro exam no focal deficit     MEDICATIONS  (STANDING):  albuterol    0.083%. 2.5 milliGRAM(s) Nebulizer once  amLODIPine   Tablet 5 milliGRAM(s) Oral daily  aspirin enteric coated 81 milliGRAM(s) Oral daily  atorvastatin 40 milliGRAM(s) Oral at bedtime  bisacodyl 5 milliGRAM(s) Oral at bedtime  dextrose 5%. 1000 milliLiter(s) (50 mL/Hr) IV Continuous <Continuous>  dextrose 5%. 1000 milliLiter(s) (100 mL/Hr) IV Continuous <Continuous>  dextrose 50% Injectable 25 Gram(s) IV Push once  dextrose 50% Injectable 12.5 Gram(s) IV Push once  dextrose 50% Injectable 25 Gram(s) IV Push once  enoxaparin Injectable 40 milliGRAM(s) SubCutaneous every 24 hours  glucagon  Injectable 1 milliGRAM(s) IntraMuscular once  influenza  Vaccine (HIGH DOSE) 0.5 milliLiter(s) IntraMuscular once  insulin lispro (ADMELOG) corrective regimen sliding scale   SubCutaneous three times a day before meals  insulin lispro (ADMELOG) corrective regimen sliding scale   SubCutaneous at bedtime  pregabalin 75 milliGRAM(s) Oral two times a day  senna 2 Tablet(s) Oral at bedtime  tamsulosin 0.4 milliGRAM(s) Oral at bedtime    MEDICATIONS  (PRN):  dextrose Oral Gel 15 Gram(s) Oral once PRN Blood Glucose LESS THAN 70 milliGRAM(s)/deciliter  ketorolac   Injectable 15 milliGRAM(s) IV Push every 8 hours PRN Moderate Pain (4 - 6)  morphine  - Injectable 2 milliGRAM(s) IV Push every 4 hours PRN Severe Pain (7 - 10)  polyethylene glycol 3350 17 Gram(s) Oral daily PRN Constipation                        13.0   6.71  )-----------( 251      ( 23 Sep 2024 05:21 )             40.1     09-23    137  |  103  |  12  ----------------------------<  121[H]  4.1   |  23  |  0.7    Ca    9.1      23 Sep 2024 05:21  Mg     2.1     09-23    TPro  7.1  /  Alb  4.5  /  TBili  0.9  /  DBili  x   /  AST  16  /  ALT  17  /  AlkPhos  113  09-23    Procalcitonin: 0.04 ng/mL [0.02 - 0.10] (09-21-24 @ 06:55)    COVID-19 PCR: NotDetec (09-06-24 @ 17:39)    < from: Xray Ankle 2 Views, Bilateral (09.22.24 @ 10:20) >  Right: No acute fracture or dislocation. Speckled densities in the   medullary canal in the distal right tibial shaft measuring 4.6 cm in the   craniocaudal dimension, likely representing chondroid tumor such as   enchondroma.    The ankle mortise is symmetric. Mild osteoarthritis of the tibiotalar   joint. Plantar calcaneal heel spur.    Left: No acute fracture or dislocation. Faint speckled densities in the   medullary canal, distal left tibial shaft, likely representing chondroid   tumor such asenchondroma.    The ankle mortise is symmetric. No ankle joint effusion. Mild   osteoarthritis of the tibiotalar joint. Vascular calcifications.    < end of copied text >    a/p  68-year-old male history of DM2, HLD, HTN, OA, Asthma, recurrent pancreatitis, Prostate cancer s/p hx of seeding, appendectomy, s/p Hip L replacement 9/2015.  recent admission 09/04 - 09/07 for cellulitis of left lower extremity presenting to ER for evaluation of left lower extremity pain redness and swelling.  Patient states finished course of p.o. antibiotics. States over the past few days has had no recurrent pain redness and swelling to left lower extremity. States initially had small cuts to area before symptoms started. He has no fever, chills, nausea, vomiting, recent trauma injury or falls. Patient reports inability to bear weight or ambulate.     # severe bilat ankle tenderness , hyperesthesia, probably related to diabetic neuropathy vs due to chondroma   started lyrica today   pod followup     doubt infectious process, possible DJD /OA , hold abx for now   xray bilat ankles  podiatry consult     # DM  A1C with Estimated Average Glucose Result: 6.7 (09.05.24 @ 09:21)  CAPILLARY BLOOD GLUCOSE    POCT Blood Glucose.: 125 mg/dL (23 Sep 2024 07:54)  POCT Blood Glucose.: 130 mg/dL (22 Sep 2024 20:53)  POCT Blood Glucose.: 114 mg/dL (22 Sep 2024 17:17)  POCT Blood Glucose.: 100 mg/dL (22 Sep 2024 11:32)    insulin per protocol     # HTN, HLD, CAD , stable cont meds  # hx of prostate ca, sp seed , outpt fu   # hx of asthma nebs prn   # hx of pancreatitis, now has no pain     #Progress Note Handoff    Pending :  podiatry follow up re chondroma   Family discussion: oriana pt   Disposition: home   code status: full code

## 2024-09-24 LAB
ALBUMIN SERPL ELPH-MCNC: 4.3 G/DL — SIGNIFICANT CHANGE UP (ref 3.5–5.2)
ALP SERPL-CCNC: 110 U/L — SIGNIFICANT CHANGE UP (ref 30–115)
ALT FLD-CCNC: 19 U/L — SIGNIFICANT CHANGE UP (ref 0–41)
ANION GAP SERPL CALC-SCNC: 13 MMOL/L — SIGNIFICANT CHANGE UP (ref 7–14)
AST SERPL-CCNC: 16 U/L — SIGNIFICANT CHANGE UP (ref 0–41)
BASOPHILS # BLD AUTO: 0.03 K/UL — SIGNIFICANT CHANGE UP (ref 0–0.2)
BASOPHILS NFR BLD AUTO: 0.3 % — SIGNIFICANT CHANGE UP (ref 0–1)
BILIRUB SERPL-MCNC: 0.6 MG/DL — SIGNIFICANT CHANGE UP (ref 0.2–1.2)
BUN SERPL-MCNC: 10 MG/DL — SIGNIFICANT CHANGE UP (ref 10–20)
CALCIUM SERPL-MCNC: 9.5 MG/DL — SIGNIFICANT CHANGE UP (ref 8.4–10.5)
CHLORIDE SERPL-SCNC: 103 MMOL/L — SIGNIFICANT CHANGE UP (ref 98–110)
CO2 SERPL-SCNC: 23 MMOL/L — SIGNIFICANT CHANGE UP (ref 17–32)
CREAT SERPL-MCNC: 0.6 MG/DL — LOW (ref 0.7–1.5)
CRP SERPL-MCNC: <3 MG/L — SIGNIFICANT CHANGE UP
EGFR: 105 ML/MIN/1.73M2 — SIGNIFICANT CHANGE UP
EOSINOPHIL # BLD AUTO: 0.08 K/UL — SIGNIFICANT CHANGE UP (ref 0–0.7)
EOSINOPHIL NFR BLD AUTO: 0.9 % — SIGNIFICANT CHANGE UP (ref 0–8)
ERYTHROCYTE [SEDIMENTATION RATE] IN BLOOD: 20 MM/HR — HIGH (ref 0–10)
GLUCOSE BLDC GLUCOMTR-MCNC: 145 MG/DL — HIGH (ref 70–99)
GLUCOSE BLDC GLUCOMTR-MCNC: 172 MG/DL — HIGH (ref 70–99)
GLUCOSE BLDC GLUCOMTR-MCNC: 173 MG/DL — HIGH (ref 70–99)
GLUCOSE BLDC GLUCOMTR-MCNC: 187 MG/DL — HIGH (ref 70–99)
GLUCOSE SERPL-MCNC: 115 MG/DL — HIGH (ref 70–99)
HCT VFR BLD CALC: 38.7 % — LOW (ref 42–52)
HGB BLD-MCNC: 12.5 G/DL — LOW (ref 14–18)
IMM GRANULOCYTES NFR BLD AUTO: 0.3 % — SIGNIFICANT CHANGE UP (ref 0.1–0.3)
LYMPHOCYTES # BLD AUTO: 1.47 K/UL — SIGNIFICANT CHANGE UP (ref 1.2–3.4)
LYMPHOCYTES # BLD AUTO: 15.8 % — LOW (ref 20.5–51.1)
MAGNESIUM SERPL-MCNC: 2.1 MG/DL — SIGNIFICANT CHANGE UP (ref 1.8–2.4)
MCHC RBC-ENTMCNC: 27.7 PG — SIGNIFICANT CHANGE UP (ref 27–31)
MCHC RBC-ENTMCNC: 32.3 G/DL — SIGNIFICANT CHANGE UP (ref 32–37)
MCV RBC AUTO: 85.6 FL — SIGNIFICANT CHANGE UP (ref 80–94)
MONOCYTES # BLD AUTO: 0.86 K/UL — HIGH (ref 0.1–0.6)
MONOCYTES NFR BLD AUTO: 9.2 % — SIGNIFICANT CHANGE UP (ref 1.7–9.3)
NEUTROPHILS # BLD AUTO: 6.85 K/UL — HIGH (ref 1.4–6.5)
NEUTROPHILS NFR BLD AUTO: 73.5 % — SIGNIFICANT CHANGE UP (ref 42.2–75.2)
NRBC # BLD: 0 /100 WBCS — SIGNIFICANT CHANGE UP (ref 0–0)
PLATELET # BLD AUTO: 263 K/UL — SIGNIFICANT CHANGE UP (ref 130–400)
PMV BLD: 10.8 FL — HIGH (ref 7.4–10.4)
POTASSIUM SERPL-MCNC: 4.2 MMOL/L — SIGNIFICANT CHANGE UP (ref 3.5–5)
POTASSIUM SERPL-SCNC: 4.2 MMOL/L — SIGNIFICANT CHANGE UP (ref 3.5–5)
PROT SERPL-MCNC: 7.1 G/DL — SIGNIFICANT CHANGE UP (ref 6–8)
RBC # BLD: 4.52 M/UL — LOW (ref 4.7–6.1)
RBC # FLD: 13.1 % — SIGNIFICANT CHANGE UP (ref 11.5–14.5)
SODIUM SERPL-SCNC: 139 MMOL/L — SIGNIFICANT CHANGE UP (ref 135–146)
WBC # BLD: 9.32 K/UL — SIGNIFICANT CHANGE UP (ref 4.8–10.8)
WBC # FLD AUTO: 9.32 K/UL — SIGNIFICANT CHANGE UP (ref 4.8–10.8)

## 2024-09-24 PROCEDURE — 93925 LOWER EXTREMITY STUDY: CPT | Mod: 26

## 2024-09-24 PROCEDURE — 99232 SBSQ HOSP IP/OBS MODERATE 35: CPT

## 2024-09-24 RX ORDER — CHLORHEXIDINE GLUCONATE ORAL RINSE 1.2 MG/ML
1 SOLUTION DENTAL DAILY
Refills: 0 | Status: DISCONTINUED | OUTPATIENT
Start: 2024-09-24 | End: 2024-09-26

## 2024-09-24 RX ADMIN — PREGABALIN 75 MILLIGRAM(S): 25 CAPSULE ORAL at 05:57

## 2024-09-24 RX ADMIN — MORPHINE SULFATE 2 MILLIGRAM(S): 30 TABLET, FILM COATED, EXTENDED RELEASE ORAL at 10:03

## 2024-09-24 RX ADMIN — PREDNISONE 20 MILLIGRAM(S): 5 TABLET ORAL at 05:57

## 2024-09-24 RX ADMIN — Medication 5 MILLIGRAM(S): at 05:57

## 2024-09-24 RX ADMIN — Medication 0.4 MILLIGRAM(S): at 21:34

## 2024-09-24 RX ADMIN — Medication 81 MILLIGRAM(S): at 12:00

## 2024-09-24 RX ADMIN — Medication 1: at 12:44

## 2024-09-24 RX ADMIN — MORPHINE SULFATE 2 MILLIGRAM(S): 30 TABLET, FILM COATED, EXTENDED RELEASE ORAL at 05:57

## 2024-09-24 RX ADMIN — CLINDAMYCIN PHOSPHATE 100 MILLIGRAM(S): 150 INJECTION, SOLUTION INTRAVENOUS at 21:38

## 2024-09-24 RX ADMIN — Medication 1: at 17:24

## 2024-09-24 RX ADMIN — MORPHINE SULFATE 2 MILLIGRAM(S): 30 TABLET, FILM COATED, EXTENDED RELEASE ORAL at 22:58

## 2024-09-24 RX ADMIN — MORPHINE SULFATE 2 MILLIGRAM(S): 30 TABLET, FILM COATED, EXTENDED RELEASE ORAL at 14:54

## 2024-09-24 RX ADMIN — CLINDAMYCIN PHOSPHATE 100 MILLIGRAM(S): 150 INJECTION, SOLUTION INTRAVENOUS at 14:03

## 2024-09-24 RX ADMIN — CLINDAMYCIN PHOSPHATE 100 MILLIGRAM(S): 150 INJECTION, SOLUTION INTRAVENOUS at 05:57

## 2024-09-24 RX ADMIN — CHLORHEXIDINE GLUCONATE ORAL RINSE 1 APPLICATION(S): 1.2 SOLUTION DENTAL at 12:01

## 2024-09-24 RX ADMIN — ATORVASTATIN CALCIUM 40 MILLIGRAM(S): 10 TABLET, FILM COATED ORAL at 21:34

## 2024-09-24 RX ADMIN — PREGABALIN 75 MILLIGRAM(S): 25 CAPSULE ORAL at 17:23

## 2024-09-24 RX ADMIN — MORPHINE SULFATE 2 MILLIGRAM(S): 30 TABLET, FILM COATED, EXTENDED RELEASE ORAL at 21:37

## 2024-09-24 RX ADMIN — MORPHINE SULFATE 2 MILLIGRAM(S): 30 TABLET, FILM COATED, EXTENDED RELEASE ORAL at 01:08

## 2024-09-24 RX ADMIN — ENOXAPARIN SODIUM 40 MILLIGRAM(S): 150 INJECTION SUBCUTANEOUS at 12:00

## 2024-09-24 NOTE — PROGRESS NOTE ADULT - SUBJECTIVE AND OBJECTIVE BOX
pt seen and examined.     My notes supersede resident's notes in case of discrepancy       ROS: no cp, no sob, no n/v, no fever    Vital Signs Last 24 Hrs  T(C): 36.6 (24 Sep 2024 04:54), Max: 36.6 (23 Sep 2024 21:46)  T(F): 97.9 (24 Sep 2024 04:54), Max: 97.9 (24 Sep 2024 04:54)  HR: 59 (24 Sep 2024 04:54) (59 - 71)  BP: 137/79 (24 Sep 2024 04:54) (137/79 - 150/84)  BP(mean): 98 (24 Sep 2024 04:54) (98 - 98)  RR: 19 (24 Sep 2024 04:54) (18 - 20)  SpO2: 96% (24 Sep 2024 08:05) (95% - 98%)    Parameters below as of 24 Sep 2024 08:05  Patient On (Oxygen Delivery Method): room air        physical exam  constitutional NAD, AAOX3, Respiratory  lungs CTA, CVS heart RRR, GI: abdomen Soft NT, ND, BS+, skin: severe tenderness on bilat ankles, worse on the left side   neuro exam no focal deficit     MEDICATIONS  (STANDING):  albuterol    0.083%. 2.5 milliGRAM(s) Nebulizer once  amLODIPine   Tablet 5 milliGRAM(s) Oral daily  aspirin enteric coated 81 milliGRAM(s) Oral daily  atorvastatin 40 milliGRAM(s) Oral at bedtime  bisacodyl 5 milliGRAM(s) Oral at bedtime  chlorhexidine 2% Cloths 1 Application(s) Topical daily  clindamycin IVPB 600 milliGRAM(s) IV Intermittent every 8 hours  dextrose 5%. 1000 milliLiter(s) (50 mL/Hr) IV Continuous <Continuous>  dextrose 5%. 1000 milliLiter(s) (100 mL/Hr) IV Continuous <Continuous>  dextrose 50% Injectable 25 Gram(s) IV Push once  dextrose 50% Injectable 12.5 Gram(s) IV Push once  dextrose 50% Injectable 25 Gram(s) IV Push once  enoxaparin Injectable 40 milliGRAM(s) SubCutaneous every 24 hours  glucagon  Injectable 1 milliGRAM(s) IntraMuscular once  influenza  Vaccine (HIGH DOSE) 0.5 milliLiter(s) IntraMuscular once  insulin lispro (ADMELOG) corrective regimen sliding scale   SubCutaneous three times a day before meals  insulin lispro (ADMELOG) corrective regimen sliding scale   SubCutaneous at bedtime  predniSONE   Tablet 20 milliGRAM(s) Oral daily  pregabalin 75 milliGRAM(s) Oral two times a day  senna 2 Tablet(s) Oral at bedtime  tamsulosin 0.4 milliGRAM(s) Oral at bedtime    MEDICATIONS  (PRN):  dextrose Oral Gel 15 Gram(s) Oral once PRN Blood Glucose LESS THAN 70 milliGRAM(s)/deciliter  ketorolac   Injectable 15 milliGRAM(s) IV Push every 8 hours PRN Moderate Pain (4 - 6)  morphine  - Injectable 2 milliGRAM(s) IV Push every 4 hours PRN Severe Pain (7 - 10)  polyethylene glycol 3350 17 Gram(s) Oral daily PRN Constipation                            12.5   9.32  )-----------( 263      ( 24 Sep 2024 05:55 )             38.7     09-24    139  |  103  |  10  ----------------------------<  115[H]  4.2   |  23  |  0.6[L]    Ca    9.5      24 Sep 2024 05:55  Mg     2.1     09-24    TPro  7.1  /  Alb  4.3  /  TBili  0.6  /  DBili  x   /  AST  16  /  ALT  19  /  AlkPhos  110  09-24    Procalcitonin: 0.04 ng/mL [0.02 - 0.10] (09-21-24 @ 06:55)    COVID-19 PCR: NotDetec (09-06-24 @ 17:39)    a/p  68-year-old male history of DM2, HLD, HTN, OA, Asthma, recurrent pancreatitis, Prostate cancer s/p hx of seeding, appendectomy, s/p Hip L replacement 9/2015.  recent admission 09/04 - 09/07 for cellulitis of left lower extremity presenting to ER for evaluation of left lower extremity pain redness and swelling.  Patient states finished course of p.o. antibiotics. States over the past few days has had no recurrent pain redness and swelling to left lower extremity. States initially had small cuts to area before symptoms started. He has no fever, chills, nausea, vomiting, recent trauma injury or falls. Patient reports inability to bear weight or ambulate.     # severe bilat ankle tenderness , hyperesthesia, probably related to diabetic neuropathy vs due to chondroma   per ID started on antibiotics   dw ortho , they recommend short course of steroids, ( give 3 days)   pod followup     doubt infectious process, possible DJD /OA , hold abx for now   xray bilat ankles  podiatry consult     # DM  A1C with Estimated Average Glucose Result: 6.7 (09.05.24 @ 09:21)  CAPILLARY BLOOD GLUCOSE  POCT Blood Glucose.: 172 mg/dL (24 Sep 2024 12:35)  POCT Blood Glucose.: 145 mg/dL (24 Sep 2024 08:07)  POCT Blood Glucose.: 150 mg/dL (23 Sep 2024 21:10)  POCT Blood Glucose.: 162 mg/dL (23 Sep 2024 17:11)    insulin per protocol     # HTN, HLD, CAD , stable cont meds  # hx of prostate ca, sp seed , outpt fu   # hx of asthma nebs prn   # hx of pancreatitis, now has no pain     #Progress Note Handoff    Pending : clinical improvement   Family discussion: oriana pt   Disposition: home   code status: full code

## 2024-09-24 NOTE — PROGRESS NOTE ADULT - ASSESSMENT
68-year-old male history of DM2, HLD, HTN, OA, Asthma, recurrent pancreatitis, Prostate cancer s/p hx of seeding, appendectomy, s/p Hip L replacement 9/2015,  recent admission 09/04 - 09/07 for cellulitis of left lower extremity presenting to ER for evaluation of left lower extremity pain redness and swelling.  Patient states finished course of p.o. antibiotics. States over the past few days has had no recurrent pain redness and swelling to left lower extremity. States initially had small cuts to area before symptoms started. He has no fever, chills, nausea, vomiting, recent trauma injury or falls. Patient reports inability to bear weight or ambulate.     #B/L LE pain and edema   #Possible cellulitis iso diabetes  #H/o 2 prior admissions this month for cellulitis s/p courses of abx  - on exam LE warm and tender on touch   - ESR inc, CRP neg  - Xray L tib/fib negative   - Xray b/l ankle- Right: No acute fracture or dislocation. Speckled densities in the medullary canal in the distal right tibial shaft measuring 4.6 cm in the craniocaudal dimension, likely representing chondroid tumor such as enchondroma. Left: No acute fracture or dislocation. Faint speckled densities in the medullary canal, distal left tibial shaft, likely representing chondroid tumor such as enchondroma.  - CT LE with IV contrast- Bilateral leg subcutaneous edema without drainable abscess. 1.4 x 1.1 cm nonspecific subcutaneous cystic structure along the medial aspect of the proximal right leg, with tubular appearance, of uncertain etiology.  - Venous Doppler neg  - S/p cefazolin, unasyn   - adequate pain control with PRN Ketorolac and Morphine IM  - Trial of IV lasix for symptomatic relief  - Vascular surgery recs appreciated       - No acute intervention at this time       - F/u w/ ID about possible unresolved cellulitis       - F/u Arterial duplex, if positive, will see patient again       - O/p f/u with Dr. Mckinnon  - Podiatry recs appreciated       - Patient is stable, podiatry signed off  - Spoke with Dr. Grazyna Osorio from ortho- there is no acute intervention or treatment for endochondroma and severe pain from endochondroma is unlikely  - ID recs appreciated       - Cellulitis of LLE with underlying PAD/neuropathic pain       - C/w clindamycin 600mg IV q8h       - f/u w/ vascular surg       - F/u about outpatient antibiotic regimen  - Started prednisone 20 x 3 doses    #Asthma  - C/w home albuterol inhaler as needed    #BPH  - c/w flomax    #Hld  - c/w home statin    #Htn  - c/w home amlodipine     #DM2  - A1c 6.7  - C/w ISS    #MISC  - DVT PPx: lovenox  - GI PPx: not needed  - Diet: DASH/TLC/CC  - Activity: AAT    Pending: arterial duplex, resolution of cellulitis, ID recs for o/p regimen

## 2024-09-24 NOTE — PROGRESS NOTE ADULT - SUBJECTIVE AND OBJECTIVE BOX
SUBJECTIVE/OVERNIGHT EVENTS  Today is hospital day 4d. This morning patient was seen and examined at bedside, resting comfortably in bed. No acute or major events overnight. Patient has no complaints at this time. Denies fever, chills, nausea, vomiting, diarrhea, constipation, hematochezia, dysuria, hematuria, chest pain, palpitations, sob. Patient was informed of his plan of care at this time.    CODE STATUS:    MEDICATIONS  STANDING MEDICATIONS  albuterol    0.083%. 2.5 milliGRAM(s) Nebulizer once  amLODIPine   Tablet 5 milliGRAM(s) Oral daily  aspirin enteric coated 81 milliGRAM(s) Oral daily  atorvastatin 40 milliGRAM(s) Oral at bedtime  bisacodyl 5 milliGRAM(s) Oral at bedtime  chlorhexidine 2% Cloths 1 Application(s) Topical daily  clindamycin IVPB 600 milliGRAM(s) IV Intermittent every 8 hours  dextrose 5%. 1000 milliLiter(s) IV Continuous <Continuous>  dextrose 5%. 1000 milliLiter(s) IV Continuous <Continuous>  dextrose 50% Injectable 25 Gram(s) IV Push once  dextrose 50% Injectable 12.5 Gram(s) IV Push once  dextrose 50% Injectable 25 Gram(s) IV Push once  enoxaparin Injectable 40 milliGRAM(s) SubCutaneous every 24 hours  glucagon  Injectable 1 milliGRAM(s) IntraMuscular once  influenza  Vaccine (HIGH DOSE) 0.5 milliLiter(s) IntraMuscular once  insulin lispro (ADMELOG) corrective regimen sliding scale   SubCutaneous three times a day before meals  insulin lispro (ADMELOG) corrective regimen sliding scale   SubCutaneous at bedtime  predniSONE   Tablet 20 milliGRAM(s) Oral daily  pregabalin 75 milliGRAM(s) Oral two times a day  senna 2 Tablet(s) Oral at bedtime  tamsulosin 0.4 milliGRAM(s) Oral at bedtime    PRN MEDICATIONS  dextrose Oral Gel 15 Gram(s) Oral once PRN  ketorolac   Injectable 15 milliGRAM(s) IV Push every 8 hours PRN  morphine  - Injectable 2 milliGRAM(s) IV Push every 4 hours PRN  polyethylene glycol 3350 17 Gram(s) Oral daily PRN    VITALS  T(F): 97.9 (09-24-24 @ 04:54), Max: 97.9 (09-24-24 @ 04:54)  HR: 59 (09-24-24 @ 04:54) (59 - 62)  BP: 137/79 (09-24-24 @ 04:54) (137/79 - 150/84)  RR: 19 (09-24-24 @ 04:54) (18 - 19)  SpO2: 96% (09-24-24 @ 08:05) (95% - 96%)  POCT Blood Glucose.: 172 mg/dL (09-24-24 @ 12:35)  POCT Blood Glucose.: 145 mg/dL (09-24-24 @ 08:07)  POCT Blood Glucose.: 150 mg/dL (09-23-24 @ 21:10)  POCT Blood Glucose.: 162 mg/dL (09-23-24 @ 17:11)    PHYSICAL EXAM  GENERAL  (  ) NAD, lying in bed comfortably     (  ) obtunded     (  ) lethargic     (  ) somnolent    HEAD  (  ) Atraumatic     (  ) hematoma     (  ) laceration (specify location:       )     NECK  (  ) Supple     (  ) neck stiffness     (  ) nuchal rigidity     (  )  no JVD     (  ) JVD present ( -- cm)    HEART  Rate -->  (  ) normal rate    (  ) bradycardic    (  ) tachycardic  Rhythm -->  (  ) regular    (  ) regularly irregular    (  ) irregularly irregular  Murmurs -->  (  ) normal s1/s2    (  ) systolic murmur    (  ) diastolic murmur    (  ) continuous murmur     (  ) S3 present    (  ) S4 present    LUNGS  (  )Unlabored respirations     (  ) tachypnea  (  ) B/L air entry     (  ) decreased breath sounds in:  (location     )    (  ) no adventitious sound     (  ) crackles     (  ) wheezing      (  ) rhonchi      (specify location:       )  (  ) chest wall tenderness (specify location:       )    ABDOMEN  (  ) Soft     (  ) tense   |   (  ) nondistended     (  ) distended   |   (  ) +BS     (  ) hypoactive bowel sounds     (  ) hyperactive bowel sounds  (  ) nontender     (  ) RUQ tenderness     (  ) RLQ tenderness     (  ) LLQ tenderness     (  ) epigastric tenderness     (  ) diffuse tenderness  (  ) Splenomegaly      (  ) Hepatomegaly      (  ) Jaundice     (  ) ecchymosis     EXTREMITIES  (  ) Normal     (  ) Rash     (  ) ecchymosis     (  ) varicose veins      (  ) pitting edema     (  ) non-pitting edema   (  ) ulceration     (  ) gangrene:     (location:     )    NERVOUS SYSTEM  (  ) A&Ox3     (  ) confused     (  ) lethargic  CN II-XII:     (  ) Intact     (  ) focal deficits  (Specify:     )   Upper extremities:     (  ) strength X/5     (  ) focal deficit (specify:    )  Lower extremities:     (  ) strength  X/5    (  ) focal deficit (specify:    )    SKIN  (  ) No rashes or lesions     (  ) maculopapular rash     (  ) pustules     (  ) vesicles     (  ) ulcer     (  ) ecchymosis     (specify location:     )    (  ) Indwelling Davey Catheter   Date insterted:    Reason (  ) Critical illness     (  ) urinary retention    (  ) Accurate Ins/Outs Monitoring     (  ) CMO patient    (  ) Central Line  Date inserted:  Location: (  ) Right IJ   (  ) Left IJ   (  ) Right Fem   (  ) Left Fem  (  ) SPC  (  ) pigtail  (  ) PEG tube  (  ) colostomy  (  ) jejunostomy  (  ) U-Dall    LABS             12.5   9.32  )-----------( 263      ( 09-24-24 @ 05:55 )             38.7     139  |  103  |  10  -------------------------<  115   09-24-24 @ 05:55  4.2  |  23  |  0.6    Ca      9.5     09-24-24 @ 05:55  Mg     2.1     09-24-24 @ 05:55    TPro  7.1  /  Alb  4.3  /  TBili  0.6  /  DBili  x   /  AST  16  /  ALT  19  /  AlkPhos  110  /  GGT  x     09-24-24 @ 05:55        Urinalysis Basic - ( 24 Sep 2024 05:55 )    Color: x / Appearance: x / SG: x / pH: x  Gluc: 115 mg/dL / Ketone: x  / Bili: x / Urobili: x   Blood: x / Protein: x / Nitrite: x   Leuk Esterase: x / RBC: x / WBC x   Sq Epi: x / Non Sq Epi: x / Bacteria: x          IMAGING SUBJECTIVE/OVERNIGHT EVENTS  Today is hospital day 4d. This morning patient was seen and examined at bedside, resting comfortably in bed. No acute or major events overnight. Patient complains of persistent, stabbing, b/l LE pain that is improved from yesterday. Denies fever, chills, nausea, vomiting, diarrhea, constipation, hematochezia, dysuria, hematuria, chest pain, palpitations, sob. Patient was informed of his plan of care at this time.     CODE STATUS: FULL    MEDICATIONS  STANDING MEDICATIONS  albuterol    0.083%. 2.5 milliGRAM(s) Nebulizer once  amLODIPine   Tablet 5 milliGRAM(s) Oral daily  aspirin enteric coated 81 milliGRAM(s) Oral daily  atorvastatin 40 milliGRAM(s) Oral at bedtime  bisacodyl 5 milliGRAM(s) Oral at bedtime  chlorhexidine 2% Cloths 1 Application(s) Topical daily  clindamycin IVPB 600 milliGRAM(s) IV Intermittent every 8 hours  dextrose 5%. 1000 milliLiter(s) IV Continuous <Continuous>  dextrose 5%. 1000 milliLiter(s) IV Continuous <Continuous>  dextrose 50% Injectable 25 Gram(s) IV Push once  dextrose 50% Injectable 12.5 Gram(s) IV Push once  dextrose 50% Injectable 25 Gram(s) IV Push once  enoxaparin Injectable 40 milliGRAM(s) SubCutaneous every 24 hours  glucagon  Injectable 1 milliGRAM(s) IntraMuscular once  influenza  Vaccine (HIGH DOSE) 0.5 milliLiter(s) IntraMuscular once  insulin lispro (ADMELOG) corrective regimen sliding scale   SubCutaneous three times a day before meals  insulin lispro (ADMELOG) corrective regimen sliding scale   SubCutaneous at bedtime  predniSONE   Tablet 20 milliGRAM(s) Oral daily  pregabalin 75 milliGRAM(s) Oral two times a day  senna 2 Tablet(s) Oral at bedtime  tamsulosin 0.4 milliGRAM(s) Oral at bedtime    PRN MEDICATIONS  dextrose Oral Gel 15 Gram(s) Oral once PRN  ketorolac   Injectable 15 milliGRAM(s) IV Push every 8 hours PRN  morphine  - Injectable 2 milliGRAM(s) IV Push every 4 hours PRN  polyethylene glycol 3350 17 Gram(s) Oral daily PRN    VITALS  T(F): 97.9 (09-24-24 @ 04:54), Max: 97.9 (09-24-24 @ 04:54)  HR: 59 (09-24-24 @ 04:54) (59 - 62)  BP: 137/79 (09-24-24 @ 04:54) (137/79 - 150/84)  RR: 19 (09-24-24 @ 04:54) (18 - 19)  SpO2: 96% (09-24-24 @ 08:05) (95% - 96%)  POCT Blood Glucose.: 172 mg/dL (09-24-24 @ 12:35)  POCT Blood Glucose.: 145 mg/dL (09-24-24 @ 08:07)  POCT Blood Glucose.: 150 mg/dL (09-23-24 @ 21:10)  POCT Blood Glucose.: 162 mg/dL (09-23-24 @ 17:11)    PHYSICAL EXAM  GENERAL  ( X ) NAD, lying in bed comfortably     (  ) obtunded     (  ) lethargic     (  ) somnolent    HEAD  ( X ) Atraumatic     (  ) hematoma     (  ) laceration (specify location:       )     NECK  ( X ) Supple     (  ) neck stiffness     (  ) nuchal rigidity     (  )  no JVD     (  ) JVD present ( -- cm)    HEART  Rate -->  ( X ) normal rate    (  ) bradycardic    (  ) tachycardic  Rhythm -->  ( X ) regular    (  ) regularly irregular    (  ) irregularly irregular  Murmurs -->  ( X ) normal s1/s2    (  ) systolic murmur    (  ) diastolic murmur    (  ) continuous murmur     (  ) S3 present    (  ) S4 present    LUNGS  ( X )Unlabored respirations     (  ) tachypnea  ( X ) B/L air entry     (  ) decreased breath sounds in:  (location     )    (  ) no adventitious sound     (  ) crackles     (  ) wheezing      (  ) rhonchi      (specify location:       )  (  ) chest wall tenderness (specify location:       )    ABDOMEN  ( X ) Soft     (  ) tense   |   ( X ) nondistended     (  ) distended   |   ( X ) +BS     (  ) hypoactive bowel sounds     (  ) hyperactive bowel sounds  ( X ) nontender     (  ) RUQ tenderness     (  ) RLQ tenderness     (  ) LLQ tenderness     (  ) epigastric tenderness     (  ) diffuse tenderness  (  ) Splenomegaly      (  ) Hepatomegaly      (  ) Jaundice     (  ) ecchymosis     EXTREMITIES  (  ) Normal     (  ) Rash     (  ) ecchymosis     (  ) varicose veins      (  ) pitting edema     (  ) non-pitting edema   (  ) ulceration     (  ) gangrene:     (location:     )   ( X ) b/l LE shin tenderness to palpation and hyperpigmentation, improved    NERVOUS SYSTEM  ( X ) A&Ox3     (  ) confused     (  ) lethargic  CN II-XII:     (  ) Intact     (  ) focal deficits  (Specify:     )   Upper extremities:     (  ) strength X/5     (  ) focal deficit (specify:    )  Lower extremities:     (  ) strength  X/5    (  ) focal deficit (specify:    )    SKIN  ( X ) No rashes or lesions     (  ) maculopapular rash     (  ) pustules     (  ) vesicles     (  ) ulcer     (  ) ecchymosis     (specify location:     )        LABS             12.5   9.32  )-----------( 263      ( 09-24-24 @ 05:55 )             38.7     139  |  103  |  10  -------------------------<  115   09-24-24 @ 05:55  4.2  |  23  |  0.6    Ca      9.5     09-24-24 @ 05:55  Mg     2.1     09-24-24 @ 05:55    TPro  7.1  /  Alb  4.3  /  TBili  0.6  /  DBili  x   /  AST  16  /  ALT  19  /  AlkPhos  110  /  GGT  x     09-24-24 @ 05:55        Urinalysis Basic - ( 24 Sep 2024 05:55 )    Color: x / Appearance: x / SG: x / pH: x  Gluc: 115 mg/dL / Ketone: x  / Bili: x / Urobili: x   Blood: x / Protein: x / Nitrite: x   Leuk Esterase: x / RBC: x / WBC x   Sq Epi: x / Non Sq Epi: x / Bacteria: x

## 2024-09-25 ENCOUNTER — TRANSCRIPTION ENCOUNTER (OUTPATIENT)
Age: 69
End: 2024-09-25

## 2024-09-25 LAB
ALBUMIN SERPL ELPH-MCNC: 4.1 G/DL — SIGNIFICANT CHANGE UP (ref 3.5–5.2)
ALP SERPL-CCNC: 100 U/L — SIGNIFICANT CHANGE UP (ref 30–115)
ALT FLD-CCNC: 22 U/L — SIGNIFICANT CHANGE UP (ref 0–41)
ANION GAP SERPL CALC-SCNC: 14 MMOL/L — SIGNIFICANT CHANGE UP (ref 7–14)
AST SERPL-CCNC: 22 U/L — SIGNIFICANT CHANGE UP (ref 0–41)
BASOPHILS # BLD AUTO: 0.04 K/UL — SIGNIFICANT CHANGE UP (ref 0–0.2)
BASOPHILS NFR BLD AUTO: 0.5 % — SIGNIFICANT CHANGE UP (ref 0–1)
BILIRUB SERPL-MCNC: 0.7 MG/DL — SIGNIFICANT CHANGE UP (ref 0.2–1.2)
BUN SERPL-MCNC: 14 MG/DL — SIGNIFICANT CHANGE UP (ref 10–20)
CALCIUM SERPL-MCNC: 9.1 MG/DL — SIGNIFICANT CHANGE UP (ref 8.4–10.5)
CHLORIDE SERPL-SCNC: 103 MMOL/L — SIGNIFICANT CHANGE UP (ref 98–110)
CO2 SERPL-SCNC: 21 MMOL/L — SIGNIFICANT CHANGE UP (ref 17–32)
CREAT SERPL-MCNC: 0.8 MG/DL — SIGNIFICANT CHANGE UP (ref 0.7–1.5)
CRP SERPL-MCNC: <3 MG/L — SIGNIFICANT CHANGE UP
EGFR: 96 ML/MIN/1.73M2 — SIGNIFICANT CHANGE UP
EOSINOPHIL # BLD AUTO: 0.19 K/UL — SIGNIFICANT CHANGE UP (ref 0–0.7)
EOSINOPHIL NFR BLD AUTO: 2.3 % — SIGNIFICANT CHANGE UP (ref 0–8)
GLUCOSE BLDC GLUCOMTR-MCNC: 120 MG/DL — HIGH (ref 70–99)
GLUCOSE BLDC GLUCOMTR-MCNC: 134 MG/DL — HIGH (ref 70–99)
GLUCOSE BLDC GLUCOMTR-MCNC: 168 MG/DL — HIGH (ref 70–99)
GLUCOSE BLDC GLUCOMTR-MCNC: 208 MG/DL — HIGH (ref 70–99)
GLUCOSE SERPL-MCNC: 119 MG/DL — HIGH (ref 70–99)
HCT VFR BLD CALC: 36.8 % — LOW (ref 42–52)
HGB BLD-MCNC: 12.1 G/DL — LOW (ref 14–18)
IMM GRANULOCYTES NFR BLD AUTO: 0.5 % — HIGH (ref 0.1–0.3)
LYMPHOCYTES # BLD AUTO: 1.97 K/UL — SIGNIFICANT CHANGE UP (ref 1.2–3.4)
LYMPHOCYTES # BLD AUTO: 23.6 % — SIGNIFICANT CHANGE UP (ref 20.5–51.1)
MAGNESIUM SERPL-MCNC: 2 MG/DL — SIGNIFICANT CHANGE UP (ref 1.8–2.4)
MCHC RBC-ENTMCNC: 28.2 PG — SIGNIFICANT CHANGE UP (ref 27–31)
MCHC RBC-ENTMCNC: 32.9 G/DL — SIGNIFICANT CHANGE UP (ref 32–37)
MCV RBC AUTO: 85.8 FL — SIGNIFICANT CHANGE UP (ref 80–94)
MONOCYTES # BLD AUTO: 0.77 K/UL — HIGH (ref 0.1–0.6)
MONOCYTES NFR BLD AUTO: 9.2 % — SIGNIFICANT CHANGE UP (ref 1.7–9.3)
NEUTROPHILS # BLD AUTO: 5.32 K/UL — SIGNIFICANT CHANGE UP (ref 1.4–6.5)
NEUTROPHILS NFR BLD AUTO: 63.9 % — SIGNIFICANT CHANGE UP (ref 42.2–75.2)
NRBC # BLD: 0 /100 WBCS — SIGNIFICANT CHANGE UP (ref 0–0)
PLATELET # BLD AUTO: 258 K/UL — SIGNIFICANT CHANGE UP (ref 130–400)
PMV BLD: 10.7 FL — HIGH (ref 7.4–10.4)
POTASSIUM SERPL-MCNC: 4.7 MMOL/L — SIGNIFICANT CHANGE UP (ref 3.5–5)
POTASSIUM SERPL-SCNC: 4.7 MMOL/L — SIGNIFICANT CHANGE UP (ref 3.5–5)
PROT SERPL-MCNC: 6.9 G/DL — SIGNIFICANT CHANGE UP (ref 6–8)
RBC # BLD: 4.29 M/UL — LOW (ref 4.7–6.1)
RBC # FLD: 13.2 % — SIGNIFICANT CHANGE UP (ref 11.5–14.5)
SODIUM SERPL-SCNC: 138 MMOL/L — SIGNIFICANT CHANGE UP (ref 135–146)
WBC # BLD: 8.33 K/UL — SIGNIFICANT CHANGE UP (ref 4.8–10.8)
WBC # FLD AUTO: 8.33 K/UL — SIGNIFICANT CHANGE UP (ref 4.8–10.8)

## 2024-09-25 PROCEDURE — 99232 SBSQ HOSP IP/OBS MODERATE 35: CPT

## 2024-09-25 RX ORDER — PREDNISONE 5 MG/1
2 TABLET ORAL
Qty: 8 | Refills: 0
Start: 2024-09-25 | End: 2024-09-28

## 2024-09-25 RX ORDER — PREDNISONE 5 MG/1
2 TABLET ORAL
Qty: 10 | Refills: 0
Start: 2024-09-25 | End: 2024-09-29

## 2024-09-25 RX ORDER — MORPHINE SULFATE 30 MG/1
15 TABLET, FILM COATED, EXTENDED RELEASE ORAL EVERY 6 HOURS
Refills: 0 | Status: DISCONTINUED | OUTPATIENT
Start: 2024-09-25 | End: 2024-09-26

## 2024-09-25 RX ORDER — CLINDAMYCIN PHOSPHATE 150 MG/ML
1 INJECTION, SOLUTION INTRAVENOUS
Qty: 18 | Refills: 0
Start: 2024-09-25 | End: 2024-09-30

## 2024-09-25 RX ORDER — PREDNISONE 5 MG/1
40 TABLET ORAL DAILY
Refills: 0 | Status: DISCONTINUED | OUTPATIENT
Start: 2024-09-25 | End: 2024-09-26

## 2024-09-25 RX ORDER — MORPHINE SULFATE 30 MG/1
20 TABLET, FILM COATED, EXTENDED RELEASE ORAL EVERY 6 HOURS
Refills: 0 | Status: DISCONTINUED | OUTPATIENT
Start: 2024-09-25 | End: 2024-09-25

## 2024-09-25 RX ADMIN — MORPHINE SULFATE 15 MILLIGRAM(S): 30 TABLET, FILM COATED, EXTENDED RELEASE ORAL at 19:27

## 2024-09-25 RX ADMIN — PREGABALIN 75 MILLIGRAM(S): 25 CAPSULE ORAL at 17:45

## 2024-09-25 RX ADMIN — PREDNISONE 40 MILLIGRAM(S): 5 TABLET ORAL at 17:45

## 2024-09-25 RX ADMIN — Medication 81 MILLIGRAM(S): at 11:44

## 2024-09-25 RX ADMIN — Medication 2: at 17:41

## 2024-09-25 RX ADMIN — Medication 1: at 11:45

## 2024-09-25 RX ADMIN — PREDNISONE 20 MILLIGRAM(S): 5 TABLET ORAL at 06:17

## 2024-09-25 RX ADMIN — Medication 5 MILLIGRAM(S): at 06:17

## 2024-09-25 RX ADMIN — MORPHINE SULFATE 2 MILLIGRAM(S): 30 TABLET, FILM COATED, EXTENDED RELEASE ORAL at 06:38

## 2024-09-25 RX ADMIN — ENOXAPARIN SODIUM 40 MILLIGRAM(S): 150 INJECTION SUBCUTANEOUS at 11:44

## 2024-09-25 RX ADMIN — CLINDAMYCIN PHOSPHATE 100 MILLIGRAM(S): 150 INJECTION, SOLUTION INTRAVENOUS at 13:52

## 2024-09-25 RX ADMIN — ATORVASTATIN CALCIUM 40 MILLIGRAM(S): 10 TABLET, FILM COATED ORAL at 21:46

## 2024-09-25 RX ADMIN — MORPHINE SULFATE 2 MILLIGRAM(S): 30 TABLET, FILM COATED, EXTENDED RELEASE ORAL at 10:13

## 2024-09-25 RX ADMIN — CLINDAMYCIN PHOSPHATE 100 MILLIGRAM(S): 150 INJECTION, SOLUTION INTRAVENOUS at 06:17

## 2024-09-25 RX ADMIN — CHLORHEXIDINE GLUCONATE ORAL RINSE 1 APPLICATION(S): 1.2 SOLUTION DENTAL at 11:47

## 2024-09-25 RX ADMIN — Medication 0.4 MILLIGRAM(S): at 21:46

## 2024-09-25 RX ADMIN — PREGABALIN 75 MILLIGRAM(S): 25 CAPSULE ORAL at 06:17

## 2024-09-25 RX ADMIN — CLINDAMYCIN PHOSPHATE 100 MILLIGRAM(S): 150 INJECTION, SOLUTION INTRAVENOUS at 21:46

## 2024-09-25 RX ADMIN — MORPHINE SULFATE 2 MILLIGRAM(S): 30 TABLET, FILM COATED, EXTENDED RELEASE ORAL at 14:25

## 2024-09-25 RX ADMIN — MORPHINE SULFATE 2 MILLIGRAM(S): 30 TABLET, FILM COATED, EXTENDED RELEASE ORAL at 10:35

## 2024-09-25 RX ADMIN — MORPHINE SULFATE 15 MILLIGRAM(S): 30 TABLET, FILM COATED, EXTENDED RELEASE ORAL at 19:00

## 2024-09-25 RX ADMIN — MORPHINE SULFATE 2 MILLIGRAM(S): 30 TABLET, FILM COATED, EXTENDED RELEASE ORAL at 06:16

## 2024-09-25 NOTE — PROGRESS NOTE ADULT - ASSESSMENT
· Assessment	  68-year-old male history of DM2, HLD, HTN, OA, Asthma, recurrent pancreatitis, Prostate cancer s/p hx of seeding, appendectomy, s/p Hip L replacement 9/2015.  recent admission 09/04 - 09/07 for cellulitis of left lower extremity presenting to ER for evaluation of left lower extremity pain redness and swelling.     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

## 2024-09-25 NOTE — PROGRESS NOTE ADULT - ASSESSMENT
68-year-old male history of DM2, HLD, HTN, OA, Asthma, recurrent pancreatitis, Prostate cancer s/p hx of seeding, appendectomy, s/p Hip L replacement 9/2015,  recent admission 09/04 - 09/07 for cellulitis of left lower extremity presenting to ER for evaluation of left lower extremity pain redness and swelling.  Patient states finished course of p.o. antibiotics. States over the past few days has had no recurrent pain redness and swelling to left lower extremity. States initially had small cuts to area before symptoms started. He has no fever, chills, nausea, vomiting, recent trauma injury or falls. Patient reports inability to bear weight or ambulate.     #B/L LE pain possibly due to Diabetic Peripheral Neuropathy    complicated by PAD     #Possible cellulitis iso diabetes  #H/o 2 prior admissions this month for cellulitis s/p courses of abx  - on exam LE warm and tender on touch   - ESR inc, CRP neg  - Xray L tib/fib negative   - Xray b/l ankle- Right: No acute fracture or dislocation. Speckled densities in the medullary canal in the distal right tibial shaft measuring 4.6 cm in the craniocaudal dimension, likely representing chondroid tumor such as enchondroma. Left: No acute fracture or dislocation. Faint speckled densities in the medullary canal, distal left tibial shaft, likely representing chondroid tumor such as enchondroma.  - CT LE with IV contrast- Bilateral leg subcutaneous edema without drainable abscess. 1.4 x 1.1 cm nonspecific subcutaneous cystic structure along the medial aspect of the proximal right leg, with tubular appearance, of uncertain etiology.  - Venous Doppler neg  - S/p cefazolin, unasyn   - adequate pain control with PRN Ketorolac and Morphine > will switch to PO   - Vascular surgery recs appreciated       - No acute intervention at this time       - F/u w/ ID about possible unresolved cellulitis       - F/u Arterial duplex,  >>severe stenosis right anterior Tibial art        - O/p f/u with Dr. Mckinnon  - Podiatry recs appreciated       - Patient is stable, podiatry signed off  - Spoke with Dr. Grazyna Osorio from ortho- there is no acute intervention or treatment for endochondroma and severe pain from endochondroma is unlikely  - ID recs appreciated       - Cellulitis of LLE with underlying PAD/neuropathic pain       - C/w clindamycin 600mg IV q8h >> will switch to PO on dc.        - F/u about outpatient antibiotic regimen  - will cw Prednisone for 5 more days     #Asthma  - C/w home albuterol inhaler as needed    #BPH  - c/w flomax    #Hld  - c/w home statin    #Htn  - c/w home amlodipine     #DM2  - A1c 6.7  - C/w ISS    #MISC  - DVT PPx: lovenox  - GI PPx: not needed  - Diet: DASH/TLC/CC  - Activity: AAT    Pending: Pain control  PFD in 24 hrs  Dispo: Home

## 2024-09-25 NOTE — PROGRESS NOTE ADULT - ASSESSMENT
68-year-old male with a medical history significant for diabetes, hypertension, hyperlipidemia, osteoarthritis, asthma, recurrent pancreatitis, and prostate cancer, status post appendectomy and hip replacement, presents to the emergency room for evaluation of pain, redness, and swelling in his left lower extremity.   Admitted for #Possible cellulitis in the setting of diabetes  Vascular surgery evaluated the patient initially upon admission. Pt with palpable pulses. Now re-called to evaluate for the findings of severe stenosis of the right AT artery.  Pt has no foot wounds. Complains are mainly on the left lower extremity shin pains and swelling.       Plan:  - I reviewed labs  - I reviewed radiology imagings  - I personally visualized the imagings  - I discussed the findings and imagings with Dr. Mckinnon:  - no need for any vascular interventions  - patient should be on ASA 81 mg and statin  - follow up with Dr. Mckinnon as outpt in 1 month    Any questions, call [phone]

## 2024-09-25 NOTE — DISCHARGE NOTE PROVIDER - PROVIDER TOKENS
PROVIDER:[TOKEN:[[id number]],SCHEDULEDAPPT:[09/30/2024],ESTABLISHEDPATIENT:[T]],PROVIDER:[TOKEN:[[id number]],FOLLOWUP:[1 month]],PROVIDER:[TOKEN:[[id number]],SCHEDULEDAPPT:[10/09/2024],ESTABLISHEDPATIENT:[T]],PROVIDER:[TOKEN:[[id number]],SCHEDULEDAPPT:[10/30/2024],ESTABLISHEDPATIENT:[T]]

## 2024-09-25 NOTE — PROGRESS NOTE ADULT - CONSTITUTIONAL
Where Is Your Acne Located?: Face
Additional Comments (Use Complete Sentences): Not taking anything, sisters with history of bad acne, one did accutane
obese
normal/well-groomed/no distress

## 2024-09-25 NOTE — PROGRESS NOTE ADULT - SUBJECTIVE AND OBJECTIVE BOX
VASCULAR SURGERY PROGRESS NOTE    CC: left leg edema      Events of past 24 hours: pt seen and examined. Pt has palpable pulses, no wounds          ROS otherwise negative except per subjective and HPI      PAST MEDICAL & SURGICAL HISTORY:  Diabetes mellitus, type II      Hypertension      Prostate cancer  s/p history of seeding      Hyperlipidemia      Osteoarthritis      Pancreatitis  recurrent      Prostate cancer  s/p history of seeding      History of appendectomy      S/P hip replacement, left  9/2015          Vital Signs Last 24 Hrs  T(C): 36.5 (25 Sep 2024 04:52), Max: 36.6 (24 Sep 2024 14:06)  T(F): 97.7 (25 Sep 2024 04:52), Max: 97.8 (24 Sep 2024 14:06)  HR: 56 (25 Sep 2024 04:52) (56 - 76)  BP: 125/83 (25 Sep 2024 04:52) (125/83 - 153/92)  BP(mean): 97 (25 Sep 2024 04:52) (97 - 97)  RR: 19 (25 Sep 2024 04:52) (19 - 20)  SpO2: 100% (25 Sep 2024 04:52) (100% - 100%)    Parameters below as of 25 Sep 2024 04:52  Patient On (Oxygen Delivery Method): room air        Pain (0-10):            Pain Control Adequate: [] YES [] N    Diet:    I&O's Detail      PHYSICAL EXAM    Appearance: Normal	  HEENT:   Normal oral mucosa, PERRL, EOMI	  Neck: Supple, - JVD;   Cardiovascular: Normal S1 S2, No JVD, No murmurs,   Respiratory: Lungs clear to auscultation/No Rales, Rhonchi, Wheezing	  Gastrointestinal:  Soft, Non-tender, + BS	  Skin: No rashes, No ecchymoses, No cyanosis  Extremities: Normal range of motion, No clubbing, cyanosis   left lower extremity edema  Neurologic: Non-focal  Psychiatry: A & O x 3, Mood & affect appropriate    PULSES:  Femoral:  Popliteal:  Dorsal Pedal: palpable b/l  Posterior Tibial: palpable b/l  Capillary:      MEDICATIONS:   MEDICATIONS  (STANDING):  albuterol    0.083%. 2.5 milliGRAM(s) Nebulizer once  amLODIPine   Tablet 5 milliGRAM(s) Oral daily  aspirin enteric coated 81 milliGRAM(s) Oral daily  atorvastatin 40 milliGRAM(s) Oral at bedtime  bisacodyl 5 milliGRAM(s) Oral at bedtime  chlorhexidine 2% Cloths 1 Application(s) Topical daily  clindamycin IVPB 600 milliGRAM(s) IV Intermittent every 8 hours  dextrose 5%. 1000 milliLiter(s) (50 mL/Hr) IV Continuous <Continuous>  dextrose 5%. 1000 milliLiter(s) (100 mL/Hr) IV Continuous <Continuous>  dextrose 50% Injectable 25 Gram(s) IV Push once  dextrose 50% Injectable 12.5 Gram(s) IV Push once  dextrose 50% Injectable 25 Gram(s) IV Push once  enoxaparin Injectable 40 milliGRAM(s) SubCutaneous every 24 hours  glucagon  Injectable 1 milliGRAM(s) IntraMuscular once  influenza  Vaccine (HIGH DOSE) 0.5 milliLiter(s) IntraMuscular once  insulin lispro (ADMELOG) corrective regimen sliding scale   SubCutaneous at bedtime  insulin lispro (ADMELOG) corrective regimen sliding scale   SubCutaneous three times a day before meals  pregabalin 75 milliGRAM(s) Oral two times a day  senna 2 Tablet(s) Oral at bedtime  tamsulosin 0.4 milliGRAM(s) Oral at bedtime    MEDICATIONS  (PRN):  dextrose Oral Gel 15 Gram(s) Oral once PRN Blood Glucose LESS THAN 70 milliGRAM(s)/deciliter  ketorolac   Injectable 15 milliGRAM(s) IV Push every 8 hours PRN Moderate Pain (4 - 6)  morphine  - Injectable 2 milliGRAM(s) IV Push every 4 hours PRN Severe Pain (7 - 10)  polyethylene glycol 3350 17 Gram(s) Oral daily PRN Constipation        LAB/STUDIES:                        12.1   8.33  )-----------( 258      ( 25 Sep 2024 07:40 )             36.8     09-25    138  |  103  |  14  ----------------------------<  119[H]  4.7   |  21  |  0.8    Ca    9.1      25 Sep 2024 07:40  Mg     2.0     09-25    TPro  6.9  /  Alb  4.1  /  TBili  0.7  /  DBili  x   /  AST  22  /  ALT  22  /  AlkPhos  100  09-25      LIVER FUNCTIONS - ( 25 Sep 2024 07:40 )  Alb: 4.1 g/dL / Pro: 6.9 g/dL / ALK PHOS: 100 U/L / ALT: 22 U/L / AST: 22 U/L / GGT: x             Urinalysis Basic - ( 25 Sep 2024 07:40 )    Color: x / Appearance: x / SG: x / pH: x  Gluc: 119 mg/dL / Ketone: x  / Bili: x / Urobili: x   Blood: x / Protein: x / Nitrite: x   Leuk Esterase: x / RBC: x / WBC x   Sq Epi: x / Non Sq Epi: x / Bacteria: x          IMAGING:    VA Duplex Lower Extrem Arterial, Bilat (09.24.24 @ 19:50) >  Mild stenosis of the right common femoral artery. Severe stenosis of the   right anterior tibial artery.    Normal arterial flow in the left lower extremity.      VA Duplex Lower Ext Vein Scan, Bilat (09.20.24 @ 08:57) >  No evidence of deep venous thrombosis in either lower extremity.

## 2024-09-25 NOTE — PROGRESS NOTE ADULT - SUBJECTIVE AND OBJECTIVE BOX
WILI ALFARO  68y, Male    All available historical data reviewed    OVERNIGHT EVENTS:  no fevers  pain Wero reduced    ROS:  General: Denies rigors, nightsweats  HEENT: Denies headache, rhinorrhea, sore throat, eye pain  CV: Denies CP, palpitations  PULM: Denies wheezing, hemoptysis  GI: Denies hematemesis, hematochezia, melena  : Denies discharge, hematuria  MSK: pain Wero  SKIN: Denies rash, lesions  NEURO: weakness  PSYCH: Denies depression, anxiety    VITALS:  T(F): 97.7, Max: 97.8 (09-24-24 @ 14:06)  HR: 56  BP: 125/83  RR: 19Vital Signs Last 24 Hrs  T(C): 36.5 (25 Sep 2024 04:52), Max: 36.6 (24 Sep 2024 14:06)  T(F): 97.7 (25 Sep 2024 04:52), Max: 97.8 (24 Sep 2024 14:06)  HR: 56 (25 Sep 2024 04:52) (56 - 76)  BP: 125/83 (25 Sep 2024 04:52) (125/83 - 153/92)  BP(mean): 97 (25 Sep 2024 04:52) (97 - 97)  RR: 19 (25 Sep 2024 04:52) (19 - 20)  SpO2: 100% (25 Sep 2024 04:52) (96% - 100%)    Parameters below as of 25 Sep 2024 04:52  Patient On (Oxygen Delivery Method): room air        TESTS & MEASUREMENTS:                        12.5   9.32  )-----------( 263      ( 24 Sep 2024 05:55 )             38.7     09-24    139  |  103  |  10  ----------------------------<  115[H]  4.2   |  23  |  0.6[L]    Ca    9.5      24 Sep 2024 05:55  Mg     2.1     09-24    TPro  7.1  /  Alb  4.3  /  TBili  0.6  /  DBili  x   /  AST  16  /  ALT  19  /  AlkPhos  110  09-24    LIVER FUNCTIONS - ( 24 Sep 2024 05:55 )  Alb: 4.3 g/dL / Pro: 7.1 g/dL / ALK PHOS: 110 U/L / ALT: 19 U/L / AST: 16 U/L / GGT: x             Urinalysis Basic - ( 24 Sep 2024 05:55 )    Color: x / Appearance: x / SG: x / pH: x  Gluc: 115 mg/dL / Ketone: x  / Bili: x / Urobili: x   Blood: x / Protein: x / Nitrite: x   Leuk Esterase: x / RBC: x / WBC x   Sq Epi: x / Non Sq Epi: x / Bacteria: x          Social History:  Tobacco Use: No  Alcohol Use: No  Drug Use: No    RADIOLOGY & ADDITIONAL TESTS:  Personal review of radiological diagnostics performed  Echo and EKG results noted when applicable.     MEDICATIONS:  albuterol    0.083%. 2.5 milliGRAM(s) Nebulizer once  amLODIPine   Tablet 5 milliGRAM(s) Oral daily  aspirin enteric coated 81 milliGRAM(s) Oral daily  atorvastatin 40 milliGRAM(s) Oral at bedtime  bisacodyl 5 milliGRAM(s) Oral at bedtime  chlorhexidine 2% Cloths 1 Application(s) Topical daily  clindamycin IVPB 600 milliGRAM(s) IV Intermittent every 8 hours  dextrose 5%. 1000 milliLiter(s) IV Continuous <Continuous>  dextrose 5%. 1000 milliLiter(s) IV Continuous <Continuous>  dextrose 50% Injectable 25 Gram(s) IV Push once  dextrose 50% Injectable 12.5 Gram(s) IV Push once  dextrose 50% Injectable 25 Gram(s) IV Push once  dextrose Oral Gel 15 Gram(s) Oral once PRN  enoxaparin Injectable 40 milliGRAM(s) SubCutaneous every 24 hours  glucagon  Injectable 1 milliGRAM(s) IntraMuscular once  influenza  Vaccine (HIGH DOSE) 0.5 milliLiter(s) IntraMuscular once  insulin lispro (ADMELOG) corrective regimen sliding scale   SubCutaneous three times a day before meals  insulin lispro (ADMELOG) corrective regimen sliding scale   SubCutaneous at bedtime  ketorolac   Injectable 15 milliGRAM(s) IV Push every 8 hours PRN  morphine  - Injectable 2 milliGRAM(s) IV Push every 4 hours PRN  polyethylene glycol 3350 17 Gram(s) Oral daily PRN  pregabalin 75 milliGRAM(s) Oral two times a day  senna 2 Tablet(s) Oral at bedtime  tamsulosin 0.4 milliGRAM(s) Oral at bedtime      ANTIBIOTICS:  clindamycin IVPB 600 milliGRAM(s) IV Intermittent every 8 hours

## 2024-09-25 NOTE — DISCHARGE NOTE PROVIDER - CARE PROVIDERS DIRECT ADDRESSES
[email or web address]

## 2024-09-25 NOTE — PROGRESS NOTE ADULT - SUBJECTIVE AND OBJECTIVE BOX
WILI ALFARO  68y  Male      Patient is a 68y old  Male who presents with a chief complaint of bilateral lower extremity pain,      INTERVAL HPI/OVERNIGHT EVENTS:      ******************************* REVIEW OF SYSTEMS:**********************************************    All other review of systems negative    *********************** VITALS ******************************************    T(F): 97.7 (09-25-24 @ 13:39)  HR: 72 (09-25-24 @ 13:39) (56 - 72)  BP: 164/73 (09-25-24 @ 13:39) (125/83 - 164/73)  RR: 18 (09-25-24 @ 13:39) (18 - 20)  SpO2: 100% (09-25-24 @ 04:52) (100% - 100%)            ******************************** PHYSICAL EXAM:**************************************************  GENERAL: NAD    PSYCH: no agitation, baseline mentation  HEENT:     NERVOUS SYSTEM:  Alert & Oriented X3,     PULMONARY: MINGO, CTA    CARDIOVASCULAR: S1S2 RRR    GI: Soft, NT, ND; BS present.    EXTREMITIES:  2+ Peripheral Pulses, No clubbing, cyanosis, or edema    LYMPH: No lymphadenopathy noted    SKIN: No rashes or lesions      **************************** LABS *******************************************************                          12.1   8.33  )-----------( 258      ( 25 Sep 2024 07:40 )             36.8     09-25    138  |  103  |  14  ----------------------------<  119[H]  4.7   |  21  |  0.8    Ca    9.1      25 Sep 2024 07:40  Mg     2.0     09-25    TPro  6.9  /  Alb  4.1  /  TBili  0.7  /  DBili  x   /  AST  22  /  ALT  22  /  AlkPhos  100  09-25      Urinalysis Basic - ( 25 Sep 2024 07:40 )    Color: x / Appearance: x / SG: x / pH: x  Gluc: 119 mg/dL / Ketone: x  / Bili: x / Urobili: x   Blood: x / Protein: x / Nitrite: x   Leuk Esterase: x / RBC: x / WBC x   Sq Epi: x / Non Sq Epi: x / Bacteria: x        Lactate Trend        CAPILLARY BLOOD GLUCOSE      POCT Blood Glucose.: 168 mg/dL (25 Sep 2024 11:23)          **************************Active Medications *******************************************  No Known Allergies      albuterol    0.083%. 2.5 milliGRAM(s) Nebulizer once  amLODIPine   Tablet 5 milliGRAM(s) Oral daily  aspirin enteric coated 81 milliGRAM(s) Oral daily  atorvastatin 40 milliGRAM(s) Oral at bedtime  bisacodyl 5 milliGRAM(s) Oral at bedtime  chlorhexidine 2% Cloths 1 Application(s) Topical daily  clindamycin IVPB 600 milliGRAM(s) IV Intermittent every 8 hours  dextrose 5%. 1000 milliLiter(s) IV Continuous <Continuous>  dextrose 5%. 1000 milliLiter(s) IV Continuous <Continuous>  dextrose 50% Injectable 25 Gram(s) IV Push once  dextrose 50% Injectable 12.5 Gram(s) IV Push once  dextrose 50% Injectable 25 Gram(s) IV Push once  dextrose Oral Gel 15 Gram(s) Oral once PRN  enoxaparin Injectable 40 milliGRAM(s) SubCutaneous every 24 hours  glucagon  Injectable 1 milliGRAM(s) IntraMuscular once  influenza  Vaccine (HIGH DOSE) 0.5 milliLiter(s) IntraMuscular once  insulin lispro (ADMELOG) corrective regimen sliding scale   SubCutaneous at bedtime  insulin lispro (ADMELOG) corrective regimen sliding scale   SubCutaneous three times a day before meals  ketorolac   Injectable 15 milliGRAM(s) IV Push every 8 hours PRN  morphine  IR 20 milliGRAM(s) Oral every 6 hours PRN  polyethylene glycol 3350 17 Gram(s) Oral daily PRN  predniSONE   Tablet 40 milliGRAM(s) Oral daily  pregabalin 75 milliGRAM(s) Oral two times a day  senna 2 Tablet(s) Oral at bedtime  tamsulosin 0.4 milliGRAM(s) Oral at bedtime      ***************************************************  RADIOLOGY & ADDITIONAL TESTS:    Imaging Personally Reviewed:  [ ] YES  [ ] NO    HEALTH ISSUES - PROBLEM Dx:

## 2024-09-25 NOTE — DISCHARGE NOTE PROVIDER - NSDCMRMEDTOKEN_GEN_ALL_CORE_FT
amLODIPine 5 mg oral tablet: 1 tab(s) orally once a day  aspirin 81 mg oral tablet: 1 tab(s) orally once a day - Hold until surgery.   atorvastatin 40 mg oral tablet: 1 tab(s) orally once a day (at bedtime)  Flomax 0.4 mg oral capsule: 1 cap(s) orally once a day (at bedtime)  ibuprofen 400 mg oral tablet: 1 tab(s) orally 3 times a day as needed for Moderate Pain (4 - 6)  Trulicity Pen 0.75 mg/0.5 mL subcutaneous solution: 0.75 milligram(s) subcutaneously once a week  Tylenol Extended Release 650 mg oral tablet, extended release: 1 tab(s) orally 3 times a day as needed for  moderate pain   amLODIPine 5 mg oral tablet: 1 tab(s) orally once a day  aspirin 81 mg oral tablet: 1 tab(s) orally once a day - Hold until surgery.   atorvastatin 40 mg oral tablet: 1 tab(s) orally once a day (at bedtime)  clindamycin 300 mg oral capsule: 1 cap(s) orally every 8 hours Stop after 6 days  Flomax 0.4 mg oral capsule: 1 cap(s) orally once a day (at bedtime)  ibuprofen 400 mg oral tablet: 1 tab(s) orally 3 times a day as needed for Moderate Pain (4 - 6)  predniSONE 20 mg oral tablet: 2 tab(s) orally once a day Take 2 tabs per day for 2 days, then 1 tab per day for 2 days, then 1/2 tab for 1 day, then stop  Trulicity Pen 0.75 mg/0.5 mL subcutaneous solution: 0.75 milligram(s) subcutaneously once a week  Tylenol Extended Release 650 mg oral tablet, extended release: 1 tab(s) orally 3 times a day as needed for  moderate pain   amLODIPine 5 mg oral tablet: 1 tab(s) orally once a day  aspirin 81 mg oral tablet: 1 tab(s) orally once a day - Hold until surgery.   atorvastatin 40 mg oral tablet: 1 tab(s) orally once a day (at bedtime)  clindamycin 300 mg oral capsule: 1 cap(s) orally every 8 hours Stop after 6 days  Flomax 0.4 mg oral capsule: 1 cap(s) orally once a day (at bedtime)  ibuprofen 400 mg oral tablet: 1 tab(s) orally 3 times a day as needed for Moderate Pain (4 - 6)  morphine 15 mg oral tablet: 1.5 tab(s) orally every 6 hours as needed for  pain MDD: 80  naloxone 4 mg/0.25 mL nasal spray: 1 spray(s) intranasally 2 times a day as needed for opioid overdose  predniSONE 20 mg oral tablet: 2 tab(s) orally once a day Take 2 tabs per day for 1 day, then 1 tab per day for 2 days, then 1/2 tab for 1 day, then stop  Trulicity Pen 0.75 mg/0.5 mL subcutaneous solution: 0.75 milligram(s) subcutaneously once a week  Tylenol Extended Release 650 mg oral tablet, extended release: 1 tab(s) orally 3 times a day as needed for  moderate pain   amLODIPine 5 mg oral tablet: 1 tab(s) orally once a day  aspirin 81 mg oral tablet: 1 tab(s) orally once a day - Hold until surgery.   atorvastatin 40 mg oral tablet: 1 tab(s) orally once a day (at bedtime)  clindamycin 300 mg oral capsule: 1 cap(s) orally every 8 hours Stop after 6 days  Flomax 0.4 mg oral capsule: 1 cap(s) orally once a day (at bedtime)  ibuprofen 400 mg oral tablet: 1 tab(s) orally 3 times a day as needed for Moderate Pain (4 - 6)  morphine 15 mg oral tablet: 1.5 tab(s) orally every 6 hours as needed for  pain MDD: 80  naloxone 4 mg/0.25 mL nasal spray: 1 spray(s) intranasally 2 times a day as needed for opioid overdose  predniSONE 20 mg oral tablet: 2 tab(s) orally once a day Take 2 tabs per day for 1 day, then 1 tab per day for 2 days, then 1/2 tab for 1 day, then stop  pregabalin 75 mg oral capsule: 1 cap(s) orally 2 times a day MDD: 2  Trulicity Pen 0.75 mg/0.5 mL subcutaneous solution: 0.75 milligram(s) subcutaneously once a week  Tylenol Extended Release 650 mg oral tablet, extended release: 1 tab(s) orally 3 times a day as needed for  moderate pain

## 2024-09-25 NOTE — DISCHARGE NOTE PROVIDER - NSDCFUADDAPPT_GEN_ALL_CORE_FT
- Please follow up with your PCP, Dr. Ernst, at your scheduled appointment on 9/30/2024  - Please follow up with vascular surgeon, Dr. Mckinnon, in 1 month  - Please follow up with your gastroenterologist, Dr. High, at your scheduled appointment on 10/09/2024  - Please follow up with your gastroenterologist, Dr. Chilo Florez, at your scheduled appointment on 10/30/2024

## 2024-09-25 NOTE — DISCHARGE NOTE PROVIDER - HOSPITAL COURSE
68-year-old male history of DM2, HLD, HTN, OA, Asthma, recurrent pancreatitis, Prostate cancer s/p hx of seeding, appendectomy, s/p Hip L replacement 9/2015.  recent admission 09/04 - 09/07 for cellulitis of left lower extremity presenting to ER for evaluation of left lower extremity pain redness and swelling.  Patient states finished course of p.o. antibiotics. States over the past few days has had no recurrent pain redness and swelling to left lower extremity. States initially had small cuts to area before symptoms started. He has no fever, chills, nausea, vomiting, recent trauma injury or falls. Patient reports inability to bear weight or ambulate.     In ED:  VITALS: BP: 142/82  HR: 71  RR: 16  TEMP: 98.3  SPO2: 96%      LABS: Unremarkable     VA DUPLEX b/l LE negative for any acute DVT  XRAY Tibia+Fibula negative for any acute injury  - Xray b/l ankle negative for OM  - f/u CT LE with IV contrast to r/o abscess  - Continue cefazolin 1g IV 50mL q8  - adequate pain control with PRN Ketorolac and Morphine IM  - started on Unasyn  - Trial of IV lasix for symptomatic relief   68-year-old male with a past medical history of DM2, HLD, HTN, OA, Asthma, recurrent pancreatitis, Prostate cancer s/p history of seeding, appendectomy, s/p Hip L replacement 9/2015, and recent admission 09/04 - 09/07 for cellulitis of left lower extremity presented to the ED for evaluation of left lower extremity pain redness and swelling. Patient states that he finished course of p.o. antibiotics, however, over the past few days, he has had increased recurrent pain redness and swelling to bilateral lower extremities. Patient reports inability to bear weight or ambulate, however is witnessed walking around.     In the ED, vitals were BP: 142/82, HR: 71, RR: 16, TEMP: 98.3, SPO2: 96% on room air. Labs were unremarkable. A VA DUPLEX b/l LE was done and was negative for any acute DVT. An XRAY Tibia+Fibula was done and was negative for any acute injury. An Xray b/l ankle was done and was negative for osteomyelitis, but showed enchondromas bilaterally. CT LE with IV contrast was done and showed Bilateral leg subcutaneous edema without drainable abscess, 1.4 x 1.1 cm nonspecific subcutaneous cystic structure along the medial aspect of the proximal right leg, with tubular appearance. Patient received cefazolin and unasyn as well as ketorolac and morphine IM for pain control and IV lasix for symptomatic relief. Patient was admitted for recurrent b/l lower extremity pain.    On the floor, orthopedic surgery was called regarding the bilateral enchondromas seen on xray and CT and they recommended that enchondromas require no acute intervention. Arterial duplex was done of b/l LE and showed severe stenosis in the right anterior tibial artery. Vascular surgery was consulted and they recommended no acute vascular intervention at this time with outpatient follow up with Dr. Mckinnon in 1 month. Podiatry was consulted and they recommended no acute podiatric intervention as the patient is stable. ID was consulted and they diagnosed the patient with cellulitis of the left lower extremity with underlying PAS/neuropathic pain. Patient was started on clindamycin and prednisone. Patient's pain has decreased throughout his hospital admission.    Patient is medically stable for discharge.

## 2024-09-25 NOTE — DISCHARGE NOTE PROVIDER - CARE PROVIDER_API CALL
[facility]  [address] [facility] [address] [facility] - [address]  [place] [address]  Phone: [phone]  Fax: [phone]  Established Patient  Scheduled Appointment: 09/30/2024    Jerson Mckinnon  Vascular Surgery  [address], [address]  [place] [address]  Phone: [phone]  Fax: [phone]  Follow Up Time: 1 month    Brando High  Gastroenterology  [address] [place] [address]  Phone: [phone]  Fax: [phone]  Established Patient  Scheduled Appointment: 10/09/2024    Rhea Robles  Gastroenterology  [facility] [place] [address]  Phone: [phone]  Fax: [phone]  Established Patient  Scheduled Appointment: 10/30/2024

## 2024-09-25 NOTE — DISCHARGE NOTE PROVIDER - NSDCFUSCHEDAPPT_GEN_ALL_CORE_FT
Mark Ernst  [facility] PreAdmits  Scheduled Appointment: 09/30/2024    Mark Ernst  [facility]  INTMED  [address]  Scheduled Appointment: 09/30/2024    Brando High  [facility] PreAdmits  Scheduled Appointment: 10/09/2024    [facility]  GASTRO  [address]  Scheduled Appointment: 10/09/2024    Rhea Robles  [facility] PreAdmits  Scheduled Appointment: 10/30/2024    [facility]  GASTRO  [address]  Scheduled Appointment: 10/30/2024

## 2024-09-25 NOTE — DISCHARGE NOTE PROVIDER - NSDCCPCAREPLAN_GEN_ALL_CORE_FT
PRINCIPAL DISCHARGE DIAGNOSIS  Diagnosis: Cellulitis  Assessment and Plan of Treatment: You came in with severe, stabbing, ankle pain and difficulty walking. Xrays and CAT scan were done which showed no bone infection, but did show benign masses on your bones called enchondromas. Orthopedic surgery was called regarding the enchondromas and they stated that these don't cause pain and there is no intervention to be done for them. Infectious disease was called and thought you were having continued cellulitis. You were started on antibiotics and steroids. It is also possible that the pain was exacerbated by your diabetes as neuropathic pain (nerve pain). You were started on a medication for this. Your pain decreased over the course of your hospital stay.  - Please take all medications as prescribed  - Please follow up with your PCP within a week  - If you notice any changes or start feeling worse, please return to the emergency department immediately.      SECONDARY DISCHARGE DIAGNOSES  Diagnosis: Peripheral artery disease  Assessment and Plan of Treatment: While hospitalized, an arterial ultrasound was done of your legs. It showed that you have very decreased blood flow and constricted artery in your right leg. Vascular surgery was called and they recommended no acute vascular interventions while in the hospital, but to continue your aspirin and atorvostatin, as well as outpatient follow-up with Dr. Mckinnon in 1 month.  - Please take all medications as prescribed  - Please follow up with your PCP within a week  - Please follow up with vascular surgeon, Dr. Mckinnon, in 1 month.  - If you notice any changes or start feeling worse, please return to the emergency department immediately.

## 2024-09-26 ENCOUNTER — TRANSCRIPTION ENCOUNTER (OUTPATIENT)
Age: 69
End: 2024-09-26

## 2024-09-26 VITALS
SYSTOLIC BLOOD PRESSURE: 154 MMHG | OXYGEN SATURATION: 97 % | TEMPERATURE: 98 F | RESPIRATION RATE: 18 BRPM | DIASTOLIC BLOOD PRESSURE: 87 MMHG | HEART RATE: 68 BPM

## 2024-09-26 LAB
ALBUMIN SERPL ELPH-MCNC: 4.5 G/DL — SIGNIFICANT CHANGE UP (ref 3.5–5.2)
ALP SERPL-CCNC: 120 U/L — HIGH (ref 30–115)
ALT FLD-CCNC: 21 U/L — SIGNIFICANT CHANGE UP (ref 0–41)
ANION GAP SERPL CALC-SCNC: 12 MMOL/L — SIGNIFICANT CHANGE UP (ref 7–14)
AST SERPL-CCNC: 14 U/L — SIGNIFICANT CHANGE UP (ref 0–41)
BASOPHILS # BLD AUTO: 0.02 K/UL — SIGNIFICANT CHANGE UP (ref 0–0.2)
BASOPHILS NFR BLD AUTO: 0.2 % — SIGNIFICANT CHANGE UP (ref 0–1)
BILIRUB SERPL-MCNC: 0.5 MG/DL — SIGNIFICANT CHANGE UP (ref 0.2–1.2)
BUN SERPL-MCNC: 15 MG/DL — SIGNIFICANT CHANGE UP (ref 10–20)
CALCIUM SERPL-MCNC: 9.3 MG/DL — SIGNIFICANT CHANGE UP (ref 8.4–10.5)
CHLORIDE SERPL-SCNC: 101 MMOL/L — SIGNIFICANT CHANGE UP (ref 98–110)
CO2 SERPL-SCNC: 19 MMOL/L — SIGNIFICANT CHANGE UP (ref 17–32)
CREAT SERPL-MCNC: 0.7 MG/DL — SIGNIFICANT CHANGE UP (ref 0.7–1.5)
EGFR: 100 ML/MIN/1.73M2 — SIGNIFICANT CHANGE UP
EOSINOPHIL # BLD AUTO: 0 K/UL — SIGNIFICANT CHANGE UP (ref 0–0.7)
EOSINOPHIL NFR BLD AUTO: 0 % — SIGNIFICANT CHANGE UP (ref 0–8)
GLUCOSE BLDC GLUCOMTR-MCNC: 172 MG/DL — HIGH (ref 70–99)
GLUCOSE BLDC GLUCOMTR-MCNC: 266 MG/DL — HIGH (ref 70–99)
GLUCOSE BLDC GLUCOMTR-MCNC: 267 MG/DL — HIGH (ref 70–99)
GLUCOSE SERPL-MCNC: 293 MG/DL — HIGH (ref 70–99)
HCT VFR BLD CALC: 36.9 % — LOW (ref 42–52)
HGB BLD-MCNC: 11.9 G/DL — LOW (ref 14–18)
IMM GRANULOCYTES NFR BLD AUTO: 0.5 % — HIGH (ref 0.1–0.3)
LYMPHOCYTES # BLD AUTO: 0.91 K/UL — LOW (ref 1.2–3.4)
LYMPHOCYTES # BLD AUTO: 9.6 % — LOW (ref 20.5–51.1)
MAGNESIUM SERPL-MCNC: 2.3 MG/DL — SIGNIFICANT CHANGE UP (ref 1.8–2.4)
MCHC RBC-ENTMCNC: 28.1 PG — SIGNIFICANT CHANGE UP (ref 27–31)
MCHC RBC-ENTMCNC: 32.2 G/DL — SIGNIFICANT CHANGE UP (ref 32–37)
MCV RBC AUTO: 87 FL — SIGNIFICANT CHANGE UP (ref 80–94)
MONOCYTES # BLD AUTO: 0.68 K/UL — HIGH (ref 0.1–0.6)
MONOCYTES NFR BLD AUTO: 7.2 % — SIGNIFICANT CHANGE UP (ref 1.7–9.3)
NEUTROPHILS # BLD AUTO: 7.85 K/UL — HIGH (ref 1.4–6.5)
NEUTROPHILS NFR BLD AUTO: 82.5 % — HIGH (ref 42.2–75.2)
NRBC # BLD: 0 /100 WBCS — SIGNIFICANT CHANGE UP (ref 0–0)
PLATELET # BLD AUTO: 208 K/UL — SIGNIFICANT CHANGE UP (ref 130–400)
PMV BLD: 11 FL — HIGH (ref 7.4–10.4)
POTASSIUM SERPL-MCNC: 4.7 MMOL/L — SIGNIFICANT CHANGE UP (ref 3.5–5)
POTASSIUM SERPL-SCNC: 4.7 MMOL/L — SIGNIFICANT CHANGE UP (ref 3.5–5)
PROT SERPL-MCNC: 7 G/DL — SIGNIFICANT CHANGE UP (ref 6–8)
RBC # BLD: 4.24 M/UL — LOW (ref 4.7–6.1)
RBC # FLD: 13.3 % — SIGNIFICANT CHANGE UP (ref 11.5–14.5)
SODIUM SERPL-SCNC: 132 MMOL/L — LOW (ref 135–146)
WBC # BLD: 9.51 K/UL — SIGNIFICANT CHANGE UP (ref 4.8–10.8)
WBC # FLD AUTO: 9.51 K/UL — SIGNIFICANT CHANGE UP (ref 4.8–10.8)

## 2024-09-26 RX ORDER — MORPHINE SULFATE 30 MG/1
1.5 TABLET, FILM COATED, EXTENDED RELEASE ORAL
Qty: 42 | Refills: 0
Start: 2024-09-26 | End: 2024-10-02

## 2024-09-26 RX ORDER — PREGABALIN 25 MG/1
1 CAPSULE ORAL
Qty: 60 | Refills: 0
Start: 2024-09-26 | End: 2024-10-25

## 2024-09-26 RX ORDER — OXYCODONE HYDROCHLORIDE 30 MG/1
1 TABLET, FILM COATED, EXTENDED RELEASE ORAL
Qty: 20 | Refills: 0
Start: 2024-09-26 | End: 2024-09-30

## 2024-09-26 RX ADMIN — CLINDAMYCIN PHOSPHATE 100 MILLIGRAM(S): 150 INJECTION, SOLUTION INTRAVENOUS at 06:31

## 2024-09-26 RX ADMIN — Medication 81 MILLIGRAM(S): at 12:06

## 2024-09-26 RX ADMIN — Medication 5 MILLIGRAM(S): at 06:32

## 2024-09-26 RX ADMIN — ENOXAPARIN SODIUM 40 MILLIGRAM(S): 150 INJECTION SUBCUTANEOUS at 12:06

## 2024-09-26 RX ADMIN — PREGABALIN 75 MILLIGRAM(S): 25 CAPSULE ORAL at 17:35

## 2024-09-26 RX ADMIN — Medication 3: at 17:36

## 2024-09-26 RX ADMIN — MORPHINE SULFATE 15 MILLIGRAM(S): 30 TABLET, FILM COATED, EXTENDED RELEASE ORAL at 10:22

## 2024-09-26 RX ADMIN — PREGABALIN 75 MILLIGRAM(S): 25 CAPSULE ORAL at 06:32

## 2024-09-26 RX ADMIN — PREDNISONE 40 MILLIGRAM(S): 5 TABLET ORAL at 06:32

## 2024-09-26 RX ADMIN — CLINDAMYCIN PHOSPHATE 100 MILLIGRAM(S): 150 INJECTION, SOLUTION INTRAVENOUS at 14:04

## 2024-09-26 RX ADMIN — Medication 1: at 12:24

## 2024-09-26 RX ADMIN — Medication 3: at 08:26

## 2024-09-26 RX ADMIN — CHLORHEXIDINE GLUCONATE ORAL RINSE 1 APPLICATION(S): 1.2 SOLUTION DENTAL at 12:08

## 2024-09-26 RX ADMIN — MORPHINE SULFATE 15 MILLIGRAM(S): 30 TABLET, FILM COATED, EXTENDED RELEASE ORAL at 11:22

## 2024-09-26 NOTE — PROGRESS NOTE ADULT - ASSESSMENT
68-year-old male history of DM2, HLD, HTN, OA, Asthma, recurrent pancreatitis, Prostate cancer s/p hx of seeding, appendectomy, s/p Hip L replacement 9/2015,  recent admission 09/04 - 09/07 for cellulitis of left lower extremity presenting to ER for evaluation of left lower extremity pain redness and swelling.  Patient states finished course of p.o. antibiotics. States over the past few days has had no recurrent pain redness and swelling to left lower extremity. States initially had small cuts to area before symptoms started. He has no fever, chills, nausea, vomiting, recent trauma injury or falls. Patient reports inability to bear weight or ambulate.     #B/L LE pain possibly due to Diabetic Peripheral Neuropathy  complicated by PAD     #Possible cellulitis iso diabetes  #H/o 2 prior admissions this month for cellulitis s/p courses of abx  - on exam LE warm and tender on touch   - ESR inc, CRP neg  - Xray L tib/fib negative   - Xray b/l ankle- Right: No acute fracture or dislocation. Speckled densities in the medullary canal in the distal right tibial shaft measuring 4.6 cm in the craniocaudal dimension, likely representing chondroid tumor such as enchondroma. Left: No acute fracture or dislocation. Faint speckled densities in the medullary canal, distal left tibial shaft, likely representing chondroid tumor such as enchondroma.  - CT LE with IV contrast- Bilateral leg subcutaneous edema without drainable abscess. 1.4 x 1.1 cm nonspecific subcutaneous cystic structure along the medial aspect of the proximal right leg, with tubular appearance, of uncertain etiology.  - Venous Doppler neg  - S/p cefazolin, unasyn   - adequate pain control with PRN Ketorolac and PO morphine   - Vascular surgery recs appreciated       - No acute intervention at this time       - F/u w/ ID about possible unresolved cellulitis       - F/u Arterial duplex-->severe stenosis right anterior Tibial art        - O/p f/u with Dr. Mckinnon  - Podiatry recs appreciated       - Patient is stable, podiatry signed off  - Spoke with Dr. Grazyna Osorio from ortho- there is no acute intervention or treatment for endochondroma and severe pain from endochondroma is unlikely  - ID recs appreciated       - Cellulitis of LLE with underlying PAD/neuropathic pain       - C/w clindamycin 600mg IV q8h --> will switch to PO on dc.   - will c/w Prednisone for 4 more days     #Asthma  - C/w home albuterol inhaler as needed    #BPH  - c/w flomax    #Hld  - c/w home statin    #Htn  - c/w home amlodipine     #DM2  - A1c 6.7  - C/w ISS    #MISC  - DVT PPx: lovenox  - GI PPx: not needed  - Diet: DASH/TLC/CC  - Activity: AAT    Pending: d/c

## 2024-09-26 NOTE — DISCHARGE NOTE NURSING/CASE MANAGEMENT/SOCIAL WORK - NURSING SECTION COMPLETE
"OB follow up     Marisol Moses is a 24 y.o.  27w5d being seen today for her obstetrical visit.  Patient reports  mild cramping . Fetal movement: normal.    Her prenatal care is complicated by (and status): anxiety/depression, migraines     Review of Systems  Cramping/contractions : none   Vaginal bleeding: none   Fetal movement good     /73   Wt 82.6 kg (182 lb)   LMP 2023 (Approximate)   BMI 28.51 kg/m²     Prenatal Assessment  Fetal Heart Rate: 134  Fundal Height (cm): 26 cm  Movement: Present  Edema  LLE Edema: None  RLE Edema: None  Facial Edema: None        Assessment    Diagnoses and all orders for this visit:    1. Encounter for supervision of normal first pregnancy in second trimester (Primary)  -     POC Urinalysis Dipstick    2. Depression during pregnancy in second trimester    3. Rh negative status during pregnancy in second trimester    Other orders  -     Rhogam Immune Globulin Immunization        1) pregnancy at 27w5d   doing anemia/GDM/syphilis screen today - expectations reviewed   Reviewed next trimester in detail including but not limited to finding pediatrician, looking into prenatal classes and hospital tours.   Encouraged TDaP booster in pregnancy and why  Encouraged to consider questions regarding L&D, anesthesia, breast feeding and birth control   2) Rh negative, Rhogam done, ordered ABS   3) Depression   Stable off meds    Told to request \"parking pass\"   Please give letter to her to ask to park closer to work due to advanced gestation making it more difficult to walk longer distances    Plan    Reviewed this stage of pregnancy  Problem list updated   Follow up in 2 weeks    Malvin Hartley MD   3/29/2024  11:14 EDT   "
Patient/Caregiver provided printed discharge information.

## 2024-09-26 NOTE — DISCHARGE NOTE NURSING/CASE MANAGEMENT/SOCIAL WORK - NSDCPEFALRISK_GEN_ALL_CORE
For information on Fall & Injury Prevention, visit: [email or web address] [place]  [email or web address] [place]  https://www.cdc.gov/steadi/patient.html

## 2024-09-26 NOTE — DISCHARGE NOTE NURSING/CASE MANAGEMENT/SOCIAL WORK - NSDCVIVACCINE_GEN_ALL_CORE_FT
influenza, injectable, quadrivalent, preservative free; 03-Feb-2019 13:02; Stacy Joe (RN); [employer]; [id number] (Exp. Date: 30-Jun-2019); IntraMuscular; Deltoid Left.; 0.5 milliLiter(s); VIS (VIS Published: 07-Aug-2015, VIS Presented: 03-Feb-2019);   Tdap; 01-Feb-2019 22:39; Angie Serna (RN); [employer]; [id number] (Exp. Date: 02-Nov-2020); IntraMuscular; Deltoid Left.; 0.5 milliLiter(s); VIS (VIS Published: 09-May-2013, VIS Presented: 01-Feb-2019);   Tdap; 23-Oct-2020 20:52; Whitney Hsu (RN); [employer]; [id number] (Exp. Date: 21-Jul-2022); IntraMuscular; Deltoid Right.; 0.5 milliLiter(s); VIS (VIS Published: 09-May-2013, VIS Presented: 23-Oct-2020);   Tdap; 06-Aug-2024 21:18; Telma Milton (RN); [employer]; [id number] (Exp. Date: 20-May-2026); IntraMuscular; Deltoid Left.; 0.5 milliLiter(s); VIS (VIS Published: 09-May-2013, VIS Presented: 06-Aug-2024);

## 2024-09-26 NOTE — DISCHARGE NOTE NURSING/CASE MANAGEMENT/SOCIAL WORK - PATIENT PORTAL LINK FT
You can access the FollowMyHealth Patient Portal offered by [facility] by registering at the following website: [email or web address]. By joining [employer]’s FollowMyHealth portal, you will also be able to view your health information using other applications (apps) compatible with our system.

## 2024-09-26 NOTE — PROGRESS NOTE ADULT - PROVIDER SPECIALTY LIST ADULT
Hospitalist
Internal Medicine
Vascular Surgery
Hospitalist
Infectious Disease
Internal Medicine
Infectious Disease

## 2024-09-26 NOTE — PROGRESS NOTE ADULT - SUBJECTIVE AND OBJECTIVE BOX
SUBJECTIVE/OVERNIGHT EVENTS  Today is hospital day 6d. This morning patient was seen and examined at bedside, resting comfortably in bed. No acute or major events overnight. Patient states that his pain is still present, but has improved. Denies fever, chills, nausea, vomiting, diarrhea, constipation, hematochezia, dysuria, hematuria, chest pain, palpitations, sob. Patient was informed of their plan of care at this time.    CODE STATUS: FULL    MEDICATIONS  STANDING MEDICATIONS  albuterol    0.083%. 2.5 milliGRAM(s) Nebulizer once  amLODIPine   Tablet 5 milliGRAM(s) Oral daily  aspirin enteric coated 81 milliGRAM(s) Oral daily  atorvastatin 40 milliGRAM(s) Oral at bedtime  bisacodyl 5 milliGRAM(s) Oral at bedtime  chlorhexidine 2% Cloths 1 Application(s) Topical daily  clindamycin IVPB 600 milliGRAM(s) IV Intermittent every 8 hours  dextrose 5%. 1000 milliLiter(s) IV Continuous <Continuous>  dextrose 5%. 1000 milliLiter(s) IV Continuous <Continuous>  dextrose 50% Injectable 25 Gram(s) IV Push once  dextrose 50% Injectable 12.5 Gram(s) IV Push once  dextrose 50% Injectable 25 Gram(s) IV Push once  enoxaparin Injectable 40 milliGRAM(s) SubCutaneous every 24 hours  glucagon  Injectable 1 milliGRAM(s) IntraMuscular once  influenza  Vaccine (HIGH DOSE) 0.5 milliLiter(s) IntraMuscular once  insulin lispro (ADMELOG) corrective regimen sliding scale   SubCutaneous three times a day before meals  insulin lispro (ADMELOG) corrective regimen sliding scale   SubCutaneous at bedtime  predniSONE   Tablet 40 milliGRAM(s) Oral daily  pregabalin 75 milliGRAM(s) Oral two times a day  senna 2 Tablet(s) Oral at bedtime  tamsulosin 0.4 milliGRAM(s) Oral at bedtime    PRN MEDICATIONS  dextrose Oral Gel 15 Gram(s) Oral once PRN  morphine  IR 15 milliGRAM(s) Oral every 6 hours PRN  polyethylene glycol 3350 17 Gram(s) Oral daily PRN    VITALS  T(F): 97.1 (09-26-24 @ 05:52), Max: 97.7 (09-25-24 @ 13:39)  HR: 62 (09-26-24 @ 05:52) (62 - 72)  BP: 121/71 (09-26-24 @ 05:52) (121/71 - 164/73)  RR: 19 (09-26-24 @ 05:52) (18 - 19)  SpO2: 98% (09-26-24 @ 05:52) (98% - 100%)  POCT Blood Glucose.: 172 mg/dL (09-26-24 @ 11:26)  POCT Blood Glucose.: 266 mg/dL (09-26-24 @ 07:41)  POCT Blood Glucose.: 134 mg/dL (09-25-24 @ 20:51)  POCT Blood Glucose.: 208 mg/dL (09-25-24 @ 17:00)    PHYSICAL EXAM  GENERAL  ( X ) NAD, lying in bed comfortably     (  ) obtunded     (  ) lethargic     (  ) somnolent    HEAD  ( X ) Atraumatic     (  ) hematoma     (  ) laceration (specify location:       )     NECK  ( X ) Supple     (  ) neck stiffness     (  ) nuchal rigidity     (  )  no JVD     (  ) JVD present ( -- cm)    HEART  Rate -->  ( X ) normal rate    (  ) bradycardic    (  ) tachycardic  Rhythm -->  ( X ) regular    (  ) regularly irregular    (  ) irregularly irregular  Murmurs -->  ( X ) normal s1/s2    (  ) systolic murmur    (  ) diastolic murmur    (  ) continuous murmur     (  ) S3 present    (  ) S4 present    LUNGS  ( X )Unlabored respirations     (  ) tachypnea  ( X ) B/L air entry     (  ) decreased breath sounds in:  (location     )    (  ) no adventitious sound     (  ) crackles     (  ) wheezing      (  ) rhonchi      (specify location:       )  (  ) chest wall tenderness (specify location:       )    ABDOMEN  ( X ) Soft     (  ) tense   |   ( X ) nondistended     (  ) distended   |   ( X ) +BS     (  ) hypoactive bowel sounds     (  ) hyperactive bowel sounds  ( X ) nontender     (  ) RUQ tenderness     (  ) RLQ tenderness     (  ) LLQ tenderness     (  ) epigastric tenderness     (  ) diffuse tenderness  (  ) Splenomegaly      (  ) Hepatomegaly      (  ) Jaundice     (  ) ecchymosis     EXTREMITIES  ( X ) Normal     (  ) Rash     (  ) ecchymosis     (  ) varicose veins      (  ) pitting edema     (  ) non-pitting edema   (  ) ulceration     (  ) gangrene:     (location:     )  ( X ) B/l LE TTP, much improved    NERVOUS SYSTEM  ( X ) A&Ox3     (  ) confused     (  ) lethargic  CN II-XII:     (  ) Intact     (  ) focal deficits  (Specify:     )   Upper extremities:     (  ) strength X/5     (  ) focal deficit (specify:    )  Lower extremities:     (  ) strength  X/5    (  ) focal deficit (specify:    )    SKIN  ( X ) No rashes or lesions     (  ) maculopapular rash     (  ) pustules     (  ) vesicles     (  ) ulcer     (  ) ecchymosis     (specify location:     )      LABS             11.9   9.51  )-----------( 208      ( 09-26-24 @ 06:41 )             36.9     132  |  101  |  15  -------------------------<  293   09-26-24 @ 06:41  4.7  |  19  |  0.7    Ca      9.3     09-26-24 @ 06:41  Mg     2.3     09-26-24 @ 06:41    TPro  7.0  /  Alb  4.5  /  TBili  0.5  /  DBili  x   /  AST  14  /  ALT  21  /  AlkPhos  120  /  GGT  x     09-26-24 @ 06:41        Urinalysis Basic - ( 26 Sep 2024 06:41 )    Color: x / Appearance: x / SG: x / pH: x  Gluc: 293 mg/dL / Ketone: x  / Bili: x / Urobili: x   Blood: x / Protein: x / Nitrite: x   Leuk Esterase: x / RBC: x / WBC x   Sq Epi: x / Non Sq Epi: x / Bacteria: x

## 2024-09-27 ENCOUNTER — NON-APPOINTMENT (OUTPATIENT)
Age: 69
End: 2024-09-27

## 2024-09-28 ENCOUNTER — TRANSCRIPTION ENCOUNTER (OUTPATIENT)
Age: 69
End: 2024-09-28

## 2024-09-30 ENCOUNTER — OUTPATIENT (OUTPATIENT)
Dept: OUTPATIENT SERVICES | Facility: HOSPITAL | Age: 69
LOS: 1 days | End: 2024-09-30
Payer: MEDICARE

## 2024-09-30 ENCOUNTER — APPOINTMENT (OUTPATIENT)
Dept: INTERNAL MEDICINE | Facility: CLINIC | Age: 69
End: 2024-09-30
Payer: MEDICARE

## 2024-09-30 VITALS
HEART RATE: 75 BPM | TEMPERATURE: 97.2 F | BODY MASS INDEX: 32.47 KG/M2 | WEIGHT: 202 LBS | OXYGEN SATURATION: 99 % | DIASTOLIC BLOOD PRESSURE: 66 MMHG | SYSTOLIC BLOOD PRESSURE: 107 MMHG | HEIGHT: 66 IN

## 2024-09-30 DIAGNOSIS — L03.90 CELLULITIS, UNSPECIFIED: ICD-10-CM

## 2024-09-30 DIAGNOSIS — Z92.89 PERSONAL HISTORY OF OTHER MEDICAL TREATMENT: ICD-10-CM

## 2024-09-30 DIAGNOSIS — Z00.00 ENCOUNTER FOR GENERAL ADULT MEDICAL EXAMINATION WITHOUT ABNORMAL FINDINGS: ICD-10-CM

## 2024-09-30 DIAGNOSIS — I73.9 PERIPHERAL VASCULAR DISEASE, UNSPECIFIED: ICD-10-CM

## 2024-09-30 DIAGNOSIS — M79.604 PAIN IN RIGHT LEG: ICD-10-CM

## 2024-09-30 DIAGNOSIS — E11.9 TYPE 2 DIABETES MELLITUS WITHOUT COMPLICATIONS: ICD-10-CM

## 2024-09-30 DIAGNOSIS — E66.9 OBESITY, UNSPECIFIED: ICD-10-CM

## 2024-09-30 DIAGNOSIS — E78.5 HYPERLIPIDEMIA, UNSPECIFIED: ICD-10-CM

## 2024-09-30 DIAGNOSIS — Z96.642 PRESENCE OF LEFT ARTIFICIAL HIP JOINT: Chronic | ICD-10-CM

## 2024-09-30 DIAGNOSIS — E11.9 TYPE 2 DIABETES MELLITUS W/OUT COMPLICATIONS: ICD-10-CM

## 2024-09-30 DIAGNOSIS — I10 ESSENTIAL (PRIMARY) HYPERTENSION: ICD-10-CM

## 2024-09-30 DIAGNOSIS — C61 MALIGNANT NEOPLASM OF PROSTATE: Chronic | ICD-10-CM

## 2024-09-30 DIAGNOSIS — M79.605 PAIN IN RIGHT LEG: ICD-10-CM

## 2024-09-30 PROCEDURE — G2211 COMPLEX E/M VISIT ADD ON: CPT

## 2024-09-30 PROCEDURE — 99214 OFFICE O/P EST MOD 30 MIN: CPT

## 2024-09-30 RX ORDER — IBUPROFEN 800 MG/1
800 TABLET, FILM COATED ORAL TWICE DAILY
Qty: 30 | Refills: 0 | Status: ACTIVE | COMMUNITY
Start: 2024-09-30 | End: 1900-01-01

## 2024-09-30 RX ORDER — RANOLAZINE 500 MG/1
500 TABLET, EXTENDED RELEASE ORAL
Qty: 60 | Refills: 3 | Status: ACTIVE | COMMUNITY
Start: 2024-09-30 | End: 1900-01-01

## 2024-09-30 NOTE — END OF VISIT
[] : Resident [FreeTextEntry3] : I saw the patient, examined the patient independently, reviewed medical record, and provided the medical services. Agree with resident note as personally edited above. LE infection treated c/w asa and statin qhs vascular f/u ranexa trial for claudication sx's lyrica started in hospital, reasonable to continue ibuprofen sparingly, counselled regarding nephrotoxicity

## 2024-09-30 NOTE — HISTORY OF PRESENT ILLNESS
[FreeTextEntry1] : f/u after hospital discharge for b/l LE pain [de-identified] : 69yoM w/ PMH of HTN, DLD, DM, prostate ca s/p seeding in remission, OA, pancreatitis presents with follow up after hospital discharge for b/l stabbing LE pain. Patient was admitted with possible cellulitis vs PAD vs diabetic neuropathy in b/l ankles. Patient finished an inpatient course of IV antibiotics and prednisone and an outpatient course of PO antibiotics. Patient was started on lyrica for possible neuropathic pain. Patient was found on XR and CT to have b/l enchondromas, orthopedic surgery was on board and recommended no further intervention. Patient was also found on arterial duplex to have R anterior tibial artery stenosis, vascular surgery was on board and recommended outpatient follow up with Dr. Mckinnon in 1 month. Patient denies any other symptoms except persistent pain, even with walking.

## 2024-09-30 NOTE — REVIEW OF SYSTEMS
[Lower Ext Edema] : lower extremity edema [Joint Pain] : joint pain [Muscle Pain] : muscle pain [Fever] : no fever [Chills] : no chills [Fatigue] : no fatigue [Night Sweats] : no night sweats [Recent Change In Weight] : ~T no recent weight change [Pain] : no pain [Vision Problems] : no vision problems [Earache] : no earache [Hearing Loss] : no hearing loss [Sore Throat] : no sore throat [Chest Pain] : no chest pain [Palpitations] : no palpitations [Shortness Of Breath] : no shortness of breath [Wheezing] : no wheezing [Cough] : no cough [Dyspnea on Exertion] : not dyspnea on exertion [Abdominal Pain] : no abdominal pain [Nausea] : no nausea [Constipation] : no constipation [Diarrhea] : no diarrhea [Vomiting] : no vomiting [Heartburn] : no heartburn [Melena] : no melena [Dysuria] : no dysuria [Hematuria] : no hematuria [Back Pain] : no back pain [Itching] : no itching [Skin Rash] : no skin rash [Headache] : no headache [Dizziness] : no dizziness [Fainting] : no fainting [Confusion] : no confusion

## 2024-09-30 NOTE — PHYSICAL EXAM
[No Acute Distress] : no acute distress [Well Nourished] : well nourished [Well Developed] : well developed [Well-Appearing] : well-appearing [Normal Sclera/Conjunctiva] : normal sclera/conjunctiva [PERRL] : pupils equal round and reactive to light [EOMI] : extraocular movements intact [Normal Outer Ear/Nose] : the outer ears and nose were normal in appearance [Normal Oropharynx] : the oropharynx was normal [No JVD] : no jugular venous distention [No Lymphadenopathy] : no lymphadenopathy [Supple] : supple [Thyroid Normal, No Nodules] : the thyroid was normal and there were no nodules present [No Respiratory Distress] : no respiratory distress  [No Accessory Muscle Use] : no accessory muscle use [Clear to Auscultation] : lungs were clear to auscultation bilaterally [Normal Rate] : normal rate  [Regular Rhythm] : with a regular rhythm [Normal S1, S2] : normal S1 and S2 [No Murmur] : no murmur heard [No Carotid Bruits] : no carotid bruits [No Abdominal Bruit] : a ~M bruit was not heard ~T in the abdomen [No Varicosities] : no varicosities [Pedal Pulses Present] : the pedal pulses are present [No Edema] : there was no peripheral edema [No Palpable Aorta] : no palpable aorta [No Extremity Clubbing/Cyanosis] : no extremity clubbing/cyanosis [Soft] : abdomen soft [Non Tender] : non-tender [Non-distended] : non-distended [Normal Bowel Sounds] : normal bowel sounds [No CVA Tenderness] : no CVA  tenderness [No Joint Swelling] : no joint swelling [No Rash] : no rash [No Focal Deficits] : no focal deficits [de-identified] : hyperesthesia of b/l ankles [de-identified] : dry skin in b/l ankles

## 2024-09-30 NOTE — ASSESSMENT
[FreeTextEntry1] : 69yoM w/ PMH of HTN, DLD, DM, prostate ca s/p seeding in remission, OA, pancreatitis presents with follow up after hospital discharge for b/l stabbing LE pain.  #HFU for b/l LE overlying PAD, without abscess, tx c iv and then transitioned and completed po abx no evidence of ongoing infection  #B/l LE ankle pain #PAD #Possible diabetic neuropathy #H/o LE cellulitis - Recently discharged from hospital for these symptoms - Finished course of IV antibiotics and prednisone as well as outpatient PO antibiotics - Found to have R anterior tibial artery severe stenosis - C/w lyrica - C/w aspirin and atorvastatin - Started ranolazine  counselled patient on how/when to take medication and about side effects - Started ibuprofen 800 PRN counselled patient on how/when to take medication and about side effects - Sent pain management referral - F/u with Dr. Mckinnon, vascular surgeon, in 1 month  #DM #DLD #HTN #Obesity - C/w current regimen - Return to care in 6 months

## 2024-10-04 DIAGNOSIS — Z79.82 LONG TERM (CURRENT) USE OF ASPIRIN: ICD-10-CM

## 2024-10-04 DIAGNOSIS — I25.10 ATHEROSCLEROTIC HEART DISEASE OF NATIVE CORONARY ARTERY WITHOUT ANGINA PECTORIS: ICD-10-CM

## 2024-10-04 DIAGNOSIS — I10 ESSENTIAL (PRIMARY) HYPERTENSION: ICD-10-CM

## 2024-10-04 DIAGNOSIS — I70.293 OTHER ATHEROSCLEROSIS OF NATIVE ARTERIES OF EXTREMITIES, BILATERAL LEGS: ICD-10-CM

## 2024-10-04 DIAGNOSIS — D16.31 BENIGN NEOPLASM OF SHORT BONES OF RIGHT LOWER LIMB: ICD-10-CM

## 2024-10-04 DIAGNOSIS — F17.200 NICOTINE DEPENDENCE, UNSPECIFIED, UNCOMPLICATED: ICD-10-CM

## 2024-10-04 DIAGNOSIS — N40.0 BENIGN PROSTATIC HYPERPLASIA WITHOUT LOWER URINARY TRACT SYMPTOMS: ICD-10-CM

## 2024-10-04 DIAGNOSIS — E78.5 HYPERLIPIDEMIA, UNSPECIFIED: ICD-10-CM

## 2024-10-04 DIAGNOSIS — E11.40 TYPE 2 DIABETES MELLITUS WITH DIABETIC NEUROPATHY, UNSPECIFIED: ICD-10-CM

## 2024-10-04 DIAGNOSIS — Z79.84 LONG TERM (CURRENT) USE OF ORAL HYPOGLYCEMIC DRUGS: ICD-10-CM

## 2024-10-04 DIAGNOSIS — E66.9 OBESITY, UNSPECIFIED: ICD-10-CM

## 2024-10-04 DIAGNOSIS — F12.99 CANNABIS USE, UNSPECIFIED WITH UNSPECIFIED CANNABIS-INDUCED DISORDER: ICD-10-CM

## 2024-10-04 DIAGNOSIS — Z85.46 PERSONAL HISTORY OF MALIGNANT NEOPLASM OF PROSTATE: ICD-10-CM

## 2024-10-04 DIAGNOSIS — E11.51 TYPE 2 DIABETES MELLITUS WITH DIABETIC PERIPHERAL ANGIOPATHY WITHOUT GANGRENE: ICD-10-CM

## 2024-10-04 DIAGNOSIS — J45.909 UNSPECIFIED ASTHMA, UNCOMPLICATED: ICD-10-CM

## 2024-10-04 DIAGNOSIS — D84.81 IMMUNODEFICIENCY DUE TO CONDITIONS CLASSIFIED ELSEWHERE: ICD-10-CM

## 2024-10-04 DIAGNOSIS — Z96.642 PRESENCE OF LEFT ARTIFICIAL HIP JOINT: ICD-10-CM

## 2024-10-28 NOTE — DISCHARGE NOTE ADULT - USE THE 5 A'S (ASK, ADVISE, ASSESS, ASSIST, ARRANGE)
"Hospital Medicine Daily Progress Note    Date of Service  10/27/2024    Chief Complaint  Chava Mercedes is a 73 y.o. male admitted 10/24/2024 with GI bleeding    Hospital Course  Per H&P: \"Chava Mercedes is a 73 y.o. male with a past medical history of alcoholic cirrhosis admitted 10/19/2024 with GI bleeding.  The patient was started on octreotide infusion and intravenous Protonix.  Gastroenterology were consulted, Dr. Palencia from [facility] performed an upper endoscopy that revealed Ramirez's esophagus without esophageal varices.  Patient was also found to have moderate portal hypertensive gastropathy, in addition to duodenitis.  Unfortunately, the patient continued to have melanotic and bloody stools.  He required multiple blood transfusions.  Today, October 23, his hemoglobin dropped to 6.9 this morning.  He required a blood transfusion. CT angiography is revealing a focus of active extravasation in the small bowel.  I discussed patient condition with Dr. Allen.  I discussed the finding with the patient.  He is agreeable to IR embolization.  He understand the risk and benefits of this procedure. I discussed patient condition with interventional radiology on-call, Dr. Lees.  Bleeding location appears to be in the small intestine which can be very technically difficult.  Dr. Lees will attempt angioembolization.  The patient will be transferred to [facility] for IR consultation/attempt embolization I with a bleeding focus.\"  When he got to [facility] he had no further bleeding episodes. Hgb was stable and vitals stable. Discussed with IR and decision was made to monitor for now and if any further bleeding occurs then to proceed with embolization but at least initially did appear to stop.  SBP ppx. Extended withdrawal episodes and slow to get back to baseline mentally.    Interval Problem Update  10/27  Afebrile with normal pulse and respiratory rate normal blood pressure and room air " oxygen saturation.  He is still quite weak, unsteady on his feet, quite adamant that he does not want to go to a SNF, initially saying he was planning ongoing.  I spent a while trying to convince him otherwise however I was unsuccessful.  Patient is rather high fall risk however we cannot force him to go to SNF if he refuses, if that is the case we will attempt to convince him 1 more time otherwise however we will plan to proceed with setting up with home health.    I have discussed this patient's plan of care and discharge plan at IDT rounds today with Case Management, Nursing, Nursing leadership, and other members of the IDT team.    Consultants/Specialty  NA    Code Status  Full Code    Disposition  The patient is not medically cleared for discharge to home or a post-acute facility.      I have placed the appropriate orders for post-discharge needs.    Review of Systems  ROS   Constitutional:  Positive for malaise/fatigue. Negative for chills and fever.   Respiratory:  Negative for cough, shortness of breath and stridor.    Cardiovascular:  Negative for chest pain and leg swelling.   Gastrointestinal:  Positive for blood in stool and melena. Negative for abdominal pain and vomiting.        Multiple large bloody and melanotic bowel movements today   Genitourinary:  Positive for flank pain.        Scrotal swelling   Musculoskeletal:  Negative for myalgias.   Skin: Negative.    Neurological:  Negative for focal weakness.   Endo/Heme/Allergies:  Does not bruise/bleed easily.   Psychiatric/Behavioral:  The patient is not nervous/anxious.      Physical Exam  Temp:  [36.6 °C (97.9 °F)-36.8 °C (98.2 °F)] 36.6 °C (97.9 °F)  Pulse:  [91-98] 98  Resp:  [12-18] 16  BP: (103-114)/(58-75) 114/66  SpO2:  [95 %-96 %] 95 %    Physical Exam  Vitals and nursing note reviewed.   Constitutional:       General: He is not in acute distress.     Appearance: He is not ill-appearing.   HENT:      Head: Normocephalic and atraumatic.       Nose: Nose normal. No rhinorrhea.      Mouth/Throat:      Mouth: Mucous membranes are moist.      Pharynx: Oropharynx is clear.   Eyes:      General: No scleral icterus.     Extraocular Movements: Extraocular movements intact.      Conjunctiva/sclera: Conjunctivae normal.   Cardiovascular:      Rate and Rhythm: Normal rate and regular rhythm.      Pulses: Normal pulses.   Pulmonary:      Effort: No respiratory distress.      Breath sounds: No wheezing, rhonchi or rales.   Abdominal:      General: There is no distension.      Palpations: Abdomen is soft.      Tenderness: There is no abdominal tenderness. There is no guarding or rebound.   Musculoskeletal:         General: Normal range of motion.      Cervical back: Normal range of motion and neck supple. No rigidity.      Right lower leg: No edema.      Left lower leg: No edema.   Skin:     General: Skin is warm and dry.      Capillary Refill: Capillary refill takes less than 2 seconds.      Coloration: Skin is not jaundiced.      Findings: No bruising.   Neurological:      General: No focal deficit present.      Mental Status: He is alert and oriented to person, place, and time.      Cranial Nerves: No cranial nerve deficit.      Motor: Weakness (generalized) present.      Comments: Face symmetrical, tongue midline, moves all 4, follows   Psychiatric:         Mood and Affect: Mood normal.         Behavior: Behavior normal.         Thought Content: Thought content normal.         Judgment: Judgment normal.         Fluids    Intake/Output Summary (Last 24 hours) at 10/28/2024 0353  Last data filed at 10/27/2024 1430  Gross per 24 hour   Intake 440 ml   Output 300 ml   Net 140 ml        Laboratory  Recent Labs     10/26/24  0524 10/27/24  0143   WBC 3.9*  --    RBC 2.94*  --    HEMOGLOBIN 8.1* 8.4*   HEMATOCRIT 25.4*  --    MCV 86.4  --    MCH 27.6  --    MCHC 31.9*  --    RDW 64.2*  --    PLATELETCT 131*  --    MPV 10.0  --      Recent Labs     10/26/24  0524  10/27/24  0143   SODIUM 135 136   POTASSIUM 4.1 3.7   CHLORIDE 108 109   CO2 19* 19*   GLUCOSE 100* 101*   BUN 5* 6*   CREATININE 0.60 0.76   CALCIUM 7.9* 8.1*                     Imaging  No orders to display        Assessment/Plan  Lower GI bleeding- (present on admission)  Assessment & Plan  No overt signs of bleeding, stable hemoglobin, stable vitals, discussed with Dr. Oden from IR, will hold off on embolization at this time if he has any recurrence if it is felt to be large-volume obtain CTA.    Ramirez's esophagus- (present on admission)  Assessment & Plan  Continue PPI twice daily, will need follow-up with GI as outpatient    Scrotal swelling- (present on admission)  Assessment & Plan  Will need outpatient follow-up for large right hydrocele incidental 12 mm right epididymal cyst    Alcoholic cirrhosis (HCC)- (present on admission)  Assessment & Plan  Counseled on alcohol cessation, going through withdrawals on CIWA protocol    Thrombocytopenia (HCC)- (present on admission)  Assessment & Plan  Continue to monitor, transfuse if bleeding for platelets less than 50,000.    Alcoholic liver disease (HCC)- (present on admission)  Assessment & Plan  CIWA protocol, multivitamins    Severe protein-calorie malnutrition (HCC)- (present on admission)  Assessment & Plan  RD consult, supplements.         VTE prophylaxis:   SCDs/TEDs      I have performed a physical exam and reviewed and updated ROS and Plan today (10/27/2024). In review of yesterday's note (10/26/2024), there are no changes except as documented above.  Total time spent 55 minutes. I spent greater than 50% of the time for patient care, counseling, and coordination on this date, including unit/floor time, and face-to-face time with the patient as per interval events, my own review of patient's imaging and lab analysis and developing my assessment and plan above.         Statement Selected

## 2024-10-30 ENCOUNTER — APPOINTMENT (OUTPATIENT)
Dept: GASTROENTEROLOGY | Facility: CLINIC | Age: 69
End: 2024-10-30
Payer: MEDICARE

## 2024-10-30 ENCOUNTER — OUTPATIENT (OUTPATIENT)
Dept: OUTPATIENT SERVICES | Facility: HOSPITAL | Age: 69
LOS: 1 days | End: 2024-10-30
Payer: MEDICARE

## 2024-10-30 VITALS
TEMPERATURE: 97.6 F | WEIGHT: 197 LBS | SYSTOLIC BLOOD PRESSURE: 127 MMHG | HEIGHT: 66 IN | HEART RATE: 72 BPM | BODY MASS INDEX: 31.66 KG/M2 | OXYGEN SATURATION: 100 % | DIASTOLIC BLOOD PRESSURE: 83 MMHG

## 2024-10-30 DIAGNOSIS — K85.90 ACUTE PANCREATITIS WITHOUT NECROSIS OR INFECTION, UNSPECIFIED: ICD-10-CM

## 2024-10-30 DIAGNOSIS — C61 MALIGNANT NEOPLASM OF PROSTATE: Chronic | ICD-10-CM

## 2024-10-30 DIAGNOSIS — D50.9 IRON DEFICIENCY ANEMIA, UNSPECIFIED: ICD-10-CM

## 2024-10-30 DIAGNOSIS — Z96.642 PRESENCE OF LEFT ARTIFICIAL HIP JOINT: Chronic | ICD-10-CM

## 2024-10-30 DIAGNOSIS — D36.9 BENIGN NEOPLASM, UNSPECIFIED SITE: ICD-10-CM

## 2024-10-30 DIAGNOSIS — K59.00 CONSTIPATION, UNSPECIFIED: ICD-10-CM

## 2024-10-30 DIAGNOSIS — Z98.890 OTHER SPECIFIED POSTPROCEDURAL STATES: Chronic | ICD-10-CM

## 2024-10-30 PROCEDURE — 99214 OFFICE O/P EST MOD 30 MIN: CPT

## 2024-10-30 RX ORDER — POLYETHYLENE GLYCOL 3350 AND ELECTROLYTES WITH LEMON FLAVOR 236; 22.74; 6.74; 5.86; 2.97 G/4L; G/4L; G/4L; G/4L; G/4L
236 POWDER, FOR SOLUTION ORAL
Qty: 1 | Refills: 0 | Status: ACTIVE | COMMUNITY
Start: 2024-10-30 | End: 1900-01-01

## 2024-11-06 NOTE — ED CDU PROVIDER DISPOSITION NOTE - PRINCIPAL DIAGNOSIS
Problem: Cognitive:  Goal: Knowledge of wound care  Description: Knowledge of wound care  Outcome: Progressing  Goal: Understands risk factors for wounds  Description: Understands risk factors for wounds  Outcome: Progressing     Problem: Wound:  Goal: Will show signs of wound healing; wound closure and no evidence of infection  Description: Will show signs of wound healing; wound closure and no evidence of infection  Outcome: Progressing     Problem: Venous:  Goal: Signs of wound healing will improve  Description: Signs of wound healing will improve  Outcome: Progressing     
Dizziness

## 2024-11-07 NOTE — PATIENT PROFILE ADULT. - COMFORT LEVEL, ACCEPTABLE
You can access the FollowMyHealth Patient Portal offered by [facility] by registering at the following website: [email or web address]. By joining [employer]’s FollowMyHealth portal, you will also be able to view your health information using other applications (apps) compatible with our system. English 3

## 2025-01-02 NOTE — ED ADULT NURSE NOTE - PAIN RATING/NUMBER SCALE (0-10): REST
"  Pre Op Diagnosis: TIPS dysfunction  Post Op Diagnosis: Same    Procedure: TIPS revision    Procedure performed by: Karis    Written Informed Consent Obtained: Yes  Specimen Removed: NO  Estimated Blood Loss: Minimal    Findings:   Successful TIPS revision with 10 mm balloon. Improved flow through existing TIPS noted after venoplasty. Pressures as below:    Pre portal: 21  Pre RA: 13  Post portal: 17  Post RA: 15    Note was also made that the port catheter tip is in the wrong location within the left Brachiocephalic vein. Attempts were made to reposition the catheter, but all attempts were unsuccessful. This should be exchanged or removed in the future.    Patient tolerated procedure well.    Josué Dyson MD ([place])  Interventional Radiology  [phone]      " 0

## 2025-01-06 ENCOUNTER — APPOINTMENT (OUTPATIENT)
Dept: INTERNAL MEDICINE | Facility: CLINIC | Age: 70
End: 2025-01-06

## 2025-01-09 NOTE — ED PROVIDER NOTE - TOBACCO USE
[facility] [place]  217/1  PROGRESS NOTE   Patient: Moira Caballero  Today's Date: 2025    YOB: 1941  Admission Date: 2025    MRN: [id number]  Inpatient LOS: 2    Attending: Mariana Alatorre MD  Hospital Day: Hospital Day: 4    Subjective   HISTORY AND SUBJECTIVE COMPLAINTS     Chief Complaint:   SOB  Leg edema     Interval History / Subjective:   Some wheezing noted on exam, today, pt remains at 2L O2. Continues to have a wet cough, added mucinex and flutter therapy. VBG ordered this AM due to some somnolence, which showed increased bicarb, and will add a 1x dose of diamox. Her Dyspnea overall has shown improvement, and pt is able to speak in full sentences.   Her RVP was + for RSV.   Did speak to pt's spouse as well in detail in regards to plan of care. Discussed all the lab work and the blood gas findings. There is concern for pt not improving, discussed her resp status is currently stable and very close to baseline. Discussed RSV infection, and the symptoms it can cause. Will also get Pulmonology on board to further assess pt.       Hospital Course:  Presenting with complaint of cold symptoms and 16 pound weight gain since [place].     Treatment for CHF, COPD exacerbation     ROS:  Pertinent systems negative except as above.    Objective   PHYSICAL EXAMINATION     Vital 24 Hour Range Most Recent Value   Temperature Temp  Min: 97.7 °F (36.5 °C)  Max: 98.6 °F (37 °C) 98.1 °F (36.7 °C)   Pulse Pulse  Min: 60  Max: 65 65   Respiratory Resp  Min: 16  Max: 18 18   Blood Pressure BP  Min: 131/62  Max: 173/77 (!) 143/87   Pulse Oximetry SpO2  Min: 94 %  Max: 97 % 97 %   Arterial BP No data recorded     O2 O2 Flow Rate (L/min)  Av L/min  Min: 2 L/min   Min taken time: 25 1512  Max: 2 L/min   Max taken time: 25 1512       Recorded Intake and Output:  Intake/Output Summary (Last 24 hours) at 2025 1533  Last data filed at 2025 1504  Gross per 24 hour   Intake 1070 ml    Output 2025 ml   Net -955 ml      Recorded Last Stool Occurrence:       Vital Most Recent Value First Value   Weight 66.6 kg (146 lb 13.2 oz) Weight: 66.2 kg (145 lb 15.1 oz)   Height 5' 1\" (154.9 cm) Height: 5' 1\" (154.9 cm)   BMI 27.74 N/A        General: Chronically ill appearance  Cardiovascular:  Regular rate and rhythm, normal S1, S2, no added sounds or murmurs.  Respiratory: Coarse breath sounds bilaterally w, expiratory wheezing present, on 2L NC.   Gastrointestinal:  Soft, nontender and nondistended, no hepatomegaly or splenomegaly. bowel sounds present.  Neuro:   Alert and Oriented to time, place, person  Psychiatric:   Cooperative.  Appropriate mood & affect.  Normal judgment.  Skin:  Warm and dry without rash.  Extremities: No pitting edema of the lower extremities bilaterally, distal pulses intact.      TEST RESULTS     Labs: The Laboratory values listed below have been reviewed and pertinent findings discussed in the Assessment and Plan.    Laboratory values:   Recent Labs   Lab 01/09/25  0809 01/07/25  0509 01/06/25  1414   WBC 9.7 6.3 6.3   HGB 9.7* 10.0* 10.2*   HCT 30.2* 31.6* 31.9*    271 254         Recent Labs   Lab 01/09/25  0808 01/08/25  0445 01/07/25  1225 01/07/25  0509   SODIUM 133* 133*  --  133*   POTASSIUM 4.0 4.7  4.7 3.6 3.2*   CHLORIDE 94* 95*  --  93*   CO2 34* 32  --  33*   CALCIUM 9.4 9.3  --  9.0   GLUCOSE 85 125*  --  93   BUN 23* 24*  --  13   CREATININE 0.70 0.82  --  0.63   MG  --  1.8  --  1.5*          Radiology: Imaging studies have been reviewed and pertinent findings discussed in the Assessment and Plan.  Results for orders placed or performed during the hospital encounter of 01/06/25 (from the past 48 hour(s))   XR CHEST AP OR PA    Impression    IMPRESSION: Suspect trace sized bilateral pleural effusions. No evidence  for overt CHF currently.    Electronically Signed by: Ramírez Russell MD  Signed on: 1/8/2025 10:11 AM  Created on Workstation ID:  [id number]  Signed on Workstation ID: [id number]        ANCILLARY ORDERS     Diet:  Sodium 2 Gm (Low Sodium); Yes, Medical Nutrition Management by Rd (Registered Dietitian); Fluid Restrict 2000 Ml (1200 From Dietary) Diet  Telemetry: On  Consults:    None  Therapy Orders:   PT and OT Orders Placed this Encounter   Procedures    Occupational Therapy Evaluation    Occupational Therapy Treatment    Physical Therapy Evaluation    Physical Therapy Treatment       ADVANCED DIRECTIVES     Code Status: Full Resuscitation         ASSESSMENT AND PLAN       Acute exacerbation of chronic diastolic congestive heart failure, right side heart failure-   -With shortness of breath, bilateral lower extremities edema, increased weight  -Elevated proBNP to 5000, baseline proBNP 3000.   -Chest x-ray showed pulmonary vascular congestion.    -Last echocardiogram showed normal EF.    -Repeat echocardiogram on 1/7: Preserved left ventricular function at 55%.  Dilated right ventricle, severe pulmonary hypertension RVSP 77.  Moderate tricuspid valve regurgitation.    -Down titrate to IV lasix 20 bid, and transition to PO on DC   - VBG today shows elevation in bicarb, likely contraction alkalosis, will order 1x Diamox   - monitor weight    Acute and Chronic Hypoxic Respiratory Failure   Pulmonary hypertension severe  COPD exacerbation  RSV Pneumonia   -Cold symptoms, shortness of breath, cough for 10 days.  -Chronically intermittent hypoxia mostly at nighttime but now requiring oxygen during the daytime  - at home used 2L NC at night, currently requiring 2L during the day as well  - Likely 2/2 RSV Pna, and this may be her new baseline until recovery from viral pneumonia   - Discussed w/ Pulmn, appreciate further reccs if indicated  - Due to persistent wheezing, will increase solumedrol to TID   - Duoneb scheduled   - mucinex, Flutter therapy   -GI protection Pepcid ordered while on  high-dose steroids   - Echo finding reviewed, continue w/ above  management     Essential hypertension-   - Hydralazine 50 bid,  mg BID      Hypothyroidism-   -Continue home dose of Synthroid    Chronic pain syndrome   -Continue home dose morphine prn     Paroxysmal atrial fibrillation/flutter status post left atrial appendage ligation, off anticoagulation,   -  MVR status post redo sternotomy (2/2020); initial MVR 2010  - SSS s/p dual-chamber pacemaker (5/6/20)     Smoking status:      Nutrition status: Does Not Meet Criteria for Malnutrition   Body mass index is 27.74 kg/m². - Patient is overweight with BMI 24-30  DVT Prophylaxis: Lovenox prophylactic dosing (dose adjusted as per renal function), SCDs       DISCHARGE PLANNING     The patient's treatment plans were discussed with patient.    Discharge Planning    Barriers to discharge: Patient is not medically ready and needs to remain in the hospital today due to continue IV lasix and steroids  Anticipated discharge destination: Home  Expected Discharge Date: 1/11/2025  TBD: increased wheezing              Mariana Alatorre MD, MDHospitalist      Never smoker

## 2025-03-13 RX ORDER — DULAGLUTIDE 0.75 MG/.5ML
0.75 INJECTION, SOLUTION SUBCUTANEOUS
Qty: 4 | Refills: 1 | Status: ACTIVE | COMMUNITY
Start: 2025-03-12 | End: 1900-01-01

## 2025-04-21 ENCOUNTER — APPOINTMENT (OUTPATIENT)
Dept: INTERNAL MEDICINE | Facility: CLINIC | Age: 70
End: 2025-04-21

## 2025-05-29 ENCOUNTER — NON-APPOINTMENT (OUTPATIENT)
Age: 70
End: 2025-05-29

## 2025-05-30 ENCOUNTER — NON-APPOINTMENT (OUTPATIENT)
Age: 70
End: 2025-05-30

## 2025-06-04 ENCOUNTER — APPOINTMENT (OUTPATIENT)
Dept: GASTROENTEROLOGY | Facility: CLINIC | Age: 70
End: 2025-06-04

## 2025-07-02 NOTE — ED ADULT TRIAGE NOTE - NSWEIGHTCALCTOOLDRUG_GEN_A_CORE
Social Work-Екатерина Falk will admit today. DC/Readmit completed. Left message for wife. Patient is agreeable with dc plans. Nurse to call report to [phone]. Patient and wife are agreeable with dc plans. Miles    used

## 2025-08-13 ENCOUNTER — EMERGENCY (EMERGENCY)
Facility: HOSPITAL | Age: 70
LOS: 0 days | Discharge: HOME | End: 2025-08-13
Attending: EMERGENCY MEDICINE
Payer: MEDICARE

## 2025-08-13 VITALS
SYSTOLIC BLOOD PRESSURE: 157 MMHG | TEMPERATURE: 98 F | WEIGHT: 192.24 LBS | HEART RATE: 62 BPM | OXYGEN SATURATION: 99 % | DIASTOLIC BLOOD PRESSURE: 90 MMHG | RESPIRATION RATE: 18 BRPM

## 2025-08-13 DIAGNOSIS — J45.909 UNSPECIFIED ASTHMA, UNCOMPLICATED: ICD-10-CM

## 2025-08-13 DIAGNOSIS — I10 ESSENTIAL (PRIMARY) HYPERTENSION: ICD-10-CM

## 2025-08-13 DIAGNOSIS — M19.90 UNSPECIFIED OSTEOARTHRITIS, UNSPECIFIED SITE: ICD-10-CM

## 2025-08-13 DIAGNOSIS — E78.5 HYPERLIPIDEMIA, UNSPECIFIED: ICD-10-CM
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
## 2025-08-13 DIAGNOSIS — E11.9 TYPE 2 DIABETES MELLITUS WITHOUT COMPLICATIONS: ICD-10-CM

## 2025-08-13 DIAGNOSIS — S69.91XA UNSPECIFIED INJURY OF RIGHT WRIST, HAND AND FINGER(S), INITIAL ENCOUNTER: ICD-10-CM

## 2025-08-13 DIAGNOSIS — C61 MALIGNANT NEOPLASM OF PROSTATE: Chronic | ICD-10-CM

## 2025-08-13 DIAGNOSIS — Z98.890 OTHER SPECIFIED POSTPROCEDURAL STATES: Chronic | ICD-10-CM
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
## 2025-08-13 DIAGNOSIS — Z96.642 PRESENCE OF LEFT ARTIFICIAL HIP JOINT: Chronic | ICD-10-CM

## 2025-08-13 PROCEDURE — 99284 EMERGENCY DEPT VISIT MOD MDM: CPT | Mod: 25

## 2025-08-13 PROCEDURE — 73110 X-RAY EXAM OF WRIST: CPT | Mod: 26,RT

## 2025-08-13 PROCEDURE — 73130 X-RAY EXAM OF HAND: CPT | Mod: 26,RT

## 2025-08-13 PROCEDURE — 29125 APPL SHORT ARM SPLINT STATIC: CPT | Mod: RT

## 2025-08-13 PROCEDURE — 73130 X-RAY EXAM OF HAND: CPT | Mod: RT

## 2025-08-13 PROCEDURE — 73110 X-RAY EXAM OF WRIST: CPT | Mod: RT

## 2025-08-13 RX ORDER — IBUPROFEN 200 MG
600 TABLET ORAL ONCE
Refills: 0 | Status: COMPLETED | OUTPATIENT
Start: 2025-08-13 | End: 2025-08-13

## 2025-08-13 RX ADMIN — Medication 600 MILLIGRAM(S): at 23:16

## 2025-08-13 RX ADMIN — Medication 600 MILLIGRAM(S): at 22:38

## 2025-08-18 ENCOUNTER — OUTPATIENT (OUTPATIENT)
Dept: OUTPATIENT SERVICES | Facility: HOSPITAL | Age: 70
LOS: 1 days | End: 2025-08-18
Payer: MEDICAID

## 2025-08-18 ENCOUNTER — APPOINTMENT (OUTPATIENT)
Dept: ORTHOPEDIC SURGERY | Facility: CLINIC | Age: 70
End: 2025-08-18
Payer: MEDICAID

## 2025-08-18 ENCOUNTER — APPOINTMENT (OUTPATIENT)
Dept: INTERNAL MEDICINE | Facility: CLINIC | Age: 70
End: 2025-08-18

## 2025-08-18 VITALS
HEART RATE: 70 BPM | DIASTOLIC BLOOD PRESSURE: 74 MMHG | OXYGEN SATURATION: 100 % | HEIGHT: 66 IN | SYSTOLIC BLOOD PRESSURE: 134 MMHG | TEMPERATURE: 96.6 F

## 2025-08-18 DIAGNOSIS — I49.8 OTHER SPECIFIED CARDIAC ARRHYTHMIAS: ICD-10-CM

## 2025-08-18 DIAGNOSIS — R23.8 OTHER SKIN CHANGES: ICD-10-CM

## 2025-08-18 DIAGNOSIS — I10 ESSENTIAL (PRIMARY) HYPERTENSION: ICD-10-CM

## 2025-08-18 DIAGNOSIS — E11.9 TYPE 2 DIABETES MELLITUS W/OUT COMPLICATIONS: ICD-10-CM

## 2025-08-18 DIAGNOSIS — I73.9 PERIPHERAL VASCULAR DISEASE, UNSPECIFIED: ICD-10-CM

## 2025-08-18 DIAGNOSIS — C61 MALIGNANT NEOPLASM OF PROSTATE: ICD-10-CM

## 2025-08-18 DIAGNOSIS — S60.219A CONTUSION OF UNSPECIFIED WRIST, INITIAL ENCOUNTER: ICD-10-CM

## 2025-08-18 DIAGNOSIS — K85.90 ACUTE PANCREATITIS WITHOUT NECROSIS OR INFECTION, UNSPECIFIED: ICD-10-CM

## 2025-08-18 DIAGNOSIS — Z92.89 PERSONAL HISTORY OF OTHER MEDICAL TREATMENT: ICD-10-CM

## 2025-08-18 DIAGNOSIS — D36.9 BENIGN NEOPLASM, UNSPECIFIED SITE: ICD-10-CM

## 2025-08-18 DIAGNOSIS — R39.12 POOR URINARY STREAM: ICD-10-CM

## 2025-08-18 DIAGNOSIS — Z00.00 ENCOUNTER FOR GENERAL ADULT MEDICAL EXAMINATION WITHOUT ABNORMAL FINDINGS: ICD-10-CM

## 2025-08-18 DIAGNOSIS — Z98.890 OTHER SPECIFIED POSTPROCEDURAL STATES: Chronic | ICD-10-CM

## 2025-08-18 DIAGNOSIS — H61.20 IMPACTED CERUMEN, UNSPECIFIED EAR: ICD-10-CM

## 2025-08-18 DIAGNOSIS — E78.5 HYPERLIPIDEMIA, UNSPECIFIED: ICD-10-CM

## 2025-08-18 DIAGNOSIS — Z96.642 PRESENCE OF LEFT ARTIFICIAL HIP JOINT: Chronic | ICD-10-CM

## 2025-08-18 DIAGNOSIS — E66.9 OBESITY, UNSPECIFIED: ICD-10-CM

## 2025-08-18 DIAGNOSIS — Z00.00 ENCOUNTER FOR GENERAL ADULT MEDICAL EXAMINATION W/OUT ABNORMAL FINDINGS: ICD-10-CM

## 2025-08-18 PROCEDURE — G2211 COMPLEX E/M VISIT ADD ON: CPT | Mod: NC

## 2025-08-18 PROCEDURE — 99203 OFFICE O/P NEW LOW 30 MIN: CPT

## 2025-08-18 PROCEDURE — 99214 OFFICE O/P EST MOD 30 MIN: CPT

## 2025-08-18 RX ORDER — CARBAMIDE PEROXIDE 0.07 G/ML
6.5 LIQUID AURICULAR (OTIC)
Qty: 1 | Refills: 3 | Status: ACTIVE | COMMUNITY
Start: 2025-08-18 | End: 1900-01-01

## 2025-08-18 RX ORDER — AMMONIUM LACTATE 12 %
12 LOTION (GRAM) TOPICAL TWICE DAILY
Qty: 1 | Refills: 3 | Status: ACTIVE | COMMUNITY
Start: 2025-08-18 | End: 1900-01-01

## 2025-08-21 ENCOUNTER — NON-APPOINTMENT (OUTPATIENT)
Age: 70
End: 2025-08-21

## 2025-08-23 ENCOUNTER — INPATIENT (INPATIENT)
Facility: HOSPITAL | Age: 70
LOS: 1 days | Discharge: HOME | DRG: 440 | End: 2025-08-25
Attending: STUDENT IN AN ORGANIZED HEALTH CARE EDUCATION/TRAINING PROGRAM | Admitting: STUDENT IN AN ORGANIZED HEALTH CARE EDUCATION/TRAINING PROGRAM
Payer: MEDICARE

## 2025-08-23 VITALS
WEIGHT: 184.31 LBS | HEART RATE: 80 BPM | RESPIRATION RATE: 19 BRPM | SYSTOLIC BLOOD PRESSURE: 150 MMHG | DIASTOLIC BLOOD PRESSURE: 104 MMHG | OXYGEN SATURATION: 95 % | TEMPERATURE: 98 F

## 2025-08-23 DIAGNOSIS — Z98.890 OTHER SPECIFIED POSTPROCEDURAL STATES: Chronic | ICD-10-CM

## 2025-08-23 DIAGNOSIS — Z96.642 PRESENCE OF LEFT ARTIFICIAL HIP JOINT: Chronic | ICD-10-CM

## 2025-08-23 DIAGNOSIS — C61 MALIGNANT NEOPLASM OF PROSTATE: Chronic | ICD-10-CM

## 2025-08-23 LAB
ALBUMIN SERPL ELPH-MCNC: 4.6 G/DL — SIGNIFICANT CHANGE UP (ref 3.5–5.2)
ALP SERPL-CCNC: 96 U/L — SIGNIFICANT CHANGE UP (ref 30–115)
ALT FLD-CCNC: 13 U/L — SIGNIFICANT CHANGE UP (ref 0–41)
ANION GAP SERPL CALC-SCNC: 16 MMOL/L — HIGH (ref 7–14)
AST SERPL-CCNC: 24 U/L — SIGNIFICANT CHANGE UP (ref 0–41)
BASOPHILS # BLD AUTO: 0.03 K/UL — SIGNIFICANT CHANGE UP (ref 0–0.2)
BASOPHILS NFR BLD AUTO: 0.4 % — SIGNIFICANT CHANGE UP (ref 0–1)
BILIRUB DIRECT SERPL-MCNC: <0.2 MG/DL — SIGNIFICANT CHANGE UP (ref 0–0.3)
BILIRUB INDIRECT FLD-MCNC: >1 MG/DL — SIGNIFICANT CHANGE UP (ref 0.2–1.2)
BILIRUB SERPL-MCNC: 1.2 MG/DL — SIGNIFICANT CHANGE UP (ref 0.2–1.2)
BUN SERPL-MCNC: 8 MG/DL — LOW (ref 10–20)
CALCIUM SERPL-MCNC: 9.5 MG/DL — SIGNIFICANT CHANGE UP (ref 8.4–10.5)
CHLORIDE SERPL-SCNC: 99 MMOL/L — SIGNIFICANT CHANGE UP (ref 98–110)
CO2 SERPL-SCNC: 21 MMOL/L — SIGNIFICANT CHANGE UP (ref 17–32)
CREAT SERPL-MCNC: 0.7 MG/DL — SIGNIFICANT CHANGE UP (ref 0.7–1.5)
EGFR: 100 ML/MIN/1.73M2 — SIGNIFICANT CHANGE UP
EGFR: 100 ML/MIN/1.73M2 — SIGNIFICANT CHANGE UP
EOSINOPHIL # BLD AUTO: 0.07 K/UL — SIGNIFICANT CHANGE UP (ref 0–0.7)
EOSINOPHIL NFR BLD AUTO: 1 % — SIGNIFICANT CHANGE UP (ref 0–8)
GLUCOSE SERPL-MCNC: 114 MG/DL — HIGH (ref 70–99)
HCT VFR BLD CALC: 38.3 % — LOW (ref 42–52)
HGB BLD-MCNC: 13.1 G/DL — LOW (ref 14–18)
IMM GRANULOCYTES NFR BLD AUTO: 0.3 % — SIGNIFICANT CHANGE UP (ref 0.1–0.3)
LACTATE SERPL-SCNC: 1.4 MMOL/L — SIGNIFICANT CHANGE UP (ref 0.7–2)
LIDOCAIN IGE QN: 114 U/L — HIGH (ref 7–60)
LYMPHOCYTES # BLD AUTO: 1.38 K/UL — SIGNIFICANT CHANGE UP (ref 1.2–3.4)
LYMPHOCYTES # BLD AUTO: 20.4 % — LOW (ref 20.5–51.1)
MCHC RBC-ENTMCNC: 28.9 PG — SIGNIFICANT CHANGE UP (ref 27–31)
MCHC RBC-ENTMCNC: 34.2 G/DL — SIGNIFICANT CHANGE UP (ref 32–37)
MCV RBC AUTO: 84.5 FL — SIGNIFICANT CHANGE UP (ref 80–94)
MONOCYTES # BLD AUTO: 0.63 K/UL — HIGH (ref 0.1–0.6)
MONOCYTES NFR BLD AUTO: 9.3 % — SIGNIFICANT CHANGE UP (ref 1.7–9.3)
NEUTROPHILS # BLD AUTO: 4.64 K/UL — SIGNIFICANT CHANGE UP (ref 1.4–6.5)
NEUTROPHILS NFR BLD AUTO: 68.6 % — SIGNIFICANT CHANGE UP (ref 42.2–75.2)
NRBC BLD AUTO-RTO: 0 /100 WBCS — SIGNIFICANT CHANGE UP (ref 0–0)
PLATELET # BLD AUTO: 209 K/UL — SIGNIFICANT CHANGE UP (ref 130–400)
PMV BLD: 10 FL — SIGNIFICANT CHANGE UP (ref 7.4–10.4)
POTASSIUM SERPL-MCNC: 4.8 MMOL/L — SIGNIFICANT CHANGE UP (ref 3.5–5)
POTASSIUM SERPL-SCNC: 4.8 MMOL/L — SIGNIFICANT CHANGE UP (ref 3.5–5)
PROT SERPL-MCNC: 7.6 G/DL — SIGNIFICANT CHANGE UP (ref 6–8)
RBC # BLD: 4.53 M/UL — LOW (ref 4.7–6.1)
RBC # FLD: 13.8 % — SIGNIFICANT CHANGE UP (ref 11.5–14.5)
SODIUM SERPL-SCNC: 136 MMOL/L — SIGNIFICANT CHANGE UP (ref 135–146)
WBC # BLD: 6.77 K/UL — SIGNIFICANT CHANGE UP (ref 4.8–10.8)
WBC # FLD AUTO: 6.77 K/UL — SIGNIFICANT CHANGE UP (ref 4.8–10.8)

## 2025-08-23 PROCEDURE — 74177 CT ABD & PELVIS W/CONTRAST: CPT | Mod: 26

## 2025-08-23 PROCEDURE — 99285 EMERGENCY DEPT VISIT HI MDM: CPT

## 2025-08-23 RX ORDER — ONDANSETRON HCL/PF 4 MG/2 ML
4 VIAL (ML) INJECTION ONCE
Refills: 0 | Status: COMPLETED | OUTPATIENT
Start: 2025-08-23 | End: 2025-08-23

## 2025-08-23 RX ADMIN — Medication 4 MILLIGRAM(S): at 20:30

## 2025-08-23 RX ADMIN — Medication 1000 MILLILITER(S): at 20:30

## 2025-08-24 DIAGNOSIS — K85.90 ACUTE PANCREATITIS WITHOUT NECROSIS OR INFECTION, UNSPECIFIED: ICD-10-CM

## 2025-08-24 LAB
ALBUMIN SERPL ELPH-MCNC: 4.4 G/DL — SIGNIFICANT CHANGE UP (ref 3.5–5.2)
ALP SERPL-CCNC: 96 U/L — SIGNIFICANT CHANGE UP (ref 30–115)
ALT FLD-CCNC: 12 U/L — SIGNIFICANT CHANGE UP (ref 0–41)
ANION GAP SERPL CALC-SCNC: 13 MMOL/L — SIGNIFICANT CHANGE UP (ref 7–14)
APPEARANCE UR: CLEAR — SIGNIFICANT CHANGE UP
AST SERPL-CCNC: 15 U/L — SIGNIFICANT CHANGE UP (ref 0–41)
BASOPHILS # BLD AUTO: 0.03 K/UL — SIGNIFICANT CHANGE UP (ref 0–0.2)
BASOPHILS NFR BLD AUTO: 0.5 % — SIGNIFICANT CHANGE UP (ref 0–1)
BILIRUB SERPL-MCNC: 1.1 MG/DL — SIGNIFICANT CHANGE UP (ref 0.2–1.2)
BILIRUB UR-MCNC: NEGATIVE — SIGNIFICANT CHANGE UP
BUN SERPL-MCNC: 7 MG/DL — LOW (ref 10–20)
CALCIUM SERPL-MCNC: 8.9 MG/DL — SIGNIFICANT CHANGE UP (ref 8.4–10.5)
CHLORIDE SERPL-SCNC: 100 MMOL/L — SIGNIFICANT CHANGE UP (ref 98–110)
CO2 SERPL-SCNC: 22 MMOL/L — SIGNIFICANT CHANGE UP (ref 17–32)
COLOR SPEC: YELLOW — SIGNIFICANT CHANGE UP
CREAT SERPL-MCNC: 0.6 MG/DL — LOW (ref 0.7–1.5)
CRP SERPL-MCNC: <3 MG/L — SIGNIFICANT CHANGE UP
DIFF PNL FLD: NEGATIVE — SIGNIFICANT CHANGE UP
EGFR: 104 ML/MIN/1.73M2 — SIGNIFICANT CHANGE UP
EGFR: 104 ML/MIN/1.73M2 — SIGNIFICANT CHANGE UP
EOSINOPHIL # BLD AUTO: 0.16 K/UL — SIGNIFICANT CHANGE UP (ref 0–0.7)
EOSINOPHIL NFR BLD AUTO: 2.7 % — SIGNIFICANT CHANGE UP (ref 0–8)
GLUCOSE BLDC GLUCOMTR-MCNC: 110 MG/DL — HIGH (ref 70–99)
GLUCOSE BLDC GLUCOMTR-MCNC: 83 MG/DL — SIGNIFICANT CHANGE UP (ref 70–99)
GLUCOSE BLDC GLUCOMTR-MCNC: 89 MG/DL — SIGNIFICANT CHANGE UP (ref 70–99)
GLUCOSE BLDC GLUCOMTR-MCNC: 98 MG/DL — SIGNIFICANT CHANGE UP (ref 70–99)
GLUCOSE SERPL-MCNC: 99 MG/DL — SIGNIFICANT CHANGE UP (ref 70–99)
GLUCOSE UR QL: NEGATIVE MG/DL — SIGNIFICANT CHANGE UP
HCT VFR BLD CALC: 39 % — LOW (ref 42–52)
HGB BLD-MCNC: 13 G/DL — LOW (ref 14–18)
IMM GRANULOCYTES NFR BLD AUTO: 0.3 % — SIGNIFICANT CHANGE UP (ref 0.1–0.3)
KETONES UR QL: 15 MG/DL
LEUKOCYTE ESTERASE UR-ACNC: NEGATIVE — SIGNIFICANT CHANGE UP
LYMPHOCYTES # BLD AUTO: 1.18 K/UL — LOW (ref 1.2–3.4)
LYMPHOCYTES # BLD AUTO: 19.9 % — LOW (ref 20.5–51.1)
MAGNESIUM SERPL-MCNC: 2 MG/DL — SIGNIFICANT CHANGE UP (ref 1.8–2.4)
MCHC RBC-ENTMCNC: 28.8 PG — SIGNIFICANT CHANGE UP (ref 27–31)
MCHC RBC-ENTMCNC: 33.3 G/DL — SIGNIFICANT CHANGE UP (ref 32–37)
MCV RBC AUTO: 86.5 FL — SIGNIFICANT CHANGE UP (ref 80–94)
MONOCYTES # BLD AUTO: 0.59 K/UL — SIGNIFICANT CHANGE UP (ref 0.1–0.6)
MONOCYTES NFR BLD AUTO: 9.9 % — HIGH (ref 1.7–9.3)
NEUTROPHILS # BLD AUTO: 3.96 K/UL — SIGNIFICANT CHANGE UP (ref 1.4–6.5)
NEUTROPHILS NFR BLD AUTO: 66.7 % — SIGNIFICANT CHANGE UP (ref 42.2–75.2)
NITRITE UR-MCNC: NEGATIVE — SIGNIFICANT CHANGE UP
NRBC BLD AUTO-RTO: 0 /100 WBCS — SIGNIFICANT CHANGE UP (ref 0–0)
PH UR: 7 — SIGNIFICANT CHANGE UP (ref 5–8)
PLATELET # BLD AUTO: 216 K/UL — SIGNIFICANT CHANGE UP (ref 130–400)
PMV BLD: 10.9 FL — HIGH (ref 7.4–10.4)
POTASSIUM SERPL-MCNC: 4.2 MMOL/L — SIGNIFICANT CHANGE UP (ref 3.5–5)
POTASSIUM SERPL-SCNC: 4.2 MMOL/L — SIGNIFICANT CHANGE UP (ref 3.5–5)
PROT SERPL-MCNC: 6.6 G/DL — SIGNIFICANT CHANGE UP (ref 6–8)
PROT UR-MCNC: NEGATIVE MG/DL — SIGNIFICANT CHANGE UP
RBC # BLD: 4.51 M/UL — LOW (ref 4.7–6.1)
RBC # FLD: 14 % — SIGNIFICANT CHANGE UP (ref 11.5–14.5)
SODIUM SERPL-SCNC: 135 MMOL/L — SIGNIFICANT CHANGE UP (ref 135–146)
SP GR SPEC: 1.01 — SIGNIFICANT CHANGE UP (ref 1–1.03)
TRIGL SERPL-MCNC: 60 MG/DL — SIGNIFICANT CHANGE UP
UROBILINOGEN FLD QL: 1 MG/DL — SIGNIFICANT CHANGE UP (ref 0.2–1)
WBC # BLD: 5.94 K/UL — SIGNIFICANT CHANGE UP (ref 4.8–10.8)
WBC # FLD AUTO: 5.94 K/UL — SIGNIFICANT CHANGE UP (ref 4.8–10.8)

## 2025-08-24 PROCEDURE — 81003 URINALYSIS AUTO W/O SCOPE: CPT

## 2025-08-24 PROCEDURE — 82962 GLUCOSE BLOOD TEST: CPT

## 2025-08-24 PROCEDURE — 80053 COMPREHEN METABOLIC PANEL: CPT

## 2025-08-24 PROCEDURE — 84478 ASSAY OF TRIGLYCERIDES: CPT

## 2025-08-24 PROCEDURE — 80074 ACUTE HEPATITIS PANEL: CPT

## 2025-08-24 PROCEDURE — 93010 ELECTROCARDIOGRAM REPORT: CPT

## 2025-08-24 PROCEDURE — 36415 COLL VENOUS BLD VENIPUNCTURE: CPT

## 2025-08-24 PROCEDURE — 76705 ECHO EXAM OF ABDOMEN: CPT

## 2025-08-24 PROCEDURE — 86140 C-REACTIVE PROTEIN: CPT

## 2025-08-24 PROCEDURE — 83735 ASSAY OF MAGNESIUM: CPT

## 2025-08-24 PROCEDURE — 85025 COMPLETE CBC W/AUTO DIFF WBC: CPT

## 2025-08-24 PROCEDURE — 93005 ELECTROCARDIOGRAM TRACING: CPT

## 2025-08-24 PROCEDURE — 99223 1ST HOSP IP/OBS HIGH 75: CPT

## 2025-08-24 RX ORDER — SODIUM CHLORIDE 9 G/1000ML
1000 INJECTION, SOLUTION INTRAVENOUS
Refills: 0 | Status: DISCONTINUED | OUTPATIENT
Start: 2025-08-24 | End: 2025-08-24

## 2025-08-24 RX ORDER — ACETAMINOPHEN 500 MG/5ML
1000 LIQUID (ML) ORAL EVERY 8 HOURS
Refills: 0 | Status: DISCONTINUED | OUTPATIENT
Start: 2025-08-24 | End: 2025-08-25

## 2025-08-24 RX ORDER — SODIUM CHLORIDE 9 G/1000ML
1000 INJECTION, SOLUTION INTRAVENOUS
Refills: 0 | Status: DISCONTINUED | OUTPATIENT
Start: 2025-08-24 | End: 2025-08-25

## 2025-08-24 RX ORDER — ONDANSETRON HCL/PF 4 MG/2 ML
4 VIAL (ML) INJECTION EVERY 8 HOURS
Refills: 0 | Status: DISCONTINUED | OUTPATIENT
Start: 2025-08-24 | End: 2025-08-25

## 2025-08-24 RX ORDER — MAGNESIUM, ALUMINUM HYDROXIDE 200-200 MG
30 TABLET,CHEWABLE ORAL EVERY 4 HOURS
Refills: 0 | Status: DISCONTINUED | OUTPATIENT
Start: 2025-08-24 | End: 2025-08-25

## 2025-08-24 RX ORDER — ATORVASTATIN CALCIUM 80 MG/1
40 TABLET, FILM COATED ORAL AT BEDTIME
Refills: 0 | Status: DISCONTINUED | OUTPATIENT
Start: 2025-08-24 | End: 2025-08-25

## 2025-08-24 RX ORDER — POLYETHYLENE GLYCOL 3350 17 G/17G
17 POWDER, FOR SOLUTION ORAL
Refills: 0 | Status: DISCONTINUED | OUTPATIENT
Start: 2025-08-24 | End: 2025-08-25

## 2025-08-24 RX ORDER — SENNA 187 MG
2 TABLET ORAL AT BEDTIME
Refills: 0 | Status: DISCONTINUED | OUTPATIENT
Start: 2025-08-24 | End: 2025-08-25

## 2025-08-24 RX ORDER — HYDROMORPHONE/SOD CHLOR,ISO/PF 2 MG/10 ML
0.5 SYRINGE (ML) INJECTION ONCE
Refills: 0 | Status: DISCONTINUED | OUTPATIENT
Start: 2025-08-24 | End: 2025-08-24

## 2025-08-24 RX ORDER — ENOXAPARIN SODIUM 100 MG/ML
40 INJECTION SUBCUTANEOUS EVERY 24 HOURS
Refills: 0 | Status: DISCONTINUED | OUTPATIENT
Start: 2025-08-24 | End: 2025-08-25

## 2025-08-24 RX ORDER — MELATONIN 5 MG
3 TABLET ORAL AT BEDTIME
Refills: 0 | Status: DISCONTINUED | OUTPATIENT
Start: 2025-08-24 | End: 2025-08-25

## 2025-08-24 RX ORDER — ASPIRIN 325 MG
81 TABLET ORAL DAILY
Refills: 0 | Status: DISCONTINUED | OUTPATIENT
Start: 2025-08-24 | End: 2025-08-25

## 2025-08-24 RX ORDER — AMLODIPINE BESYLATE 10 MG/1
5 TABLET ORAL DAILY
Refills: 0 | Status: DISCONTINUED | OUTPATIENT
Start: 2025-08-24 | End: 2025-08-25

## 2025-08-24 RX ADMIN — Medication 0.5 MILLIGRAM(S): at 10:44

## 2025-08-24 RX ADMIN — SODIUM CHLORIDE 150 MILLILITER(S): 9 INJECTION, SOLUTION INTRAVENOUS at 12:13

## 2025-08-24 RX ADMIN — Medication 1000 MILLIGRAM(S): at 02:27

## 2025-08-24 RX ADMIN — Medication 4 MILLIGRAM(S): at 05:42

## 2025-08-24 RX ADMIN — Medication 2 MILLIGRAM(S): at 23:56

## 2025-08-24 RX ADMIN — ENOXAPARIN SODIUM 40 MILLIGRAM(S): 100 INJECTION SUBCUTANEOUS at 05:42

## 2025-08-24 RX ADMIN — Medication 1000 MILLILITER(S): at 00:52

## 2025-08-24 RX ADMIN — SODIUM CHLORIDE 150 MILLILITER(S): 9 INJECTION, SOLUTION INTRAVENOUS at 21:30

## 2025-08-24 RX ADMIN — Medication 4 MILLIGRAM(S): at 01:00

## 2025-08-24 RX ADMIN — Medication 2 MILLIGRAM(S): at 14:45

## 2025-08-24 RX ADMIN — ATORVASTATIN CALCIUM 40 MILLIGRAM(S): 80 TABLET, FILM COATED ORAL at 21:30

## 2025-08-24 RX ADMIN — AMLODIPINE BESYLATE 5 MILLIGRAM(S): 10 TABLET ORAL at 05:40

## 2025-08-24 RX ADMIN — SODIUM CHLORIDE 150 MILLILITER(S): 9 INJECTION, SOLUTION INTRAVENOUS at 05:43

## 2025-08-24 RX ADMIN — Medication 2 MILLIGRAM(S): at 23:26

## 2025-08-25 ENCOUNTER — TRANSCRIPTION ENCOUNTER (OUTPATIENT)
Age: 70
End: 2025-08-25

## 2025-08-25 ENCOUNTER — APPOINTMENT (OUTPATIENT)
Dept: PODIATRY | Facility: CLINIC | Age: 70
End: 2025-08-25

## 2025-08-25 VITALS
SYSTOLIC BLOOD PRESSURE: 128 MMHG | RESPIRATION RATE: 18 BRPM | HEART RATE: 75 BPM | OXYGEN SATURATION: 97 % | DIASTOLIC BLOOD PRESSURE: 88 MMHG | TEMPERATURE: 98 F

## 2025-08-25 PROCEDURE — 76705 ECHO EXAM OF ABDOMEN: CPT | Mod: 26

## 2025-08-25 PROCEDURE — 99239 HOSP IP/OBS DSCHRG MGMT >30: CPT

## 2025-08-25 PROCEDURE — 99223 1ST HOSP IP/OBS HIGH 75: CPT

## 2025-08-25 RX ORDER — GABAPENTIN 400 MG/1
100 CAPSULE ORAL ONCE
Refills: 0 | Status: COMPLETED | OUTPATIENT
Start: 2025-08-25 | End: 2025-08-25

## 2025-08-25 RX ORDER — IBUPROFEN 200 MG
600 TABLET ORAL EVERY 6 HOURS
Refills: 0 | Status: DISCONTINUED | OUTPATIENT
Start: 2025-08-25 | End: 2025-08-25

## 2025-08-25 RX ORDER — METFORMIN HYDROCHLORIDE 850 MG/1
1 TABLET ORAL
Qty: 60 | Refills: 0
Start: 2025-08-25 | End: 2025-09-23

## 2025-08-25 RX ADMIN — Medication 1 APPLICATION(S): at 11:14

## 2025-08-25 RX ADMIN — ENOXAPARIN SODIUM 40 MILLIGRAM(S): 100 INJECTION SUBCUTANEOUS at 05:52

## 2025-08-25 RX ADMIN — Medication 1000 MILLIGRAM(S): at 08:58

## 2025-08-25 RX ADMIN — Medication 2 MILLIGRAM(S): at 05:53

## 2025-08-25 RX ADMIN — Medication 2 MILLIGRAM(S): at 06:23

## 2025-08-25 RX ADMIN — AMLODIPINE BESYLATE 5 MILLIGRAM(S): 10 TABLET ORAL at 05:53

## 2025-08-25 RX ADMIN — Medication 1000 MILLIGRAM(S): at 10:00

## 2025-08-25 RX ADMIN — GABAPENTIN 100 MILLIGRAM(S): 400 CAPSULE ORAL at 11:16

## 2025-08-25 RX ADMIN — Medication 81 MILLIGRAM(S): at 11:14

## 2025-08-26 LAB
HAV IGM SER-ACNC: SIGNIFICANT CHANGE UP
HBV CORE IGM SER-ACNC: SIGNIFICANT CHANGE UP
HBV SURFACE AG SER-ACNC: SIGNIFICANT CHANGE UP
HCV AB S/CO SERPL IA: 0.08 S/CO — SIGNIFICANT CHANGE UP (ref 0–0.79)
HCV AB SERPL-IMP: SIGNIFICANT CHANGE UP

## 2025-08-28 DIAGNOSIS — H61.20 IMPACTED CERUMEN, UNSPECIFIED EAR: ICD-10-CM

## 2025-08-28 DIAGNOSIS — C61 MALIGNANT NEOPLASM OF PROSTATE: ICD-10-CM

## 2025-08-28 DIAGNOSIS — E11.9 TYPE 2 DIABETES MELLITUS WITHOUT COMPLICATIONS: ICD-10-CM

## 2025-08-28 DIAGNOSIS — K85.90 ACUTE PANCREATITIS WITHOUT NECROSIS OR INFECTION, UNSPECIFIED: ICD-10-CM

## 2025-08-28 DIAGNOSIS — I49.8 OTHER SPECIFIED CARDIAC ARRHYTHMIAS: ICD-10-CM

## 2025-08-28 DIAGNOSIS — Z92.89 PERSONAL HISTORY OF OTHER MEDICAL TREATMENT: ICD-10-CM

## 2025-08-28 DIAGNOSIS — I10 ESSENTIAL (PRIMARY) HYPERTENSION: ICD-10-CM

## 2025-08-28 DIAGNOSIS — I73.9 PERIPHERAL VASCULAR DISEASE, UNSPECIFIED: ICD-10-CM

## 2025-08-28 DIAGNOSIS — D36.9 BENIGN NEOPLASM, UNSPECIFIED SITE: ICD-10-CM

## 2025-08-28 DIAGNOSIS — E78.5 HYPERLIPIDEMIA, UNSPECIFIED: ICD-10-CM

## 2025-08-28 DIAGNOSIS — E66.9 OBESITY, UNSPECIFIED: ICD-10-CM

## 2025-08-28 DIAGNOSIS — R39.12 POOR URINARY STREAM: ICD-10-CM

## 2025-08-30 DIAGNOSIS — Z96.642 PRESENCE OF LEFT ARTIFICIAL HIP JOINT: ICD-10-CM

## 2025-08-30 DIAGNOSIS — Z85.46 PERSONAL HISTORY OF MALIGNANT NEOPLASM OF PROSTATE: ICD-10-CM

## 2025-08-30 DIAGNOSIS — Z79.82 LONG TERM (CURRENT) USE OF ASPIRIN: ICD-10-CM

## 2025-08-30 DIAGNOSIS — Z79.85 LONG-TERM (CURRENT) USE OF INJECTABLE NON-INSULIN ANTIDIABETIC DRUGS: ICD-10-CM

## 2025-08-30 DIAGNOSIS — E11.9 TYPE 2 DIABETES MELLITUS WITHOUT COMPLICATIONS: ICD-10-CM

## 2025-08-30 DIAGNOSIS — Z90.49 ACQUIRED ABSENCE OF OTHER SPECIFIED PARTS OF DIGESTIVE TRACT: ICD-10-CM

## 2025-08-30 DIAGNOSIS — K85.80 OTHER ACUTE PANCREATITIS WITHOUT NECROSIS OR INFECTION: ICD-10-CM

## 2025-08-30 DIAGNOSIS — I10 ESSENTIAL (PRIMARY) HYPERTENSION: ICD-10-CM

## 2025-08-30 DIAGNOSIS — Z79.51 LONG TERM (CURRENT) USE OF INHALED STEROIDS: ICD-10-CM
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
## 2025-08-30 DIAGNOSIS — K85.30 DRUG INDUCED ACUTE PANCREATITIS WITHOUT NECROSIS OR INFECTION: ICD-10-CM

## 2025-08-30 DIAGNOSIS — J45.909 UNSPECIFIED ASTHMA, UNCOMPLICATED: ICD-10-CM

## 2025-09-04 ENCOUNTER — NON-APPOINTMENT (OUTPATIENT)
Age: 70
End: 2025-09-04

## 2025-09-05 ENCOUNTER — TRANSCRIPTION ENCOUNTER (OUTPATIENT)
Age: 70
End: 2025-09-05

## 2025-09-05 ENCOUNTER — RESULT REVIEW (OUTPATIENT)
Age: 70
End: 2025-09-05

## 2025-09-09 ENCOUNTER — TRANSCRIPTION ENCOUNTER (OUTPATIENT)
Age: 70
End: 2025-09-09

## 2025-09-15 ENCOUNTER — APPOINTMENT (OUTPATIENT)
Dept: ORTHOPEDIC SURGERY | Facility: CLINIC | Age: 70
End: 2025-09-15

## 2025-09-15 ENCOUNTER — APPOINTMENT (OUTPATIENT)
Dept: OTOLARYNGOLOGY | Facility: CLINIC | Age: 70
End: 2025-09-15

## 2025-09-25 PROBLEM — K85.90 RECURRENT ACUTE PANCREATITIS: Status: ACTIVE | Noted: 2025-09-25

## 2025-09-25 PROBLEM — Z92.89 HISTORY OF RECENT HOSPITALIZATION: Status: RESOLVED | Noted: 2024-09-30 | Resolved: 2025-09-25

## 2025-09-25 PROBLEM — Z92.89 HISTORY OF RECENT HOSPITALIZATION: Status: ACTIVE | Noted: 2025-09-25
